# Patient Record
Sex: FEMALE | Race: WHITE | Employment: OTHER | URBAN - NONMETROPOLITAN AREA
[De-identification: names, ages, dates, MRNs, and addresses within clinical notes are randomized per-mention and may not be internally consistent; named-entity substitution may affect disease eponyms.]

---

## 2017-07-31 LAB
BUN BLDV-MCNC: NORMAL MG/DL
CALCIUM SERPL-MCNC: NORMAL MG/DL
CHLORIDE BLD-SCNC: NORMAL MMOL/L
CO2: NORMAL MMOL/L
CREAT SERPL-MCNC: NORMAL MG/DL
GFR CALCULATED: NORMAL
GLUCOSE BLD-MCNC: NORMAL MG/DL
POTASSIUM SERPL-SCNC: NORMAL MMOL/L
SODIUM BLD-SCNC: NORMAL MMOL/L

## 2017-08-21 ENCOUNTER — HOSPITAL ENCOUNTER (OUTPATIENT)
Age: 82
Discharge: HOME | End: 2017-08-21
Payer: MEDICARE

## 2017-08-21 ENCOUNTER — OFFICE VISIT (OUTPATIENT)
Dept: FAMILY MEDICINE CLINIC | Age: 82
End: 2017-08-21
Payer: MEDICARE

## 2017-08-21 VITALS
HEIGHT: 60 IN | BODY MASS INDEX: 26.7 KG/M2 | TEMPERATURE: 98.1 F | WEIGHT: 136 LBS | SYSTOLIC BLOOD PRESSURE: 120 MMHG | RESPIRATION RATE: 16 BRPM | DIASTOLIC BLOOD PRESSURE: 74 MMHG | HEART RATE: 72 BPM

## 2017-08-21 DIAGNOSIS — Z01.818 PREOP EXAMINATION: ICD-10-CM

## 2017-08-21 DIAGNOSIS — M17.12 PRIMARY OSTEOARTHRITIS OF LEFT KNEE: Primary | ICD-10-CM

## 2017-08-21 LAB
EKG ATRIAL RATE: 81 BPM
EKG P AXIS: 55 DEGREES
EKG P-R INTERVAL: 134 MS
EKG Q-T INTERVAL: 398 MS
EKG QRS DURATION: 84 MS
EKG QTC CALCULATION (BAZETT): 462 MS
EKG R AXIS: -28 DEGREES
EKG T AXIS: -15 DEGREES
EKG VENTRICULAR RATE: 81 BPM

## 2017-08-21 PROCEDURE — 1036F TOBACCO NON-USER: CPT | Performed by: FAMILY MEDICINE

## 2017-08-21 PROCEDURE — 93005 ELECTROCARDIOGRAM TRACING: CPT

## 2017-08-21 PROCEDURE — 4040F PNEUMOC VAC/ADMIN/RCVD: CPT | Performed by: FAMILY MEDICINE

## 2017-08-21 PROCEDURE — 1123F ACP DISCUSS/DSCN MKR DOCD: CPT | Performed by: FAMILY MEDICINE

## 2017-08-21 PROCEDURE — G8399 PT W/DXA RESULTS DOCUMENT: HCPCS | Performed by: FAMILY MEDICINE

## 2017-08-21 PROCEDURE — 99213 OFFICE O/P EST LOW 20 MIN: CPT | Performed by: FAMILY MEDICINE

## 2017-08-21 PROCEDURE — 1090F PRES/ABSN URINE INCON ASSESS: CPT | Performed by: FAMILY MEDICINE

## 2017-08-21 PROCEDURE — G8427 DOCREV CUR MEDS BY ELIG CLIN: HCPCS | Performed by: FAMILY MEDICINE

## 2017-08-21 PROCEDURE — G8419 CALC BMI OUT NRM PARAM NOF/U: HCPCS | Performed by: FAMILY MEDICINE

## 2017-08-21 RX ORDER — ACETAMINOPHEN,DIPHENHYDRAMINE HCL 500; 25 MG/1; MG/1
1 TABLET, FILM COATED ORAL NIGHTLY PRN
Status: ON HOLD | COMMUNITY
End: 2017-10-12 | Stop reason: HOSPADM

## 2017-08-21 RX ORDER — IBUPROFEN 200 MG
200 TABLET ORAL 2 TIMES DAILY PRN
COMMUNITY
End: 2017-08-29

## 2017-08-21 ASSESSMENT — ENCOUNTER SYMPTOMS
COUGH: 0
RHINORRHEA: 0
DIARRHEA: 0
SORE THROAT: 0
EYES NEGATIVE: 1
BACK PAIN: 0
NAUSEA: 0
SHORTNESS OF BREATH: 0
ABDOMINAL PAIN: 0
VOMITING: 0
CHEST TIGHTNESS: 0

## 2017-08-23 ENCOUNTER — TELEPHONE (OUTPATIENT)
Dept: FAMILY MEDICINE CLINIC | Age: 82
End: 2017-08-23

## 2017-08-23 ENCOUNTER — TELEPHONE (OUTPATIENT)
Dept: CARDIOLOGY CLINIC | Age: 82
End: 2017-08-23

## 2017-08-29 ENCOUNTER — OFFICE VISIT (OUTPATIENT)
Dept: CARDIOLOGY CLINIC | Age: 82
End: 2017-08-29
Payer: MEDICARE

## 2017-08-29 VITALS — DIASTOLIC BLOOD PRESSURE: 77 MMHG | HEART RATE: 92 BPM | SYSTOLIC BLOOD PRESSURE: 131 MMHG

## 2017-08-29 DIAGNOSIS — I35.1 NONRHEUMATIC AORTIC VALVE INSUFFICIENCY: ICD-10-CM

## 2017-08-29 DIAGNOSIS — I49.3 PVC'S (PREMATURE VENTRICULAR CONTRACTIONS): ICD-10-CM

## 2017-08-29 DIAGNOSIS — Z01.818 PRE-OP EVALUATION: ICD-10-CM

## 2017-08-29 PROCEDURE — 1036F TOBACCO NON-USER: CPT | Performed by: INTERNAL MEDICINE

## 2017-08-29 PROCEDURE — 1090F PRES/ABSN URINE INCON ASSESS: CPT | Performed by: INTERNAL MEDICINE

## 2017-08-29 PROCEDURE — G8399 PT W/DXA RESULTS DOCUMENT: HCPCS | Performed by: INTERNAL MEDICINE

## 2017-08-29 PROCEDURE — 1123F ACP DISCUSS/DSCN MKR DOCD: CPT | Performed by: INTERNAL MEDICINE

## 2017-08-29 PROCEDURE — G8427 DOCREV CUR MEDS BY ELIG CLIN: HCPCS | Performed by: INTERNAL MEDICINE

## 2017-08-29 PROCEDURE — 99204 OFFICE O/P NEW MOD 45 MIN: CPT | Performed by: INTERNAL MEDICINE

## 2017-08-29 PROCEDURE — G8419 CALC BMI OUT NRM PARAM NOF/U: HCPCS | Performed by: INTERNAL MEDICINE

## 2017-08-29 PROCEDURE — 4040F PNEUMOC VAC/ADMIN/RCVD: CPT | Performed by: INTERNAL MEDICINE

## 2017-08-29 RX ORDER — IBUPROFEN 200 MG
200 TABLET ORAL EVERY 6 HOURS PRN
Status: ON HOLD | COMMUNITY
End: 2017-10-10 | Stop reason: HOSPADM

## 2017-08-29 ASSESSMENT — ENCOUNTER SYMPTOMS
GASTROINTESTINAL NEGATIVE: 1
RESPIRATORY NEGATIVE: 1
EYES NEGATIVE: 1

## 2017-08-30 LAB
ANION GAP SERPL CALCULATED.3IONS-SCNC: 14 MMOL/L
BUN BLDV-MCNC: 24 MG/DL (ref 9–24)
CALCIUM SERPL-MCNC: 9.2 MG/DL (ref 8.7–10.8)
CHLORIDE BLD-SCNC: 107 MMOL/L (ref 95–111)
CO2: 25 MMOL/L (ref 21–32)
CREAT SERPL-MCNC: 0.8 MG/DL (ref 0.5–1.3)
EGFR AFRICAN AMERICAN: 83
EGFR IF NONAFRICAN AMERICAN: 69
GLUCOSE: 133 MG/DL (ref 70–100)
MAGNESIUM: 2.5 MG/DL (ref 1.7–2.4)
POTASSIUM SERPL-SCNC: 4 MMOL/L (ref 3.5–5.4)
SODIUM BLD-SCNC: 142 MMOL/L (ref 134–147)

## 2017-10-05 ENCOUNTER — APPOINTMENT (OUTPATIENT)
Dept: CT IMAGING | Age: 82
DRG: 286 | End: 2017-10-05
Payer: MEDICARE

## 2017-10-05 ENCOUNTER — HOSPITAL ENCOUNTER (INPATIENT)
Age: 82
LOS: 7 days | Discharge: HOME | DRG: 286 | End: 2017-10-12
Attending: EMERGENCY MEDICINE | Admitting: HOSPITALIST
Payer: MEDICARE

## 2017-10-05 DIAGNOSIS — I48.91 ATRIAL FIBRILLATION WITH RVR (HCC): Primary | ICD-10-CM

## 2017-10-05 DIAGNOSIS — I50.21 ACUTE SYSTOLIC CHF (CONGESTIVE HEART FAILURE) (HCC): ICD-10-CM

## 2017-10-05 PROBLEM — R07.9 CHEST PAIN: Status: ACTIVE | Noted: 2017-10-05

## 2017-10-05 PROBLEM — R06.02 SOB (SHORTNESS OF BREATH): Status: ACTIVE | Noted: 2017-10-05

## 2017-10-05 LAB
ANION GAP SERPL CALCULATED.3IONS-SCNC: 19 MEQ/L (ref 8–16)
ANISOCYTOSIS: ABNORMAL
APTT: 23.9 SECONDS (ref 22–38)
BACTERIA: ABNORMAL /HPF
BASOPHILS # BLD: 0.6 %
BASOPHILS ABSOLUTE: 0.1 THOU/MM3 (ref 0–0.1)
BILIRUBIN URINE: NEGATIVE
BLOOD, URINE: NEGATIVE
BUN BLDV-MCNC: 22 MG/DL (ref 7–22)
CALCIUM SERPL-MCNC: 9.1 MG/DL (ref 8.5–10.5)
CASTS 2: ABNORMAL /LPF
CASTS UA: ABNORMAL /LPF
CHARACTER, URINE: CLEAR
CHLORIDE BLD-SCNC: 106 MEQ/L (ref 98–111)
CO2: 17 MEQ/L (ref 23–33)
COLOR: YELLOW
CREAT SERPL-MCNC: 0.8 MG/DL (ref 0.4–1.2)
CRYSTALS, UA: ABNORMAL
EKG ATRIAL RATE: 105 BPM
EKG Q-T INTERVAL: 290 MS
EKG QRS DURATION: 82 MS
EKG QTC CALCULATION (BAZETT): 477 MS
EKG R AXIS: -46 DEGREES
EKG T AXIS: -134 DEGREES
EKG VENTRICULAR RATE: 163 BPM
EOSINOPHIL # BLD: 0.4 %
EOSINOPHILS ABSOLUTE: 0 THOU/MM3 (ref 0–0.4)
EPITHELIAL CELLS, UA: ABNORMAL /HPF
GFR SERPL CREATININE-BSD FRML MDRD: 68 ML/MIN/1.73M2
GLUCOSE BLD-MCNC: 222 MG/DL (ref 70–108)
GLUCOSE URINE: NEGATIVE MG/DL
HCT VFR BLD CALC: 36.5 % (ref 37–47)
HEMOGLOBIN: 11.8 GM/DL (ref 12–16)
INR BLD: 1.15 (ref 0.85–1.13)
KETONES, URINE: NEGATIVE
LACTIC ACID: 3 MMOL/L (ref 0.5–2.2)
LACTIC ACID: 3.8 MMOL/L (ref 0.5–2.2)
LEUKOCYTE ESTERASE, URINE: ABNORMAL
LV EF: 30 %
LVEF MODALITY: NORMAL
LYMPHOCYTES # BLD: 13.3 %
LYMPHOCYTES ABSOLUTE: 1.2 THOU/MM3 (ref 1–4.8)
MAGNESIUM: 2.5 MG/DL (ref 1.6–2.4)
MCH RBC QN AUTO: 29 PG (ref 27–31)
MCHC RBC AUTO-ENTMCNC: 32.3 GM/DL (ref 33–37)
MCV RBC AUTO: 89.7 FL (ref 81–99)
MISCELLANEOUS 2: ABNORMAL
MONOCYTES # BLD: 6.9 %
MONOCYTES ABSOLUTE: 0.6 THOU/MM3 (ref 0.4–1.3)
NITRITE, URINE: NEGATIVE
NUCLEATED RED BLOOD CELLS: 0 /100 WBC
OSMOLALITY CALCULATION: 293.3 MOSMOL/KG (ref 275–300)
PDW BLD-RTO: 14.9 % (ref 11.5–14.5)
PH UA: 5.5
PLATELET # BLD: 351 THOU/MM3 (ref 130–400)
PMV BLD AUTO: 9.5 MCM (ref 7.4–10.4)
POTASSIUM SERPL-SCNC: 4.2 MEQ/L (ref 3.5–5.2)
PRO-BNP: 6159 PG/ML (ref 0–1800)
PROCALCITONIN: 0.05 NG/ML (ref 0.01–0.09)
PROTEIN UA: ABNORMAL
RBC # BLD: 4.06 MILL/MM3 (ref 4.2–5.4)
RBC # BLD: NORMAL 10*6/UL
RBC URINE: ABNORMAL /HPF
RENAL EPITHELIAL, UA: ABNORMAL
SEG NEUTROPHILS: 78.8 %
SEGMENTED NEUTROPHILS ABSOLUTE COUNT: 7.2 THOU/MM3 (ref 1.8–7.7)
SODIUM BLD-SCNC: 142 MEQ/L (ref 135–145)
SPECIFIC GRAVITY, URINE: > 1.03 (ref 1–1.03)
TROPONIN T: < 0.01 NG/ML
UROBILINOGEN, URINE: 0.2 EU/DL
WBC # BLD: 9.2 THOU/MM3 (ref 4.8–10.8)
WBC UA: ABNORMAL /HPF
YEAST: ABNORMAL

## 2017-10-05 PROCEDURE — 83735 ASSAY OF MAGNESIUM: CPT

## 2017-10-05 PROCEDURE — 36415 COLL VENOUS BLD VENIPUNCTURE: CPT

## 2017-10-05 PROCEDURE — B3201ZZ COMPUTERIZED TOMOGRAPHY (CT SCAN) OF THORACIC AORTA USING LOW OSMOLAR CONTRAST: ICD-10-PCS | Performed by: RADIOLOGY

## 2017-10-05 PROCEDURE — 6360000002 HC RX W HCPCS: Performed by: NUCLEAR MEDICINE

## 2017-10-05 PROCEDURE — 87086 URINE CULTURE/COLONY COUNT: CPT

## 2017-10-05 PROCEDURE — 2500000003 HC RX 250 WO HCPCS: Performed by: PHYSICIAN ASSISTANT

## 2017-10-05 PROCEDURE — 84484 ASSAY OF TROPONIN QUANT: CPT

## 2017-10-05 PROCEDURE — B32S1ZZ COMPUTERIZED TOMOGRAPHY (CT SCAN) OF RIGHT PULMONARY ARTERY USING LOW OSMOLAR CONTRAST: ICD-10-PCS | Performed by: RADIOLOGY

## 2017-10-05 PROCEDURE — 87040 BLOOD CULTURE FOR BACTERIA: CPT

## 2017-10-05 PROCEDURE — 71275 CT ANGIOGRAPHY CHEST: CPT

## 2017-10-05 PROCEDURE — 81001 URINALYSIS AUTO W/SCOPE: CPT

## 2017-10-05 PROCEDURE — 96374 THER/PROPH/DIAG INJ IV PUSH: CPT

## 2017-10-05 PROCEDURE — 6360000002 HC RX W HCPCS

## 2017-10-05 PROCEDURE — 84145 PROCALCITONIN (PCT): CPT

## 2017-10-05 PROCEDURE — 83880 ASSAY OF NATRIURETIC PEPTIDE: CPT

## 2017-10-05 PROCEDURE — 80048 BASIC METABOLIC PNL TOTAL CA: CPT

## 2017-10-05 PROCEDURE — 93306 TTE W/DOPPLER COMPLETE: CPT

## 2017-10-05 PROCEDURE — 85610 PROTHROMBIN TIME: CPT

## 2017-10-05 PROCEDURE — 85730 THROMBOPLASTIN TIME PARTIAL: CPT

## 2017-10-05 PROCEDURE — 6370000000 HC RX 637 (ALT 250 FOR IP): Performed by: NUCLEAR MEDICINE

## 2017-10-05 PROCEDURE — B32T1ZZ COMPUTERIZED TOMOGRAPHY (CT SCAN) OF LEFT PULMONARY ARTERY USING LOW OSMOLAR CONTRAST: ICD-10-PCS | Performed by: RADIOLOGY

## 2017-10-05 PROCEDURE — 1200000003 HC TELEMETRY R&B

## 2017-10-05 PROCEDURE — 99222 1ST HOSP IP/OBS MODERATE 55: CPT | Performed by: HOSPITALIST

## 2017-10-05 PROCEDURE — 85025 COMPLETE CBC W/AUTO DIFF WBC: CPT

## 2017-10-05 PROCEDURE — 2580000003 HC RX 258: Performed by: PHYSICIAN ASSISTANT

## 2017-10-05 PROCEDURE — 99223 1ST HOSP IP/OBS HIGH 75: CPT | Performed by: NUCLEAR MEDICINE

## 2017-10-05 PROCEDURE — 99285 EMERGENCY DEPT VISIT HI MDM: CPT

## 2017-10-05 PROCEDURE — 2580000003 HC RX 258: Performed by: HOSPITALIST

## 2017-10-05 PROCEDURE — 6360000002 HC RX W HCPCS: Performed by: HOSPITALIST

## 2017-10-05 PROCEDURE — 83605 ASSAY OF LACTIC ACID: CPT

## 2017-10-05 PROCEDURE — 93005 ELECTROCARDIOGRAM TRACING: CPT | Performed by: PHYSICIAN ASSISTANT

## 2017-10-05 PROCEDURE — 6360000004 HC RX CONTRAST MEDICATION: Performed by: EMERGENCY MEDICINE

## 2017-10-05 RX ORDER — TRAMADOL HYDROCHLORIDE 50 MG/1
50 TABLET ORAL EVERY 6 HOURS PRN
Status: ON HOLD | COMMUNITY
End: 2017-10-12 | Stop reason: HOSPADM

## 2017-10-05 RX ORDER — DILTIAZEM HYDROCHLORIDE 5 MG/ML
15 INJECTION INTRAVENOUS ONCE
Status: COMPLETED | OUTPATIENT
Start: 2017-10-05 | End: 2017-10-05

## 2017-10-05 RX ORDER — POTASSIUM CHLORIDE 20 MEQ/1
20 TABLET, EXTENDED RELEASE ORAL ONCE
Status: COMPLETED | OUTPATIENT
Start: 2017-10-05 | End: 2017-10-05

## 2017-10-05 RX ORDER — HYDROCODONE BITARTRATE AND ACETAMINOPHEN 5; 325 MG/1; MG/1
1 TABLET ORAL EVERY 6 HOURS PRN
Status: ON HOLD | COMMUNITY
End: 2017-10-12 | Stop reason: HOSPADM

## 2017-10-05 RX ORDER — ONDANSETRON 2 MG/ML
4 INJECTION INTRAMUSCULAR; INTRAVENOUS EVERY 6 HOURS PRN
Status: DISCONTINUED | OUTPATIENT
Start: 2017-10-05 | End: 2017-10-07 | Stop reason: SDUPTHER

## 2017-10-05 RX ORDER — ONDANSETRON 2 MG/ML
INJECTION INTRAMUSCULAR; INTRAVENOUS
Status: COMPLETED
Start: 2017-10-05 | End: 2017-10-05

## 2017-10-05 RX ORDER — SODIUM CHLORIDE 0.9 % (FLUSH) 0.9 %
10 SYRINGE (ML) INJECTION EVERY 12 HOURS SCHEDULED
Status: DISCONTINUED | OUTPATIENT
Start: 2017-10-05 | End: 2017-10-07 | Stop reason: SDUPTHER

## 2017-10-05 RX ORDER — ACETAMINOPHEN 325 MG/1
650 TABLET ORAL EVERY 4 HOURS PRN
Status: DISCONTINUED | OUTPATIENT
Start: 2017-10-05 | End: 2017-10-07 | Stop reason: SDUPTHER

## 2017-10-05 RX ORDER — SODIUM CHLORIDE 0.9 % (FLUSH) 0.9 %
10 SYRINGE (ML) INJECTION PRN
Status: DISCONTINUED | OUTPATIENT
Start: 2017-10-05 | End: 2017-10-07 | Stop reason: SDUPTHER

## 2017-10-05 RX ORDER — SODIUM CHLORIDE 9 MG/ML
INJECTION, SOLUTION INTRAVENOUS CONTINUOUS
Status: DISCONTINUED | OUTPATIENT
Start: 2017-10-05 | End: 2017-10-07 | Stop reason: SDUPTHER

## 2017-10-05 RX ORDER — ASPIRIN 81 MG/1
81 TABLET, CHEWABLE ORAL DAILY
Status: DISCONTINUED | OUTPATIENT
Start: 2017-10-05 | End: 2017-10-12 | Stop reason: HOSPADM

## 2017-10-05 RX ORDER — FUROSEMIDE 40 MG/1
40 TABLET ORAL DAILY
Status: DISCONTINUED | OUTPATIENT
Start: 2017-10-06 | End: 2017-10-10

## 2017-10-05 RX ORDER — FUROSEMIDE 10 MG/ML
40 INJECTION INTRAMUSCULAR; INTRAVENOUS ONCE
Status: COMPLETED | OUTPATIENT
Start: 2017-10-05 | End: 2017-10-05

## 2017-10-05 RX ADMIN — FUROSEMIDE 40 MG: 10 INJECTION, SOLUTION INTRAMUSCULAR; INTRAVENOUS at 15:14

## 2017-10-05 RX ADMIN — SODIUM CHLORIDE: 9 INJECTION, SOLUTION INTRAVENOUS at 08:09

## 2017-10-05 RX ADMIN — ENOXAPARIN SODIUM 60 MG: 60 INJECTION SUBCUTANEOUS at 20:00

## 2017-10-05 RX ADMIN — ONDANSETRON 4 MG: 2 INJECTION INTRAMUSCULAR; INTRAVENOUS at 08:09

## 2017-10-05 RX ADMIN — Medication 10 ML: at 20:01

## 2017-10-05 RX ADMIN — CEFTRIAXONE 1 G: 1 INJECTION, POWDER, FOR SOLUTION INTRAMUSCULAR; INTRAVENOUS at 20:00

## 2017-10-05 RX ADMIN — ENOXAPARIN SODIUM 40 MG: 40 INJECTION SUBCUTANEOUS at 12:21

## 2017-10-05 RX ADMIN — DILTIAZEM HYDROCHLORIDE 5 MG/HR: 5 INJECTION INTRAVENOUS at 09:31

## 2017-10-05 RX ADMIN — DILTIAZEM HYDROCHLORIDE 15 MG: 5 INJECTION INTRAVENOUS at 08:41

## 2017-10-05 RX ADMIN — METOPROLOL TARTRATE 25 MG: 25 TABLET ORAL at 20:00

## 2017-10-05 RX ADMIN — DILTIAZEM HYDROCHLORIDE 15 MG/HR: 5 INJECTION INTRAVENOUS at 17:21

## 2017-10-05 RX ADMIN — SODIUM CHLORIDE: 9 INJECTION, SOLUTION INTRAVENOUS at 11:04

## 2017-10-05 RX ADMIN — IOPAMIDOL 80 ML: 755 INJECTION, SOLUTION INTRAVENOUS at 08:56

## 2017-10-05 RX ADMIN — POTASSIUM CHLORIDE 20 MEQ: 1500 TABLET, EXTENDED RELEASE ORAL at 16:18

## 2017-10-05 ASSESSMENT — ENCOUNTER SYMPTOMS
VOMITING: 0
SORE THROAT: 0
COUGH: 1
EYE PAIN: 0
EYE DISCHARGE: 0
WHEEZING: 1
RHINORRHEA: 0
CONSTIPATION: 1
SHORTNESS OF BREATH: 1
NAUSEA: 1
BACK PAIN: 0
DIARRHEA: 0
ABDOMINAL PAIN: 0

## 2017-10-05 ASSESSMENT — PAIN DESCRIPTION - LOCATION: LOCATION: RIB CAGE

## 2017-10-05 ASSESSMENT — PAIN SCALES - GENERAL: PAINLEVEL_OUTOF10: 2

## 2017-10-05 ASSESSMENT — PAIN DESCRIPTION - PAIN TYPE: TYPE: ACUTE PAIN

## 2017-10-05 NOTE — ED PROVIDER NOTES
[facility]  eMERGENCY dEPARTMENT eNCOUnter          [address] [facility]       Chief Complaint   Patient presents with    Shortness of Breath    Nausea       Nurses Notes reviewed and I agree except as noted in the HPI. HISTORY OF PRESENT ILLNESS    Aquiles Funez is a 80 y.o. female who presents to the Emergency Department for the evaluation of shortness of breath. The patient's symptoms started several days ago, worse today and accompanied with a stabbing chest pain under the left breast that lasted a few seconds. She has had cough, wheezing, and orthopnea since the onset of the shortness of breath. Additionally, the patient has had nausea and decreased appetite since a left TKR that she had about a month ago that was performed by Dr. Thanh Rangel, for which she has been on Ultram and Norco. The patient states that she did stop taking the pain medications a few days ago because of constipation and the shortness of breath. She is on 81 mg aspirin daily, does not take any anticoagulant medications. No prior history of DVT/PE. The patient denies recent illness. She denies a history of atrial fibrillation. The patient follows up with cardiologist, Dr. Rosa Christie. Location/Symptom: Shortness of breath  Timing/Onset: Several days ago  Context/Setting: Symptoms started several days ago per patient. Accompanied by an episode of stabbing chest pain this morning that lasted a few seconds. Recent TKR on the left lower extremity. No history of PE/DVT. Discontinued pain medications. No anticoagulants. Quality: Stabbing, chest pain  Duration: Intermittent, episode lasted a few seconds  Severity: Moderate    The HPI was provided by the patient. REVIEW OF SYSTEMS     Review of Systems   Constitutional: Positive for appetite change. Negative for chills, fatigue and fever. HENT: Negative for congestion, ear pain, rhinorrhea and sore throat. Eyes: Negative for pain, discharge and visual disturbance. Respiratory: Positive for cough, shortness of breath and wheezing. Orthopnea   Cardiovascular: Positive for chest pain. Negative for palpitations and leg swelling. Gastrointestinal: Positive for constipation and nausea. Negative for abdominal pain, diarrhea and vomiting. Genitourinary: Negative for difficulty urinating, dysuria and vaginal discharge. Musculoskeletal: Negative for arthralgias, back pain, joint swelling and neck pain. Skin: Negative for pallor and rash. Neurological: Negative for dizziness, syncope, weakness, light-headedness and headaches. Hematological: Negative for adenopathy. Psychiatric/Behavioral: Negative for confusion, dysphoric mood and suicidal ideas. The patient is not nervous/anxious. PAST MEDICAL HISTORY    has a past medical history of Allergic rhinitis; Arthritis; GERD (gastroesophageal reflux disease); Nonrheumatic aortic valve insufficiency; Osteoporosis; Pre-op evaluation: prior to left knee repalcement; PVC's (premature ventricular contractions); and Torn meniscus. SURGICAL HISTORY      has a past surgical history that includes Rotator cuff repair (3/8/06); Appendectomy (1946); back surgery (3/24/14); and eye surgery.     CURRENT MEDICATIONS       Current Discharge Medication List      CONTINUE these medications which have NOT CHANGED    Details   HYDROcodone-acetaminophen (NORCO) 5-325 MG per tablet Take 1 tablet by mouth every 6 hours as needed for Pain .      traMADol (ULTRAM) 50 MG tablet Take 50 mg by mouth every 6 hours as needed for Pain      ibuprofen (ADVIL;MOTRIN) 200 MG tablet Take 200 mg by mouth every 6 hours as needed for Pain      !! NONFORMULARY Vit C 1 tab  daily      diphenhydrAMINE-APAP, sleep, (TYLENOL PM EXTRA STRENGTH)  MG tablet Take 1 tablet by mouth nightly as needed for Sleep      lansoprazole (PREVACID) 30 MG delayed release capsule Take 1 capsule by mouth daily  Qty: 30 capsule, Refills: 11      aspirin EC 81 MG EC

## 2017-10-05 NOTE — ED NOTES
Pt resting quietly on cart, resp easy. Shruti Jaimes PA in to update pt on plan of care. Will monitor.       Claudia Ryan RN  10/05/17 2817

## 2017-10-05 NOTE — CONSULTS
No known drug allergies. MEDICATIONS: None except for p.r.n. _____. SOCIAL HISTORY:  No tobacco.  No drugs. No alcohol. FAMILY HISTORY:  Noncontributory. PHYSICAL EXAMINATION:  VITAL SIGNS:  Blood pressure of 130/80, heart rate of 120 after  Cardizem treatment, respiratory rate 25. GENERAL APPEARANCE:  Pleasant lady, seems to be somehow short of  breath. EYES AND EARS:  No discharge. NECK:  Moderate JVD. LUNGS:  Decreased air entry with bilateral rhonchi. HEART:  Normal S1 and S2. Systolic murmur grade 1/6. ABDOMEN:  Soft and nontender. Positive bowel sounds. No  organomegaly. EXTREMITIES:  Mild edema. NEUROLOGICAL:  Remains grossly intact. Awake and alert. No focal  deficits. PSYCH:  No evidence of active psychosis. SKIN:  No rashes. LABORATORY DATA:  Sodium 142, potassium 4.2, BUN 22, and creatinine   0.8. White count 9.2, hemoglobin 11.8, hematocrit 36.5, platelets  793. BNP 6159. EKG showed AFib, rapid ventricular response. IMPRESSION:  This is a patient who comes in with AFib, RVR with  secondary congestive heart failure which is likely caused by the  atrial fibrillation. So far, the patient has ruled out for myocardial  infarct and for pulmonary embolus. RECOMMENDATIONS:  1.  Rate control. 2.  Add beta-blockers. 3.  Diuresis. 4.  Anticoagulation. 5.  Consider KAMRAN-guided cardioversion which I discussed with the  patient. 6.  Close monitoring. 7.  Further management would follow accordingly. Discussed with the  staff at length as well as the patient. Thank you for allowing me to participate in her care.         Mike Courtney M.D.    D: 10/05/2017 15:05:55       T: 10/05/2017 16:35:18     [email or web address]  Job#: [id number]     Doc#: [id number]

## 2017-10-05 NOTE — H&P
History & Physical    Patient: Yousif Vera  YOB: 1934  Date of Service: 10/5/2017  MRN: [id number]   Acct:  [de-identified]   Primary Care Physician: Liliane Miller MD    Chief Complaint: SOB    History of Present Illness:   History obtained from the patient and her . The patient is a 80 y.o. female who presents with complaints of worsening SOB and fatigue for the last month. Patient is s/p left knee replacement and has had a slow recovery. She is accompanied by her  who states she has been slowly declining and finally came in today because she couldn't catch her breath. She also complains of easy fatigue and nausea. She denies any fever or chills. In the ED she was found to be in rapid atrial fibrillation and was started on cardizem drip. She had a negative CTA chest.  She recently saw cardiology for pre op clearance and was in normal sinus rhythm at that time. Past Medical History:        Diagnosis Date    Allergic rhinitis     Arthritis     GERD (gastroesophageal reflux disease)     Nonrheumatic aortic valve insufficiency 8/29/2017    Osteoporosis     Pre-op evaluation: prior to left knee repalcement 8/29/2017    PVC's (premature ventricular contractions) 8/29/2017    Torn meniscus 12/2016       Past Surgical History:        Procedure Laterality Date    APPENDECTOMY  1946    BACK SURGERY  3/24/14    Lumbar Laminectomy Fusion L3-5, L4-5 PLHAMIDA - Dr. Ortiz [facility]  3/8/06    right shoulder        Home Medications:   No current facility-administered medications on file prior to encounter.       Current Outpatient Prescriptions on File Prior to Encounter   Medication Sig Dispense Refill    ibuprofen (ADVIL;MOTRIN) 200 MG tablet Take 200 mg by mouth every 6 hours as needed for Pain      NONFORMULARY Vit C 1 tab  daily      diphenhydrAMINE-APAP, sleep, (TYLENOL PM EXTRA STRENGTH)  MG tablet Take 1 tablet by mouth nightly as needed for Sleep      lansoprazole (PREVACID) 30 MG delayed release capsule Take 1 capsule by mouth daily 30 capsule 11    aspirin EC 81 MG EC tablet Take 1 tablet by mouth daily 30 tablet 0    NONFORMULARY Vit D 1 tab daily         Allergies:  Review of patient's allergies indicates no known allergies. Social History:    reports that she quit smoking about 52 years ago. She has a 2.50 pack-year smoking history. She has never used smokeless tobacco. She reports that she drinks alcohol. She reports that she does not use illicit drugs. Family History:       Problem Relation Age of Onset    Arthritis Mother     Heart Disease Sister     High Blood Pressure Sister     Arthritis Sister     Cancer Brother     Heart Disease Brother     Diabetes Maternal Grandfather        Review of systems:    10 point review of systems completed, all other than noted above are negative. Vitals:   Vitals:    10/05/17 1410   BP:    Pulse: 99   Resp: 24   Temp:    SpO2:       BMI: Body mass index is 26.95 kg/(m^2). Exam:  Physical Examination: General appearance - alert, awake appears to be in no acute distress  HEENT: Atraumatic normocephalic,no JVD, trachea is midline  Neck - supple, no significant adenopathy, no JVD, or carotid bruits  Chest - Bilateral air entry, no wheezes, crackles or rhonchi. Non tender to palpation of chest wall  Heart - Irregular rhythm and rate  Abdomen - Soft, non tender non distended. Normoactive bowel sounds  Neurological - II-XII grossly intact, no neurological deficits  Musculoskeletal - 5/5 power upper and lower extremities equal bilaterally.   Full ROM of all limbs  Extremities - no edema, no cyanosis or clubbing  Skin - normal coloration and turgor, no rashes, no suspicious skin lesions noted      Review of Labs and Diagnostic Testing:  CBC:   Recent Labs      10/05/17   0755   WBC  9.2   HGB  11.8*   PLT  351     BMP:    Recent Labs      10/05/17   0755   NA  142   K lymphadenopathy. The tracheobronchial tree is patent. Lung windows reveal small to moderate bilateral pleural effusions, right slightly larger than left. There is accentuation of the vascular markings. Lung windows are limited secondary to breathing motion. Limited images through the upper abdomen reveal a small hiatal hernia. There is a 12 mm liver cyst or hemangioma at the liver dome laterally. There is a mildly prominent thoracic kyphosis and there are degenerative throughout the lower cervical and thoracic spine. 1. No evidence of pulmonary embolism. 2. Small to moderate bilateral pleural effusions with accentuation of the vascular markings. **This report has been created using voice recognition software. It may contain minor errors which are inherent in voice recognition technology. ** Final report electronically signed by Dr. Raheem Barnes on 10/5/2017 9:21 AM         EKG: rapid atrial fibrillation    Assessment:    Active Problems:    Rapid atrial fibrillation (HCC)    SOB (shortness of breath)    Chest pain      Plan:  1. Rapid atrial fibrillation new onset:  cardizem drip. Cardiology consulted -- she follows with Dr Kari Clemons. Echo. ASA. 2. New onset CHF: echo ordered, lasix and monitor  3. HTN: bp stable   4.  VTE prophyalxis: Lovenox      DVT prophylaxis: [x] Lovenox                                 [] SCDs                                 [] SQ Heparin                                 [] Encourage ambulation, low risk for DVT, no chemical or mechanical prophylaxis necessary              [] Already on Anticoagulation        Anticipated Disposition upon discharge: [x] Home                                                                         [] Home with Home Health                                                                         [] [facility]                                                                         [] [address]          Electronically signed by Florian Almonte MD on 10/5/2017 at 2:31 PM

## 2017-10-06 ENCOUNTER — PREP FOR PROCEDURE (OUTPATIENT)
Dept: CARDIOLOGY CLINIC | Age: 82
End: 2017-10-06

## 2017-10-06 ENCOUNTER — APPOINTMENT (OUTPATIENT)
Dept: GENERAL RADIOLOGY | Age: 82
DRG: 286 | End: 2017-10-06
Payer: MEDICARE

## 2017-10-06 LAB
ALLEN TEST: POSITIVE
ANION GAP SERPL CALCULATED.3IONS-SCNC: 16 MEQ/L (ref 8–16)
AVERAGE GLUCOSE: 93 MG/DL (ref 70–126)
BASE EXCESS (CALCULATED): -4.4 MMOL/L (ref -2.5–2.5)
BUN BLDV-MCNC: 25 MG/DL (ref 7–22)
CALCIUM SERPL-MCNC: 8.1 MG/DL (ref 8.5–10.5)
CHLORIDE BLD-SCNC: 104 MEQ/L (ref 98–111)
CO2: 16 MEQ/L (ref 23–33)
COLLECTED BY:: ABNORMAL
CREAT SERPL-MCNC: 1 MG/DL (ref 0.4–1.2)
DEVICE: ABNORMAL
FOLATE: 16.9 NG/ML (ref 4.8–24.2)
GFR SERPL CREATININE-BSD FRML MDRD: 53 ML/MIN/1.73M2
GLUCOSE BLD-MCNC: 154 MG/DL (ref 70–108)
HBA1C MFR BLD: 5.1 % (ref 4.4–6.4)
HCO3: 19 MMOL/L (ref 23–28)
IFIO2: 1
MAGNESIUM: 2.2 MG/DL (ref 1.6–2.4)
O2 SATURATION: 96 %
ORGANISM: ABNORMAL
PCO2: 29 MMHG (ref 35–45)
PH BLOOD GAS: 7.43 (ref 7.35–7.45)
PHOSPHORUS: 4.5 MG/DL (ref 2.4–4.7)
PO2: 77 MMHG (ref 71–104)
POTASSIUM SERPL-SCNC: 4.6 MEQ/L (ref 3.5–5.2)
PRO-BNP: 5651 PG/ML (ref 0–1800)
SODIUM BLD-SCNC: 136 MEQ/L (ref 135–145)
SOURCE, BLOOD GAS: ABNORMAL
TROPONIN T: < 0.01 NG/ML
TSH SERPL DL<=0.05 MIU/L-ACNC: 2.47 UIU/ML (ref 0.4–4.2)
URINE CULTURE REFLEX: ABNORMAL
VITAMIN B-12: 338 PG/ML (ref 211–911)

## 2017-10-06 PROCEDURE — 2580000003 HC RX 258: Performed by: PHYSICIAN ASSISTANT

## 2017-10-06 PROCEDURE — 71010 XR CHEST PORTABLE: CPT

## 2017-10-06 PROCEDURE — 2700000000 HC OXYGEN THERAPY PER DAY

## 2017-10-06 PROCEDURE — 82803 BLOOD GASES ANY COMBINATION: CPT

## 2017-10-06 PROCEDURE — 83036 HEMOGLOBIN GLYCOSYLATED A1C: CPT

## 2017-10-06 PROCEDURE — A6257 TRANSPARENT FILM <= 16 SQ IN: HCPCS

## 2017-10-06 PROCEDURE — 36415 COLL VENOUS BLD VENIPUNCTURE: CPT

## 2017-10-06 PROCEDURE — 1200000003 HC TELEMETRY R&B

## 2017-10-06 PROCEDURE — 6360000002 HC RX W HCPCS: Performed by: INTERNAL MEDICINE

## 2017-10-06 PROCEDURE — 80048 BASIC METABOLIC PNL TOTAL CA: CPT

## 2017-10-06 PROCEDURE — 36600 WITHDRAWAL OF ARTERIAL BLOOD: CPT

## 2017-10-06 PROCEDURE — 6360000002 HC RX W HCPCS: Performed by: HOSPITALIST

## 2017-10-06 PROCEDURE — 2500000003 HC RX 250 WO HCPCS: Performed by: PHYSICIAN ASSISTANT

## 2017-10-06 PROCEDURE — 6360000002 HC RX W HCPCS: Performed by: NUCLEAR MEDICINE

## 2017-10-06 PROCEDURE — 2500000003 HC RX 250 WO HCPCS: Performed by: INTERNAL MEDICINE

## 2017-10-06 PROCEDURE — 84443 ASSAY THYROID STIM HORMONE: CPT

## 2017-10-06 PROCEDURE — 82607 VITAMIN B-12: CPT

## 2017-10-06 PROCEDURE — 6370000000 HC RX 637 (ALT 250 FOR IP): Performed by: PHYSICIAN ASSISTANT

## 2017-10-06 PROCEDURE — 2580000003 HC RX 258: Performed by: HOSPITALIST

## 2017-10-06 PROCEDURE — 51702 INSERT TEMP BLADDER CATH: CPT

## 2017-10-06 PROCEDURE — 84100 ASSAY OF PHOSPHORUS: CPT

## 2017-10-06 PROCEDURE — 99232 SBSQ HOSP IP/OBS MODERATE 35: CPT | Performed by: PHYSICIAN ASSISTANT

## 2017-10-06 PROCEDURE — 84484 ASSAY OF TROPONIN QUANT: CPT

## 2017-10-06 PROCEDURE — 2580000003 HC RX 258: Performed by: INTERNAL MEDICINE

## 2017-10-06 PROCEDURE — 83880 ASSAY OF NATRIURETIC PEPTIDE: CPT

## 2017-10-06 PROCEDURE — 93005 ELECTROCARDIOGRAM TRACING: CPT | Performed by: NUCLEAR MEDICINE

## 2017-10-06 PROCEDURE — 83735 ASSAY OF MAGNESIUM: CPT

## 2017-10-06 PROCEDURE — 82746 ASSAY OF FOLIC ACID SERUM: CPT

## 2017-10-06 PROCEDURE — 6370000000 HC RX 637 (ALT 250 FOR IP): Performed by: HOSPITALIST

## 2017-10-06 RX ORDER — ALPRAZOLAM 0.25 MG/1
0.25 TABLET ORAL ONCE
Status: DISCONTINUED | OUTPATIENT
Start: 2017-10-06 | End: 2017-10-12 | Stop reason: HOSPADM

## 2017-10-06 RX ORDER — FUROSEMIDE 10 MG/ML
40 INJECTION INTRAMUSCULAR; INTRAVENOUS ONCE
Status: COMPLETED | OUTPATIENT
Start: 2017-10-06 | End: 2017-10-06

## 2017-10-06 RX ORDER — BUMETANIDE 0.25 MG/ML
0.5 INJECTION, SOLUTION INTRAMUSCULAR; INTRAVENOUS ONCE
Status: DISCONTINUED | OUTPATIENT
Start: 2017-10-06 | End: 2017-10-06

## 2017-10-06 RX ADMIN — ENOXAPARIN SODIUM 60 MG: 60 INJECTION SUBCUTANEOUS at 20:14

## 2017-10-06 RX ADMIN — METOPROLOL TARTRATE 25 MG: 25 TABLET ORAL at 20:14

## 2017-10-06 RX ADMIN — FUROSEMIDE 40 MG: 10 INJECTION, SOLUTION INTRAMUSCULAR; INTRAVENOUS at 02:32

## 2017-10-06 RX ADMIN — CEFTRIAXONE 1 G: 1 INJECTION, POWDER, FOR SOLUTION INTRAMUSCULAR; INTRAVENOUS at 21:40

## 2017-10-06 RX ADMIN — DILTIAZEM HYDROCHLORIDE 5 MG/HR: 5 INJECTION INTRAVENOUS at 20:21

## 2017-10-06 RX ADMIN — ENOXAPARIN SODIUM 60 MG: 60 INJECTION SUBCUTANEOUS at 10:21

## 2017-10-06 RX ADMIN — SODIUM CHLORIDE: 9 INJECTION, SOLUTION INTRAVENOUS at 13:37

## 2017-10-06 RX ADMIN — DILTIAZEM HYDROCHLORIDE 15 MG/HR: 5 INJECTION INTRAVENOUS at 10:21

## 2017-10-06 RX ADMIN — BUMETANIDE 0.5 MG/HR: 0.25 INJECTION, SOLUTION INTRAMUSCULAR; INTRAVENOUS at 07:36

## 2017-10-06 RX ADMIN — DILTIAZEM HYDROCHLORIDE 15 MG/HR: 5 INJECTION INTRAVENOUS at 01:41

## 2017-10-06 RX ADMIN — ASPIRIN 81 MG: 81 TABLET, CHEWABLE ORAL at 10:21

## 2017-10-06 ASSESSMENT — ENCOUNTER SYMPTOMS
SHORTNESS OF BREATH: 1
COUGH: 0
VOMITING: 0
DIARRHEA: 0
NAUSEA: 0

## 2017-10-06 ASSESSMENT — PAIN SCALES - GENERAL: PAINLEVEL_OUTOF10: 0

## 2017-10-06 NOTE — PROGRESS NOTES
Potassium Latest Ref Range: 3.5 - 5.2 meq/L  4.0  4.2      4.6        Chloride Latest Ref Range: 98 - 111 meq/L  107  106      104        CO2 Latest Ref Range: 23 - 33 meq/L  25  17 (L)      16 (L)        BUN Latest Ref Range: 7 - 22 mg/dL  24  22      25 (H)        Creatinine Latest Ref Range: 0.4 - 1.2 mg/dL  0.8  0.8      1.0        Anion Gap Latest Ref Range: 8.0 - 16.0 meq/L  14  19.0 (H)      16.0        Est, Glom Filt Rate Latest Units: ml/min/1.73m2    68 (A)      53 (A)        eGFR  Latest Ref Range: >60   83                EGFR IF NonAfrican American Latest Ref Range: >60   69                Magnesium Latest Ref Range: 1.6 - 2.4 mg/dL  2.5 (H)  2.5 (H)           2.2   Lactic Acid Latest Ref Range: 0.5 - 2.2 mmol/L    3.8 (H)    3.0 (H)          Glucose Latest Ref Range: 70 - 108 mg/dL  133 (H)  222 (H)      154 (H)        Calcium Latest Ref Range: 8.5 - 10.5 mg/dL  9.2  9.1      8.1 (L)        Osmolality Calc Latest Ref Range: 275.0 - 300 mOsmol/kg    293.3              Phosphorus Latest Ref Range: 2.4 - 4.7 mg/dL               4.5   Procalcitonin Latest Ref Range: 0.01 - 0.09 ng/mL    0.05              AVERAGE GLUCOSE Latest Ref Range: 70 - 126 mg/dL               93   Pro-BNP Latest Ref Range: 0.0 - 1800.0 pg/mL    6159.0 (H)           5651.0 (H)   Troponin T Latest Units: ng/ml    < 0.010      < 0.010        Hemoglobin A1C Latest Ref Range: 4.4 - 6.4 %               5.1   TSH Latest Ref Range: 0.400 - 4.20 uIU/mL          2.470        WBC Latest Ref Range: 4.8 - 10.8 thou/mm3    9.2              RBC Latest Ref Range: 4.20 - 5.40 mill/mm3    4.06 (L)              Hemoglobin Quant Latest Ref Range: 12.0 - 16.0 gm/dl    11.8 (L)              Hematocrit Latest Ref Range: 37.0 - 47.0 %    36.5 (L)              MCV Latest Ref Range: 81.0 - 99.0 fL    89.7              MCH Latest Ref Range: 27.0 - 31.0 pg    29.0              MCHC Latest Ref Range: 33.0 - 37.0 gm/dl    32.3 (L) MPV Latest Ref Range: 7.4 - 10.4 mcm    9.5              RDW Latest Ref Range: 11.5 - 14.5 %    14.9 (H)              Platelet Count Latest Ref Range: 130 - 400 thou/mm3    351              Lymphocytes # Latest Ref Range: 1.0 - 4.8 thou/mm3    1.2              Monocytes # Latest Ref Range: 0.4 - 1.3 thou/mm3    0.6              Eosinophils # Latest Ref Range: 0.0 - 0.4 thou/mm3    0.0              Basophils # Latest Ref Range: 0.0 - 0.1 thou/mm3    0.1              Seg Neutrophils Latest Units: %    78.8              Segs Absolute Latest Ref Range: 1.8 - 7.7 thou/mm3    7.2              Lymphocytes Latest Units: %    13.3              Monocytes Latest Units: %    6.9              Eosinophils Latest Units: %    0.4              Basophils Latest Units: %    0.6              Nucleated Red Blood Cells Latest Units: /100 wbc    0              RBC Morphology Unknown    NORMAL              Anisocytosis Unknown    1+              Folate Latest Ref Range: 4.8 - 24.2 ng/mL              16.9    Vitamin B-12 Latest Ref Range: 211 - 911 pg/mL              338    INR Latest Ref Range: 0.85 - 1.13     1.15 (H)              aPTT Latest Ref Range: 22.0 - 38.0 seconds    23.9              CULTURE BLOOD #1 Unknown     Rpt             CULTURE BLOOD #2 Unknown    Rpt              URINE CULTURE, REFLEXED Unknown         Rpt (A)         URINE WITH REFLEXED MICRO Unknown         Rpt (A)         Organism Unknown         Mixed Growth (A)         Blood Culture, Routine Unknown    No growth-prelimi. .. No growth-prelimi. ..              Color, UA Latest Ref Range: STRAW-YELL          YELLOW         Glucose, UA Latest Ref Range: NEGATIVE mg/dl         NEGATIVE         Bilirubin, Urine Latest Ref Range: NEGATIVE          NEGATIVE         Ketones, Urine Latest Ref Range: NEGATIVE          NEGATIVE         Blood, Urine Latest Ref Range: NEGATIVE          NEGATIVE         pH, UA Latest Ref Range: 5.0 - 9.0          5.5         Protein, UA Latest Ref QRS Duration Latest Units: ms   82        92       QTc Calculation (Bazett) Latest Units: ms   477        549       R Axis Latest Units: degrees   -46        -45       T Axis Latest Units: degrees   -134        -170       Ventricular Rate Latest Units: BPM   163        93       ECHOCARDIOGRAM COMPLETE 2D W DOPPLER W COLOR Unknown       Rpt                      Assessment:    Condition: In stable condition. Unchanged. Active Problems:    Rapid atrial fibrillation (HCC)    SOB (shortness of breath)    Chest pain        Plan:  1. Rapid atrial fibrillation new onset:  cardizem drip. Cardiology consulted -- she follows with Dr Serene Shelley. Echo. ASA. Plan for C pending in AM.  2. New onset CHF: echo ordered, lasix and monitor  3. HTN: bp stable   4.  VTE prophyalxis: Lovenox        DVT prophylaxis: [x] Lovenox                                 [] SCDs                                 [] SQ Heparin                                 [] Encourage ambulation, low risk for DVT, no chemical or mechanical prophylaxis necessary                                                        [] Already on Anticoagulation           Anticipated Disposition upon discharge: [x] Home                                                                         [] Home with Home Health                                                                         [] [facility]                                                                         [] [address] [place] [address]  10/6/2017

## 2017-10-06 NOTE — PROGRESS NOTES
Nutrition Assessment    Type and Reason for Visit: Initial, Positive Nutrition Screen (Poor intake/appetite, Nausea/vomiting)    Nutrition Recommendations: Consider MVI. Consider Calcium Supplement with Vitamin D that is easy to swallow-cannot swallow large pills. Consider Vitamin D level-hx of osteoporosis noted. Malnutrition Assessment:  · Malnutrition Status: At risk for malnutrition    Nutrition Diagnosis:   · Problem: Inadequate oral intake  · Etiology: related to Cardiac dysfunction     Signs and symptoms:  as evidenced by Patient report of    Nutrition Assessment:  · Subjective Assessment: Pt. seen. She mentions she is waiting on her breakfast tray. Appetite has been poor ~ 1 month since knee surgery 9/5. Has been eating less than 50% usual intake for ~ 1 month. She denies difficulty chewing or swallowing foods, has difficulty swallowing large pills. No BM. She mentions  she  doesn't cook with salt but adds salt to foods at the table. She mentions she is not nauseated now, didn't eat breakfast yet. ·   · Wound Type: None  · Current Nutrition Therapies:  · Oral Diet Orders: Cardiac   · Oral Diet intake: %, 51-75%  · Oral Nutrition Supplement (ONS) Orders:  (declined)  · ONS intake: Refused  · Anthropometric Measures:  · Ht: 5' (152.4 cm)   · Current Body Wt: 139 lb 8.8 oz (63.3 kg)  · Admission Body Wt: 138 lb 0.1 oz (62.6 kg) ((10/5))  · Usual Body Wt: 137 lb (62.1 kg) ((3/27/14) PER HOSPITAL RECORDS)  · Adjusted Body Wt: 110 lb (49.9 kg), body weight adjusted for Obesity  · BMI Classification: BMI 25.0 - 29.9 Overweight  · Comparative Standards (Estimated Nutrition Needs):  · Estimated Daily Total Kcal: 6630-2861  · Estimated Daily Protein (g): 50-60 grams    Estimated Intake vs Estimated Needs: Intake Less Than Needs    Nutrition Risk Level:  Moderate    Nutrition Interventions:   Continue current diet  Continued Inpatient Monitoring, Education Completed ([facility] diet education completed (10/6))  Encouraged lean protein choice with meals. Nutrition Evaluation:   · Evaluation: Goals set   · Goals: Pt. will consume 75% or more at meals during LOS. · Monitoring: Meal Intake, Diet Tolerance, Wound Healing, Weight, Comparative Standards, Pertinent Labs, Nausea or Vomiting    See Adult Nutrition Doc Flowsheet for more detail.      Electronically signed by Charles Moreno RD, LD on 10/6/17 at 9:57 AM    Contact Number: [phone]

## 2017-10-06 NOTE — CARE COORDINATION
DISCHARGE BARRIERS  10/6/17, 11:06 AM    Reason for Referral: \"may need help at discharge\"  Mental Status: Patient is alert and oriented  Decision Making: Patient makes own decisons  Family/Social/Home Environment: Spoke with patient, spouse and daughter, assessment completed. Patient lives with spouse in one floor home with basement. Bedroom is on the main floor, shower is in the basement. Patient states she has no problems with steps. She has a walk in shower with a seat, nor grab bars and a higher toilet. Patient is independent with ADL's. Patient has knee replacement Sept. 5, since then her  has been doing the cooking, cleaning and laundry but expects to resume those duties. Patient currently goes to outpatient PT therapy at [facility]. Discussed [facility] service, patient unsure if she wants HH, thinks she will be well enough when discharged to not need HH. Left a [facility] agency list, SW will stop back this afternoon to talk with them again. Current Services: Outpatient PT at [facility]  Current Equipment: cane  Payment Source:[employer] Medicare Supp  Concerns or Barriers to Discharge: none  Collabrative List of ECF/HH were provided:List provided    Teach Back Method used with patient, spouse and daughter regarding care plan and discharge planning  Patient and spouse and daughter verbalize understanding of the plan of care and contribute to goal setting. Anticipated Needs/Discharge Plan: Patient plans to return home with spouse, unsure if she wants [facility] services.       Electronically signed by DEBBIE Gracia on 10/6/2017 at 11:06 AM
Services PTA:     Potential Assistance Needed:  N/A  Potential Assistance Purchasing Medications:  No  Does patient want to participate in local refill/ meds to beds program?  No  Type of Home Care Services:  None  Patient expects to be discharged to:     Expected Discharge date:  10/07/17  Follow Up Appointment: Best Day/ Time:      Discharge Plan: Spoke with patient, plans to return home with spouse at discharge. Uses a cane at home. Does not have home oxygen. She is unsure if [place] will be needed at discharge. EMMANUEL consult order by physician. Will continue to monitor.       Evaluation: yes

## 2017-10-06 NOTE — PLAN OF CARE
Problem: Pain Control  Goal: Maintain pain level at or below patients acceptable level (or 5 if patient is unable to determine acceptable level)  Outcome: Met This Shift  Pt denied pain throughout the shift. Stated she had slight discomfort in her left knee, that recently underwent a knee replacement, but no pain. Problem: Cardiovascular  Goal: Hemodynamic stability  Outcome: Ongoing  Pt remained hemodynamically stable throughout the night. Problem: Respiratory  Goal: O2 Sat > 90%  Outcome: Ongoing  Pt O2 sat remained withing limits >90%    Problem: GI  Goal: No bowel complications  Outcome: Ongoing  Pt has had no bowel complications this shift. Problem: Discharge Planning  Goal: Able to ambulate independently  Able to ambulate independently   Outcome: Ongoing  Pt ambulated in room with assist from staff and a cane. Pt tolerates ambulation with with no complaints. Non slip slippers are on     Comments:   Care plan reviewed with patient. Patient verbalizes understanding of the care plan and contributed to goal setting.

## 2017-10-06 NOTE — PROGRESS NOTES
Cardiology Progress Note      Patient: Ludmila Abarca  YOB: 1934  MRN: [id number]   Acct: [de-identified]  Admit Date:  10/5/2017  Primary Cardiologist: Barry Montanez MD  Seen by dr James Saleh    Reason for consult - new afib  hpi per dr James Saleh \" This is a very pleasant 80-year-old  patient who recently has not had any obvious significant medical  problems, who is status post recent knee surgery about a month ago and  was recovering from that. Came in with worsening shortness of breath  for the last 2 days, significant orthopnea, unable to lie flat, and  significant fatigue. There is some discomfort and heaviness; however,  no significant chest pain. No previous history of cardiac disease. No previous history of CHF and some history of arthritis. Initial  assessment in the ER showed evidence of atrial fibrillation, rapid  ventricular response with a chest x-ray and CAT scan ruling out  pulmonary embolism showing pulmonary effusion. We were asked to  Evaluate. \"    Subjective (Events in last 24 hours): pt awake and alert. NAD. No cp. Sob has improved, but still with orthopnea and cath cancelled due to orthopnea not resolved yet.       Objective:   /66  Pulse 103  Temp 98.1 °F (36.7 °C) (Oral)   Resp 16  Ht 5' (1.524 m)  Wt 139 lb 9.6 oz (63.3 kg)  SpO2 90%  BMI 27.26 kg/m2       TELEMETRY: afib 100s    Physical Exam:  General Appearance: alert and oriented to person, place and time, in no acute distress  Cardiovascular: irregularly irregular  Pulmonary/Chest: clear to auscultation bilaterally- no wheezes, rales or rhonchi, normal air movement, no respiratory distress  Abdomen: soft, non-tender, non-distended, normal bowel sounds, no masses Extremities: no cyanosis, clubbing or edema, pulse   Skin: warm and dry, no rash or erythema  Head: normocephalic and atraumatic  Eyes: pupils equal, round, and reactive to light  Neck: supple and non-tender without mass, no thyromegaly   Musculoskeletal: normal range of motion, no joint swelling, deformity or tenderness  Neurological: alert, oriented, normal speech, no focal findings or movement disorder noted    Medications:    ALPRAZolam  0.25 mg Oral Once    sodium chloride flush  10 mL Intravenous 2 times per day    furosemide  40 mg Oral Daily    potassium replacement protocol   Other RX Placeholder    metoprolol tartrate  25 mg Oral BID    enoxaparin  1 mg/kg Subcutaneous Q12H    aspirin  81 mg Oral Daily    cefTRIAXone (ROCEPHIN) IV  1 g Intravenous Q24H      bumetanide 0.1 mg/mL infusion 0.5 mg/hr (10/06/17 0736)    diltiazem (CARDIZEM) 125 mg in dextrose 5% 125 mL infusion 15 mg/hr (10/06/17 1021)    sodium chloride 20 mL/hr (10/06/17 0619)       sodium chloride flush 10 mL PRN   acetaminophen 650 mg Q4H PRN   magnesium hydroxide 30 mL Daily PRN   ondansetron 4 mg Q6H PRN       Diagnostics:  Echo - 10/5/17  Summary   Ejection fraction is visually estimated at 30%.   There was moderate global hypokinesis of the left ventricle.   Moderate mitral regurgitation is present.   Moderate-to-severe tricuspid regurgitation.      Signature      ----------------------------------------------------------------   Electronically signed by Carmenza Resendiz MD (Interpreting   physician) on 10/05/2017 at 04:21 PM    Lab Data:    Cardiac Enzymes:  No results for input(s): CKTOTAL, CKMB, CKMBINDEX, TROPONINI in the last 72 hours.     CBC:   Lab Results   Component Value Date    WBC 9.2 10/05/2017    RBC 4.06 10/05/2017    HGB 11.8 10/05/2017    HCT 36.5 10/05/2017     10/05/2017       CMP:  Lab Results   Component Value Date     10/06/2017    K 4.6 10/06/2017     10/06/2017    CO2 16 10/06/2017    BUN 25 10/06/2017    CREATININE 1.0 10/06/2017    LABGLOM 53 10/06/2017    GLUCOSE 154 10/06/2017    GLUCOSE 133 08/29/2017    CALCIUM 8.1 10/06/2017       Hepatic Function Panel:  Lab Results   Component Value Date    ALKPHOS 82 09/23/2016    ALT 10 09/23/2016    AST 11 09/23/2016    PROT 6.8 09/23/2016    BILITOT 0.8 09/23/2016    LABALBU 4.1 09/23/2016       Magnesium:    Lab Results   Component Value Date    MG 2.2 10/06/2017       PT/INR:    Lab Results   Component Value Date    INR 1.15 10/05/2017       HgBA1c:    Lab Results   Component Value Date    LABA1C 5.1 10/06/2017       FLP:  Lab Results   Component Value Date    TRIG 92 09/24/2016    HDL 62 09/24/2016    LDLCALC 131 09/24/2016       TSH:    Lab Results   Component Value Date    TSH 2.470 10/06/2017         Assessment:    Acute systolic CHF  Bilateral pleural effusions  New onset afib rvr of unknown duration  Ef 30 per echo 10/5/17 - ?etiology - r/o ischemia, ?tachy induced  Mod MR, mod-severe TR    Plan:  · Normal tsh 10/6/17  · Keep mag >2 and K >4  · Cont diuresis - on bumex gtt - monitor vitals and BMP  · Daily I/o and weights  · 2 liter fluid restriction and 2 gm sodium diet  · Wean cdz gtt off to keep HR <100  · Cont full dose lovenox, hold after 8pm dose for preparation for cath tomorrow  · Cont asa/bb  · Npo after midnight  · Cardiac cath tomorrow as long as breathing/CHF has improved  · May need KAMRAN/CV         Electronically signed by Lamont Rico PA-C on 10/6/2017 at 12:46 PM

## 2017-10-06 NOTE — PLAN OF CARE
Problem: DISCHARGE BARRIERS  Goal: Patients continuum of care needs are met  Outcome: Ongoing  Patient plans to return home with , unsure if she wants [facility] services, see sw note.

## 2017-10-06 NOTE — PLAN OF CARE
Problem: Nutrition  Goal: Optimal nutrition therapy  Outcome: Ongoing  Nutrition Problem: Inadequate oral intake  Intervention: Food and/or Nutrient Delivery: Continue current diet  Nutritional Goals: Pt. will consume 75% or more at meals during LOS.

## 2017-10-06 NOTE — PLAN OF CARE
Problem: Pain Control  Goal: Maintain pain level at or below patients acceptable level (or 5 if patient is unable to determine acceptable level)  Outcome: Ongoing  Monitoring patients pain with each shift assessment and hourly rounding. She denies any pain at this time. Medication per mar if needed. Problem: Cardiovascular  Goal: Hemodynamic stability  Outcome: Ongoing  Monitoring patients vitals with each shift assessment and hourly rounding and as needed. Problem: Respiratory  Goal: O2 Sat > 90%  Outcome: Ongoing  Patient is currently on 1L nasal cannula and is currently not using oxygen at home. Will try to wean her to off when able. Problem: GI  Goal: No bowel complications  Outcome: Ongoing  Monitoring patient for bowel movement with each shift assessment. Problem: Discharge Planning  Goal: Able to ambulate independently  Able to ambulate independently   Outcome: Ongoing  Monitoring patients discharge needs with each shift assessment and when  is present.  is also on the case. Problem: Nutrition  Goal: Optimal nutrition therapy  Outcome: Ongoing  Monitoring amount of intake with each meal and every 8 hours. Problem: DISCHARGE BARRIERS  Goal: Patients continuum of care needs are met  Outcome: Ongoing    Problem: Risk for Impaired Skin Integrity  Goal: Tissue integrity - skin and mucous membranes  Structural intactness and normal physiological function of skin and  mucous membranes. Outcome: Ongoing  Monitoring patients skin with each shift assessment and as needed. Comments:   Care plan reviewed with patient. Patient verbalize understanding of the plan of care and contribute to goal setting.

## 2017-10-06 NOTE — H&P
(NORCO) 5-325 MG per tablet Take 1 tablet by mouth every 6 hours as needed for Pain . Historical Provider, MD   traMADol (ULTRAM) 50 MG tablet Take 50 mg by mouth every 6 hours as needed for Pain    Historical Provider, MD   ibuprofen (ADVIL;MOTRIN) 200 MG tablet Take 200 mg by mouth every 6 hours as needed for Pain    Historical Provider, MD   NONFORMULARY Vit C 1 tab  daily    Historical Provider, MD   NONFORMULARY Vit D 1 tab daily    Historical Provider, MD   diphenhydrAMINE-APAP, sleep, (TYLENOL PM EXTRA STRENGTH)  MG tablet Take 1 tablet by mouth nightly as needed for Sleep    Historical Provider, MD   lansoprazole (PREVACID) 30 MG delayed release capsule Take 1 capsule by mouth daily 10/20/16   Irina Pichardo MD   aspirin EC 81 MG EC tablet Take 1 tablet by mouth daily 9/24/16   Kobi Peñaloza MD     Additional information:       VITAL SIGNS   VSS    PHYSICAL:   General: No acute distress, but visible short of breath  HEENT:  Unremarkable for age  Neck: with midly increased JVD = 5-6, carotid pulses 2+ bilaterally without bruits  Heart: Irreg-irreg, S1 & S2 WNL, S4 gallop, without murmurs or rubs    Lungs: Mild crackles bilaterally    Abdomen: BS present, without HSM, masses, or tenderness    Extremities: without C,C,E.  Pulses 2+ bilaterally  Mental Status: Alert & Oriented        PLANNED PROCEDURE   [x]Cath  []PCI                []Pacemaker/AICD  []KAMRAN             []Cardioversion []Peripheral angiography/PTA  []Other:      SEDATION  Planned agent:[x]Midazolam []Meperidine [x]Sublimaze []Morphine  []Diazepam  []Other:     ASA Classification:  []1 []2 [x]3 []4 []5  Class 1: A normal healthy patient  Class 2: Pt with mild to moderate systemic disease  Class 3: Severe systemic disease or disturbance  Class 4: Severe systemic disorders that are already life threatening. Class 5: Moribund pt with little chances of survival, for more than 24 hours.   Mallampati I Airway Classification:   []1 [x]2

## 2017-10-06 NOTE — PROGRESS NOTES
0022: Pt called out having increased shortness of breath while laying flat. Pt HOB was elevated to high moreno and vital signs were assessed, lung sounds were clear In all lung fields. Bp was 110/68 Manually, Pulse Ox 92%, and pulse 92 BPM. Pt respirations were 24 and was slightly tachypnic, slightly more shallow than previous assessment. 0025: Pt sitting high fowlers and appears more relaxed, respirations decreased to 18 and breaths appear normal. Pt stated she just felt anxious. Denied need for any extra medication for anxiety. Pt stated to just let her \"sit up for awhile\" and hope she can fall asleep. 0030: pt repositioned and a third pillow was added to support her head pt was laid back to semi fowlers to promote rest and pt appears comfortable. 1221: Pt eyes closed and respirations unlabored.

## 2017-10-07 ENCOUNTER — APPOINTMENT (OUTPATIENT)
Dept: CARDIAC CATH/INVASIVE PROCEDURES | Age: 82
DRG: 286 | End: 2017-10-07
Payer: MEDICARE

## 2017-10-07 LAB
ABO: NORMAL
ALBUMIN SERPL-MCNC: 3.7 G/DL (ref 3.5–5.1)
ALP BLD-CCNC: 84 U/L (ref 38–126)
ALT SERPL-CCNC: 29 U/L (ref 11–66)
ANION GAP SERPL CALCULATED.3IONS-SCNC: 16 MEQ/L (ref 8–16)
ANTIBODY SCREEN: NORMAL
APTT: 30.5 SECONDS (ref 22–38)
AST SERPL-CCNC: 17 U/L (ref 5–40)
BILIRUB SERPL-MCNC: 0.6 MG/DL (ref 0.3–1.2)
BUN BLDV-MCNC: 18 MG/DL (ref 7–22)
CALCIUM SERPL-MCNC: 8.1 MG/DL (ref 8.5–10.5)
CHLORIDE BLD-SCNC: 97 MEQ/L (ref 98–111)
CO2: 26 MEQ/L (ref 23–33)
COLLECTED BY:: ABNORMAL
COLLECTED BY:: NORMAL
CREAT SERPL-MCNC: 0.8 MG/DL (ref 0.4–1.2)
EKG ATRIAL RATE: 105 BPM
EKG Q-T INTERVAL: 442 MS
EKG QRS DURATION: 92 MS
EKG QTC CALCULATION (BAZETT): 549 MS
EKG R AXIS: -45 DEGREES
EKG T AXIS: -170 DEGREES
EKG VENTRICULAR RATE: 93 BPM
GFR SERPL CREATININE-BSD FRML MDRD: 68 ML/MIN/1.73M2
GLUCOSE BLD-MCNC: 109 MG/DL (ref 70–108)
HCT VFR BLD CALC: 31.4 % (ref 37–47)
HEMOGLOBIN: 10.6 GM/DL (ref 12–16)
INR BLD: 1.24 (ref 0.85–1.13)
MCH RBC QN AUTO: 29.4 PG (ref 27–31)
MCHC RBC AUTO-ENTMCNC: 33.6 GM/DL (ref 33–37)
MCV RBC AUTO: 87.6 FL (ref 81–99)
PDW BLD-RTO: 14.4 % (ref 11.5–14.5)
PLATELET # BLD: 281 THOU/MM3 (ref 130–400)
PMV BLD AUTO: 9.3 MCM (ref 7.4–10.4)
POC O2 SATURATION: 62 % (ref 94–97)
POC O2 SATURATION: 96 % (ref 94–97)
POTASSIUM SERPL-SCNC: 2.8 MEQ/L (ref 3.5–5.2)
POTASSIUM SERPL-SCNC: 4.5 MEQ/L (ref 3.5–5.2)
PRO-BNP: 5370 PG/ML (ref 0–1800)
RBC # BLD: 3.59 MILL/MM3 (ref 4.2–5.4)
RH FACTOR: NORMAL
SODIUM BLD-SCNC: 139 MEQ/L (ref 135–145)
SOURCE, BLOOD GAS: ABNORMAL
SOURCE, BLOOD GAS: NORMAL
TOTAL PROTEIN: 6.5 G/DL (ref 6.1–8)
WBC # BLD: 10 THOU/MM3 (ref 4.8–10.8)

## 2017-10-07 PROCEDURE — B2151ZZ FLUOROSCOPY OF LEFT HEART USING LOW OSMOLAR CONTRAST: ICD-10-PCS | Performed by: INTERNAL MEDICINE

## 2017-10-07 PROCEDURE — A6258 TRANSPARENT FILM >16<=48 IN: HCPCS

## 2017-10-07 PROCEDURE — B2111ZZ FLUOROSCOPY OF MULTIPLE CORONARY ARTERIES USING LOW OSMOLAR CONTRAST: ICD-10-PCS | Performed by: INTERNAL MEDICINE

## 2017-10-07 PROCEDURE — 6360000002 HC RX W HCPCS: Performed by: NUCLEAR MEDICINE

## 2017-10-07 PROCEDURE — 84132 ASSAY OF SERUM POTASSIUM: CPT

## 2017-10-07 PROCEDURE — 4A023N8 MEASUREMENT OF CARDIAC SAMPLING AND PRESSURE, BILATERAL, PERCUTANEOUS APPROACH: ICD-10-PCS | Performed by: INTERNAL MEDICINE

## 2017-10-07 PROCEDURE — 6370000000 HC RX 637 (ALT 250 FOR IP): Performed by: HOSPITALIST

## 2017-10-07 PROCEDURE — 86850 RBC ANTIBODY SCREEN: CPT

## 2017-10-07 PROCEDURE — 2720000010 HC SURG SUPPLY STERILE

## 2017-10-07 PROCEDURE — 86901 BLOOD TYPING SEROLOGIC RH(D): CPT

## 2017-10-07 PROCEDURE — 85027 COMPLETE CBC AUTOMATED: CPT

## 2017-10-07 PROCEDURE — 93460 R&L HRT ART/VENTRICLE ANGIO: CPT

## 2017-10-07 PROCEDURE — 82810 BLOOD GASES O2 SAT ONLY: CPT

## 2017-10-07 PROCEDURE — 85730 THROMBOPLASTIN TIME PARTIAL: CPT

## 2017-10-07 PROCEDURE — C1760 CLOSURE DEV, VASC: HCPCS

## 2017-10-07 PROCEDURE — C1769 GUIDE WIRE: HCPCS

## 2017-10-07 PROCEDURE — 2580000003 HC RX 258: Performed by: INTERNAL MEDICINE

## 2017-10-07 PROCEDURE — 1200000003 HC TELEMETRY R&B

## 2017-10-07 PROCEDURE — 2780000010 HC IMPLANT OTHER

## 2017-10-07 PROCEDURE — 86900 BLOOD TYPING SEROLOGIC ABO: CPT

## 2017-10-07 PROCEDURE — 2500000003 HC RX 250 WO HCPCS

## 2017-10-07 PROCEDURE — 6370000000 HC RX 637 (ALT 250 FOR IP): Performed by: INTERNAL MEDICINE

## 2017-10-07 PROCEDURE — 80053 COMPREHEN METABOLIC PANEL: CPT

## 2017-10-07 PROCEDURE — 36415 COLL VENOUS BLD VENIPUNCTURE: CPT

## 2017-10-07 PROCEDURE — 6360000002 HC RX W HCPCS

## 2017-10-07 PROCEDURE — 2580000003 HC RX 258: Performed by: HOSPITALIST

## 2017-10-07 PROCEDURE — 99232 SBSQ HOSP IP/OBS MODERATE 35: CPT | Performed by: HOSPITALIST

## 2017-10-07 PROCEDURE — 6360000002 HC RX W HCPCS: Performed by: HOSPITALIST

## 2017-10-07 PROCEDURE — 93460 R&L HRT ART/VENTRICLE ANGIO: CPT | Performed by: INTERNAL MEDICINE

## 2017-10-07 PROCEDURE — 83880 ASSAY OF NATRIURETIC PEPTIDE: CPT

## 2017-10-07 PROCEDURE — 85610 PROTHROMBIN TIME: CPT

## 2017-10-07 PROCEDURE — 6370000000 HC RX 637 (ALT 250 FOR IP): Performed by: PHYSICIAN ASSISTANT

## 2017-10-07 PROCEDURE — C1894 INTRO/SHEATH, NON-LASER: HCPCS

## 2017-10-07 RX ORDER — ATROPINE SULFATE 0.4 MG/ML
0.5 AMPUL (ML) INJECTION
Status: ACTIVE | OUTPATIENT
Start: 2017-10-07 | End: 2017-10-07

## 2017-10-07 RX ORDER — SODIUM CHLORIDE 0.9 % (FLUSH) 0.9 %
10 SYRINGE (ML) INJECTION EVERY 12 HOURS SCHEDULED
Status: DISCONTINUED | OUTPATIENT
Start: 2017-10-07 | End: 2017-10-07 | Stop reason: SDUPTHER

## 2017-10-07 RX ORDER — ONDANSETRON 2 MG/ML
4 INJECTION INTRAMUSCULAR; INTRAVENOUS EVERY 6 HOURS PRN
Status: DISCONTINUED | OUTPATIENT
Start: 2017-10-07 | End: 2017-10-12 | Stop reason: HOSPADM

## 2017-10-07 RX ORDER — SODIUM CHLORIDE 0.9 % (FLUSH) 0.9 %
10 SYRINGE (ML) INJECTION PRN
Status: DISCONTINUED | OUTPATIENT
Start: 2017-10-07 | End: 2017-10-07 | Stop reason: SDUPTHER

## 2017-10-07 RX ORDER — SODIUM CHLORIDE 9 MG/ML
INJECTION, SOLUTION INTRAVENOUS CONTINUOUS
Status: DISCONTINUED | OUTPATIENT
Start: 2017-10-07 | End: 2017-10-09

## 2017-10-07 RX ORDER — ACETAMINOPHEN 325 MG/1
650 TABLET ORAL EVERY 4 HOURS PRN
Status: DISCONTINUED | OUTPATIENT
Start: 2017-10-07 | End: 2017-10-12 | Stop reason: HOSPADM

## 2017-10-07 RX ORDER — SODIUM CHLORIDE 9 MG/ML
INJECTION, SOLUTION INTRAVENOUS CONTINUOUS
Status: ACTIVE | OUTPATIENT
Start: 2017-10-07 | End: 2017-10-07

## 2017-10-07 RX ORDER — SODIUM CHLORIDE 0.9 % (FLUSH) 0.9 %
10 SYRINGE (ML) INJECTION PRN
Status: DISCONTINUED | OUTPATIENT
Start: 2017-10-07 | End: 2017-10-12 | Stop reason: HOSPADM

## 2017-10-07 RX ORDER — SODIUM CHLORIDE 0.9 % (FLUSH) 0.9 %
10 SYRINGE (ML) INJECTION EVERY 12 HOURS SCHEDULED
Status: DISCONTINUED | OUTPATIENT
Start: 2017-10-07 | End: 2017-10-12 | Stop reason: HOSPADM

## 2017-10-07 RX ORDER — POTASSIUM CHLORIDE 20MEQ/15ML
40 LIQUID (ML) ORAL ONCE
Status: COMPLETED | OUTPATIENT
Start: 2017-10-07 | End: 2017-10-07

## 2017-10-07 RX ORDER — POTASSIUM CHLORIDE 7.45 MG/ML
10 INJECTION INTRAVENOUS
Status: DISCONTINUED | OUTPATIENT
Start: 2017-10-07 | End: 2017-10-07

## 2017-10-07 RX ADMIN — POTASSIUM CHLORIDE 40 MEQ: 1.5 SOLUTION ORAL at 08:08

## 2017-10-07 RX ADMIN — SODIUM CHLORIDE: 9 INJECTION, SOLUTION INTRAVENOUS at 12:56

## 2017-10-07 RX ADMIN — METOPROLOL TARTRATE 25 MG: 25 TABLET ORAL at 23:04

## 2017-10-07 RX ADMIN — SODIUM CHLORIDE: 9 INJECTION, SOLUTION INTRAVENOUS at 21:18

## 2017-10-07 RX ADMIN — METOPROLOL TARTRATE 25 MG: 25 TABLET ORAL at 08:33

## 2017-10-07 RX ADMIN — ENOXAPARIN SODIUM 60 MG: 60 INJECTION SUBCUTANEOUS at 21:02

## 2017-10-07 RX ADMIN — CEFTRIAXONE 1 G: 1 INJECTION, POWDER, FOR SOLUTION INTRAMUSCULAR; INTRAVENOUS at 21:02

## 2017-10-07 RX ADMIN — ASPIRIN 81 MG: 81 TABLET, CHEWABLE ORAL at 08:33

## 2017-10-07 RX ADMIN — POTASSIUM CHLORIDE 10 MEQ: 10 INJECTION, SOLUTION INTRAVENOUS at 07:01

## 2017-10-07 RX ADMIN — Medication 10 ML: at 21:02

## 2017-10-07 ASSESSMENT — ENCOUNTER SYMPTOMS
NAUSEA: 0
DIARRHEA: 0
SHORTNESS OF BREATH: 1
VOMITING: 0
COUGH: 1

## 2017-10-07 ASSESSMENT — PAIN SCALES - GENERAL
PAINLEVEL_OUTOF10: 0
PAINLEVEL_OUTOF10: 0

## 2017-10-07 NOTE — PROGRESS NOTES
Range: 4.20 - 5.40 mill/mm3   4.06 (L)            3.59 (L)   Hemoglobin Quant Latest Ref Range: 12.0 - 16.0 gm/dl   11.8 (L)            10.6 (L)   Hematocrit Latest Ref Range: 37.0 - 47.0 %   36.5 (L)            31.4 (L)   MCV Latest Ref Range: 81.0 - 99.0 fL   89.7            87.6   MCH Latest Ref Range: 27.0 - 31.0 pg   29.0            29.4   MCHC Latest Ref Range: 33.0 - 37.0 gm/dl   32.3 (L)            33.6   MPV Latest Ref Range: 7.4 - 10.4 mcm   9.5            9.3   RDW Latest Ref Range: 11.5 - 14.5 %   14.9 (H)            14.4   Platelet Count Latest Ref Range: 130 - 400 thou/mm3   351            281   Lymphocytes # Latest Ref Range: 1.0 - 4.8 thou/mm3   1.2               Monocytes # Latest Ref Range: 0.4 - 1.3 thou/mm3   0.6               Eosinophils # Latest Ref Range: 0.0 - 0.4 thou/mm3   0.0               Basophils # Latest Ref Range: 0.0 - 0.1 thou/mm3   0.1               Seg Neutrophils Latest Units: %   78.8               Segs Absolute Latest Ref Range: 1.8 - 7.7 thou/mm3   7.2               Lymphocytes Latest Units: %   13.3               Monocytes Latest Units: %   6.9               Eosinophils Latest Units: %   0.4               Basophils Latest Units: %   0.6               Nucleated Red Blood Cells Latest Units: /100 wbc   0               RBC Morphology Unknown   NORMAL               Anisocytosis Unknown   1+               Folate Latest Ref Range: 4.8 - 24.2 ng/mL             16.9     Vitamin B-12 Latest Ref Range: 211 - 911 pg/mL             338     INR Latest Ref Range: 0.85 - 1.13    1.15 (H)            1.24 (H)   aPTT Latest Ref Range: 22.0 - 38.0 seconds   23.9            30.5   CULTURE BLOOD #1 Unknown    Rpt              CULTURE BLOOD #2 Unknown   Rpt               URINE CULTURE, REFLEXED Unknown        Rpt (A)          URINE WITH REFLEXED MICRO Unknown        Rpt (A)          Organism Unknown        Mixed Growth (A)          Blood Culture, Routine Unknown   No growth-prelimi. ..  No the distal right clavicle.       LINES/TUBES/MECHANICAL DEVICES: None.       TRACHEA/HEART/MEDIASTINUM/HILUM: Unremarkable.       LUNG RAVI: Normal.       OTHER: None.       PNEUMOTHORAX:  None.       OSSEOUS STRUCTURES:   1. No acute osseous abnormality. 2. Generalized osteopenia. 3. Suspected rotator cuff degeneration/tearing at the right shoulder. 4. Degenerative changes at the left glenohumeral articulation.               Impression   1. There is no acute cardiopulmonary process.               **This report has been created using voice recognition software.  It may contain minor errors which are inherent in voice recognition technology. **       Final report electronically signed by Dr. Wendy Faulkner on 10/6/2017 7:00 AM             Assessment:    Condition: In stable condition. Active Problems:    Rapid atrial fibrillation (HCC)    SOB (shortness of breath)    Chest pain        Plan:  1. Rapid atrial fibrillation new onset:  cardizem drip. Cardiology consulted -- she follows with Dr Leti Fish. Echo. ASA. 2. New onset CHF: echo ordered, lasix and monitor  3. HTN: bp stable   4. VTE prophyalxis: Lovenox  5.  Severe Hypokalemia of 2.8 after being on Bumex IV gtt - Patient started on potassium replacement protocol.        DVT prophylaxis: [x] Lovenox                                 [] SCDs                                 [] SQ Heparin                                 [] Encourage ambulation, low risk for DVT, no chemical or mechanical prophylaxis necessary                                                        [] Already on Anticoagulation           Anticipated Disposition upon discharge: [x] Home                                                                         [] Home with Home Health                                                                         [] [facility]                                                                         [] [address] [place] [address]

## 2017-10-07 NOTE — PROGRESS NOTES
0830:  Dr. Rizwan Jo was on the unit and updated him regarding the patient getting IV potassium per replacement, she was 2.8 this a.m., but it was burning her so the normal saline was increased to 50 ml/hr and the potassium is infusing at 50 ml/hr and the bumex drip was stopped this a.m. Per the order. He stated to give her 409 meq liquid potassium now and let the 10 meq iv potassium that's infusing continue and then redraw stat.

## 2017-10-07 NOTE — PLAN OF CARE
Problem: Pain Control  Goal: Maintain pain level at or below patients acceptable level (or 5 if patient is unable to determine acceptable level)  Outcome: Met This Shift  Patient has not complained of any pain this shift. Problem: Cardiovascular  Goal: Hemodynamic stability  Outcome: Met This Shift  Monitoring BP and HR. All medications given. HR 80-90s. Cardizem gtt at 5mg/hr. BP stable. Problem: Respiratory  Goal: O2 Sat > 90%  Outcome: Met This Shift  Patient is still on 1L NC. O2 sat 93%. Will try to wean off when awake. Problem: GI  Goal: No bowel complications  Outcome: Met This Shift  Last BM was yesterday. BS active and passing gas. Problem: Discharge Planning  Goal: Able to ambulate independently  Able to ambulate independently   Outcome: Ongoing  Patient uses a cane and assist from one staff member due to multiple lines. Problem: Nutrition  Goal: Optimal nutrition therapy  Outcome: Ongoing  Patient denied any need for a snack at bedtime. Problem: DISCHARGE BARRIERS  Goal: Patients continuum of care needs are met  Outcome: Met This Shift  Patient is able to do perform ADLs on own. Problem: Falls - Risk of  Goal: Absence of falls  Outcome: Met This Shift  Patient has not fallen this shift. Non slip socks and bed alarm is on. Call light and bedside table is at reach. Hourly rounding completed. Problem: Risk for Impaired Skin Integrity  Goal: Tissue integrity - skin and mucous membranes  Structural intactness and normal physiological function of skin and  mucous membranes. Outcome: Met This Shift  No signs of skin breakdown noted. Comments:   Care plan reviewed with patient. Patient verbalizes understanding of the care plan and contributed to goal setting.

## 2017-10-08 LAB
ALBUMIN SERPL-MCNC: 3.3 G/DL (ref 3.5–5.1)
ALP BLD-CCNC: 76 U/L (ref 38–126)
ALT SERPL-CCNC: 22 U/L (ref 11–66)
ANION GAP SERPL CALCULATED.3IONS-SCNC: 12 MEQ/L (ref 8–16)
AST SERPL-CCNC: 13 U/L (ref 5–40)
BILIRUB SERPL-MCNC: 0.8 MG/DL (ref 0.3–1.2)
BUN BLDV-MCNC: 22 MG/DL (ref 7–22)
CALCIUM SERPL-MCNC: 8.2 MG/DL (ref 8.5–10.5)
CHLORIDE BLD-SCNC: 100 MEQ/L (ref 98–111)
CO2: 29 MEQ/L (ref 23–33)
CREAT SERPL-MCNC: 0.7 MG/DL (ref 0.4–1.2)
GFR SERPL CREATININE-BSD FRML MDRD: 80 ML/MIN/1.73M2
GLUCOSE BLD-MCNC: 97 MG/DL (ref 70–108)
HCT VFR BLD CALC: 31.9 % (ref 37–47)
HEMOGLOBIN: 10.7 GM/DL (ref 12–16)
MCH RBC QN AUTO: 29.7 PG (ref 27–31)
MCHC RBC AUTO-ENTMCNC: 33.6 GM/DL (ref 33–37)
MCV RBC AUTO: 88.3 FL (ref 81–99)
PDW BLD-RTO: 14.8 % (ref 11.5–14.5)
PLATELET # BLD: 253 THOU/MM3 (ref 130–400)
PMV BLD AUTO: 9.1 MCM (ref 7.4–10.4)
POTASSIUM SERPL-SCNC: 3.8 MEQ/L (ref 3.5–5.2)
PRO-BNP: 3716 PG/ML (ref 0–1800)
RBC # BLD: 3.62 MILL/MM3 (ref 4.2–5.4)
SODIUM BLD-SCNC: 141 MEQ/L (ref 135–145)
TOTAL PROTEIN: 5.9 G/DL (ref 6.1–8)
WBC # BLD: 9.4 THOU/MM3 (ref 4.8–10.8)

## 2017-10-08 PROCEDURE — 83880 ASSAY OF NATRIURETIC PEPTIDE: CPT

## 2017-10-08 PROCEDURE — 85027 COMPLETE CBC AUTOMATED: CPT

## 2017-10-08 PROCEDURE — 6370000000 HC RX 637 (ALT 250 FOR IP): Performed by: HOSPITALIST

## 2017-10-08 PROCEDURE — 6360000002 HC RX W HCPCS: Performed by: HOSPITALIST

## 2017-10-08 PROCEDURE — 6360000002 HC RX W HCPCS: Performed by: NURSE PRACTITIONER

## 2017-10-08 PROCEDURE — 6370000000 HC RX 637 (ALT 250 FOR IP): Performed by: NUCLEAR MEDICINE

## 2017-10-08 PROCEDURE — 2580000003 HC RX 258: Performed by: HOSPITALIST

## 2017-10-08 PROCEDURE — 6370000000 HC RX 637 (ALT 250 FOR IP): Performed by: INTERNAL MEDICINE

## 2017-10-08 PROCEDURE — 2500000003 HC RX 250 WO HCPCS: Performed by: PHYSICIAN ASSISTANT

## 2017-10-08 PROCEDURE — 6370000000 HC RX 637 (ALT 250 FOR IP): Performed by: PHYSICIAN ASSISTANT

## 2017-10-08 PROCEDURE — 6360000002 HC RX W HCPCS: Performed by: NUCLEAR MEDICINE

## 2017-10-08 PROCEDURE — 99232 SBSQ HOSP IP/OBS MODERATE 35: CPT | Performed by: HOSPITALIST

## 2017-10-08 PROCEDURE — 36415 COLL VENOUS BLD VENIPUNCTURE: CPT

## 2017-10-08 PROCEDURE — 6370000000 HC RX 637 (ALT 250 FOR IP): Performed by: NURSE PRACTITIONER

## 2017-10-08 PROCEDURE — 99232 SBSQ HOSP IP/OBS MODERATE 35: CPT | Performed by: NURSE PRACTITIONER

## 2017-10-08 PROCEDURE — 2580000003 HC RX 258: Performed by: INTERNAL MEDICINE

## 2017-10-08 PROCEDURE — 1200000003 HC TELEMETRY R&B

## 2017-10-08 PROCEDURE — 80053 COMPREHEN METABOLIC PANEL: CPT

## 2017-10-08 PROCEDURE — 2580000003 HC RX 258: Performed by: PHYSICIAN ASSISTANT

## 2017-10-08 RX ORDER — POTASSIUM CHLORIDE 750 MG/1
20 TABLET, FILM COATED, EXTENDED RELEASE ORAL ONCE
Status: COMPLETED | OUTPATIENT
Start: 2017-10-08 | End: 2017-10-08

## 2017-10-08 RX ORDER — DIGOXIN 0.25 MG/ML
500 INJECTION INTRAMUSCULAR; INTRAVENOUS ONCE
Status: COMPLETED | OUTPATIENT
Start: 2017-10-08 | End: 2017-10-08

## 2017-10-08 RX ORDER — DIGOXIN 0.25 MG/ML
250 INJECTION INTRAMUSCULAR; INTRAVENOUS EVERY 6 HOURS
Status: COMPLETED | OUTPATIENT
Start: 2017-10-08 | End: 2017-10-09

## 2017-10-08 RX ADMIN — DILTIAZEM HYDROCHLORIDE 2.5 MG/HR: 5 INJECTION INTRAVENOUS at 18:34

## 2017-10-08 RX ADMIN — DIGOXIN 250 MCG: 0.25 INJECTION INTRAMUSCULAR; INTRAVENOUS at 18:34

## 2017-10-08 RX ADMIN — METOPROLOL TARTRATE 25 MG: 25 TABLET ORAL at 20:40

## 2017-10-08 RX ADMIN — SODIUM CHLORIDE: 9 INJECTION, SOLUTION INTRAVENOUS at 20:40

## 2017-10-08 RX ADMIN — ENOXAPARIN SODIUM 60 MG: 60 INJECTION SUBCUTANEOUS at 10:32

## 2017-10-08 RX ADMIN — ASPIRIN 81 MG: 81 TABLET, CHEWABLE ORAL at 10:32

## 2017-10-08 RX ADMIN — CEFTRIAXONE 1 G: 1 INJECTION, POWDER, FOR SOLUTION INTRAMUSCULAR; INTRAVENOUS at 20:40

## 2017-10-08 RX ADMIN — Medication 10 ML: at 20:41

## 2017-10-08 RX ADMIN — FUROSEMIDE 40 MG: 40 TABLET ORAL at 11:52

## 2017-10-08 RX ADMIN — APIXABAN 5 MG: 5 TABLET, FILM COATED ORAL at 20:40

## 2017-10-08 RX ADMIN — POTASSIUM CHLORIDE 20 MEQ: 750 TABLET, FILM COATED, EXTENDED RELEASE ORAL at 14:20

## 2017-10-08 RX ADMIN — MAGNESIUM HYDROXIDE 30 ML: 400 SUSPENSION ORAL at 14:21

## 2017-10-08 RX ADMIN — DIGOXIN 500 MCG: 0.25 INJECTION INTRAMUSCULAR; INTRAVENOUS at 12:43

## 2017-10-08 ASSESSMENT — PAIN SCALES - GENERAL
PAINLEVEL_OUTOF10: 0

## 2017-10-08 ASSESSMENT — ENCOUNTER SYMPTOMS
COUGH: 0
VOMITING: 0
SHORTNESS OF BREATH: 0
DIARRHEA: 0
NAUSEA: 0

## 2017-10-08 NOTE — PROGRESS NOTES
chloride 20 mL/hr at 10/07/17 2118    diltiazem (CARDIZEM) 125 mg in dextrose 5% 125 mL infusion 2.5 mg/hr (10/08/17 0530)       sodium chloride flush 10 mL PRN   acetaminophen 650 mg Q4H PRN   magnesium hydroxide 30 mL Daily PRN   ondansetron 4 mg Q6H PRN       Diagnostics:  EK-OCT-2017 04:23:18 [facility]  Atrial fibrillation with premature ventricular or aberrantly conducted complexes  Left axis deviation  Low voltage QRS, consider pulmonary disease, pericardial effusion, or normal variant  Cannot rule out Anterior infarct , age undetermined  ST & T wave abnormality, consider lateral ischemia or digitalis effect  Prolonged QT interval or tu fusion, consider myocardial disease, electrolyte imbalance, or drug effects  Abnormal ECG  When compared with ECG of 05-OCT-2017 07:41,  Ventricular rate has decreased BY 70 BPM  Confirmed by [facility] MD, Manfred N Bethany ([id number]) on 10/7/2017 12:04:05 PM      Echo:   Summary   Ejection fraction is visually estimated at 30%.   There was moderate global hypokinesis of the left ventricle.   Moderate mitral regurgitation is present.   Moderate-to-severe tricuspid regurgitation.      Signature      ----------------------------------------------------------------   Electronically signed by Sandy Mackay MD (Interpreting   physician) on 10/05/2017       Left Heart Cath:10/7/2017  Diffusely diseased LAD and LCX in a patent right dominant system. Not amenable to PCI. Right heart pressures demonstrate adequate pressures suggesting optimally diuresed      Lab Data:    Cardiac Enzymes:  No results for input(s): CKTOTAL, CKMB, CKMBINDEX, TROPONINI in the last 72 hours.     CBC:   Lab Results   Component Value Date    WBC 9.4 10/08/2017    RBC 3.62 10/08/2017    HGB 10.7 10/08/2017    HCT 31.9 10/08/2017     10/08/2017       CMP:  Lab Results   Component Value Date     10/08/2017    K 3.8 10/08/2017     10/08/2017    CO2 29 10/08/2017    BUN 22 10/08/2017 CREATININE 0.7 10/08/2017    LABGLOM 80 10/08/2017    GLUCOSE 97 10/08/2017    GLUCOSE 133 08/29/2017    CALCIUM 8.2 10/08/2017       Hepatic Function Panel:  Lab Results   Component Value Date    ALKPHOS 76 10/08/2017    ALT 22 10/08/2017    AST 13 10/08/2017    PROT 5.9 10/08/2017    BILITOT 0.8 10/08/2017    LABALBU 3.3 10/08/2017       Magnesium:    Lab Results   Component Value Date    MG 2.2 10/06/2017       PT/INR:    Lab Results   Component Value Date    INR 1.24 10/07/2017       HgBA1c:    Lab Results   Component Value Date    LABA1C 5.1 10/06/2017       FLP:  Lab Results   Component Value Date    TRIG 92 09/24/2016    HDL 62 09/24/2016    LDLCALC 131 09/24/2016       TSH:    Lab Results   Component Value Date    TSH 2.470 10/06/2017         Assessment:    Acute systolic CHF- resolved     Bilateral pleural effusions    New onset AFB of unknown duration- remains RVR   [id number]-  At least 4  Needs OAC to be started      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 Not amenable to PCI.   Right heart pressures demonstrate adequate pressures suggesting optimally diuresed    Hypotension     Recent Knee OR        Plan:    IV dig for better rate control - maybe she can take BB then  KAMRAN / CV in am    Add eliquis   Pt agrees to [employer] - she understands the risks and benefits of [employer]           Electronically signed by Kadi Nolan CNP on 10/8/2017 at 11:20 AM

## 2017-10-08 NOTE — PROGRESS NOTES
°C), temperature source Oral, resp. rate 16, height 5' (1.524 m), weight 131 lb 12.8 oz (59.8 kg), SpO2 94 %, not currently breastfeeding. Subjective:  Symptoms:  Improved. She reports weakness. No shortness of breath, malaise, cough, chest pain, headache, chest pressure, anorexia, diarrhea or anxiety. (Patient weaned off oxygen and doing well. She is speaking in complete sentences and wants to go home ASAP. She was told she is still in Atrial Fibrillation and cardiology would need to adjust her medications. ). Diet:  Adequate intake. No nausea or vomiting. Activity level: Returning to normal.    Pain:  She reports no pain. Objective:  General Appearance:  Comfortable, well-appearing, in no acute distress and not in pain. Vital signs: (most recent): Blood pressure (!) 104/55, pulse 115, temperature 98.1 °F (36.7 °C), temperature source Oral, resp. rate 20, height 5' (1.524 m), weight 131 lb 12.8 oz (59.8 kg), SpO2 94 %, not currently breastfeeding. Vital signs are normal.  No fever. Output: Producing urine. HEENT: Normal HEENT exam.    Lungs:  Normal effort and normal respiratory rate. Heart: Tachycardia. Irregular rhythm. Abdomen: Abdomen is soft, flat and non-distended. There are no signs of ascites. Bowel sounds are normal.   There is no abdominal tenderness. Extremities: Normal range of motion. Pulses: Distal pulses are intact. Neurological: Patient is alert and oriented to person, place and time. GCS score is 15. Pupils:  Pupils are equal, round, and reactive to light. Skin:  Warm, dry and pale. Labs:  Results for Felicitas Yuan (MRN [id number]) as of 10/8/2017 07:26   Ref.  Range 10/5/2017 13:59 10/5/2017 14:45 10/5/2017 16:20 10/6/2017 04:10 10/6/2017 04:23 10/6/2017 06:27 10/6/2017 06:53 10/6/2017 08:12 10/6/2017 09:07 10/7/2017 04:15 10/7/2017 08:55 10/7/2017 11:42 10/7/2017 11:47 10/7/2017 11:51 10/8/2017 05:32   Sodium Latest Ref Range: 135 - 145 meq/L    136      139        Potassium Latest Ref Range: 3.5 - 5.2 meq/L    4.6      2.8 (L) 4.5       Chloride Latest Ref Range: 98 - 111 meq/L    104      97 (L)        CO2 Latest Ref Range: 23 - 33 meq/L    16 (L)      26        BUN Latest Ref Range: 7 - 22 mg/dL    25 (H)      18        Creatinine Latest Ref Range: 0.4 - 1.2 mg/dL    1.0      0.8        Anion Gap Latest Ref Range: 8.0 - 16.0 meq/L    16.0      16.0        Est, Glom Filt Rate Latest Units: ml/min/1.73m2    53 (A)      68 (A)        Magnesium Latest Ref Range: 1.6 - 2.4 mg/dL         2.2         Lactic Acid Latest Ref Range: 0.5 - 2.2 mmol/L  3.0 (H)                Glucose Latest Ref Range: 70 - 108 mg/dL    154 (H)      109 (H)        Calcium Latest Ref Range: 8.5 - 10.5 mg/dL    8.1 (L)      8.1 (L)        Phosphorus Latest Ref Range: 2.4 - 4.7 mg/dL         4.5         Total Protein Latest Ref Range: 6.1 - 8.0 g/dL          6.5        AVERAGE GLUCOSE Latest Ref Range: 70 - 126 mg/dL         93         POC O2 SAT Latest Ref Range: 94 - 97 %             96 62 (L)    Pro-BNP Latest Ref Range: 0.0 - 1800.0 pg/mL         5651.0 (H)  5370.0 (H)    3664.0 (H)   Troponin T Latest Units: ng/ml    < 0.010              Albumin Latest Ref Range: 3.5 - 5.1 g/dL          3.7        Alk Phos Latest Ref Range: 38 - 126 U/L          84        ALT Latest Ref Range: 11 - 66 U/L          29        AST Latest Ref Range: 5 - 40 U/L          17        Bilirubin Latest Ref Range: 0.3 - 1.2 mg/dL          0.6        Hemoglobin A1C Latest Ref Range: 4.4 - 6.4 %         5.1         TSH Latest Ref Range: 0.400 - 4.20 uIU/mL    2.470              WBC Latest Ref Range: 4.8 - 10.8 thou/mm3          10.0     9.4   RBC Latest Ref Range: 4.20 - 5.40 mill/mm3          3.59 (L)     3.62 (L)   Hemoglobin Quant Latest Ref Range: 12.0 - 16.0 gm/dl          10.6 (L)     10.7 (L)   Hematocrit Latest Ref Range: 37.0 - 47.0 %          31.4 (L)     31.9 (L)   MCV Latest Ref Range: 81.0 - 99.0 fL          87.6     88.3   MCH Latest Ref Range: 27.0 - 31.0 pg          29.4     29.7   MCHC Latest Ref Range: 33.0 - 37.0 gm/dl          33.6     33.6   MPV Latest Ref Range: 7.4 - 10.4 mcm          9.3     9.1   RDW Latest Ref Range: 11.5 - 14.5 %          14.4     14.8 (H)   Platelet Count Latest Ref Range: 130 - 400 thou/mm3          281     253   Folate Latest Ref Range: 4.8 - 24.2 ng/mL        16.9          Vitamin B-12 Latest Ref Range: 211 - 911 pg/mL        338          INR Latest Ref Range: 0.85 - 1.13           1.24 (H)        aPTT Latest Ref Range: 22.0 - 38.0 seconds          30.5        URINE CULTURE, REFLEXED Unknown   Rpt (A)               URINE WITH REFLEXED MICRO Unknown   Rpt (A)               Organism Unknown   Mixed Growth (A)               Color, UA Latest Ref Range: STRAW-YELL    YELLOW               Glucose, UA Latest Ref Range: NEGATIVE mg/dl   NEGATIVE               Bilirubin, Urine Latest Ref Range: NEGATIVE    NEGATIVE               Ketones, Urine Latest Ref Range: NEGATIVE    NEGATIVE               Blood, Urine Latest Ref Range: NEGATIVE    NEGATIVE               pH, UA Latest Ref Range: 5.0 - 9.0    5.5               Protein, UA Latest Ref Range: NEGATIVE    TRACE (A)               Urobilinogen, Urine Latest Ref Range: 0.0 - 1.0 eu/dl   0.2               Nitrite, Urine Latest Ref Range: NEGATIVE    NEGATIVE               Leukocyte Esterase, Urine Latest Ref Range: NEGATIVE    MODERATE (A)               Casts UA Latest Ref Range: NONE SEEN /lpf   NONE SEEN               CASTS 2 Latest Ref Range: NONE SEEN /lpf   NONE SEEN               WBC, UA Latest Ref Range: 0-4/hpf /hpf   10-15               RBC, UA Latest Ref Range: 0-2/hpf /hpf   0-2               Epi Cells Latest Ref Range: 3-5/hpf /hpf   0-2               Renal Epithelial, Urine Latest Ref Range: NONE SEEN    NONE SEEN               Bacteria, UA Latest Ref Range: FEW/NONE S /hpf   NONE               Yeast, Urine Latest Ref Consider DCCV. 2. New onset CHF: echo ordered, lasix and monitor with patient now euvolemic and off oxygen. 3. HTN: bp stable   4. VTE prophyalxis: Lovenox  5.  Severe Hypokalemia of 2.8 after being on Bumex IV gtt  Which is now stopped - Patient started on potassium replacement protocol with repeat level of 4.5.           DVT prophylaxis: [x] Lovenox                                 [] SCDs                                 [] SQ Heparin                                 [] Encourage ambulation, low risk for DVT, no chemical or mechanical prophylaxis necessary                                                        [] Already on Anticoagulation           Anticipated Disposition upon discharge: [x] Home                                                                         [] Home with [facility]                CARMINE BADILLO DO  10/8/2017

## 2017-10-08 NOTE — PROGRESS NOTES
1145:  Trini Lopez NP is on the unit and she asked me to schedule estefania/cardioversion for tomorrow with Dr. Linda Tripp. She stated to make sure patient is on for tomorrow and they will have to check tomorrow for the time once we know when Dr. Linda Tripp will be able to do this. 1200:  Left a message with cath lab for a estefania/echo tomorrow and they will call with the time in the a.m. I spoke with asim in cath lab and I updated the patient and son that was in the room. She knows nothing to eat or drink after midnight. 1245:  Removed patients randall cath. Removed 10 mls of fluid from the balloon which was intact upon removal.  Patient tolerated well.

## 2017-10-08 NOTE — PLAN OF CARE
Problem: Pain Control  Goal: Maintain pain level at or below patients acceptable level (or 5 if patient is unable to determine acceptable level)  Outcome: Met This Shift  Patient has not complained of any pain this shift.      Problem: Cardiovascular  Goal: Hemodynamic stability  Outcome: Met This Shift  Monitoring BP and HR. BP on the low side at the beginning of the shift. Cardizem gtt decreased to 2mg/hr. Metoprolol given. HR . Will continue to monitor.      Problem: Respiratory  Goal: O2 Sat > 90%  Outcome: Met This Shift  Patient was on 2L NC to start the night. Weaned down to RA. Will continue to monitor.      Problem: GI  Goal: No bowel complications  Outcome: Met This Shift  Last BM was a two days ago. BS active and passing gas.      Problem: Discharge Planning  Goal: Able to ambulate independently  Able to ambulate independently   Outcome: Ongoing  Patient uses a cane and assist from one staff member due to multiple lines.      Problem: Nutrition  Goal: Optimal nutrition therapy  Outcome: Ongoing  Patient denied any need for a snack at bedtime.     Problem: DISCHARGE BARRIERS  Goal: Patients continuum of care needs are met  Outcome: Met This Shift  Patient is able to do perform ADLs on own.      Problem: Falls - Risk of  Goal: Absence of falls  Outcome: Met This Shift  Patient has not fallen this shift. Non slip socks and bed alarm is on. Call light and bedside table is at reach. Hourly rounding completed.     Problem: Risk for Impaired Skin Integrity  Goal: Tissue integrity - skin and mucous membranes  Structural intactness and normal physiological function of skin and  mucous membranes. Outcome: Met This Shift  No signs of skin breakdown noted.      Comments:   Care plan reviewed with patient. Patient verbalizes understanding of the care plan and contributed to goal setting.

## 2017-10-09 ENCOUNTER — APPOINTMENT (OUTPATIENT)
Dept: CARDIAC CATH/INVASIVE PROCEDURES | Age: 82
DRG: 286 | End: 2017-10-09
Payer: MEDICARE

## 2017-10-09 LAB
ANION GAP SERPL CALCULATED.3IONS-SCNC: 12 MEQ/L (ref 8–16)
BUN BLDV-MCNC: 17 MG/DL (ref 7–22)
CALCIUM SERPL-MCNC: 8.6 MG/DL (ref 8.5–10.5)
CHLORIDE BLD-SCNC: 101 MEQ/L (ref 98–111)
CO2: 28 MEQ/L (ref 23–33)
CREAT SERPL-MCNC: 0.7 MG/DL (ref 0.4–1.2)
EKG ATRIAL RATE: 102 BPM
EKG Q-T INTERVAL: 378 MS
EKG QRS DURATION: 86 MS
EKG QTC CALCULATION (BAZETT): 430 MS
EKG R AXIS: -50 DEGREES
EKG T AXIS: 158 DEGREES
EKG VENTRICULAR RATE: 78 BPM
GFR SERPL CREATININE-BSD FRML MDRD: 80 ML/MIN/1.73M2
GLUCOSE BLD-MCNC: 92 MG/DL (ref 70–108)
HCT VFR BLD CALC: 34.5 % (ref 37–47)
HEMOGLOBIN: 11.3 GM/DL (ref 12–16)
LV EF: 28 %
LVEF MODALITY: NORMAL
MCH RBC QN AUTO: 28.9 PG (ref 27–31)
MCHC RBC AUTO-ENTMCNC: 32.7 GM/DL (ref 33–37)
MCV RBC AUTO: 88.2 FL (ref 81–99)
PDW BLD-RTO: 14.8 % (ref 11.5–14.5)
PLATELET # BLD: 273 THOU/MM3 (ref 130–400)
PMV BLD AUTO: 9.3 MCM (ref 7.4–10.4)
POTASSIUM SERPL-SCNC: 3.9 MEQ/L (ref 3.5–5.2)
PRO-BNP: 3882 PG/ML (ref 0–1800)
RBC # BLD: 3.91 MILL/MM3 (ref 4.2–5.4)
SODIUM BLD-SCNC: 141 MEQ/L (ref 135–145)
WBC # BLD: 9.7 THOU/MM3 (ref 4.8–10.8)

## 2017-10-09 PROCEDURE — 80048 BASIC METABOLIC PNL TOTAL CA: CPT

## 2017-10-09 PROCEDURE — 5A2204Z RESTORATION OF CARDIAC RHYTHM, SINGLE: ICD-10-PCS | Performed by: INTERNAL MEDICINE

## 2017-10-09 PROCEDURE — 6360000002 HC RX W HCPCS: Performed by: NURSE PRACTITIONER

## 2017-10-09 PROCEDURE — G8988 SELF CARE GOAL STATUS: HCPCS

## 2017-10-09 PROCEDURE — G8987 SELF CARE CURRENT STATUS: HCPCS

## 2017-10-09 PROCEDURE — G8978 MOBILITY CURRENT STATUS: HCPCS

## 2017-10-09 PROCEDURE — 6370000000 HC RX 637 (ALT 250 FOR IP): Performed by: NUCLEAR MEDICINE

## 2017-10-09 PROCEDURE — 83880 ASSAY OF NATRIURETIC PEPTIDE: CPT

## 2017-10-09 PROCEDURE — 93005 ELECTROCARDIOGRAM TRACING: CPT | Performed by: INTERNAL MEDICINE

## 2017-10-09 PROCEDURE — 6360000002 HC RX W HCPCS

## 2017-10-09 PROCEDURE — 1200000003 HC TELEMETRY R&B

## 2017-10-09 PROCEDURE — 6370000000 HC RX 637 (ALT 250 FOR IP): Performed by: PHYSICIAN ASSISTANT

## 2017-10-09 PROCEDURE — 92960 CARDIOVERSION ELECTRIC EXT: CPT | Performed by: INTERNAL MEDICINE

## 2017-10-09 PROCEDURE — 93325 DOPPLER ECHO COLOR FLOW MAPG: CPT

## 2017-10-09 PROCEDURE — 6370000000 HC RX 637 (ALT 250 FOR IP): Performed by: NURSE PRACTITIONER

## 2017-10-09 PROCEDURE — 2580000003 HC RX 258: Performed by: INTERNAL MEDICINE

## 2017-10-09 PROCEDURE — 2500000003 HC RX 250 WO HCPCS

## 2017-10-09 PROCEDURE — 93320 DOPPLER ECHO COMPLETE: CPT

## 2017-10-09 PROCEDURE — 6370000000 HC RX 637 (ALT 250 FOR IP): Performed by: INTERNAL MEDICINE

## 2017-10-09 PROCEDURE — 6360000002 HC RX W HCPCS: Performed by: INTERNAL MEDICINE

## 2017-10-09 PROCEDURE — 6370000000 HC RX 637 (ALT 250 FOR IP): Performed by: HOSPITALIST

## 2017-10-09 PROCEDURE — 97530 THERAPEUTIC ACTIVITIES: CPT

## 2017-10-09 PROCEDURE — 99233 SBSQ HOSP IP/OBS HIGH 50: CPT | Performed by: HOSPITALIST

## 2017-10-09 PROCEDURE — 97165 OT EVAL LOW COMPLEX 30 MIN: CPT

## 2017-10-09 PROCEDURE — 93312 ECHO TRANSESOPHAGEAL: CPT

## 2017-10-09 PROCEDURE — 85027 COMPLETE CBC AUTOMATED: CPT

## 2017-10-09 PROCEDURE — G8979 MOBILITY GOAL STATUS: HCPCS

## 2017-10-09 PROCEDURE — 6370000000 HC RX 637 (ALT 250 FOR IP)

## 2017-10-09 PROCEDURE — 97110 THERAPEUTIC EXERCISES: CPT

## 2017-10-09 PROCEDURE — 36415 COLL VENOUS BLD VENIPUNCTURE: CPT

## 2017-10-09 PROCEDURE — 97161 PT EVAL LOW COMPLEX 20 MIN: CPT

## 2017-10-09 RX ORDER — LISINOPRIL 5 MG/1
5 TABLET ORAL DAILY
Status: DISCONTINUED | OUTPATIENT
Start: 2017-10-09 | End: 2017-10-09

## 2017-10-09 RX ORDER — SPIRONOLACTONE 25 MG/1
25 TABLET ORAL DAILY
Status: DISCONTINUED | OUTPATIENT
Start: 2017-10-09 | End: 2017-10-09

## 2017-10-09 RX ORDER — LISINOPRIL 2.5 MG/1
2.5 TABLET ORAL DAILY
Status: DISCONTINUED | OUTPATIENT
Start: 2017-10-10 | End: 2017-10-09

## 2017-10-09 RX ORDER — AMIODARONE HYDROCHLORIDE 200 MG/1
200 TABLET ORAL DAILY
Status: DISCONTINUED | OUTPATIENT
Start: 2017-10-09 | End: 2017-10-10

## 2017-10-09 RX ORDER — LISINOPRIL 2.5 MG/1
2.5 TABLET ORAL DAILY
Status: DISCONTINUED | OUTPATIENT
Start: 2017-10-09 | End: 2017-10-12 | Stop reason: HOSPADM

## 2017-10-09 RX ORDER — SPIRONOLACTONE 25 MG/1
12.5 TABLET ORAL DAILY
Status: DISCONTINUED | OUTPATIENT
Start: 2017-10-09 | End: 2017-10-12 | Stop reason: HOSPADM

## 2017-10-09 RX ORDER — ATORVASTATIN CALCIUM 40 MG/1
40 TABLET, FILM COATED ORAL NIGHTLY
Status: DISCONTINUED | OUTPATIENT
Start: 2017-10-09 | End: 2017-10-12 | Stop reason: HOSPADM

## 2017-10-09 RX ORDER — SPIRONOLACTONE 25 MG/1
12.5 TABLET ORAL DAILY
Status: DISCONTINUED | OUTPATIENT
Start: 2017-10-10 | End: 2017-10-09

## 2017-10-09 RX ADMIN — METOPROLOL TARTRATE 25 MG: 25 TABLET ORAL at 20:15

## 2017-10-09 RX ADMIN — SPIRONOLACTONE 12.5 MG: 25 TABLET, FILM COATED ORAL at 16:02

## 2017-10-09 RX ADMIN — DIGOXIN 250 MCG: 0.25 INJECTION INTRAMUSCULAR; INTRAVENOUS at 00:52

## 2017-10-09 RX ADMIN — APIXABAN 5 MG: 5 TABLET, FILM COATED ORAL at 20:15

## 2017-10-09 RX ADMIN — AMIODARONE HYDROCHLORIDE 1 MG/MIN: 50 INJECTION, SOLUTION INTRAVENOUS at 12:56

## 2017-10-09 RX ADMIN — APIXABAN 5 MG: 5 TABLET, FILM COATED ORAL at 08:11

## 2017-10-09 RX ADMIN — ASPIRIN 81 MG: 81 TABLET, CHEWABLE ORAL at 08:11

## 2017-10-09 RX ADMIN — FUROSEMIDE 40 MG: 40 TABLET ORAL at 08:10

## 2017-10-09 RX ADMIN — AMIODARONE HYDROCHLORIDE 0.5 MG/MIN: 50 INJECTION, SOLUTION INTRAVENOUS at 20:34

## 2017-10-09 RX ADMIN — AMIODARONE HYDROCHLORIDE 200 MG: 200 TABLET ORAL at 14:19

## 2017-10-09 RX ADMIN — METOPROLOL TARTRATE 25 MG: 25 TABLET ORAL at 08:10

## 2017-10-09 RX ADMIN — ATORVASTATIN CALCIUM 40 MG: 40 TABLET, FILM COATED ORAL at 20:15

## 2017-10-09 RX ADMIN — Medication 10 ML: at 20:12

## 2017-10-09 RX ADMIN — LISINOPRIL 2.5 MG: 5 TABLET ORAL at 14:45

## 2017-10-09 ASSESSMENT — PAIN SCALES - GENERAL
PAINLEVEL_OUTOF10: 0

## 2017-10-09 NOTE — PROGRESS NOTES
CARDIOLOGY PROGRESS NOTES          Acct: [de-identified]  Admit Date:  10/5/2017  Primary Cardiologist: Chen Brandon MD    Chief Complaint:Patient is seen on rounds today and  For cmp and afib Denies change in condition and has no fever no chest pains no sob and no bowel issues and ambulation issues all the patients  questions and concerns are attended to on rounds    Subjective (Events in last 24 hours):  Event overnight noted       Objective:    height is 5' (1.524 m) and weight is 131 lb 3.2 oz (59.5 kg). Her oral temperature is 98.3 °F (36.8 °C). Her blood pressure is 113/62 and her pulse is 106. Her respiration is 18 and oxygen saturation is 93%.     Wt Readings from Last 3 Encounters:   10/09/17 131 lb 3.2 oz (59.5 kg)   08/21/17 136 lb (61.7 kg)   10/20/16 141 lb (64 kg)       I/O (24 Hours)    Intake/Output Summary (Last 24 hours) at 10/09/17 1100  Last data filed at 10/09/17 1054   Gross per 24 hour   Intake             1051 ml   Output              450 ml   Net              601 ml       TELEMETRY: afib    Physical Exam:  General Appearance:alert   Cardiovascular: normal rate, regular rhythm, normal S1 and S2, no murmurs, rubs, clicks, or gallops, distal pulses intact, no carotid bruits, no JVD  Pulmonary/Chest: clear to auscultation bilaterally-  no wheezes,  rales or   rhonchi,  With air movement, no respiratory distress  Abdomen: soft, non-tender, non-distended, normal bowel sounds, no masses Extremities: no cyanosis, clubbing or edema, pulse     Skin: warm and dry, no rash or erythema  Head: normocephalic and atraumatic  Eyes: pupils equal, round, and reactive to light  Neck: supple and non-tender without mass, no thyromegaly   Musculoskeletal: normal range of motion, no joint swelling, deformity or tenderness  Neurological: intact    Medications:    lisinopril  5 mg Oral Daily    spironolactone  25 mg Oral Daily    atorvastatin  40 mg Oral Nightly   

## 2017-10-09 NOTE — H&P
PRE-PROCEDURE   Code Status: FULL CODE  Brief History/Pre-Procedure Diagnosis:  Víctor /cardioversion    Consent: :I have discussed with the patient and/or the patient representative the indication, alternatives, and the possible risks and/or complications of the planned procedure and the anesthesia methods. The patient and/or representative appear to understand and agree to proceed. The discussion encompasses risks, benefits, and side effects related to the alternatives and the risks related to not receiving the proposed care, treatment, and services.           MEDICAL HISTORY  [x]ASHD/ANGINA/MI/CHF   []Hypertension  []Diabetes  []Hyperlipidemia  []Smoking  []Family Hx of ASHD  []Additional information:      Past Medical History:   Diagnosis Date    Allergic rhinitis     Arthritis     GERD (gastroesophageal reflux disease)     Nonrheumatic aortic valve insufficiency 8/29/2017    Osteoporosis     Pre-op evaluation: prior to left knee repalcement 8/29/2017    PVC's (premature ventricular contractions) 8/29/2017    Torn meniscus 12/2016       SURGICAL HISTORY  Past Surgical History:   Procedure Laterality Date    APPENDECTOMY  1946    BACK SURGERY  3/24/14    Lumbar Laminectomy Fusion L3-5, L4-5 PLIF - Dr. Corrinne Fallen  3/8/06    right shoulder        ALLERGIES  No Known Allergies    Additional information:       MEDICATIONS   Aspirin  [x] 81 mg  [] 325 mg  [] None  Antiplatelet drug therapy use last 5 days  []No []Yes  Coumadin Use Last 5 Days []No []Yes  Other anticoagulant use last 5 days  []No []Yes  Current Facility-Administered Medications   Medication Dose Route Frequency Provider Last Rate Last Dose    apixaban (ELIQUIS) tablet 5 mg  5 mg Oral BID Antoni Hills CNP   5 mg at 10/09/17 0811    potassium (CARDIAC) replacement protocol   Other RX Placeholder Felix Reyes,         sodium chloride flush 0.9 % injection 10 mL  10 mL Intravenous 2 times per day Earl Nugent MD   10 mL at 10/08/17 2041    sodium chloride flush 0.9 % injection 10 mL  10 mL Intravenous PRN Earl Nugent MD        acetaminophen (TYLENOL) tablet 650 mg  650 mg Oral Q4H PRN Earl Nugent MD        magnesium hydroxide (MILK OF MAGNESIA) 400 MG/5ML suspension 30 mL  30 mL Oral Daily PRN Earl Nugent MD   30 mL at 10/08/17 1421    ondansetron (ZOFRAN) injection 4 mg  4 mg Intravenous Q6H PRN Earl Nugent MD        0.9 % sodium chloride infusion   Intravenous Continuous Earl Nugent MD   Stopped at 10/08/17 2042    ALPRAZolam Tomeka Blind) tablet 0.25 mg  0.25 mg Oral Once Viayahairae Boogie Reyes DO        furosemide (LASIX) tablet 40 mg  40 mg Oral Daily Monalisaheir Marissa Kawasaki, MD   40 mg at 10/09/17 0810    potassium replacement protocol   Other RX Placeholder Apple Hayward MD        metoprolol tartrate (LOPRESSOR) tablet 25 mg  25 mg Oral BID Jazlyn Larios PA-C   25 mg at 10/09/17 0810    aspirin chewable tablet 81 mg  81 mg Oral Daily Trav Melchor MD   81 mg at 10/09/17 0811    cefTRIAXone (ROCEPHIN) 1 g in sterile water IV syringe  1 g Intravenous Q24H Trav Melchor MD   1 g at 10/08/17 2040     Prescriptions Prior to Admission: HYDROcodone-acetaminophen ([address]) 5-325 MG per tablet, Take 1 tablet by mouth every 6 hours as needed for Pain .  traMADol (ULTRAM) 50 MG tablet, Take 50 mg by mouth every 6 hours as needed for Pain  ibuprofen (ADVIL;MOTRIN) 200 MG tablet, Take 200 mg by mouth every 6 hours as needed for Pain  NONFORMULARY, Vit C 1 tab  daily  diphenhydrAMINE-APAP, sleep, (TYLENOL PM EXTRA STRENGTH)  MG tablet, Take 1 tablet by mouth nightly as needed for Sleep  lansoprazole (PREVACID) 30 MG delayed release capsule, Take 1 capsule by mouth daily  aspirin EC 81 MG EC tablet, Take 1 tablet by mouth daily  NONFORMULARY, Vit D 1 tab daily    Additional information:       VITAL SIGNS   Vitals:    10/08/17 2241 10/09/17 0000 10/09/17 0421 10/09/17 0808   BP:  127/74 (!) 119/59 113/62   Pulse: 96 99 100 106   Resp:  20 16 18   Temp:  98.1 °F (36.7 °C) 98.1 °F (36.7 °C) 98.3 °F (36.8 °C)   TempSrc:  Oral Oral Oral   SpO2:  97% 96% 93%   Weight:   131 lb 3.2 oz (59.5 kg)    Height:           PHYSICAL:   General: No acute distress  HEENT:  Unremarkable for age  Neck: without increased JVD, carotid pulses 2+ bilaterally without bruits  Heart: RRR, S1 & S2 WNL, S4 gallop, without murmurs or rubs    Lungs: Clear to auscultation    Abdomen: BS present, without HSM, masses, or tenderness    Extremities: without C,C,E.  Pulses 2+ bilaterally  Mental Status: Alert & Oriented        PLANNED PROCEDURE   [x]Cath  []PCI                []Pacemaker/AICD  []KAMRAN             []Cardioversion []Peripheral angiography/PTA  []Other:      SEDATION  Planned agent:[x]Midazolam []Meperidine [x]Sublimaze []Morphine  []Diazepam  []Other:     ASA Classification:  []1 [x]2 []3 []4 []5  Class 1: A normal healthy patient  Class 2: Pt with mild to moderate systemic disease  Class 3: Severe systemic disease or disturbance  Class 4: Severe systemic disorders that are already life threatening. Class 5: Moribund pt with little chances of survival, for more than 24 hours. Mallampati I Airway Classification:   []1 [x]2 []3 []4    [x]Pre-procedure diagnostic studies complete and results available. Comment:    [x]Previous sedation/anesthesia experiences assessed. Comment:    [x]The patient is an appropriate candidate to undergo the planned procedure sedation and anesthesia. (Refer to nursing sedation/analgesia documentation record)  [x]Formulation and discussion of sedation/procedure plan, risks, and expectations with patient and/or responsible adult completed. [x]Patient examined immediately prior to the procedure. (Refer to nursing sedation/analgesia documentation record)    Day Del Castillo D.O., YOUNG Pisano., F.A.C.O.I., FA,C,C BOARD CERTIFIED  CARDIOLOGIST

## 2017-10-09 NOTE — PROGRESS NOTES
[facility] 60  INPATIENT OCCUPATIONAL THERAPY  STR CCU-STEPDOWN 3B  EVALUATION    Time:  Time In: 0493  Time Out: 1500  Timed Code Treatment Minutes: 8 Minutes  Minutes: 23          Date: 10/9/2017  Patient Name: Nasrin Carlson,   Gender: female      MRN: [id number]  : 1934  (80 y.o.)  Referring Practitioner: Chong Valverde CNP  Diagnosis: rapid atrial fibrillation  Additional Pertinent Hx: Per ED note, pt is a 80 y. o.female who presents to ED complaining of SOB and irregular heart beat. Patient was found to have tachycardia and EKG shows atrial fibrillation with rapid ventricular response. Status post left total knee a month ago. This is a new onset atrial fibrillation. Patient denied chronic atrial fibrillation or flutter. She denied previous MI or coronary artery disease. She's not on anticoagulation. No prior bleeding problem. Physical examination: Mild shortness of breath, irregularly irregular rhythm, tachycardic, abdomen soft. Left knee status post total knee changes, incision is clean dry and intact. No calf swelling. VR under control with Cardizem. Restrictions/Precautions:  Restrictions/Precautions: Fall Risk, General Precautions                      Past Medical History:   Diagnosis Date    Allergic rhinitis     Arthritis     GERD (gastroesophageal reflux disease)     Nonrheumatic aortic valve insufficiency 2017    Osteoporosis     Pre-op evaluation: prior to left knee repalcement 2017    PVC's (premature ventricular contractions) 2017    Torn meniscus 2016     Past Surgical History:   Procedure Laterality Date    APPENDECTOMY      BACK SURGERY  3/24/14    Lumbar Laminectomy Fusion L3-5, L4-5 PLIF - Dr. Arvind Soria  3/8/06    right shoulder            Subjective  Patient assessed for rehabilitation services?: Yes    Subjective: RN okayed session.  pt in bed asleep agreeable to work with therapy     General: Level: Stand by assistance  Functional Mobility Comments: in room and into hallway at slow pace, slightly unsteady at times, no LOB     Type of ROM/Therapeutic Exercise  Comment: issued yellow theraband. Pt completed BUE strengthening exercises for all joints in all planes X 10 reps with min cues for technique while seated at EOB. completed to improve UB strength and activity tolerance needed for ease with ADLs and functional trnasfers         Activity Tolerance:  Activity Tolerance: Patient Tolerated treatment well    Treatment Initiated:  OT eval completed, see above for more details. Assessment:  Assessment: Pt tolerated treatment well, more fatigued this pm, Pt presents with above deficits requiring increased assitance with ADLs and mobility, will continue to benefit from OT services to address deficits and return to PLOF  Performance deficits / Impairments: Decreased functional mobility , Decreased ADL status, Decreased endurance, Decreased strength, Decreased balance  Prognosis: Good  Discharge Recommendations: Continue to assess pending progress    Clinical Decision Making: Clinical Decision making was of Low Complexity as the result of analysis of data from a problem focused assessment, a consideration of a limited number of treatment options, no significant comorbidities affecting the plan of care and no modification or assistance required to complete the evaluation. Patient Education:  Patient Education: OT POC, role of OT, UE exercises    Equipment Recommendations:  Equipment Needed: No    Safety:  Safety Devices in place: Yes  Type of devices:  All fall risk precautions in place, Left in bed, Nurse notified, Call light within reach    Plan:  Times per week: 5x  Current Treatment Recommendations: Strengthening, Balance Training, Functional Mobility Training, Endurance Training, Patient/Caregiver Education & Training, Self-Care / ADL, Safety Education & Training    Goals:  Patient goals : to go home    Short term goals  Time Frame for Short term goals: 1 week  Short term goal 1: Pt will complete functional mobility within environment with S and cane with no LOB to increase I with HH ambulation  Short term goal 2: Pt will complete functional transfers with S and no cues for safety including to/from toilet  Short term goal 3: Pt will complete dynamic standing task with S for > 4 minutes with 2 hand release and no LOB to increase I with sink side grooming  Short term goal 4: Pt will complete BUE strengthening exercises to increase strength and endurance needed for ease with ADL routine  Short term goal 5: Pt will complete BADL routine with S and any adaptive technique needed to increase I with bathing and dressing at home  Long term goals  Time Frame for Long term goals : No LTG established d/t short ELOS    Evaluation Complexity: Based on the findings of patient history, examination, clinical presentation, and decision making during this evaluation, this patient is of low complexity. OT G-codes  Functional Assessment Tool Used: professional judgement  Functional Limitation: Self care  Self Care Current Status ():  At least 1 percent but less than 20 percent impaired, limited or restricted  Self Care Goal Status (): 0 percent impaired, limited or restricted

## 2017-10-09 NOTE — PROGRESS NOTES
[facility]  INPATIENT PHYSICAL THERAPY  EVALUATION  STRZ CCU-STEPDOWN 3B    Time In: 0940  Time Out: 1004  Timed Code Treatment Minutes: 9 Minutes  Minutes: 24          Date: 10/9/2017  Patient Name: Asia Stone,  Gender:  female        MRN: [id number]  : 1934  (80 y.o.)  Referral Date : 10/08/17   Referring Practitioner: Apoorva Alexis CNP  Diagnosis: Rapid Atrial Fibrillation  Additional Pertinent Hx: Per ED note, pt is a 80 y. o.female who presents to ED complaining of SOB and irregular heart beat. Patient was found to have tachycardia and EKG shows atrial fibrillation with rapid ventricular response. Status post left total knee a month ago. This is a new onset atrial fibrillation. Patient denied chronic atrial fibrillation or flutter. She denied previous MI or coronary artery disease. She's not on anticoagulation. No prior bleeding problem. Physical examination: Mild shortness of breath, irregularly irregular rhythm, tachycardic, abdomen soft. Left knee status post total knee changes, incision is clean dry and intact. No calf swelling. VR under control with Cardizem. Past Medical History:   Diagnosis Date    Allergic rhinitis     Arthritis     GERD (gastroesophageal reflux disease)     Nonrheumatic aortic valve insufficiency 2017    Osteoporosis     Pre-op evaluation: prior to left knee repalcement 2017    PVC's (premature ventricular contractions) 2017    Torn meniscus 2016     Past Surgical History:   Procedure Laterality Date    APPENDECTOMY      BACK SURGERY  3/24/14    Lumbar Laminectomy Fusion L3-5, L4-5 PLHAMIDA - Dr. Wellington Vazquez  3/8/06    right shoulder        Restrictions/Precautions:  Restrictions/Precautions: Fall Risk, General Precautions        Subjective:  Chart Reviewed: Yes  Patient assessed for rehabilitation services?: Yes  Subjective: Pt resting in bed with spouse present.  RN approved session and pt to be having cardioversion later this morning    General:  Overall Orientation Status: Within Normal Limits    Vision: Impaired  Vision Exceptions: Wears glasses at all times    Hearing: Within functional limits         Pain:  Denies. Social/Functional History:    Lives With: Spouse  Type of Home: House  Home Layout: Two level, Able to Live on Main level with bedroom/bathroom  Home Access: Stairs to enter with rails  Entrance Stairs - Number of Steps: 2-3 steps with 1 rail  Home Equipment: Rolling walker, [employer] Help From: Family        Ambulation Assistance: Independent  Transfer Assistance: Independent       Additional Comments: Pt using cane currently and has been going to Outpt PT for L total knee replacement completed last month. Objective:     RLE AROM: WNL     LLE AROM : WFL        Strength RLE: WFL  Comment: grossly 4/5 throughout    Strength LLE: WFL  Comment: grossly 4-/5 throughout        Sensation  Overall Sensation Status: WNL         Supine to Sit: Modified independent  Sit to Supine: Modified independent  Scooting: Modified independent    Transfers  Sit to Stand: Stand by assistance  Stand to sit: Supervision    WB Status: WBAT L LE  Ambulation 1  Surface: level tile  Device: Single point cane  Assistance: Stand by assistance  Quality of Gait: limited L knee terminal extension, minor LOB that was self corrected  Distance: 10 ft and additional distance below       Exercises:  Comments: Pt was guided through completion of supine heelslides, hip abd/add, quad sets, ankle pumps, and straight leg raises. Each x 7-10 reps for strengthening to improve functional mobility          Activity Tolerance:  Activity Tolerance: Patient limited by endurance; Patient Tolerated treatment well    Treatment Initiated: See exercises above and pt ambulated 120 ft with st cane and close SBA with same deviations as above      Assessment:   Body structures, Functions, Activity limitations: Decreased decision making during this evaluation, the evaluation of Brisa Funez  is of low complexity. PT G-Codes  Functional Assessment Tool Used: prof judgement  Functional Limitation: Mobility: Walking and moving around  Mobility: Walking and Moving Around Current Status (): At least 20 percent but less than 40 percent impaired, limited or restricted  Mobility: Walking and Moving Around Goal Status (): At least 1 percent but less than 20 percent impaired, limited or restricted      Bhavana .  Charlene Alston, [place] [address]

## 2017-10-09 NOTE — PROGRESS NOTES
0.8   --   0.7  0.7   CALCIUM  8.1*   --   8.2*  8.6     Recent Labs      10/07/17   0415  10/08/17   0624   AST  17  13   ALT  29  22   BILITOT  0.6  0.8   ALKPHOS  84  76     Recent Labs      10/07/17   0415   INR  1.24*     No results for input(s): Thelma Labor in the last 72 hours. Urinalysis:    Lab Results   Component Value Date    NITRU NEGATIVE 10/05/2017    WBCUA 10-15 10/05/2017    BACTERIA NONE 10/05/2017    RBCUA 0-2 10/05/2017    BLOODU NEGATIVE 10/05/2017    GLUCOSEU NEGATIVE 10/05/2017       Radiology:  XR Chest Portable   Final Result   1. There is no acute cardiopulmonary process. **This report has been created using voice recognition software. It may contain minor errors which are inherent in voice recognition technology. **      Final report electronically signed by Dr. Lucina Caballero on 10/6/2017 7:00 AM      CTA [address]   Final Result   1. No evidence of pulmonary embolism. 2. Small to moderate bilateral pleural effusions with accentuation of the vascular markings. **This report has been created using voice recognition software. It may contain minor errors which are inherent in voice recognition technology. **      Final report electronically signed by Dr. Marcos Ortega on 10/5/2017 9:21 AM            Active Hospital Problems    Diagnosis Date Noted    Acute systolic CHF (congestive heart failure) (HCC) [I50.21]      Priority: High    CAD, multiple vessel [I25.10]      Priority: High    Ischemic dilated cardiomyopathy [I25.5, I42.0]      Priority: High    Hypotension [I95.9]      Priority: High    Atrial fibrillation with RVR (HCC) [I48.91]      Priority: High    Rapid atrial fibrillation (HCC) [I48.91] 10/05/2017    SOB (shortness of breath) [R06.02] 10/05/2017    Chest pain [R07.9] 10/05/2017               Electronically signed by Maggi Chandra MD on 10/9/2017 at 12:07 PM

## 2017-10-09 NOTE — PLAN OF CARE
Problem: Pain Control  Goal: Maintain pain level at or below patient's acceptable level (or 5 if patient is unable to determine acceptable level)  Outcome: Ongoing  Patient denies pain, will continue to assess. Problem: Cardiovascular  Goal: Hemodynamic stability  Outcome: Ongoing  Blood pressures better. Heart rate 100s still afib. KAMRAN cardioversion today    Problem: Respiratory  Goal: O2 Sat > 90%  Outcome: Ongoing  Oxygen level within limits on room air    Problem: GI  Goal: No bowel complications  Outcome: Ongoing  Patient had bowel movement today. Problem: Discharge Planning  Goal: Able to ambulate independently  Able to ambulate independently     Outcome: Completed Date Met: 10/09/17  Patient up with a standby assist for safety    Problem: Nutrition  Goal: Optimal nutrition therapy  Outcome: Completed Date Met: 10/09/17  Patient has increased appetite    Problem: DISCHARGE BARRIERS  Goal: Patient's continuum of care needs are met  Outcome: Ongoing  Patient lives at home with , denies needs at this time    Problem: Falls - Risk of  Goal: Absence of falls  Outcome: Ongoing  Hourly rounding continues. Patient uses call light for assistance    Comments: Care plan reviewed with patient and family. Patient and family verbalize understanding of the plan of care and contribute to goal setting.

## 2017-10-09 NOTE — PROCEDURES
[address] [place] [address]                           CARDIAC CATHETERIZATION    PATIENT NAME: RUTH COUCH                                    :       1934  MED REC NO:   [id number]                                      ROOM:      0033  ACCOUNT NO:   [de-identified]                                      ADMISSION  DATE:  10/05/2017  PROVIDER:     Ej Penny D.O.      DATE OF PROCEDURE:  10/09/2017      PROCEDURE:  KAMRAN with cardioversion. INDICATION:  Difficult to control atrial fibrillation which the  patient presented with and has now also tachycardia-induced  cardiomyopathy, EF about 30%. MEDICATIONS:  Fentanyl 50 mcg and Versed 2 mg. LOCATION:  Procedure was performed in the catheterization lab. COMPLICATIONS:  None. CONSENT:  Consent was obtained from the patient after discussing the  risks of the procedure not limited to death, stroke, bleeding,  dissection of the esophagus, damage to teeth, etc.  Despite the risks,  the patient agreed to undergo a KAMRAN with cardioversion. DESCRIPTION OF PROCEDURE:  The patient was brought to the cardiac  catheterization-KAMRAN region. The throat was numbed with Cetacaine  spray. Then, the patient was given sedatives with fentanyl and  Versed. Once adequate sedation was obtained, then we went ahead and  intubated the patient. The intubation was pursued without any trauma  and then, the probe was directed towards the mid and distal esophagus. Then, images were obtained and stored so that the left atrial  appendage was small and Doppler flow across did indicate higher flow  velocity. There was no thrombus noted. The pulmonary flow velocity  appears to be normal in the intra-atrial region. The mitral valve  appears to be normal, mild mitral regurgitation. Tricuspid valve was  grossly normal with trace tricuspid insufficiency. Aortic valve is  trileaflet.   The aortic root appears to

## 2017-10-10 ENCOUNTER — TELEPHONE (OUTPATIENT)
Dept: FAMILY MEDICINE CLINIC | Age: 82
End: 2017-10-10

## 2017-10-10 LAB
ANION GAP SERPL CALCULATED.3IONS-SCNC: 11 MEQ/L (ref 8–16)
BLOOD CULTURE, ROUTINE: NORMAL
BLOOD CULTURE, ROUTINE: NORMAL
BUN BLDV-MCNC: 18 MG/DL (ref 7–22)
CALCIUM SERPL-MCNC: 8.3 MG/DL (ref 8.5–10.5)
CHLORIDE BLD-SCNC: 99 MEQ/L (ref 98–111)
CO2: 27 MEQ/L (ref 23–33)
CREAT SERPL-MCNC: 0.6 MG/DL (ref 0.4–1.2)
EKG ATRIAL RATE: 62 BPM
EKG P AXIS: 15 DEGREES
EKG P-R INTERVAL: 150 MS
EKG Q-T INTERVAL: 560 MS
EKG QRS DURATION: 90 MS
EKG QTC CALCULATION (BAZETT): 568 MS
EKG R AXIS: -54 DEGREES
EKG T AXIS: 166 DEGREES
EKG VENTRICULAR RATE: 62 BPM
GFR SERPL CREATININE-BSD FRML MDRD: > 90 ML/MIN/1.73M2
GLUCOSE BLD-MCNC: 93 MG/DL (ref 70–108)
MAGNESIUM: 2.1 MG/DL (ref 1.6–2.4)
POTASSIUM SERPL-SCNC: 4.1 MEQ/L (ref 3.5–5.2)
SODIUM BLD-SCNC: 137 MEQ/L (ref 135–145)

## 2017-10-10 PROCEDURE — 6370000000 HC RX 637 (ALT 250 FOR IP): Performed by: NUCLEAR MEDICINE

## 2017-10-10 PROCEDURE — 97535 SELF CARE MNGMENT TRAINING: CPT

## 2017-10-10 PROCEDURE — 6360000002 HC RX W HCPCS: Performed by: INTERNAL MEDICINE

## 2017-10-10 PROCEDURE — A6257 TRANSPARENT FILM <= 16 SQ IN: HCPCS

## 2017-10-10 PROCEDURE — 6370000000 HC RX 637 (ALT 250 FOR IP): Performed by: PHYSICIAN ASSISTANT

## 2017-10-10 PROCEDURE — 97110 THERAPEUTIC EXERCISES: CPT

## 2017-10-10 PROCEDURE — 99232 SBSQ HOSP IP/OBS MODERATE 35: CPT | Performed by: INTERNAL MEDICINE

## 2017-10-10 PROCEDURE — 6370000000 HC RX 637 (ALT 250 FOR IP): Performed by: NURSE PRACTITIONER

## 2017-10-10 PROCEDURE — 97116 GAIT TRAINING THERAPY: CPT

## 2017-10-10 PROCEDURE — 36415 COLL VENOUS BLD VENIPUNCTURE: CPT

## 2017-10-10 PROCEDURE — 83735 ASSAY OF MAGNESIUM: CPT

## 2017-10-10 PROCEDURE — 93005 ELECTROCARDIOGRAM TRACING: CPT | Performed by: INTERNAL MEDICINE

## 2017-10-10 PROCEDURE — 6370000000 HC RX 637 (ALT 250 FOR IP): Performed by: INTERNAL MEDICINE

## 2017-10-10 PROCEDURE — 6370000000 HC RX 637 (ALT 250 FOR IP): Performed by: HOSPITALIST

## 2017-10-10 PROCEDURE — 2580000003 HC RX 258: Performed by: INTERNAL MEDICINE

## 2017-10-10 PROCEDURE — 80048 BASIC METABOLIC PNL TOTAL CA: CPT

## 2017-10-10 PROCEDURE — 1200000003 HC TELEMETRY R&B

## 2017-10-10 PROCEDURE — 99231 SBSQ HOSP IP/OBS SF/LOW 25: CPT | Performed by: NURSE PRACTITIONER

## 2017-10-10 RX ORDER — AMIODARONE HYDROCHLORIDE 200 MG/1
200 TABLET ORAL DAILY
Qty: 30 TABLET | Refills: 3 | Status: SHIPPED | OUTPATIENT
Start: 2017-10-11 | End: 2017-10-10 | Stop reason: HOSPADM

## 2017-10-10 RX ORDER — ATORVASTATIN CALCIUM 40 MG/1
40 TABLET, FILM COATED ORAL NIGHTLY
Qty: 30 TABLET | Refills: 3 | Status: SHIPPED | OUTPATIENT
Start: 2017-10-10 | End: 2018-01-24 | Stop reason: SDUPTHER

## 2017-10-10 RX ORDER — LISINOPRIL 2.5 MG/1
2.5 TABLET ORAL DAILY
Qty: 30 TABLET | Refills: 3 | Status: SHIPPED | OUTPATIENT
Start: 2017-10-11 | End: 2018-01-16 | Stop reason: SINTOL

## 2017-10-10 RX ORDER — SPIRONOLACTONE 25 MG/1
12.5 TABLET ORAL DAILY
Qty: 30 TABLET | Refills: 3 | Status: SHIPPED | OUTPATIENT
Start: 2017-10-11 | End: 2018-01-24 | Stop reason: SDUPTHER

## 2017-10-10 RX ORDER — MAGNESIUM SULFATE IN WATER 40 MG/ML
2 INJECTION, SOLUTION INTRAVENOUS ONCE
Status: COMPLETED | OUTPATIENT
Start: 2017-10-10 | End: 2017-10-10

## 2017-10-10 RX ORDER — FUROSEMIDE 20 MG/1
20 TABLET ORAL DAILY
Status: DISCONTINUED | OUTPATIENT
Start: 2017-10-11 | End: 2017-10-12 | Stop reason: HOSPADM

## 2017-10-10 RX ORDER — FUROSEMIDE 20 MG/1
20 TABLET ORAL DAILY
Qty: 60 TABLET | Refills: 3 | Status: SHIPPED | OUTPATIENT
Start: 2017-10-11 | End: 2018-01-24 | Stop reason: SDUPTHER

## 2017-10-10 RX ADMIN — METOPROLOL TARTRATE 25 MG: 25 TABLET ORAL at 16:54

## 2017-10-10 RX ADMIN — METOPROLOL TARTRATE 25 MG: 25 TABLET ORAL at 22:02

## 2017-10-10 RX ADMIN — METOPROLOL TARTRATE 25 MG: 25 TABLET ORAL at 09:09

## 2017-10-10 RX ADMIN — MAGNESIUM SULFATE HEPTAHYDRATE 2 G: 40 INJECTION, SOLUTION INTRAVENOUS at 17:33

## 2017-10-10 RX ADMIN — ASPIRIN 81 MG: 81 TABLET, CHEWABLE ORAL at 09:09

## 2017-10-10 RX ADMIN — APIXABAN 5 MG: 5 TABLET, FILM COATED ORAL at 20:29

## 2017-10-10 RX ADMIN — APIXABAN 5 MG: 5 TABLET, FILM COATED ORAL at 09:09

## 2017-10-10 RX ADMIN — ATORVASTATIN CALCIUM 40 MG: 40 TABLET, FILM COATED ORAL at 20:29

## 2017-10-10 RX ADMIN — LISINOPRIL 2.5 MG: 5 TABLET ORAL at 09:10

## 2017-10-10 RX ADMIN — FUROSEMIDE 40 MG: 40 TABLET ORAL at 09:09

## 2017-10-10 RX ADMIN — Medication 10 ML: at 17:34

## 2017-10-10 RX ADMIN — AMIODARONE HYDROCHLORIDE 200 MG: 200 TABLET ORAL at 09:09

## 2017-10-10 RX ADMIN — Medication 10 ML: at 20:28

## 2017-10-10 RX ADMIN — SPIRONOLACTONE 12.5 MG: 25 TABLET, FILM COATED ORAL at 09:09

## 2017-10-10 ASSESSMENT — PAIN SCALES - GENERAL
PAINLEVEL_OUTOF10: 0

## 2017-10-10 NOTE — PLAN OF CARE
Problem: Pain Control  Goal: Maintain pain level at or below patient's acceptable level (or 5 if patient is unable to determine acceptable level)  Outcome: Ongoing  Patient denies pain so far this shift. Reminded patient to report any pain, pressure, or shortness of breath to the nurse. Will continue to monitor. Problem: Cardiovascular  Goal: Hemodynamic stability  Outcome: Ongoing  Ongoing assessment & interventions provided throughout shift. Patient on continuous telemetry monitoring, heart tones, vitals & pulses checked at least 3 times per shift & PRN. Vitals within acceptable limits. Peripheral pulses palpable. Problem: Respiratory  Goal: O2 Sat > 90%  Outcome: Ongoing  Pulse ox 95% or better on room air so far this shift. Problem: Falls - Risk of  Goal: Absence of falls  Outcome: Ongoing  Assessment & interventions provided throughout shift. Bed locked & in low position, call light in reach, side-rails up x2, bed alarm on, non-slip socks on when ambulating, reminded patient to use call light to call for assistance. Problem: Risk for Impaired Skin Integrity  Goal: Tissue integrity - skin and mucous membranes  Structural intactness and normal physiological function of skin and  mucous membranes. Outcome: Ongoing  Patient turns self with ease. Ambulates with a cane due to left knee surgery a month ago but is steady. Comments: Care plan reviewed with patient. Patient verbalizes understanding of the care plan and contributed to goal setting.

## 2017-10-10 NOTE — PLAN OF CARE
Problem: Pain Control  Goal: Maintain pain level at or below patient's acceptable level (or 5 if patient is unable to determine acceptable level)  Outcome: Met This Shift  Patient denies pain, will continue to assess. Problem: Cardiovascular  Goal: Hemodynamic stability  Outcome: Ongoing  Patient having runs of torsades, medication changes, telemetry and vitals continue    Problem: Respiratory  Goal: O2 Sat > 90%  Outcome: Completed Date Met: 10/10/17  Patient's O2 on room within limits. Problem: GI  Goal: No bowel complications  Outcome: Completed Date Met: 10/10/17  Bowel movement today. Problem: DISCHARGE BARRIERS  Goal: Patient's continuum of care needs are met  Outcome: Ongoing  Patient continues to plan to go home to live with . Denies additional needs. Problem: Falls - Risk of  Goal: Absence of falls  Outcome: Ongoing  Hourly rounding continues. Problem: Risk for Impaired Skin Integrity  Goal: Tissue integrity - skin and mucous membranes  Structural intactness and normal physiological function of skin and  mucous membranes. Outcome: Ongoing  Skin intact, will continue to assess. Comments: Care plan reviewed with patient and family. Patient and family verbalize understanding of the plan of care and contribute to goal setting.

## 2017-10-10 NOTE — PROGRESS NOTES
[facility]  INPATIENT OCCUPATIONAL THERAPY  STRZ CCU-STEPDOWN 3B  DAILY NOTE    Time:  Time In: 65  Time Out: 0827  Timed Code Treatment Minutes: 32 Minutes  Minutes: 24    Date: 10/10/2017  Patient Name: Cherry Matamoros,   Gender: female      Room: [facility]33/033-A  MRN: [id number]  : 1934  (80 y.o.)  Referring Practitioner: Rodolfo Acevedo CNP  Diagnosis: rapid atrial fibrillation  Additional Pertinent Hx: Per ED note, pt is a 80 y. o.female who presents to ED complaining of SOB and irregular heart beat. Patient was found to have tachycardia and EKG shows atrial fibrillation with rapid ventricular response. Status post left total knee a month ago. This is a new onset atrial fibrillation. Patient denied chronic atrial fibrillation or flutter. She denied previous MI or coronary artery disease. She's not on anticoagulation. No prior bleeding problem. Physical examination: Mild shortness of breath, irregularly irregular rhythm, tachycardic, abdomen soft. Left knee status post total knee changes, incision is clean dry and intact. No calf swelling. VR under control with Cardizem.       Restrictions/Precautions:  Restrictions/Precautions: Fall Risk, General Precautions    Past Medical History:   Diagnosis Date    Allergic rhinitis     Arthritis     Encounter for cardioversion procedure 10/05/2017    GERD (gastroesophageal reflux disease)     Hx of transesophageal echocardiography (KAMRAN) for monitoring 10/2017    Mild mitral regurgitation 10/2017    Nonrheumatic aortic valve insufficiency 2017    Osteoporosis     Pre-op evaluation: prior to left knee repalcement 2017    PVC's (premature ventricular contractions) 2017    S/P cardiac catheterization 10/07/2017    with Dr. Julito Cummings -diagonal 90%, circ 90%,     Torn meniscus 2016     Past Surgical History:   Procedure Laterality Date    APPENDECTOMY  194    BACK SURGERY  3/24/14    Lumbar Laminectomy Fusion L3-5, L4-5 PLIF -  Rema    EYE SURGERY      ROTATOR CUFF REPAIR  3/8/06    right shoulder          Subjective  Subjective: Patient cooperative to OT session   Overall Orientation Status: Within Normal Limits    Pain:  Pain Assessment  Patient Currently in Pain: Denies    Objective  ADL  Grooming: Stand by assistance (At sink to wash face, complete oral care, utilize shampoo cap and comb/dry hair)  UE Dressing: Minimal assistance (Mazin/doff gown d/t lines)  Toileting: Stand by assistance (Able to complete july care and clothing management)     Transfers  Sit to stand: Stand by assistance (From STS )  Stand to sit: Stand by assistance (To EOB )  Toilet Transfers  Toilet - Technique: Ambulating  Equipment Used: Standard toilet  Toilet Transfer: Stand by assistance  Toilet Transfers Comments: Getting off commode    Balance  Sitting Balance: Modified independent  (Sitting EOB)  Standing Balance: Stand by assistance (sink side)     Time: ~10mins   Activity: self care skills standing at sink      Functional Mobility  Functional - Mobility Device: Cane  Activity: To/from bathroom  Functional Mobility Comments: Patient completed functional mobility in room from  with SBA, cane, no LOB      Activity Tolerance:  Activity Tolerance: Patient Tolerated treatment well    Assessment:  Performance deficits / Impairments: Decreased functional mobility , Decreased ADL status, Decreased endurance, Decreased strength, Decreased balance  Prognosis: Good  Discharge Recommendations: Continue to assess pending progress    Patient Education:  Patient Education: Role of OT, self care skills     Equipment Recommendations:  Equipment Needed: No    Safety:  Safety Devices in place: Yes  Type of devices:  All fall risk precautions in place, Left in bed, Nurse notified, Call light within reach    Plan:  Times per week: 5x  Current Treatment Recommendations: Strengthening, Balance Training, Functional Mobility Training, Endurance Training, Patient/Caregiver Education & Training, Self-Care / ADL, Safety Education & Training    Goals:  Patient goals : to go home    Short term goals  Time Frame for Short term goals: 1 week  Short term goal 1: Pt will complete functional mobility within environment with S and cane with no LOB to increase I with HH ambulation  Short term goal 2: Pt will complete functional transfers with S and no cues for safety including to/from toilet  Short term goal 3: Pt will complete dynamic standing task with S for > 4 minutes with 2 hand release and no LOB to increase I with sink side grooming  Short term goal 4: Pt will complete BUE strengthening exercises to increase strength and endurance needed for ease with ADL routine  Short term goal 5: Pt will complete BADL routine with S and any adaptive technique needed to increase I with bathing and dressing at home  Long term goals  Time Frame for Long term goals : No LTG established d/t short ELOS

## 2017-10-10 NOTE — PROGRESS NOTES
CARDIOLOGY PROGRESS NOTES          Acct: [de-identified]  Admit Date:  10/5/2017  Primary Cardiologist: dr friedman/preston    Chief Complaint:Patient is seen on rounds today and  For cardiomyopathy and Afib in sinus sp cadioversion  Denies change in condition and has no fever no chest pains no sob and no bowel issues and ambulation issues all the patients  questions and concerns are attended to on rounds    Subjective (Events in last 24 hours):  Event overnight noted       Objective:    height is 5' (1.524 m) and weight is 131 lb 6.4 oz (59.6 kg). Her oral temperature is 98.2 °F (36.8 °C). Her blood pressure is 120/50 (abnormal) and her pulse is 66. Her respiration is 16 and oxygen saturation is 97%.     Wt Readings from Last 3 Encounters:   10/09/17 131 lb 6.4 oz (59.6 kg)   08/21/17 136 lb (61.7 kg)   10/20/16 141 lb (64 kg)       I/O (24 Hours)    Intake/Output Summary (Last 24 hours) at 10/10/17 1356  Last data filed at 10/10/17 0429   Gross per 24 hour   Intake          1294.07 ml   Output             1100 ml   Net           194.07 ml       TELEMETRY:sinus     Physical Exam:  General Appearance:alert   Cardiovascular: normal rate, regular rhythm, normal S1 and S2, no murmurs, rubs, clicks, or gallops, distal pulses intact, no carotid bruits, no JVD  Pulmonary/Chest: clear to auscultation bilaterally-  no wheezes,  rales or   rhonchi,  With air movement, no respiratory distress  Abdomen: soft, non-tender, non-distended, normal bowel sounds, no masses Extremities: no cyanosis, clubbing or edema, pulse     Skin: warm and dry, no rash or erythema  Head: normocephalic and atraumatic  Eyes: pupils equal, round, and reactive to light  Neck: supple and non-tender without mass, no thyromegaly   Musculoskeletal: normal range of motion, no joint swelling, deformity or tenderness  Neurological: intact    Medications:    [START ON 10/11/2017] furosemide  20 mg Oral Daily    atorvastatin  40 mg Oral Nightly    amiodarone  200 mg Oral Daily    lisinopril  2.5 mg Oral Daily    spironolactone  12.5 mg Oral Daily    apixaban  5 mg Oral BID    potassium (CARDIAC) replacement protocol   Other RX Placeholder    sodium chloride flush  10 mL Intravenous 2 times per day    ALPRAZolam  0.25 mg Oral Once    potassium replacement protocol   Other RX Placeholder    metoprolol tartrate  25 mg Oral BID    aspirin  81 mg Oral Daily      amiodarone 450mg/250ml D5W infusion 0.5 mg/min (10/09/17 2034)       sodium chloride flush 10 mL PRN   acetaminophen 650 mg Q4H PRN   magnesium hydroxide 30 mL Daily PRN   ondansetron 4 mg Q6H PRN       Diagnostics:      Lab Data:    Cardiac Enzymes:  No results for input(s): CKTOTAL, CKMB, CKMBINDEX, TROPONINI in the last 72 hours.     CBC:   Lab Results   Component Value Date    WBC 9.7 10/09/2017    RBC 3.91 10/09/2017    HGB 11.3 10/09/2017    HCT 34.5 10/09/2017     10/09/2017       CMP:  Lab Results   Component Value Date     10/10/2017    K 4.1 10/10/2017    CL 99 10/10/2017    CO2 27 10/10/2017    BUN 18 10/10/2017    CREATININE 0.6 10/10/2017    LABGLOM >90 10/10/2017    GLUCOSE 93 10/10/2017    GLUCOSE 133 08/29/2017    CALCIUM 8.3 10/10/2017       Hepatic Function Panel:  Lab Results   Component Value Date    ALKPHOS 76 10/08/2017    ALT 22 10/08/2017    AST 13 10/08/2017    PROT 5.9 10/08/2017    BILITOT 0.8 10/08/2017    LABALBU 3.3 10/08/2017       Magnesium:    Lab Results   Component Value Date    MG 2.2 10/06/2017       PT/INR:    Lab Results   Component Value Date    INR 1.24 10/07/2017       HgBA1c:    Lab Results   Component Value Date    LABA1C 5.1 10/06/2017       FLP:  Lab Results   Component Value Date    TRIG 92 09/24/2016    HDL 62 09/24/2016    LDLCALC 131 09/24/2016       TSH:    Lab Results   Component Value Date    TSH 2.470 10/06/2017         Assessment:  nsvt / TdP questionable   Cardiomyopathy  S/p afib in sinus

## 2017-10-10 NOTE — PROCEDURES
[address] [place] [address]                           CARDIAC CATHETERIZATION    PATIENT NAME: RUTH COUCH                          :             1934  MED REC NO:   [id number]                            ROOM:            0033  ACCOUNT NO:   [de-identified]                            ADMISSION DATE:  10/05/2017  PROVIDER:     Ayala Sung      DATE OF PROCEDURE:  10/07/2017      INDICATION:  Heart failure. PROCEDURE:  After informed consent was obtained from the patient, she  was brought to the cardiac catheterization laboratory and prepped in  sterile fashion. Right femoral artery and right femoral vein access  were chosen as the primary access points. After infiltration of the right  inguinal region with 2% lidocaine, a 6-Irish right femoral artery  sheath was inserted into the right femoral artery. A 5-Irish sheath  was inserted into the right femoral vein. Standard JL4 and JR4  diagnostic catheters were used for coronary angiogram.  A 5-Irish  Polanco catheter was used for right heart catheterization. CORONARY ANGIOGRAM:    LEFT MAIN:  The left main is patent with mild luminal irregularities  with no significant obstructive disease. LAD:  The LAD has a proximal vessel that is approximately 4.0 mm in  diameter and quickly narrows to 1.5 mm in diameter. The entire LAD is  diffusely diseased. There is a large diagonal branch that has 90%  stenosis. LEFT CIRCUMFLEX:  The left circumflex is small and diffusely diseased. The AV groove circumflex continues on to with a 90% stenosis in the  proximal segment. The OM branch is diseased in the mid  segment. RCA:  Dominant vessel with mild luminal irregularities throughout. The PDA has approximately 60% stenosis in the mid vessel. The PLV has  mild luminal irregularities.     RIGHT HEART CATHETERIZATION:  Right atrium 10, RV 29/8, PA 27/50 with  a mean of 21, wedge pressure of 11, cardiac index of 2.1, PA  saturation 62%. LV:  The LV systolic pressure is 96 with an EDP of 8. There is no  significant LV to AO systolic gradient. 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 Euvolemic pressures with preserved cardiac  output. PLAN:  1. Continued management of heart failure symptoms. 2.  Optimal medical therapy. 3.  Risk factor management. 4.  Multivessel disease, not amenable to PCI. Continue medical  management. 5.  Bedrest/groin care per protocol. 6.  Follow up with myself or primary cardiologist as an outpatient two  weeks post discharge. All the above was explained to the patient and the patient's family  and they are agreeable and amenable to the plan.         Rosalba Rice    D: 10/09/2017 19:01:31       T: 10/09/2017 22:09:00     ANN [email or web address]  Job#: [id number]     Doc#: [id number]

## 2017-10-10 NOTE — FLOWSHEET NOTE
Called Dr. Tan Chahal to inform him the patient continues to have the episodes of vtach/torsades. He stated to increase the metoprolol to TID and include dose now. Orders placed in Epic.

## 2017-10-10 NOTE — PROGRESS NOTES
Hospitalist Progress Note    Patient: Reginaldo Funez      Unit/Bed:3B-33/033-A    YOB: 1934    MRN: [id number]       Acct: [de-identified]     PCP: Daisy Andre MD    Date of Admission: 10/5/2017    Chief Complaint: SOB    Patient presented to the emergency room with chief complaint of shortness of breath that started couple of days prior to hospital presentation and worsened quickly, to the point where she couldn't catch her breath. She denied any chest pain or palpitations. Will more than a month ago she had left knee replacement and was little slow to recover and has been feeling fatigued for the last 1 month. In the emergency room she was noticed to have atrial fibrillation with rapid ventricular response and was started on Cardizem drip. She also had a CT chest which was negative for PE. Prior to surgery she was in sinus rhythm and had cardiology clearance. Patient stated that she was not taking any medications prior to coming here. CT scans showed moderate bilateral effusions    Hospital Course:     Patient was admitted to the hospital for new onset atrial fibrillation and also suspected to have congestive heart failure and was started on diuretics. Cardiologist was consulted. 2-D echo showed ejection fraction of 30% with moderate global hypokinesis and moderate mitral regurgitation and moderate to severe tricuspid regurgitation. Subsequently had cardiac cath done on 10/7 and was noticed to have proximal LAD stenosis, with diagonal 90%, along with diffuse irregularities in LAD and circumflex, PDA 60% stenosis in mid vessel. Cardiologist recommended medical management, as it is not amenable to PCI      Patient had KAMRAN on 10/9/17 and had cardioversion done, converted to sinus rhythm.      10/10- as she was being planned for discharge today, she was noticed to have nonsustained VT/torsades , on 3 occasions, the longest being around 10 seconds and cardiologist was made aware and she was given 2 g of IV magnesium and amiodarone was discontinued as the QT was prolonged. Cardiologist recommended LifeVest.  Patient is on anticoagulation.       Subjective:  States that she had a brief episode of feeling hot this afternoon while sitting , otherwise denies any CP or SOB or palpitations       Medications:  Reviewed    Infusion Medications    Scheduled Medications    [START ON 10/11/2017] furosemide  20 mg Oral Daily    metoprolol tartrate  25 mg Oral TID    magnesium sulfate  2 g Intravenous Once    atorvastatin  40 mg Oral Nightly    lisinopril  2.5 mg Oral Daily    spironolactone  12.5 mg Oral Daily    apixaban  5 mg Oral BID    potassium (CARDIAC) replacement protocol   Other RX Placeholder    sodium chloride flush  10 mL Intravenous 2 times per day    ALPRAZolam  0.25 mg Oral Once    potassium replacement protocol   Other RX Placeholder    aspirin  81 mg Oral Daily     PRN Meds: sodium chloride flush, acetaminophen, magnesium hydroxide, ondansetron      Intake/Output Summary (Last 24 hours) at 10/10/17 1806  Last data filed at 10/10/17 1433   Gross per 24 hour   Intake           1196.4 ml   Output              700 ml   Net            496.4 ml       Diet:  DIET CARDIAC; Cardiac    Exam:  BP (!) 104/50   Pulse 58   Temp 97.8 °F (36.6 °C) (Oral)   Resp 16   Ht 5' (1.524 m)   Wt 131 lb 6.4 oz (59.6 kg)   SpO2 97%   BMI 25.66 kg/m²     Physical Examination:   General appearance - alert, awake , and in no distress at rest  HEENT: Normocephalic, Atraumatic, pupils reactive, mucous membranes moist  Chest - moving equally to respiration,symmetric air entry, clear to auscultation  Heart - normal rate, regular rhythm, normal S1, S2, no murmurs  Abdomen - soft, nontender, nondistended, no masses or organomegaly, BS+  Neurological - alert, oriented, normal speech, sensations intact and able to move all extremities  Extremities - peripheral pulses normal, no pedal edema,  Capillary refill less than 3

## 2017-10-11 LAB
ANION GAP SERPL CALCULATED.3IONS-SCNC: 12 MEQ/L (ref 8–16)
BUN BLDV-MCNC: 16 MG/DL (ref 7–22)
CALCIUM SERPL-MCNC: 8.4 MG/DL (ref 8.5–10.5)
CHLORIDE BLD-SCNC: 102 MEQ/L (ref 98–111)
CO2: 26 MEQ/L (ref 23–33)
CREAT SERPL-MCNC: 0.6 MG/DL (ref 0.4–1.2)
EKG ATRIAL RATE: 69 BPM
EKG ATRIAL RATE: 72 BPM
EKG P AXIS: 32 DEGREES
EKG P AXIS: 36 DEGREES
EKG P-R INTERVAL: 152 MS
EKG P-R INTERVAL: 152 MS
EKG Q-T INTERVAL: 426 MS
EKG Q-T INTERVAL: 506 MS
EKG QRS DURATION: 90 MS
EKG QRS DURATION: 92 MS
EKG QTC CALCULATION (BAZETT): 466 MS
EKG QTC CALCULATION (BAZETT): 542 MS
EKG R AXIS: -47 DEGREES
EKG R AXIS: -54 DEGREES
EKG T AXIS: -168 DEGREES
EKG T AXIS: 168 DEGREES
EKG VENTRICULAR RATE: 69 BPM
EKG VENTRICULAR RATE: 72 BPM
GFR SERPL CREATININE-BSD FRML MDRD: > 90 ML/MIN/1.73M2
GLUCOSE BLD-MCNC: 94 MG/DL (ref 70–108)
MAGNESIUM: 2.5 MG/DL (ref 1.6–2.4)
PHOSPHORUS: 3.6 MG/DL (ref 2.4–4.7)
POTASSIUM SERPL-SCNC: 3.6 MEQ/L (ref 3.5–5.2)
SODIUM BLD-SCNC: 140 MEQ/L (ref 135–145)

## 2017-10-11 PROCEDURE — 84100 ASSAY OF PHOSPHORUS: CPT

## 2017-10-11 PROCEDURE — 99232 SBSQ HOSP IP/OBS MODERATE 35: CPT | Performed by: INTERNAL MEDICINE

## 2017-10-11 PROCEDURE — 2580000003 HC RX 258: Performed by: INTERNAL MEDICINE

## 2017-10-11 PROCEDURE — 83735 ASSAY OF MAGNESIUM: CPT

## 2017-10-11 PROCEDURE — 6370000000 HC RX 637 (ALT 250 FOR IP): Performed by: INTERNAL MEDICINE

## 2017-10-11 PROCEDURE — 1200000003 HC TELEMETRY R&B

## 2017-10-11 PROCEDURE — 36415 COLL VENOUS BLD VENIPUNCTURE: CPT

## 2017-10-11 PROCEDURE — 99231 SBSQ HOSP IP/OBS SF/LOW 25: CPT | Performed by: NURSE PRACTITIONER

## 2017-10-11 PROCEDURE — 6370000000 HC RX 637 (ALT 250 FOR IP): Performed by: HOSPITALIST

## 2017-10-11 PROCEDURE — 93005 ELECTROCARDIOGRAM TRACING: CPT | Performed by: INTERNAL MEDICINE

## 2017-10-11 PROCEDURE — 97116 GAIT TRAINING THERAPY: CPT

## 2017-10-11 PROCEDURE — 6370000000 HC RX 637 (ALT 250 FOR IP): Performed by: NURSE PRACTITIONER

## 2017-10-11 PROCEDURE — 80048 BASIC METABOLIC PNL TOTAL CA: CPT

## 2017-10-11 RX ORDER — POTASSIUM CHLORIDE 750 MG/1
20 TABLET, FILM COATED, EXTENDED RELEASE ORAL ONCE
Status: COMPLETED | OUTPATIENT
Start: 2017-10-11 | End: 2017-10-11

## 2017-10-11 RX ORDER — SENNA PLUS 8.6 MG/1
1 TABLET ORAL NIGHTLY
Status: DISCONTINUED | OUTPATIENT
Start: 2017-10-11 | End: 2017-10-12 | Stop reason: HOSPADM

## 2017-10-11 RX ORDER — DOCUSATE SODIUM 100 MG/1
100 CAPSULE, LIQUID FILLED ORAL DAILY
Status: DISCONTINUED | OUTPATIENT
Start: 2017-10-11 | End: 2017-10-12 | Stop reason: HOSPADM

## 2017-10-11 RX ADMIN — SENNA 8.6 MG: 8.6 TABLET, COATED ORAL at 20:07

## 2017-10-11 RX ADMIN — ATORVASTATIN CALCIUM 40 MG: 40 TABLET, FILM COATED ORAL at 20:06

## 2017-10-11 RX ADMIN — APIXABAN 5 MG: 5 TABLET, FILM COATED ORAL at 08:56

## 2017-10-11 RX ADMIN — POTASSIUM CHLORIDE 20 MEQ: 750 TABLET, FILM COATED, EXTENDED RELEASE ORAL at 08:57

## 2017-10-11 RX ADMIN — METOPROLOL TARTRATE 25 MG: 25 TABLET ORAL at 20:06

## 2017-10-11 RX ADMIN — ASPIRIN 81 MG: 81 TABLET, CHEWABLE ORAL at 08:56

## 2017-10-11 RX ADMIN — METOPROLOL TARTRATE 25 MG: 25 TABLET ORAL at 08:56

## 2017-10-11 RX ADMIN — Medication 10 ML: at 11:09

## 2017-10-11 RX ADMIN — Medication 10 ML: at 20:05

## 2017-10-11 RX ADMIN — APIXABAN 5 MG: 5 TABLET, FILM COATED ORAL at 20:06

## 2017-10-11 RX ADMIN — SPIRONOLACTONE 12.5 MG: 25 TABLET, FILM COATED ORAL at 08:57

## 2017-10-11 RX ADMIN — METOPROLOL TARTRATE 25 MG: 25 TABLET ORAL at 15:02

## 2017-10-11 RX ADMIN — FUROSEMIDE 20 MG: 20 TABLET ORAL at 11:17

## 2017-10-11 ASSESSMENT — PAIN SCALES - GENERAL
PAINLEVEL_OUTOF10: 0

## 2017-10-11 NOTE — PROGRESS NOTES
[address] [address]  INPATIENT PHYSICAL THERAPY  DAILYNOTE  [facility] CCU-STEPDOWN 3B    Time In: 3505  Time Out: 1200  Timed Code Treatment Minutes: 15 Minutes  Minutes: 15          Date: 10/11/2017  Patient Name: Lucy Can,  Gender:  female        MRN: [id number]  : 1934  (80 y.o.)  Referral Date : 10/08/17  Referring Practitioner: Trinh Carmichael CNP  Diagnosis: Rapid Atrial Fibrillation  Additional Pertinent Hx: Per ED note, pt is a 80 y. o.female who presents to ED complaining of SOB and irregular heart beat. Patient was found to have tachycardia and EKG shows atrial fibrillation with rapid ventricular response. Status post left total knee a month ago. This is a new onset atrial fibrillation. Patient denied chronic atrial fibrillation or flutter. She denied previous MI or coronary artery disease. She's not on anticoagulation. No prior bleeding problem. Physical examination: Mild shortness of breath, irregularly irregular rhythm, tachycardic, abdomen soft. Left knee status post total knee changes, incision is clean dry and intact. No calf swelling. VR under control with Cardizem.       Past Medical History:   Diagnosis Date    Allergic rhinitis     Arthritis     Encounter for cardioversion procedure 10/05/2017    GERD (gastroesophageal reflux disease)     Hx of transesophageal echocardiography (KAMRAN) for monitoring 10/2017    Mild mitral regurgitation 10/2017    Nonrheumatic aortic valve insufficiency 2017    Osteoporosis     Pre-op evaluation: prior to left knee repalcement 2017    PVC's (premature ventricular contractions) 2017    S/P cardiac catheterization 10/07/2017    with Dr. Rizwan Jo -diagonal 90%, circ 90%,     Torn meniscus 2016     Past Surgical History:   Procedure Laterality Date    APPENDECTOMY      BACK SURGERY  3/24/14    Lumbar Laminectomy Fusion L3-5, L4-5 PLIF - Dr. Royal Lim  3/8/06    right shoulder Restrictions/Precautions:  Restrictions/Precautions: Fall Risk, General Precautions       Subjective:     Subjective: RN approved therapy session. Pt. seated EOB with guests present upon arrival and agreeable to therapy session. Pt. slightly impulsive at times. Pain:  Denies. Social/Functional:  Lives With: Spouse ()  Type of Home: House  Home Layout: One level (with basement, shower is in basement )  Home Access: Stairs to enter with rails  Entrance Stairs - Number of Steps: 2-3 steps with 1 rail, pt reports  assist with getting up and down steps, climbs full flight to basement for shower  Home Equipment: Rolling walker, Cane     Objective:  Sit to Supine: Modified independent    Transfers  Sit to Stand: Supervision (From EOB)  Stand to sit: Supervision       Ambulation 1  Surface: level tile  Device: Single point cane  Assistance: Stand by assistance  Quality of Gait: Decreased shalonda and velocity. Decreased step length. Narrow YUE. Pt. with minimal instability but with no LOB. Distance: 150' x 1      Exercises:  Exercises  Comments: Pt. performed seated B LE ther ex at EOB 10x each consisting of ankle pumps, LAQ with 3\" hold, marches, hip abd/add, and glut sets all to increase strength for improved funtional mobility. Activity Tolerance:  Activity Tolerance: Patient limited by endurance; Patient Tolerated treatment well    Assessment: Body structures, Functions, Activity limitations: Decreased functional mobility , Decreased endurance, Decreased ROM, Decreased strength  Assessment: Pt. tolerated session well. Pt. motivated to recover. Pt. reports decreased strength in B LEs. Pt. would beneifit from continued skilled PT to increase strength and balance to further enhance functional mobility.    Prognosis: Excellent  REQUIRES PT FOLLOW UP: Yes  Discharge Recommendations: Continue to assess pending progress, Outpatient PT    Patient Education:  Patient Education: POC, Ther ex, ambulation, transfers. Equipment Recommendations:  Equipment Needed: No    Safety:  Type of devices: All fall risk precautions in place, Call light within reach, Nurse notified, Gait belt, Patient at risk for falls, Left in bed    Plan:  Times per week: 5x GM/O  Times per day: Daily  Current Treatment Recommendations: Strengthening, Balance Training, Endurance Training, Functional Mobility Training, Gait Training, Stair training, Patient/Caregiver Education & Training    Goals:  Patient goals : go home    Short term goals  Time Frame for Short term goals: 2 days  Short term goal 1: Pt to be Mod I for supine <> sit to get in/out of bed  Short term goal 2: Pt to be Mod I with sit <>stand to get up to ambulate  Short term goal 3: Pt to ambulate > 150 ft with cane with Supervision for household distances  Short term goal 4: Pt to negotiate 2 steps with cane and 1 rail with Supervision for home access    Long term goals  Time Frame for Long term goals : not set due to short ELOS    PT G-Codes  Functional Assessment Tool Used: prof judgement  Functional Limitation: Mobility: Walking and moving around  Mobility: Walking and Moving Around Current Status (): At least 20 percent but less than 40 percent impaired, limited or restricted  Mobility: Walking and Moving Around Goal Status ():  At least 1 percent but less than 20 percent impaired, limited or restricted

## 2017-10-11 NOTE — PROGRESS NOTES
Reported off to primary nurse Gonzalo Stanford RN. Patient awake in bed, looking at newspaper. Denies any further needs at this time.      Electronically signed by Sheng Paredes 41 on 10/11/2017 at [phone]

## 2017-10-11 NOTE — PROGRESS NOTES
1630:  Dr. Araceli Lincoln was on the unit and reviewed the patients 2 strips and he asked me to update cardiology.   I updated Sonal Brown who is on call for cardiology

## 2017-10-11 NOTE — PROGRESS NOTES
Cardiology Progress Note      Patient:   Franki Harvey  YOB: 1934  MRN: [id number]   Acct: [de-identified]  Admit Date:  10/5/2017  Primary Cardiologist: Ray Cano MD  Seen by Dr. Pedro Turner    Chief Complaint:   presented to ER with SOB - found to be in CHF with new low EF    Underwent diuresis     Cath done yesterday - revealed MV CAD    Subjective (Events in last 24 hours):     Denies Dizziness or palpitations  Still AFB with RVR- on IV cardizem    bp low so not receiving BB     Pt wanting to go home soon      10/10/2017  Pt is s\p KAMRAN / CV yesterday to SR  IV and po amiodarone started after    Remains SR - malina    VSS      10/11/2017  Apparently had torsades yesterday - I wasn't called re: this  DC was cancelled    Overnight QTC improved  Still with 4-5 beat torsade episode - pt asymptomatic this am at 9:20    VSS    LV in place  Lytes WNL    Objective:   BP (!) 107/56   Pulse 57   Temp 98.4 °F (36.9 °C) (Oral)   Resp 16   Ht 5' (1.524 m)   Wt 131 lb 9.6 oz (59.7 kg)   SpO2 96%   BMI 25.70 kg/m²        TELEMETRY: SR- SB    Physical Exam:  General Appearance: alert and oriented to person, place and time, in no acute distress  Cardiovascular:  Reg regular rhythm, normal S1 and S2, no murmurs, rubs, clicks, or gallops, distal pulses intact, no carotid bruits, no JVD  Pulmonary/Chest: clear to auscultation bilaterally- no wheezes, rales or rhonchi, normal air movement, no respiratory distress  Abdomen: soft, non-tender, non-distended, normal bowel sounds, no masses   Extremities: no cyanosis, clubbing or edema, pulses present   Skin: warm and dry, no rash or erythema  Head: normocephalic and atraumatic  Neck: supple   Musculoskeletal: normal range of motion, no joint swelling, deformity or tenderness  Neurological: alert, oriented, normal speech, no focal findings or movement disorder noted    Medications:    furosemide  20 mg Oral Daily    metoprolol tartrate  25 mg Oral TID    atorvastatin  40 mg

## 2017-10-11 NOTE — PROGRESS NOTES
Hospitalist Progress Note    Patient: Porsha Funez      Unit/Bed:3B-33/033-A    YOB: 1934    MRN: [id number]       Acct: [de-identified]     PCP: Irina Pichardo MD    Date of Admission: 10/5/2017    Chief Complaint: SOB    Patient presented to the emergency room with chief complaint of shortness of breath that started couple of days prior to hospital presentation and worsened quickly, to the point where she couldn't catch her breath. She denied any chest pain or palpitations. Will more than a month ago she had left knee replacement and was little slow to recover and has been feeling fatigued for the last 1 month. In the emergency room she was noticed to have atrial fibrillation with rapid ventricular response and was started on Cardizem drip. She also had a CT chest which was negative for PE. Prior to surgery she was in sinus rhythm and had cardiology clearance. Patient stated that she was not taking any medications prior to coming here. CT scans showed moderate bilateral effusions    Hospital Course:     Patient was admitted to the hospital for new onset atrial fibrillation and also suspected to have congestive heart failure and was started on diuretics. Cardiologist was consulted. 2-D echo showed ejection fraction of 30% with moderate global hypokinesis and moderate mitral regurgitation and moderate to severe tricuspid regurgitation. ON admission was on oxygen, and dropped to 90 inspite of oxygen    Subsequently had cardiac cath done on 10/7 and was noticed to have proximal LAD stenosis, with diagonal 90%, along with diffuse irregularities in LAD and circumflex, PDA 60% stenosis in mid vessel. Cardiologist recommended medical management, as it is not amenable to PCI      Patient had KAMRAN on 10/9/17 and had cardioversion done, converted to sinus rhythm.      10/10- as she was being planned for discharge today, she was noticed to have nonsustained VT/torsades , on 3 occasions, the longest being [] SQ Heparin                                 [] Encourage ambulation           [x] Already on Anticoagulation     Disposition:    [x] Home       [] TCU       [] Rehab       [] Psych       [] SNF       [] [place]       [] Other-    Code Status: Full Code    PT/OT Eval Status: none    Assessment/Plan:    Anticipated Discharge in : 1-2 days    Active Hospital Problems    Diagnosis Date Noted    Acute systolic CHF (congestive heart failure) ([facility]) [I50.21]      Priority: High    CAD, multiple vessel [I25.10]      Priority: High    Ischemic dilated cardiomyopathy [I25.5, I42.0]      Priority: High    Hypotension [I95.9]      Priority: High    Atrial fibrillation with RVR ([address] [I48.91]      Priority: High    Atrial fibrillation, currently in sinus rhythm ([facility]) [Z86.79]     Severe tricuspid regurgitation [I07.1]     Moderate mitral regurgitation [I34.0]     NSVT (nonsustained ventricular tachycardia) ([facility]) [I47.2]     Prolonged QT interval [R94.31]     Rapid atrial fibrillation ([facility]) [I48.91] 10/05/2017    SOB (shortness of breath) [R06.02] 10/05/2017    Chest pain [R07.9] 10/05/2017       Acute systolic congestive heart failure, EF of 30%- diuresed fairly well, currently on Lasix 20 mg daily    Atrial fibrillation with rapid ventricular response, new onset currently in sinus rhythm status post cardioversion - metoprolol dose increased to 25 mg 3 times daily, anticoagulation with Eliquis, aldactone    Multivessel coronary artery disease, not amenable to PCI: medical management, continue aspirin, statin, metoprolol  BP is low and some times not getting metoprolol     Nonsustained VT/torsades- stop amiodarone, magnesium repalced , still having VT/ torsades    QT prolongation- amiodarone stopped and is improving    Moderate to severe tricuspid regurgitation- continue diuretics  Moderate mitral regurgitation continue diuretics    GERD  nonrheumatic aortic

## 2017-10-11 NOTE — PROGRESS NOTES
Shorty Garay is a 80 y.o. female who was seen today for referral to the [facility]. An explanation of what the clinic is for was presented to the patient. She is agreeable in following up at the clinic. An appointment was scheduled For Oct 23, 2017 @1100. The Heart Failure educational binder was given to the patient. Her  was present.      Electronically signed by Chacha Barry CNP on 10/11/2017 at 10:41 AM

## 2017-10-11 NOTE — PLAN OF CARE
Problem: Pain Control  Goal: Maintain pain level at or below patient's acceptable level (or 5 if patient is unable to determine acceptable level)  Outcome: Ongoing  Patient denies pain so far this shift. Reminded patient to report any pain, pressure, or shortness of breath to the nurse. Will continue to monitor. Problem: Cardiovascular  Goal: Hemodynamic stability  Outcome: Ongoing  Ongoing assessment & interventions provided throughout shift. Patient on continuous telemetry monitoring, heart tones, vitals & pulses checked at least 3 times per shift & PRN. Vitals within acceptable limits. Peripheral pulses palpable. Problem: Falls - Risk of  Goal: Absence of falls  Outcome: Ongoing  Assessment & interventions provided throughout shift. Bed locked & in low position, call light in reach, side-rails up x2, non-slip socks on when ambulating, reminded patient to use call light to call for assistance. Problem: Risk for Impaired Skin Integrity  Goal: Tissue integrity - skin and mucous membranes  Structural intactness and normal physiological function of skin and  mucous membranes. Outcome: Ongoing  No skin issues noted. Comments: Care plan reviewed with patient. Patient verbalizes understanding of the care plan and contributed to goal setting.

## 2017-10-12 VITALS
HEIGHT: 60 IN | SYSTOLIC BLOOD PRESSURE: 104 MMHG | OXYGEN SATURATION: 99 % | RESPIRATION RATE: 16 BRPM | WEIGHT: 139.9 LBS | TEMPERATURE: 98.7 F | HEART RATE: 65 BPM | DIASTOLIC BLOOD PRESSURE: 55 MMHG | BODY MASS INDEX: 27.47 KG/M2

## 2017-10-12 LAB
ANION GAP SERPL CALCULATED.3IONS-SCNC: 17 MEQ/L (ref 8–16)
BUN BLDV-MCNC: 15 MG/DL (ref 7–22)
CALCIUM SERPL-MCNC: 8.6 MG/DL (ref 8.5–10.5)
CHLORIDE BLD-SCNC: 104 MEQ/L (ref 98–111)
CO2: 23 MEQ/L (ref 23–33)
CREAT SERPL-MCNC: 0.7 MG/DL (ref 0.4–1.2)
EKG ATRIAL RATE: 61 BPM
EKG P AXIS: 28 DEGREES
EKG P-R INTERVAL: 148 MS
EKG Q-T INTERVAL: 492 MS
EKG QRS DURATION: 86 MS
EKG QTC CALCULATION (BAZETT): 495 MS
EKG R AXIS: -49 DEGREES
EKG T AXIS: -123 DEGREES
EKG VENTRICULAR RATE: 61 BPM
GFR SERPL CREATININE-BSD FRML MDRD: 80 ML/MIN/1.73M2
GLUCOSE BLD-MCNC: 90 MG/DL (ref 70–108)
MAGNESIUM: 2.4 MG/DL (ref 1.6–2.4)
POTASSIUM SERPL-SCNC: 4.1 MEQ/L (ref 3.5–5.2)
SODIUM BLD-SCNC: 144 MEQ/L (ref 135–145)

## 2017-10-12 PROCEDURE — 2580000003 HC RX 258: Performed by: INTERNAL MEDICINE

## 2017-10-12 PROCEDURE — 93005 ELECTROCARDIOGRAM TRACING: CPT | Performed by: NURSE PRACTITIONER

## 2017-10-12 PROCEDURE — 99239 HOSP IP/OBS DSCHRG MGMT >30: CPT | Performed by: INTERNAL MEDICINE

## 2017-10-12 PROCEDURE — 97116 GAIT TRAINING THERAPY: CPT

## 2017-10-12 PROCEDURE — 83735 ASSAY OF MAGNESIUM: CPT

## 2017-10-12 PROCEDURE — 6370000000 HC RX 637 (ALT 250 FOR IP): Performed by: HOSPITALIST

## 2017-10-12 PROCEDURE — 99222 1ST HOSP IP/OBS MODERATE 55: CPT | Performed by: INTERNAL MEDICINE

## 2017-10-12 PROCEDURE — 97110 THERAPEUTIC EXERCISES: CPT

## 2017-10-12 PROCEDURE — 6370000000 HC RX 637 (ALT 250 FOR IP): Performed by: INTERNAL MEDICINE

## 2017-10-12 PROCEDURE — 80048 BASIC METABOLIC PNL TOTAL CA: CPT

## 2017-10-12 PROCEDURE — 36415 COLL VENOUS BLD VENIPUNCTURE: CPT

## 2017-10-12 PROCEDURE — 6370000000 HC RX 637 (ALT 250 FOR IP): Performed by: NURSE PRACTITIONER

## 2017-10-12 RX ORDER — ASCORBIC ACID 500 MG
500 TABLET ORAL DAILY
COMMUNITY

## 2017-10-12 RX ADMIN — ASPIRIN 81 MG: 81 TABLET, CHEWABLE ORAL at 10:16

## 2017-10-12 RX ADMIN — SPIRONOLACTONE 12.5 MG: 25 TABLET, FILM COATED ORAL at 10:16

## 2017-10-12 RX ADMIN — METOPROLOL TARTRATE 25 MG: 25 TABLET ORAL at 10:16

## 2017-10-12 RX ADMIN — LISINOPRIL 2.5 MG: 5 TABLET ORAL at 12:02

## 2017-10-12 RX ADMIN — METOPROLOL TARTRATE 25 MG: 25 TABLET ORAL at 14:56

## 2017-10-12 RX ADMIN — APIXABAN 5 MG: 5 TABLET, FILM COATED ORAL at 10:16

## 2017-10-12 RX ADMIN — Medication 10 ML: at 10:17

## 2017-10-12 RX ADMIN — FUROSEMIDE 20 MG: 20 TABLET ORAL at 10:16

## 2017-10-12 ASSESSMENT — PAIN SCALES - GENERAL
PAINLEVEL_OUTOF10: 0

## 2017-10-12 ASSESSMENT — ENCOUNTER SYMPTOMS
SHORTNESS OF BREATH: 1
VOMITING: 0
BACK PAIN: 0
BLURRED VISION: 0
LEFT EYE: 0
DIARRHEA: 0
WHEEZING: 0
SORE THROAT: 0
ABDOMINAL PAIN: 0
COUGH: 0
RIGHT EYE: 0
DOUBLE VISION: 0
NAUSEA: 1
CONSTIPATION: 0

## 2017-10-12 NOTE — PLAN OF CARE
Problem: Pain Control  Goal: Maintain pain level at or below patient's acceptable level (or 5 if patient is unable to determine acceptable level)  Outcome: Ongoing  Patient denies pain so far this shift. Reminded patient to report any pain, pressure, or shortness of breath to the nurse. Will continue to monitor. Problem: Cardiovascular  Goal: Hemodynamic stability  Outcome: Ongoing  Ongoing assessment & interventions provided throughout shift. Patient on continuous telemetry monitoring, heart tones, vitals & pulses checked at least 3 times per shift & PRN. Vitals within acceptable limits. Peripheral pulses palpable. Problem: DISCHARGE BARRIERS  Goal: Patient's continuum of care needs are met  Outcome: Ongoing  Patient is from home with her . Problem: Falls - Risk of  Goal: Absence of falls  Outcome: Ongoing  Assessment & interventions provided throughout shift. Bed locked & in low position, call light in reach, side-rails up x2, non-slip socks on when ambulating, reminded patient to use call light to call for assistance. Problem: Risk for Impaired Skin Integrity  Goal: Tissue integrity - skin and mucous membranes  Structural intactness and normal physiological function of skin and  mucous membranes. Outcome: Ongoing  Ongoing assessment & interventions provided throughout shift. Skin assessments provided. Encouraging/assisting patient to turn as needed. Comments: Care plan reviewed with patient. Patient verbalizes understanding of the care plan and contributed to goal setting.

## 2017-10-12 NOTE — DISCHARGE INSTR - DIET

## 2017-10-12 NOTE — PROGRESS NOTES
Rema    EYE SURGERY      ROTATOR CUFF REPAIR  3/8/06    right shoulder        Subjective  Subjective: Patient cooperative to OT session     Pain:  Pain Assessment  Patient Currently in Pain: Denies    Objective  ADL  Additional Comments: Declined BADL tasks at this time     Transfers  Sit to stand: Stand by assistance (From EOB )  Stand to sit: Stand by assistance (Return to EOB )    Balance  Sitting Balance: Modified independent  (Sitting EOB )  Standing Balance: Stand by assistance     Time: ~5mins   Activity: Static standing talking with physician      Functional Mobility  Functional - Mobility Device: Cane  Activity: Other  Assist Level: Stand by assistance  Functional Mobility Comments: Patient completed functional mobility in hallway with SBA, cane, slow pace, reported LLE knee stiffness, min unsteady, no LOB      Comment: Reviwed HEP:  Pt completed BUE strengthening exercises with yellow theraband for all joints in all planes X 10 reps with min cues for technique while seated at EOB. completed to improve UB strength and activity tolerance needed for ease with ADLs and functional trnasfers     Activity Tolerance:  Activity Tolerance: Patient Tolerated treatment well    Assessment:  Performance deficits / Impairments: Decreased functional mobility , Decreased ADL status, Decreased endurance, Decreased strength, Decreased balance  Prognosis: Good  Discharge Recommendations: Continue to assess pending progress    Patient Education:  Patient Education: Role of OT, mobility, transfer training, HEP     Equipment Recommendations:  Equipment Needed: No    Safety:  Safety Devices in place: Yes  Type of devices:  All fall risk precautions in place, Left in bed, Nurse notified, Call light within reach    Plan:  Times per week: 5x  Current Treatment Recommendations: Strengthening, Balance Training, Functional Mobility Training, Endurance Training, Patient/Caregiver Education & Training, Self-Care / ADL, Safety Education & Training    Goals:  Patient goals : to go home    Short term goals  Time Frame for Short term goals: 1 week  Short term goal 1: Pt will complete functional mobility within environment with S and cane with no LOB to increase I with HH ambulation  Short term goal 2: Pt will complete functional transfers with S and no cues for safety including to/from toilet  Short term goal 3: Pt will complete dynamic standing task with S for > 4 minutes with 2 hand release and no LOB to increase I with sink side grooming  Short term goal 4: Pt will complete BUE strengthening exercises to increase strength and endurance needed for ease with ADL routine  Short term goal 5: Pt will complete BADL routine with S and any adaptive technique needed to increase I with bathing and dressing at home  Long term goals  Time Frame for Long term goals : No LTG established d/t short ELOS

## 2017-10-12 NOTE — CONSULTS
[facility] [address], [facility] Tristan Buck [facility]  Dept: [phone]  Dept Fax: [phone]      CARDIAC ELECTROPHYSIOLOGY  CONSULTATION    10/5/2017      REFERRING PROVIDER:  Dr. Audrey Pereira:  I was short of breath  Chief Complaint   Patient presents with    Shortness of Breath    Nausea       HPI:  HPI  The patient is an 79 y/o female with a h/o paroxysmal atrial fibrillation diagnosed by Holter monitor in 2016. The episodes were minor and did not require treatment. The patient underwent knee surgery a few weeks ago. The patient states she developed shortness of breath about two weeks ago. She also reported having orthopnea, vague chest discomfort, and fatigue. The patient presented to the hospital on 10/5/2017 and was found to be in atrial fibrillation with a rapid ventricular response. The patient was admitted to the hospital and started on rate control medication. She had an ECHO performed which showed her EF had dropped from 60-65% to 30% in three weeks. The patient underwent a cardiac catheterization which revealed diffuse disease but did not require revascularization. The patient underwent KAMRAN guided cardioversion by Dr. Stephania Perea. She had early recurrence and therefore was started on amiodarone. The patient converted to sinus rhythm and she had a severely prolonged QT interval.  She then started to have runs of polymorphic PVCs and finally episodes of Torsades lou Pointes. The amiodarone was stopped and the patient was given magnesium. Her QT interval is still prolonged but not as long as it was with amiodarone. The patient remains in sinus rhythm. She is also on guide line directed heart failure medications and is wearing a Life Vest.  EP has been consulted for further evaluation and management. PMH:  History obtained from chart review and the patient.   Past Medical History:   Diagnosis Date    Allergic rhinitis     Arthritis     Encounter for cardioversion procedure 10/05/2017    GERD (gastroesophageal reflux disease)     Hx of transesophageal echocardiography (KAMRAN) for monitoring 10/2017    Mild mitral regurgitation 10/2017    Nonrheumatic aortic valve insufficiency 8/29/2017    Osteoporosis     Pre-op evaluation: prior to left knee repalcement 8/29/2017    PVC's (premature ventricular contractions) 8/29/2017    S/P cardiac catheterization 10/07/2017    with Dr. Pamela Mayorga -diagonal 90%, circ 90%,     Torn meniscus 12/2016       PSH:  Past Surgical History:   Procedure Laterality Date    APPENDECTOMY  1946    BACK SURGERY  3/24/14    Lumbar Laminectomy Fusion L3-5, L4-5 PLIF - Dr. Rahul Gordillo  3/8/06    right shoulder        FAMILY HISTORY:  Family History   Problem Relation Age of Onset    Arthritis Mother     Heart Disease Sister     High Blood Pressure Sister     Arthritis Sister     Cancer Brother     Heart Disease Brother     Diabetes Maternal Grandfather        SOCIAL HISTORY:  Social History     Social History    Marital status:      Spouse name: N/A    Number of children: N/A    Years of education: N/A     Social History Main Topics    Smoking status: Former Smoker     Packs/day: 0.50     Years: 5.00     Quit date: 8/30/1965    Smokeless tobacco: Never Used    Alcohol use Yes      Comment: occ.  red wine    Drug use: No    Sexual activity: Yes     Partners: Male     Other Topics Concern    None     Social History Narrative    None       MEDICATIONS:  Current Facility-Administered Medications   Medication Dose Route Frequency Provider Last Rate Last Dose    docusate sodium (COLACE) capsule 100 mg  100 mg Oral Daily Romain Morales MD        senna (SENOKOT) tablet 8.6 mg  1 tablet Oral Nightly Romain Morales MD   8.6 mg at 10/11/17 2007    magnesium replacement protocol   Other RX Placeholder Romain Morales MD        furosemide (LASIX) tablet 20 mg  20 mg Oral Daily [facility], CNP   20 mg at 10/12/17 1016    metoprolol tartrate (LOPRESSOR) tablet 25 mg  25 mg Oral TID Saturninosarai Tomeka, DO   25 mg at 10/12/17 1016    atorvastatin (LIPITOR) tablet 40 mg  40 mg Oral Nightly Saturninosarai Tomeka DO   40 mg at 10/11/17 2006    lisinopril (PRINIVIL;ZESTRIL) tablet 2.5 mg  2.5 mg Oral Daily Saturninofloydmarcus Eckert, DO   2.5 mg at 10/12/17 1202    spironolactone (ALDACTONE) tablet 12.5 mg  12.5 mg Oral Daily Sebastián Whitney, DO   12.5 mg at 10/12/17 1016    apixaban (ELIQUIS) tablet 5 mg  5 mg Oral BID Katalina Irvin, CNP   5 mg at 10/12/17 1016    potassium (CARDIAC) replacement protocol   Other RX Placeholder Jared Reyes, DO        sodium chloride flush 0.9 % injection 10 mL  10 mL Intravenous 2 times per day Baldev Brody MD   10 mL at 10/12/17 1017    sodium chloride flush 0.9 % injection 10 mL  10 mL Intravenous PRN Baldev Brody MD   10 mL at 10/10/17 1734    acetaminophen (TYLENOL) tablet 650 mg  650 mg Oral Q4H PRN Baldev Brdoy MD        magnesium hydroxide (MILK OF MAGNESIA) 400 MG/5ML suspension 30 mL  30 mL Oral Daily PRN Baldev Brody MD   30 mL at 10/08/17 1421    ondansetron (ZOFRAN) injection 4 mg  4 mg Intravenous Q6H PRN Baldev Brody MD        ALPRAZolam Abigail Fallow) tablet 0.25 mg  0.25 mg Oral Once Nargis Reyes, DO        potassium replacement protocol   Other RX Placeholder Crystal Duncan MD        aspirin chewable tablet 81 mg  81 mg Oral Daily Nahid Ling MD   81 mg at 10/12/17 1016       REVIEW OF SYSTEMS:    Review of Systems   Constitution: Negative for fever, weight gain and weight loss. HENT: Negative for hearing loss and sore throat. Eyes: Negative for blurred vision, double vision, vision loss in left eye and vision loss in right eye. Cardiovascular: Positive for dyspnea on exertion. Negative for chest pain, irregular heartbeat, near-syncope, palpitations and syncope. Respiratory: Positive for shortness of breath. Negative for cough and wheezing. Endocrine: Negative for cold intolerance, heat intolerance and polyuria. Hematologic/Lymphatic: Negative for bleeding problem. Does not bruise/bleed easily. Skin: Negative for dry skin, itching and rash. Musculoskeletal: Positive for arthritis and joint pain. Negative for back pain and myalgias. Gastrointestinal: Positive for nausea. Negative for abdominal pain, constipation, diarrhea and vomiting. Genitourinary: Negative for dysuria, frequency, hematuria and urgency. Neurological: Negative for dizziness, headaches, light-headedness, numbness, paresthesias, seizures and vertigo. Psychiatric/Behavioral: Negative for depression and memory loss. The patient is not nervous/anxious. PHYSICAL EXAM:  BP (!) 115/56   Pulse 80   Temp 98.7 °F (37.1 °C) (Oral)   Resp 16   Ht 5' (1.524 m)   Wt 139 lb 14.4 oz (63.5 kg)   SpO2 99%   BMI 27.32 kg/m²   I/O    Intake/Output Summary (Last 24 hours) at 10/12/17 1428  Last data filed at 10/12/17 1427   Gross per 24 hour   Intake             1300 ml   Output              400 ml   Net              900 ml     Patient Vitals for the past 96 hrs (Last 3 readings):   Weight   10/12/17 0440 139 lb 14.4 oz (63.5 kg)   10/11/17 0455 131 lb 9.6 oz (59.7 kg)   10/09/17 2311 131 lb 6.4 oz (59.6 kg)     Physical Exam   Constitutional: She is oriented to person, place, and time. No distress. HENT:   Head: Normocephalic and atraumatic. Head is without contusion. Right Ear: Hearing and external ear normal. No decreased hearing is noted. Left Ear: Hearing and external ear normal. No decreased hearing is noted. Nose: Nose normal. No sinus tenderness. No epistaxis. Mouth/Throat: Oropharynx is clear and moist. Mucous membranes are not dry. No oropharyngeal exudate. Eyes: Conjunctivae and EOM are normal. Pupils are equal, round, and reactive to light. Right eye exhibits no discharge. Left eye exhibits no discharge.    Neck: Trachea normal. Neck supple. No JVD present. No edema present. No thyroid mass present. Cardiovascular: Normal rate, regular rhythm, normal heart sounds and normal pulses. No extrasystoles are present. Pulmonary/Chest: Effort normal and breath sounds normal. She exhibits no tenderness. Breasts are symmetrical.   Abdominal: Soft. Normal appearance and bowel sounds are normal. She exhibits no mass. There is no hepatosplenomegaly. There is no tenderness. No hernia. Musculoskeletal: Normal range of motion. Right shoulder: Normal. She exhibits normal range of motion and no swelling. Left shoulder: Normal. She exhibits normal range of motion and no swelling. Right hip: Normal. She exhibits normal range of motion and no swelling. Left hip: Normal. She exhibits normal range of motion and no swelling. Right knee: Normal. She exhibits normal range of motion and no swelling. Left knee: Normal. She exhibits normal range of motion and no swelling. Right upper arm: Normal.        Left upper arm: Normal.        Right upper leg: Normal.        Left upper leg: Normal.        Right lower leg: Normal.        Left lower leg: Normal.   Lymphadenopathy:        Head (right side): No submandibular adenopathy present. Head (left side): No submandibular adenopathy present. Neurological: She is alert and oriented to person, place, and time. She has normal strength. She displays normal reflexes. No cranial nerve deficit or sensory deficit. Coordination and gait normal.   Skin: Skin is warm and dry. No lesion and no rash noted. She is not diaphoretic. No cyanosis. Nails show no clubbing. Psychiatric: Her speech is normal and behavior is normal. Judgment and thought content normal. Her mood appears not anxious. Her affect is not angry. Cognition and memory are normal. She does not exhibit a depressed mood. Nursing note and vitals reviewed.       DIAGNOSTIC STUDIES & LABORATORY

## 2017-10-12 NOTE — PROGRESS NOTES
2666:  Text sent to Dr. Jennifer Helton through [facility] for arrhythmias. Waiting to hear back from him. Will update hospitalist regarding consult. 1415:  Yemi Washington was on the unit and I showed him the tele strips and he stated to make sure Dr. Jennifer Helton reviews them and if he is okay with discharge then [place] stated we can discharge her. 1445:  Spoke with Dr. Jennifer Helton and he stated he is okay with discharge and his office will be making a follow up with her. Updated him that I have spoken with his office, they called me with the date and time of follow up. Dr. Jennifer Helton stated to keep patients appointment with Yemi Washington and with him. I asked if he would review the cardiac meds as he stated they have to keep a tight leash on her meds and he stated he reviewed them and have the attending service do it but he is okay with what is in there now. Will update the hospitalist.     [phone]:  Discharge instructions completed with patient and her . She denies any pain,shortness of breath and life vest is on the patient and they stated they do understand and have the info for life vest if they need it. Also, used teachback method to make sure they understood the discharge instructions and the post cath groin care instructions. Discussed daily weights and when to call physician, along with all follow up appointments.

## 2017-10-12 NOTE — DISCHARGE SUMMARY
Hospital Medicine Discharge Summary      Patient Identification:   Jennifer Garcia   : 1934  MRN: [id number]   Account: [de-identified]      Patient's PCP: Cory Talavera MD    Admit Date: 10/5/2017     Discharge Date:   10/12/2017     Admitting Physician: Cheng Tierney MD     Discharge Physician: Mitch Aleman MD     Discharge Diagnoses:    Acute systolic congestive heart failure, EF of 30%  Atrial fibrillation with rapid ventricular response, new onset status post cardioversion, in sinus rhythm  Multivessel coronary artery disease not amenable to PCI  Acute respiratory failure, hypoxic  Nonsustained VT  Torsades  QT prolongation secondary to medication  Moderate to severe tricuspid regurgitation  Moderate mitral regurgitation  GERD  Nonrheumatic aortic insufficiency  Osteoarthritis        The patient was seen and examined on day of discharge and this discharge summary is in conjunction with any daily progress note from day of discharge. Hospital Course: Jennifer Garcia is a 80 y.o. female admitted to [address] [place] [address] on 10/5/2017 for shortness of breath      Patient presented to the emergency room with chief complaint of shortness of breath that started couple of days prior to hospital presentation and worsened quickly, to the point where she couldn't catch her breath. She denied any chest pain or palpitations. Will more than a month ago she had left knee replacement and was little slow to recover and has been feeling fatigued for the last 1 month. In the emergency room she was noticed to have atrial fibrillation with rapid ventricular response and was started on Cardizem drip. She also had a CT chest which was negative for PE. Prior to surgery she was in sinus rhythm and had cardiology clearance. Patient stated that she was not taking any medications prior to coming here.   CT scans showed moderate bilateral effusions      Patient was admitted to the hospital for new onset atrial fibrillation and also suspected to have congestive heart failure and was started on diuretics. Cardiologist was consulted. 2-D echo showed ejection fraction of 30% with moderate global hypokinesis and moderate mitral regurgitation and moderate to severe tricuspid regurgitation. ON admission was on oxygen, and dropped to 90 inspite of oxygen     Subsequently had cardiac cath done on 10/7 and was noticed to have proximal LAD stenosis, with diagonal 90%, along with diffuse irregularities in LAD and circumflex, PDA 60% stenosis in mid vessel. Cardiologist recommended medical management, as it is not amenable to PCI. Patient had KAMRAN on 10/9/17 and had cardioversion done, converted to sinus rhythm. As she was being planned for discharge the subsequent day, she started having nonsustained VT and also torsades, the longest being around 10 seconds. She was given IV magnesium empirically and 12-lead EKG showed prolonged QT interval of more than 540 and her amiodarone was discontinued. Subsequent day she still had intermittent episodes of nonsustained VT and EP cardiologist was consulted. Her dose of metoprolol was also increased to 25 mg 3 times daily. EP cardiologist recommended to continue current medications and avoid class III antiarrhythmics along with amiodarone and the continue life vest and anticoagulation and follow with him in the clinic. Patient's blood pressure has been on the lower side and recommended to monitor closely and monitor for signs of bleeding especially black stools and avoid falls. The risk of anticoagulation has been explained to her and also has been.     Exam:     Vitals:  Vitals:    10/12/17 0440 10/12/17 1016 10/12/17 1143 10/12/17 1453   BP: (!) 117/56 (!) 107/56 (!) 115/56 (!) 104/55   Pulse: 70 65 80 65   Resp: 17 18 16    Temp: 98.1 °F (36.7 °C) 98.4 °F (36.9 °C) 98.7 °F (37.1 °C)    TempSrc: Oral Oral Oral    SpO2: 96% 97% 99%    Weight: 139 lb 14.4 oz (63.5 kg)      Height:         Weight: Code     Patient Instructions:    Discharge lab work: Activity: activity as tolerated  Diet: DIET CARDIAC; Cardiac      Follow-up visits:   Malgorzata Rodney MD  [facility] [address]  [address] [address]  [phone]    On 10/17/2017  @ 1:50    Chiara Nick, [address] [address] [facility], [address] [address]  [phone]    Go on 11/6/2017  @ 3:45    [facility] [place] [address], [address],[facility] [address]  [phone]    On 11/14/2017  @ 1 p.m. Discharge Medications:      Terence Worley   Home Medication Instructions NCX:[id number]    Printed on:10/12/17 1721   Medication Information                      apixaban (ELIQUIS) 5 MG TABS tablet  Take 1 tablet by mouth 2 times daily             aspirin EC 81 MG EC tablet  Take 1 tablet by mouth daily             atorvastatin (LIPITOR) 40 MG tablet  Take 1 tablet by mouth nightly             furosemide (LASIX) 20 MG tablet  Take 1 tablet by mouth daily             lansoprazole (PREVACID) 30 MG delayed release capsule  Take 1 capsule by mouth daily             lisinopril (PRINIVIL;ZESTRIL) 2.5 MG tablet  Take 1 tablet by mouth daily             metoprolol tartrate (LOPRESSOR) 25 MG tablet  Take 1 tablet by mouth 3 times daily . hold if SBP less than 100 mm hg or HR less than 60             spironolactone (ALDACTONE) 25 MG tablet  Take 0.5 tablets by mouth daily             vitamin C (ASCORBIC ACID) 500 MG tablet  Take 500 mg by mouth daily             vitamin D (CHOLECALCIFEROL) 1000 UNIT TABS tablet  Take 1,000 Units by mouth daily                 Time Spent on discharge is more than 35 min approx in the examination, evaluation, counseling and review of medications and discharge plan. Signed: Thank you Malgorzata Rodney MD for the opportunity to be involved in this patient's care.     Electronically signed by Ludmila Marcelo MD on 10/12/2017 at 5:21 PM

## 2017-10-12 NOTE — PROGRESS NOTES
around 10 seconds and cardiologist was made aware and she was given 2 g of IV magnesium and amiodarone was discontinued as the QT was prolonged. Cardiologist recommended LifeVest.  Patient is on anticoagulation.     10/11- overnight she still had episodes of VT/torsades,patient's blood pressure is on the lower side and the beta blocker is being held sometimes, QTc improved to 460s, on Lasix 20 mg daily    10/12- EP cardiologist was consulted for her to VT/torsades and she was recommended to avoid class III antiarrhythmics and follow with EP cardiologist as outpatient and continue anticoagulations and metoprolol      Subjective:    Patient is feeling good, denies any chest pain or palpitations, had a bowel movement, looking forward to going home    Medications:  Reviewed    Infusion Medications    Scheduled Medications    docusate sodium  100 mg Oral Daily    senna  1 tablet Oral Nightly    magnesium replacement protocol   Other RX Placeholder    furosemide  20 mg Oral Daily    metoprolol tartrate  25 mg Oral TID    atorvastatin  40 mg Oral Nightly    lisinopril  2.5 mg Oral Daily    spironolactone  12.5 mg Oral Daily    apixaban  5 mg Oral BID    potassium (CARDIAC) replacement protocol   Other RX Placeholder    sodium chloride flush  10 mL Intravenous 2 times per day    ALPRAZolam  0.25 mg Oral Once    potassium replacement protocol   Other RX Placeholder    aspirin  81 mg Oral Daily     PRN Meds: sodium chloride flush, acetaminophen, magnesium hydroxide, ondansetron      Intake/Output Summary (Last 24 hours) at 10/12/17 0845  Last data filed at 10/12/17 0440   Gross per 24 hour   Intake             1050 ml   Output              550 ml   Net              500 ml       Diet:  DIET CARDIAC; Cardiac    Exam:  BP (!) 117/56   Pulse 70   Temp 98.1 °F (36.7 °C) (Oral)   Resp 17   Ht 5' (1.524 m)   Wt 139 lb 14.4 oz (63.5 kg)   SpO2 96%   BMI 27.32 kg/m²     Physical Examination:   General appearance - alert, awake , and in no distress at rest, sitting in bed, has LifeVest  HEENT: Normocephalic, Atraumatic, pupils reactive, mucous membranes moist  Chest - moving equally to respiration,symmetric air entry, failry clear to auscultation  Heart - normal rate, regular rhythm, normal S1, S2, no murmurs  Abdomen - soft, nontender, nondistended, no masses or organomegaly, BS+  Neurological - alert, oriented, normal speech, sensations intact and able to move all extremities  Extremities - peripheral pulses normal, no pedal edema,  Capillary refill less than 3 sec  Skin - normal coloration and turgor, no rashes,         Labs:   No results for input(s): WBC, HGB, HCT, PLT in the last 72 hours. Recent Labs      10/10/17   0507  10/11/17   0334  10/12/17   0451   NA  137  140  144   K  4.1  3.6  4.1   CL  99  102  104   CO2  27  26  23   BUN  18  16  15   CREATININE  0.6  0.6  0.7   CALCIUM  8.3*  8.4*  8.6   PHOS   --   3.6   --      No results for input(s): AST, ALT, BILIDIR, BILITOT, ALKPHOS in the last 72 hours. No results for input(s): INR in the last 72 hours. No results for input(s): [address] in the last 72 hours. Urinalysis:      Lab Results   Component Value Date    NITRU NEGATIVE 10/05/2017    WBCUA 10-15 10/05/2017    BACTERIA NONE 10/05/2017    RBCUA 0-2 10/05/2017    BLOODU NEGATIVE 10/05/2017    GLUCOSEU NEGATIVE 10/05/2017       Radiology:  XR Chest Portable   Final Result   1. There is no acute cardiopulmonary process. **This report has been created using voice recognition software. It may contain minor errors which are inherent in voice recognition technology. **      Final report electronically signed by Dr. Greg Hubbard on 10/6/2017 7:00 AM      [address]   Final Result   1. No evidence of pulmonary embolism. 2. Small to moderate bilateral pleural effusions with accentuation of the vascular markings.                **This report has been created using voice recognition software. It may contain minor errors which are inherent in voice recognition technology. **      Final report electronically signed by Dr. Andressa Duke on 10/5/2017 9:21 AM          Diet: DIET CARDIAC; Cardiac    DVT prophylaxis: [] Lovenox                                 [] SCDs                                 [] SQ Heparin                                 [] Encourage ambulation           [x] Already on Anticoagulation     Disposition:    [x] Home       [] TCU       [] Rehab       [] Psych       [] SNF       [] [place]       [] Other-    Code Status: Full Code    PT/OT Eval Status: none    Assessment/Plan:    Anticipated Discharge in : today    Active Hospital Problems    Diagnosis Date Noted    Acute systolic CHF (congestive heart failure) ([facility]) [I50.21]      Priority: High    CAD, multiple vessel [I25.10]      Priority: High    Ischemic dilated cardiomyopathy [I25.5, I42.0]      Priority: High    Hypotension [I95.9]      Priority: High    Atrial fibrillation with RVR ([facility] [address] [I48.91]      Priority: High    Atrial fibrillation, currently in sinus rhythm ([facility]) [Z86.79]     Severe tricuspid regurgitation [I07.1]     Moderate mitral regurgitation [I34.0]     NSVT (nonsustained ventricular tachycardia) ([facility]) [I47.2]     Prolonged QT interval [R94.31]     Rapid atrial fibrillation ([address] [I48.91] 10/05/2017    SOB (shortness of breath) [R06.02] 10/05/2017    Chest pain [R07.9] 10/05/2017       Acute systolic congestive heart failure, EF of 30%- diuresed fairly well, currently on Lasix 20 mg daily, low-dose lisinopril, metoprolol, Aldactone    Atrial fibrillation with rapid ventricular response, new onset currently in sinus rhythm status post cardioversion - metoprolol dose increased to 25 mg 3 times daily, anticoagulation with Eliquis, aldactone    Multivessel coronary artery disease, not amenable to PCI: medical management, continue aspirin, statin, metoprolol  Blood pressure is

## 2017-10-12 NOTE — PROGRESS NOTES
PT to increase balance and stability to further enhance functional mobility. Prognosis: Excellent  REQUIRES PT FOLLOW UP: Yes  Discharge Recommendations: Continue to assess pending progress, Outpatient PT    Patient Education:  Patient Education: POC, Ther ex, Transfers, ambulation    Equipment Recommendations:  Equipment Needed: No    Safety:  Type of devices: All fall risk precautions in place, Call light within reach, Nurse notified, Gait belt, Patient at risk for falls, Left in bed    Plan:  Times per week: 5x GM/O  Times per day: Daily  Current Treatment Recommendations: Strengthening, Balance Training, Endurance Training, Functional Mobility Training, Gait Training, Stair training, Patient/Caregiver Education & Training    Goals:  Patient goals : go home    Short term goals  Time Frame for Short term goals: 2 days  Short term goal 1: Pt to be Mod I for supine <> sit to get in/out of bed  Short term goal 2: Pt to be Mod I with sit <>stand to get up to ambulate  Short term goal 3: Pt to ambulate > 150 ft with cane with Supervision for household distances  Short term goal 4: Pt to negotiate 2 steps with cane and 1 rail with Supervision for home access    Long term goals  Time Frame for Long term goals : not set due to short ELOS    PT G-Codes  Functional Assessment Tool Used: prof judgement  Functional Limitation: Mobility: Walking and moving around  Mobility: Walking and Moving Around Current Status (): At least 20 percent but less than 40 percent impaired, limited or restricted  Mobility: Walking and Moving Around Goal Status ():  At least 1 percent but less than 20 percent impaired, limited or restricted

## 2017-10-17 ENCOUNTER — OFFICE VISIT (OUTPATIENT)
Dept: FAMILY MEDICINE CLINIC | Age: 82
End: 2017-10-17
Payer: MEDICARE

## 2017-10-17 VITALS
WEIGHT: 129 LBS | SYSTOLIC BLOOD PRESSURE: 102 MMHG | HEART RATE: 72 BPM | DIASTOLIC BLOOD PRESSURE: 60 MMHG | BODY MASS INDEX: 25.19 KG/M2 | RESPIRATION RATE: 24 BRPM | TEMPERATURE: 98.3 F

## 2017-10-17 DIAGNOSIS — I48.91 ATRIAL FIBRILLATION WITH RVR (HCC): Primary | ICD-10-CM

## 2017-10-17 DIAGNOSIS — I48.91 ATRIAL FIBRILLATION, UNSPECIFIED TYPE (HCC): ICD-10-CM

## 2017-10-17 DIAGNOSIS — Z96.652 STATUS POST TOTAL LEFT KNEE REPLACEMENT: ICD-10-CM

## 2017-10-17 DIAGNOSIS — I42.2 HYPERTROPHIC NONOBSTRUCTIVE CARDIOMYOPATHY (HCC): ICD-10-CM

## 2017-10-17 DIAGNOSIS — I50.21 ACUTE SYSTOLIC CHF (CONGESTIVE HEART FAILURE) (HCC): ICD-10-CM

## 2017-10-17 DIAGNOSIS — I25.5 ISCHEMIC DILATED CARDIOMYOPATHY (HCC): ICD-10-CM

## 2017-10-17 DIAGNOSIS — I42.0 ISCHEMIC DILATED CARDIOMYOPATHY (HCC): ICD-10-CM

## 2017-10-17 DIAGNOSIS — I50.22 HEART FAILURE, SYSTOLIC, CHRONIC (HCC): ICD-10-CM

## 2017-10-17 DIAGNOSIS — I47.20 VENTRICULAR TACHYARRHYTHMIA: ICD-10-CM

## 2017-10-17 PROCEDURE — 99495 TRANSJ CARE MGMT MOD F2F 14D: CPT | Performed by: FAMILY MEDICINE

## 2017-10-17 ASSESSMENT — PATIENT HEALTH QUESTIONNAIRE - PHQ9
SUM OF ALL RESPONSES TO PHQ QUESTIONS 1-9: 0
SUM OF ALL RESPONSES TO PHQ9 QUESTIONS 1 & 2: 0
2. FEELING DOWN, DEPRESSED OR HOPELESS: 0
1. LITTLE INTEREST OR PLEASURE IN DOING THINGS: 0

## 2017-10-20 LAB
ANION GAP SERPL CALCULATED.3IONS-SCNC: 16 MMOL/L
BUN BLDV-MCNC: 20 MG/DL (ref 9–24)
CALCIUM SERPL-MCNC: 9.3 MG/DL (ref 8.7–10.8)
CHLORIDE BLD-SCNC: 102 MMOL/L (ref 95–111)
CO2: 24 MMOL/L (ref 21–32)
CREAT SERPL-MCNC: 1 MG/DL (ref 0.5–1.3)
EGFR AFRICAN AMERICAN: 64
EGFR IF NONAFRICAN AMERICAN: 53
GLUCOSE: 123 MG/DL (ref 70–100)
MAGNESIUM: 2.4 MG/DL (ref 1.7–2.4)
POTASSIUM SERPL-SCNC: 4.5 MMOL/L (ref 3.5–5.4)
SODIUM BLD-SCNC: 137 MMOL/L (ref 134–147)

## 2017-10-22 ASSESSMENT — ENCOUNTER SYMPTOMS
ABDOMINAL PAIN: 0
WHEEZING: 0
SORE THROAT: 0
NAUSEA: 0
COUGH: 0
EYES NEGATIVE: 1
CONSTIPATION: 0
VOMITING: 0
DIARRHEA: 0

## 2017-10-22 NOTE — PROGRESS NOTES
mouth daily             lansoprazole (PREVACID) 30 MG delayed release capsule  Take 1 capsule by mouth daily             lisinopril (PRINIVIL;ZESTRIL) 2.5 MG tablet  Take 1 tablet by mouth daily             metoprolol tartrate (LOPRESSOR) 25 MG tablet  Take 1 tablet by mouth 3 times daily . hold if SBP less than 100 mm hg or HR less than 60             spironolactone (ALDACTONE) 25 MG tablet  Take 0.5 tablets by mouth daily             vitamin C (ASCORBIC ACID) 500 MG tablet  Take 500 mg by mouth daily             vitamin D (CHOLECALCIFEROL) 1000 UNIT TABS tablet  Take 1,000 Units by mouth daily                   Medications marked \"taking\" at this time  Outpatient Prescriptions Marked as Taking for the 10/17/17 encounter (Office Visit) with Catherine Burdick MD   Medication Sig Dispense Refill    metoprolol tartrate (LOPRESSOR) 25 MG tablet Take 1 tablet by mouth 3 times daily . hold if SBP less than 100 mm hg or HR less than 60 60 tablet 3    vitamin C (ASCORBIC ACID) 500 MG tablet Take 500 mg by mouth daily      apixaban (ELIQUIS) 5 MG TABS tablet Take 1 tablet by mouth 2 times daily 60 tablet 3    atorvastatin (LIPITOR) 40 MG tablet Take 1 tablet by mouth nightly 30 tablet 3    lisinopril (PRINIVIL;ZESTRIL) 2.5 MG tablet Take 1 tablet by mouth daily 30 tablet 3    furosemide (LASIX) 20 MG tablet Take 1 tablet by mouth daily 60 tablet 3    spironolactone (ALDACTONE) 25 MG tablet Take 0.5 tablets by mouth daily 30 tablet 3    lansoprazole (PREVACID) 30 MG delayed release capsule Take 1 capsule by mouth daily 30 capsule 11    aspirin EC 81 MG EC tablet Take 1 tablet by mouth daily 30 tablet 0        Medications patient taking as of now reconciled against medications ordered at time of hospital discharge     Vitals:    10/17/17 1356   BP: 102/60   Pulse: 72   Resp: 24   Temp: 98.3 °F (36.8 °C)   TempSrc: Oral   Weight: 129 lb (58.5 kg)     Body mass index is 25.19 kg/m².      Wt Readings from Last 3 Encounters: 10/17/17 129 lb (58.5 kg)   10/12/17 139 lb 14.4 oz (63.5 kg)   08/21/17 136 lb (61.7 kg)     BP Readings from Last 3 Encounters:   10/17/17 102/60   10/12/17 (!) 104/55   08/29/17 131/77        Inpatient course: Discharge summary reviewed- see chart. Chief Complaint   Patient presents with    Follow-Up from Hospital     [facility] visit, discharged from [address] [place] [address] on 10/10/17 for CHF, a-fib. Says she is doing well. History of Present illness - Follow up of Hospital diagnosis(es): afib      Non face to face encounter following discharge, date first attempted: 10/16/2017  8:07 AM  Non face to face care coordinator encounter initiated within 2 business days of hospital discharge. Note reviewed yes. Interval history/Current status: Review of Systems   Constitutional: Negative for chills and fever. HENT: Negative for congestion and sore throat. Eyes: Negative. Respiratory: Negative for cough and wheezing. Cardiovascular: Negative for palpitations and leg swelling. Gastrointestinal: Negative for abdominal pain, constipation, diarrhea, nausea and vomiting. Genitourinary: Negative for dysuria and frequency. Musculoskeletal: Negative for joint pain and myalgias. Skin: Negative for rash. Neurological: Negative for dizziness, tingling and headaches. Physical Exam:  General Appearance: alert and oriented to person, place and time, well-developed and well-nourished, in no acute distress  Pulmonary/Chest: clear to auscultation bilaterally- no wheezes, rales or rhonchi, normal air movement, no respiratory distress  Cardiovascular: normal rate, normal S1 and S2, no gallops, intact distal pulses and no carotid bruits  Extremities: no cyanosis and no clubbing  Musculoskeletal: normal range of motion, no joint swelling, deformity or tenderness  Neurologic: gait and coordination normal and speech normal        Assessment/Plan:   [facility] was seen today for follow-up from hospital.    Diagnoses and all orders for this visit:    Atrial fibrillation with RVR (HCC)    Acute systolic CHF (congestive heart failure) (HCC)    Ischemic dilated cardiomyopathy (HCC)    Status post total left knee replacement          Medical Decision Making: moderate complexity

## 2017-10-23 ENCOUNTER — OFFICE VISIT (OUTPATIENT)
Dept: CARDIOLOGY CLINIC | Age: 82
End: 2017-10-23
Payer: MEDICARE

## 2017-10-23 VITALS
HEIGHT: 60 IN | WEIGHT: 126 LBS | OXYGEN SATURATION: 99 % | BODY MASS INDEX: 24.74 KG/M2 | RESPIRATION RATE: 20 BRPM | HEART RATE: 66 BPM | DIASTOLIC BLOOD PRESSURE: 49 MMHG | SYSTOLIC BLOOD PRESSURE: 94 MMHG

## 2017-10-23 DIAGNOSIS — I50.22 CHF (CONGESTIVE HEART FAILURE), NYHA CLASS II, CHRONIC, SYSTOLIC (HCC): Primary | ICD-10-CM

## 2017-10-23 PROCEDURE — 99213 OFFICE O/P EST LOW 20 MIN: CPT | Performed by: NURSE PRACTITIONER

## 2017-10-23 PROCEDURE — 1111F DSCHRG MED/CURRENT MED MERGE: CPT | Performed by: NURSE PRACTITIONER

## 2017-10-23 PROCEDURE — 1036F TOBACCO NON-USER: CPT | Performed by: NURSE PRACTITIONER

## 2017-10-23 PROCEDURE — G8420 CALC BMI NORM PARAMETERS: HCPCS | Performed by: NURSE PRACTITIONER

## 2017-10-23 PROCEDURE — G8598 ASA/ANTIPLAT THER USED: HCPCS | Performed by: NURSE PRACTITIONER

## 2017-10-23 PROCEDURE — 4040F PNEUMOC VAC/ADMIN/RCVD: CPT | Performed by: NURSE PRACTITIONER

## 2017-10-23 PROCEDURE — G8484 FLU IMMUNIZE NO ADMIN: HCPCS | Performed by: NURSE PRACTITIONER

## 2017-10-23 PROCEDURE — G8427 DOCREV CUR MEDS BY ELIG CLIN: HCPCS | Performed by: NURSE PRACTITIONER

## 2017-10-23 PROCEDURE — G8399 PT W/DXA RESULTS DOCUMENT: HCPCS | Performed by: NURSE PRACTITIONER

## 2017-10-23 PROCEDURE — 1090F PRES/ABSN URINE INCON ASSESS: CPT | Performed by: NURSE PRACTITIONER

## 2017-10-23 PROCEDURE — 1123F ACP DISCUSS/DSCN MKR DOCD: CPT | Performed by: NURSE PRACTITIONER

## 2017-10-23 ASSESSMENT — ENCOUNTER SYMPTOMS
APNEA: 0
COLOR CHANGE: 0
NAUSEA: 0
WHEEZING: 0
SHORTNESS OF BREATH: 0
ABDOMINAL DISTENTION: 0
COUGH: 0
CHEST TIGHTNESS: 0
ABDOMINAL PAIN: 0

## 2017-10-23 NOTE — PROGRESS NOTES
Heart Failure Clinic Follow Up      Visit Date: 10/23/2017  Cardiologist:  Dr. David Reynoso  Primary Care Physician: Dr. Radha Metzger MD    Asia Stone is a 80 y.o. female who presents today for:  Chief Complaint   Patient presents with    Congestive Heart Failure     new patient- HFU       HPI:   Asia Stone is a 80 y.o. female who presents to the office for a new patient visit in the heart failure clinic. Patient has:    Chest Pain: no  Worsening SOB/orthopnea/PND: no  Edema: no  Any extra diuretic use since last visit: no  Weight gain: no  Compliant checking home weight: yes  Abdominal bloating: no  Appetite: \"isn't that hungry\"   Difficulty sleeping: no  Cough: no  Compliant checking blood pressure: yes  Any refills on CHF medications needed at this time: no    Aggie Vaughn is here with her  and she has been feeling good since she left the hospital. She was admitted from 10/5/17-10-12-17 for A fib, SOB. Had some Torsades while in house. Per her previous ECHO her EF was 60% on 9/23/16. Her EF on 10/9/17 per KAMRAN was 25-30%. She was discharged with a LifeVest and has been wearing it but complains about it being \"heavy\". She denies feeling SOB with regular ADLs. She states she walked around [employer] last week without dyspnea. Her BPs have been in the high 90's wo mid 110's. She will sometimes not take a dose of Metoprolol due to this as she was instructed not to take if her SBP was < 100. They have been watching their diet and avoiding higher sodium foods. Her  states he has actually lost 10 pounds because of the diet change. Her weight has been consistent 125 give or take a pound.     Past Medical History:   Diagnosis Date    Allergic rhinitis     Arthritis     Encounter for cardioversion procedure 10/05/2017    GERD (gastroesophageal reflux disease)     Hx of transesophageal echocardiography (KAMRAN) for monitoring 10/2017    Mild mitral regurgitation 10/2017    Nonrheumatic aortic valve insufficiency 8/29/2017    Osteoporosis     Pre-op evaluation: prior to left knee repalcement 8/29/2017    PVC's (premature ventricular contractions) 8/29/2017    S/P cardiac catheterization 10/07/2017    with Dr. Pamela Mayorga -diagonal 90%, circ 90%,     Torn meniscus 12/2016     Past Surgical History:   Procedure Laterality Date    APPENDECTOMY  1946    BACK SURGERY  3/24/14    Lumbar Laminectomy Fusion L3-5, L4-5 PLIF - Dr. Olga Lidia Cook Left 09/05/2017    Rodriguez    ROTATOR CUFF REPAIR  3/8/06    right shoulder      Family History   Problem Relation Age of Onset    Arthritis Mother     Heart Disease Sister     High Blood Pressure Sister     Arthritis Sister     Cancer Brother     Heart Disease Brother     Diabetes Maternal Grandfather      Social History   Substance Use Topics    Smoking status: Former Smoker     Packs/day: 0.50     Years: 5.00     Quit date: 8/30/1965    Smokeless tobacco: Never Used    Alcohol use Yes      Comment: occ. red wine     Current Outpatient Prescriptions   Medication Sig Dispense Refill    metoprolol tartrate (LOPRESSOR) 25 MG tablet Take 1 tablet by mouth 2 times daily . hold if SBP less than 100 mm hg or HR less than 60 60 tablet 3    vitamin C (ASCORBIC ACID) 500 MG tablet Take 500 mg by mouth daily      vitamin D (CHOLECALCIFEROL) 1000 UNIT TABS tablet Take 1,000 Units by mouth daily      apixaban (ELIQUIS) 5 MG TABS tablet Take 1 tablet by mouth 2 times daily 60 tablet 3    atorvastatin (LIPITOR) 40 MG tablet Take 1 tablet by mouth nightly 30 tablet 3    lisinopril (PRINIVIL;ZESTRIL) 2.5 MG tablet Take 1 tablet by mouth daily 30 tablet 3    furosemide (LASIX) 20 MG tablet Take 1 tablet by mouth daily 60 tablet 3    spironolactone (ALDACTONE) 25 MG tablet Take 0.5 tablets by mouth daily 30 tablet 3    lansoprazole (PREVACID) 30 MG delayed release capsule Take 1 capsule by mouth daily 30 capsule 11    aspirin EC 81 MG EC tablet Take 1 tablet by mouth daily 30 tablet 0     No current facility-administered medications for this visit. Allergies   Allergen Reactions    Amiodarone Other (See Comments)     Prolonged QT; Torsades DP       SUBJECTIVE:   Review of Systems   Constitutional: Negative for activity change, appetite change, fatigue and fever. HENT: Negative for congestion. Respiratory: Negative for apnea, cough, chest tightness, shortness of breath and wheezing. Cardiovascular: Negative for chest pain, palpitations and leg swelling. Wearing a LifeVest   Gastrointestinal: Negative for abdominal distention, abdominal pain and nausea. Genitourinary: Negative for difficulty urinating and dysuria. Musculoskeletal: Negative for arthralgias and gait problem. Recent left knee surgery    Skin: Negative for color change. Neurological: Negative for dizziness, numbness and headaches. Psychiatric/Behavioral: Negative for confusion. The patient is not nervous/anxious. OBJECTIVE:   Today's Vitals:  BP (!) 94/49 (Site: Right Arm, Position: Sitting)   Pulse 66   Resp 20   Ht 5' (1.524 m)   Wt 126 lb (57.2 kg)   SpO2 99%   BMI 24.61 kg/m²     Physical Exam   Constitutional: She is oriented to person, place, and time. She appears well-developed and well-nourished. HENT:   Head: Normocephalic and atraumatic. Eyes: Pupils are equal, round, and reactive to light. Neck: Normal range of motion. No JVD present. Cardiovascular: Normal rate, regular rhythm and normal heart sounds. No murmur heard. Wearing a LifeVest   Pulmonary/Chest: Effort normal. No respiratory distress. She has no rales. Abdominal: Soft. She exhibits no distension. There is no tenderness. Musculoskeletal: She exhibits no edema or tenderness. Neurological: She is alert and oriented to person, place, and time. Skin: Skin is warm and dry. Psychiatric: She has a normal mood and affect.  Her behavior is normal.   Vitals

## 2017-10-23 NOTE — PATIENT INSTRUCTIONS
Continue:  · Continue current medications  · Daily weights  · Fluid restriction of 2 Liters per day  · Limit sodium in diet to around 1500 mg/day  · Monitor BP  · Activity as tolerated     Call the Heart Failure Clinic for any of the following symptoms: [phone]  Weight gain of 2-3 pounds in 1 day or 5 pounds in 1 week  Increased shortness of breath  Chest pain  Swelling in feet, ankles or legs  Tenderness or bloating in the abdomen  Decreased appetite    Decrease Metoprolol to 25 mg twice a day

## 2017-11-06 ENCOUNTER — OFFICE VISIT (OUTPATIENT)
Dept: CARDIOLOGY CLINIC | Age: 82
End: 2017-11-06
Payer: MEDICARE

## 2017-11-06 VITALS
DIASTOLIC BLOOD PRESSURE: 62 MMHG | SYSTOLIC BLOOD PRESSURE: 106 MMHG | HEIGHT: 60 IN | BODY MASS INDEX: 24.74 KG/M2 | WEIGHT: 126 LBS | HEART RATE: 76 BPM

## 2017-11-06 DIAGNOSIS — I50.22 CHF (CONGESTIVE HEART FAILURE), NYHA CLASS II, CHRONIC, SYSTOLIC (HCC): ICD-10-CM

## 2017-11-06 DIAGNOSIS — I48.0 PAROXYSMAL ATRIAL FIBRILLATION (HCC): ICD-10-CM

## 2017-11-06 DIAGNOSIS — I42.8 NON-ISCHEMIC CARDIOMYOPATHY (HCC): Primary | ICD-10-CM

## 2017-11-06 PROCEDURE — G8484 FLU IMMUNIZE NO ADMIN: HCPCS | Performed by: PHYSICIAN ASSISTANT

## 2017-11-06 PROCEDURE — 1111F DSCHRG MED/CURRENT MED MERGE: CPT | Performed by: PHYSICIAN ASSISTANT

## 2017-11-06 PROCEDURE — G8399 PT W/DXA RESULTS DOCUMENT: HCPCS | Performed by: PHYSICIAN ASSISTANT

## 2017-11-06 PROCEDURE — 1090F PRES/ABSN URINE INCON ASSESS: CPT | Performed by: PHYSICIAN ASSISTANT

## 2017-11-06 PROCEDURE — 99213 OFFICE O/P EST LOW 20 MIN: CPT | Performed by: PHYSICIAN ASSISTANT

## 2017-11-06 PROCEDURE — G8420 CALC BMI NORM PARAMETERS: HCPCS | Performed by: PHYSICIAN ASSISTANT

## 2017-11-06 PROCEDURE — 1036F TOBACCO NON-USER: CPT | Performed by: PHYSICIAN ASSISTANT

## 2017-11-06 PROCEDURE — 1123F ACP DISCUSS/DSCN MKR DOCD: CPT | Performed by: PHYSICIAN ASSISTANT

## 2017-11-06 PROCEDURE — G8427 DOCREV CUR MEDS BY ELIG CLIN: HCPCS | Performed by: PHYSICIAN ASSISTANT

## 2017-11-06 PROCEDURE — G8598 ASA/ANTIPLAT THER USED: HCPCS | Performed by: PHYSICIAN ASSISTANT

## 2017-11-06 PROCEDURE — 4040F PNEUMOC VAC/ADMIN/RCVD: CPT | Performed by: PHYSICIAN ASSISTANT

## 2017-11-06 NOTE — PROGRESS NOTES
[facility] PROFESSIONAL SERVICES  [facility]   [address] [place] [address]   [address] [address]   Dept: [phone]   Dept Fax: [phone]: [phone]      Chief Complaint   Patient presents with    Follow-Up from Hospital     CHF & Afib     Follow-up recent hospitalization and KAMRAN/cardioversion for atrial fibrillation. Patient is wearing a LifeVest.  Has not discharged. He does alarm at times. She denies any chest pain, shortness of breath or palpitations. She has not had any issues with peripheral edema, orthopnea, or weight gain. Cardiologist:  Dr. Ava Florez:   No fever, no chills, No fatigue or weight loss  Pulmonary:    No dyspnea, no wheezing  Cardiac:    Denies recent chest pain   GI:     No nausea or vomiting, no abdominal pain  Neuro:    No dizziness or light headedness  Musculoskeletal:  No recent active issues  Extremities:   No edema, good peripheral pulses      Past Medical History:   Diagnosis Date    Allergic rhinitis     Arthritis     Encounter for cardioversion procedure 10/05/2017    GERD (gastroesophageal reflux disease)     Hx of transesophageal echocardiography (KAMRAN) for monitoring 10/2017    Mild mitral regurgitation 10/2017    Nonrheumatic aortic valve insufficiency 8/29/2017    Osteoporosis     Pre-op evaluation: prior to left knee repalcement 8/29/2017    PVC's (premature ventricular contractions) 8/29/2017    S/P cardiac catheterization 10/07/2017    with Dr. Vernon Schreiber -diagonal 90%, circ 90%,     Torn meniscus 12/2016       Allergies   Allergen Reactions    Amiodarone Other (See Comments)     Prolonged QT; Torsades DP       Current Outpatient Prescriptions   Medication Sig Dispense Refill    metoprolol tartrate (LOPRESSOR) 25 MG tablet Take 1 tablet by mouth 2 times daily . hold if SBP less than 100 mm hg or HR less than 60 60 tablet 3    vitamin C (ASCORBIC ACID) 500 MG tablet Take 500 mg by mouth daily      vitamin D deformities        KAMRAN 10/9/2017  Conclusions      Summary   KAMRAN was carried out after adequate sedation. The probe was inserted with   ease and manipulated to obtain selected views in 2D, M-Mode, and color   flow. No complications occurred.  [place] was severe global hypokinesis of the left ventricle.   and left ventricle was dilated   Ejection fraction is visually estimated at 25 to 30%.   Normal size left atrium.   No evidence of patent foramen ovale.   No evidence of atrial septal defect.  MARY ALICE IF FREE OF THROMBUS AS SUCH PATIENT UNDERWENT CARDIOVERSION AT 80   JOULES AND CONVERTED TO SINUS x 1 WITHOUT COMPLICATIONS AND WAS   NEUROLOGICAL INTACT .   patient was given anti dote to sedatives post procedure   Structurally normal mitral valve.   Mild mitral regurgitation is present.   no evidence of mass or thrombus is noted in the apex of the left ventricle    Cardiac catheterization 10/9/2017  SUMMARY:  Significant multivessel disease predominantly in the LAD and  circumflex territories. Euvolemic pressures with preserved cardiac  output.     PLAN:  1. Continued management of heart failure symptoms. 2.  Optimal medical therapy. 3.  Risk factor management. 4.  Multivessel disease, not amenable to PCI. Continue medical  management. 5.  Bedrest/groin care per protocol. 6.  Follow up with myself or primary cardiologist as an outpatient two  weeks post discharge.     All the above was explained to the patient and the patient's family  and they are agreeable and amenable to the plan. 1. Non-ischemic cardiomyopathy ([address]  ECHO Limited   2. CHF (congestive heart failure), NYHA class II, chronic, systolic (HCC)     3. Paroxysmal atrial fibrillation ([address]         Orders Placed This Encounter   Procedures    ECHO Limited     Standing Status:   Future     Standing Expiration Date:   11/6/2018     Scheduling Instructions:       To be read by Dr Slater Pi            To be done after Brett 10     Order Specific Question:

## 2017-11-14 ENCOUNTER — OFFICE VISIT (OUTPATIENT)
Dept: CARDIOLOGY CLINIC | Age: 82
End: 2017-11-14
Payer: MEDICARE

## 2017-11-14 VITALS
BODY MASS INDEX: 25.13 KG/M2 | HEIGHT: 60 IN | WEIGHT: 128 LBS | DIASTOLIC BLOOD PRESSURE: 63 MMHG | SYSTOLIC BLOOD PRESSURE: 97 MMHG | HEART RATE: 80 BPM

## 2017-11-14 DIAGNOSIS — Z86.79 ATRIAL FIBRILLATION, CURRENTLY IN SINUS RHYTHM: Primary | ICD-10-CM

## 2017-11-14 DIAGNOSIS — I42.8 NON-ISCHEMIC CARDIOMYOPATHY (HCC): ICD-10-CM

## 2017-11-14 PROCEDURE — G8484 FLU IMMUNIZE NO ADMIN: HCPCS | Performed by: INTERNAL MEDICINE

## 2017-11-14 PROCEDURE — G8399 PT W/DXA RESULTS DOCUMENT: HCPCS | Performed by: INTERNAL MEDICINE

## 2017-11-14 PROCEDURE — G8419 CALC BMI OUT NRM PARAM NOF/U: HCPCS | Performed by: INTERNAL MEDICINE

## 2017-11-14 PROCEDURE — 1090F PRES/ABSN URINE INCON ASSESS: CPT | Performed by: INTERNAL MEDICINE

## 2017-11-14 PROCEDURE — G8427 DOCREV CUR MEDS BY ELIG CLIN: HCPCS | Performed by: INTERNAL MEDICINE

## 2017-11-14 PROCEDURE — 4040F PNEUMOC VAC/ADMIN/RCVD: CPT | Performed by: INTERNAL MEDICINE

## 2017-11-14 PROCEDURE — G8598 ASA/ANTIPLAT THER USED: HCPCS | Performed by: INTERNAL MEDICINE

## 2017-11-14 PROCEDURE — 1036F TOBACCO NON-USER: CPT | Performed by: INTERNAL MEDICINE

## 2017-11-14 PROCEDURE — 93000 ELECTROCARDIOGRAM COMPLETE: CPT | Performed by: INTERNAL MEDICINE

## 2017-11-14 PROCEDURE — 99214 OFFICE O/P EST MOD 30 MIN: CPT | Performed by: INTERNAL MEDICINE

## 2017-11-14 PROCEDURE — 1123F ACP DISCUSS/DSCN MKR DOCD: CPT | Performed by: INTERNAL MEDICINE

## 2017-11-14 ASSESSMENT — ENCOUNTER SYMPTOMS
BLURRED VISION: 0
SORE THROAT: 0
NAUSEA: 0
BACK PAIN: 0
LEFT EYE: 0
RIGHT EYE: 0
SHORTNESS OF BREATH: 0
DOUBLE VISION: 0
COUGH: 0
ABDOMINAL PAIN: 0
DIARRHEA: 0
WHEEZING: 0
CONSTIPATION: 0
VOMITING: 0

## 2017-11-14 NOTE — PROGRESS NOTES
CARDIOLOGY - ELECTROPHYSIOLOGY  PROGRESS NOTE    Date:   11/14/2017  Patient name: Shawanda Funez  Date of admission:  No admission date for patient encounter. MRN:   [id number]  YOB: 1934  PCP: Garvin Merlin, MD    Reason for Admission: Heart failure and Afib with RVR    Subjective: I do not like wearing this Life Vest       Clinical Changes / Abnormalities:    10/05/2017:  HPI  The patient is an 81 y/o female with a h/o paroxysmal atrial fibrillation diagnosed by Holter monitor in 2016. The episodes were minor and did not require treatment. The patient underwent knee surgery a few weeks ago. The patient states she developed shortness of breath about two weeks ago. She also reported having orthopnea, vague chest discomfort, and fatigue. The patient presented to the hospital on 10/5/2017 and was found to be in atrial fibrillation with a rapid ventricular response. The patient was admitted to the hospital and started on rate control medication. She had an ECHO performed which showed her EF had dropped from 60-65% to 30% in three weeks. The patient underwent a cardiac catheterization which revealed diffuse disease but did not require revascularization. The patient underwent KAMRAN guided cardioversion by Dr. Ebony Felix. She had early recurrence and therefore was started on amiodarone. The patient converted to sinus rhythm and she had a severely prolonged QT interval.  She then started to have runs of polymorphic PVCs and finally episodes of Torsades lou Pointes. The amiodarone was stopped and the patient was given magnesium. Her QT interval is still prolonged but not as long as it was with amiodarone. The patient remains in sinus rhythm. She is also on guide line directed heart failure medications and is wearing a Life Vest.  EP has been consulted for further evaluation and management. 11/14/2017:  The patient is doing well since her discharge.   She has not had any documented recurrence of atrial input(s): AST, ALT, ALB, BILITOT, ALKPHOS in the last 72 hours. Troponin: No results for input(s): TROPONINI in the last 72 hours. BNP: No results for input(s): BNP in the last 72 hours. Lipids: No results for input(s): CHOL, HDL in the last 72 hours. Invalid input(s): LDLCALCU  INR: No results for input(s): INR in the last 72 hours. Objective:   REVIEW OF SYSTEMS:    Review of Systems   Constitution: Negative for fever, weight gain and weight loss. HENT: Negative for hearing loss and sore throat. Eyes: Negative for blurred vision, double vision, vision loss in left eye and vision loss in right eye. Cardiovascular: Negative for chest pain, dyspnea on exertion, irregular heartbeat, near-syncope, palpitations and syncope. Respiratory: Negative for cough, shortness of breath and wheezing. Endocrine: Negative for cold intolerance, heat intolerance and polyuria. Hematologic/Lymphatic: Negative for bleeding problem. Does not bruise/bleed easily. Skin: Negative for dry skin, itching and rash. Musculoskeletal: Positive for arthritis. Negative for back pain, joint pain and myalgias. Gastrointestinal: Negative for abdominal pain, constipation, diarrhea, nausea and vomiting. Genitourinary: Negative for dysuria, frequency, hematuria and urgency. Neurological: Negative for dizziness, headaches, light-headedness, numbness, paresthesias, seizures and vertigo. Psychiatric/Behavioral: Negative for depression and memory loss. The patient is not nervous/anxious. PHYSICAL EXAM:  BP 97/63   Pulse 80   Ht 5' (1.524 m)   Wt 128 lb (58.1 kg)   Breastfeeding? No   BMI 25.00 kg/m²     Physical Exam   Constitutional: She is oriented to person, place, and time. No distress. HENT:   Head: Normocephalic and atraumatic. Head is without contusion. Right Ear: Hearing and external ear normal. No decreased hearing is noted. Left Ear: Hearing and external ear normal. No decreased hearing is noted. Nails show no clubbing. Psychiatric: Her speech is normal and behavior is normal. Judgment and thought content normal. Her mood appears not anxious. Her affect is not angry. Cognition and memory are normal. She does not exhibit a depressed mood. Nursing note and vitals reviewed. DIAGNOSTIC STUDIES & LABORATORY DATA:     I have personally reviewed and interpreted the results of the following diagnostic testing  CARDIAC HISTORY:  CATH: 10/7/2017  CORONARY ANGIOGRAM:     LEFT MAIN: Shannan Arellano left main is patent with mild luminal irregularities  with no significant obstructive disease.     LAD:  The LAD has a proximal vessel that is approximately 4.0 mm in  diameter and quickly narrows to 1.5 mm in diameter.  The entire LAD is  diffusely diseased.  There is a large diagonal branch that has 90%  stenosis.     LEFT CIRCUMFLEX:  The left circumflex is small and diffusely diseased. The AV groove circumflex continues on to with a 90% stenosis in the  proximal segment.  The OM branch is diseased in the mid  segment.     RCA:  Dominant vessel with mild luminal irregularities throughout. The PDA has approximately 60% stenosis in the mid vessel.  The PLV has  mild luminal irregularities.     RIGHT HEART CATHETERIZATION:  Right atrium 10, RV 29/8, PA 27/50 with  a mean of 21, wedge pressure of 11, cardiac index of 2.1, PA  saturation 62%.     LV:  The LV systolic pressure is 96 with an EDP of 8. There is no  significant LV to AO systolic gradient.     SUMMARY:  Significant multivessel disease predominantly in the LAD and  circumflex territories.  Euvolemic pressures with preserved cardiac  output. ECHO:   10/5/2017  Summary   Ejection fraction is visually estimated at 30%.   There was moderate global hypokinesis of the left ventricle.   Moderate mitral regurgitation is present.   Moderate-to-severe tricuspid regurgitation. 9/23/2016  Summary   Normal left ventricle size and systolic function.  Ejection fraction PVC's (premature ventricular contractions)     Nonrheumatic aortic valve insufficiency     Rapid atrial fibrillation (HCC)     SOB (shortness of breath)     Chest pain     Atrial fibrillation with RVR ([facility])     Acute systolic CHF (congestive heart failure) (HCC)     CAD, multiple vessel     Ischemic dilated cardiomyopathy (HCC)     Hypotension     Atrial fibrillation, currently in sinus rhythm     Severe tricuspid regurgitation     Moderate mitral regurgitation     NSVT (nonsustained ventricular tachycardia) ([facility])     Prolonged QT interval      Plan of Treatment:   1. Continue current medications. 2. Continue Life Vest.  3. F/U after repeat ECHO to discuss need for permanent ICD.

## 2017-12-07 ENCOUNTER — TELEPHONE (OUTPATIENT)
Dept: FAMILY MEDICINE CLINIC | Age: 82
End: 2017-12-07

## 2017-12-07 NOTE — TELEPHONE ENCOUNTER
I called and spoke with the pt. I scheduled her An appt with  tomorrow 12-8-17 at 8:15am per pt. She states that she will discuss her results with JR tomorrow at her appt.

## 2017-12-08 ENCOUNTER — OFFICE VISIT (OUTPATIENT)
Dept: FAMILY MEDICINE CLINIC | Age: 82
End: 2017-12-08
Payer: MEDICARE

## 2017-12-08 VITALS
WEIGHT: 125 LBS | BODY MASS INDEX: 23.6 KG/M2 | TEMPERATURE: 97.7 F | RESPIRATION RATE: 16 BRPM | DIASTOLIC BLOOD PRESSURE: 52 MMHG | HEIGHT: 61 IN | HEART RATE: 60 BPM | SYSTOLIC BLOOD PRESSURE: 82 MMHG

## 2017-12-08 DIAGNOSIS — H61.21 IMPACTED CERUMEN OF RIGHT EAR: ICD-10-CM

## 2017-12-08 DIAGNOSIS — J01.90 ACUTE BACTERIAL SINUSITIS: Primary | ICD-10-CM

## 2017-12-08 DIAGNOSIS — I42.0 ISCHEMIC DILATED CARDIOMYOPATHY (HCC): ICD-10-CM

## 2017-12-08 DIAGNOSIS — I25.10 CAD, MULTIPLE VESSEL: ICD-10-CM

## 2017-12-08 DIAGNOSIS — H65.93 BILATERAL SEROUS OTITIS MEDIA, UNSPECIFIED CHRONICITY: ICD-10-CM

## 2017-12-08 DIAGNOSIS — I50.21 ACUTE SYSTOLIC CHF (CONGESTIVE HEART FAILURE) (HCC): ICD-10-CM

## 2017-12-08 DIAGNOSIS — B96.89 ACUTE BACTERIAL SINUSITIS: Primary | ICD-10-CM

## 2017-12-08 DIAGNOSIS — I48.0 PAROXYSMAL ATRIAL FIBRILLATION (HCC): ICD-10-CM

## 2017-12-08 DIAGNOSIS — I25.5 ISCHEMIC DILATED CARDIOMYOPATHY (HCC): ICD-10-CM

## 2017-12-08 DIAGNOSIS — M47.9 OSTEOARTHRITIS OF SPINE, UNSPECIFIED SPINAL OSTEOARTHRITIS COMPLICATION STATUS, UNSPECIFIED SPINAL REGION: ICD-10-CM

## 2017-12-08 PROCEDURE — 1036F TOBACCO NON-USER: CPT | Performed by: NURSE PRACTITIONER

## 2017-12-08 PROCEDURE — G8598 ASA/ANTIPLAT THER USED: HCPCS | Performed by: NURSE PRACTITIONER

## 2017-12-08 PROCEDURE — G8484 FLU IMMUNIZE NO ADMIN: HCPCS | Performed by: NURSE PRACTITIONER

## 2017-12-08 PROCEDURE — G8420 CALC BMI NORM PARAMETERS: HCPCS | Performed by: NURSE PRACTITIONER

## 2017-12-08 PROCEDURE — G8427 DOCREV CUR MEDS BY ELIG CLIN: HCPCS | Performed by: NURSE PRACTITIONER

## 2017-12-08 PROCEDURE — 99213 OFFICE O/P EST LOW 20 MIN: CPT | Performed by: NURSE PRACTITIONER

## 2017-12-08 PROCEDURE — 4040F PNEUMOC VAC/ADMIN/RCVD: CPT | Performed by: NURSE PRACTITIONER

## 2017-12-08 PROCEDURE — 96372 THER/PROPH/DIAG INJ SC/IM: CPT | Performed by: NURSE PRACTITIONER

## 2017-12-08 PROCEDURE — 1123F ACP DISCUSS/DSCN MKR DOCD: CPT | Performed by: NURSE PRACTITIONER

## 2017-12-08 PROCEDURE — 1090F PRES/ABSN URINE INCON ASSESS: CPT | Performed by: NURSE PRACTITIONER

## 2017-12-08 PROCEDURE — G8399 PT W/DXA RESULTS DOCUMENT: HCPCS | Performed by: NURSE PRACTITIONER

## 2017-12-08 RX ORDER — BENZONATATE 100 MG/1
100 CAPSULE ORAL 3 TIMES DAILY PRN
Qty: 30 CAPSULE | Refills: 0 | Status: SHIPPED | OUTPATIENT
Start: 2017-12-08 | End: 2017-12-18

## 2017-12-08 RX ORDER — CEFUROXIME AXETIL 250 MG/1
250 TABLET ORAL 2 TIMES DAILY
Qty: 20 TABLET | Refills: 0 | Status: SHIPPED | OUTPATIENT
Start: 2017-12-08 | End: 2017-12-18

## 2017-12-08 RX ORDER — METHYLPREDNISOLONE ACETATE 80 MG/ML
80 INJECTION, SUSPENSION INTRA-ARTICULAR; INTRALESIONAL; INTRAMUSCULAR; SOFT TISSUE ONCE
Status: COMPLETED | OUTPATIENT
Start: 2017-12-08 | End: 2017-12-08

## 2017-12-08 RX ADMIN — METHYLPREDNISOLONE ACETATE 80 MG: 80 INJECTION, SUSPENSION INTRA-ARTICULAR; INTRALESIONAL; INTRAMUSCULAR; SOFT TISSUE at 09:02

## 2017-12-13 LAB
ANION GAP SERPL CALCULATED.3IONS-SCNC: 13 MMOL/L
BUN BLDV-MCNC: 26 MG/DL (ref 9–24)
CALCIUM SERPL-MCNC: 9.4 MG/DL (ref 8.7–10.8)
CHLORIDE BLD-SCNC: 107 MMOL/L (ref 95–111)
CO2: 26 MMOL/L (ref 21–32)
CREAT SERPL-MCNC: 0.8 MG/DL (ref 0.5–1.3)
EGFR AFRICAN AMERICAN: 83
EGFR IF NONAFRICAN AMERICAN: 69
GLUCOSE: 89 MG/DL (ref 70–100)
MAGNESIUM: 2.4 MG/DL (ref 1.7–2.4)
POTASSIUM SERPL-SCNC: 4.8 MMOL/L (ref 3.5–5.4)
SODIUM BLD-SCNC: 141 MMOL/L (ref 134–147)

## 2017-12-21 ASSESSMENT — ENCOUNTER SYMPTOMS
GASTROINTESTINAL NEGATIVE: 1
RHINORRHEA: 1
SINUS PRESSURE: 1
RESPIRATORY NEGATIVE: 1
EYES NEGATIVE: 1
SINUS PAIN: 1

## 2017-12-21 NOTE — PROGRESS NOTES
[address]  [facility]  [facility]  Dept: [phone]  Dept Fax: [phone]  Loc: [phone]  PROGRESS NOTE      Visit Date: 12/21/2017    Yousif Vera is a 80 y.o. female who presents today for:  Chief Complaint   Patient presents with    Sinus Problem     Sinus drainage x one month, she cannot cough it up. She is on a lot of heart mediction so she is scared to take OTC products. No facial pressure, no headaches. Subjective:  C/o sinus drainage , congestion, ear pain, pnd past month. No fever, cp, sob,           Review of Systems   Constitutional: Negative for fever. HENT: Positive for congestion, ear pain, postnasal drip, rhinorrhea, sinus pain and sinus pressure. Negative for ear discharge. Eyes: Negative. Respiratory: Negative. Cardiovascular: Negative. Gastrointestinal: Negative. Endocrine: Negative. Genitourinary: Negative. Musculoskeletal: Negative. Skin: Negative. Neurological: Negative. Hematological: Negative. Psychiatric/Behavioral: Negative.       Past Medical History:   Diagnosis Date    Allergic rhinitis     Arthritis     Encounter for cardioversion procedure 10/05/2017    GERD (gastroesophageal reflux disease)     Hx of transesophageal echocardiography (KAMRAN) for monitoring 10/2017    Mild mitral regurgitation 10/2017    Nonrheumatic aortic valve insufficiency 8/29/2017    Osteoporosis     Pre-op evaluation: prior to left knee repalcement 8/29/2017    PVC's (premature ventricular contractions) 8/29/2017    S/P cardiac catheterization 10/07/2017    with Dr. Lizzy Suarez -diagonal 90%, circ 90%,     Torn meniscus 12/2016      Past Surgical History:   Procedure Laterality Date    APPENDECTOMY  1946    BACK SURGERY  3/24/14    Lumbar Laminectomy Fusion L3-5, L4-5 PLIF - Dr. Williamson Dial Left 09/05/2017    Michael Shepherd Duane  3/8/06    right shoulder Cough     Orders Placed This Encounter   Procedures    Basic Metabolic Panel     Standing Status:   Future     Standing Expiration Date:   12/8/2018    Magnesium     Standing Status:   Future     Standing Expiration Date:   12/8/2018    Basic Metabolic Panel    Magnesium       Patient given educational materials - see patient instructions. Discussed use, benefit, and side effects of prescribed medications. All patient questions answered. Pt voiced understanding. Reviewed health maintenance. Patient agreed with treatment plan. Follow up as directed.           Electronically signed by Roman Roth NP on 12/21/2017 at 12:27 PM

## 2018-01-11 ENCOUNTER — HOSPITAL ENCOUNTER (OUTPATIENT)
Dept: NON INVASIVE DIAGNOSTICS | Age: 83
Discharge: HOME | End: 2018-01-11
Payer: MEDICARE

## 2018-01-11 ENCOUNTER — TELEPHONE (OUTPATIENT)
Dept: CARDIOLOGY CLINIC | Age: 83
End: 2018-01-11

## 2018-01-11 DIAGNOSIS — I42.8 NON-ISCHEMIC CARDIOMYOPATHY (HCC): ICD-10-CM

## 2018-01-11 PROCEDURE — 93307 TTE W/O DOPPLER COMPLETE: CPT

## 2018-01-12 ENCOUNTER — TELEPHONE (OUTPATIENT)
Dept: CARDIOLOGY CLINIC | Age: 83
End: 2018-01-12

## 2018-01-12 NOTE — TELEPHONE ENCOUNTER
Pt's  called.   Asking about ECHO and if she can discontinue life vest.    Verbal order Dr Rusty Wells:  Aide Sober to discontinue Life vest.    Pt's  notified and voice understanding     Please agree with verbal order

## 2018-01-16 ENCOUNTER — OFFICE VISIT (OUTPATIENT)
Dept: CARDIOLOGY CLINIC | Age: 83
End: 2018-01-16
Payer: MEDICARE

## 2018-01-16 VITALS
HEIGHT: 60 IN | HEART RATE: 78 BPM | DIASTOLIC BLOOD PRESSURE: 53 MMHG | BODY MASS INDEX: 24.74 KG/M2 | SYSTOLIC BLOOD PRESSURE: 105 MMHG | WEIGHT: 126 LBS

## 2018-01-16 DIAGNOSIS — I42.8 NON-ISCHEMIC CARDIOMYOPATHY (HCC): ICD-10-CM

## 2018-01-16 DIAGNOSIS — I48.91 ATRIAL FIBRILLATION WITH RVR (HCC): Primary | ICD-10-CM

## 2018-01-16 PROCEDURE — 4040F PNEUMOC VAC/ADMIN/RCVD: CPT | Performed by: INTERNAL MEDICINE

## 2018-01-16 PROCEDURE — G8598 ASA/ANTIPLAT THER USED: HCPCS | Performed by: INTERNAL MEDICINE

## 2018-01-16 PROCEDURE — 93000 ELECTROCARDIOGRAM COMPLETE: CPT | Performed by: INTERNAL MEDICINE

## 2018-01-16 PROCEDURE — 99213 OFFICE O/P EST LOW 20 MIN: CPT | Performed by: INTERNAL MEDICINE

## 2018-01-16 PROCEDURE — G8484 FLU IMMUNIZE NO ADMIN: HCPCS | Performed by: INTERNAL MEDICINE

## 2018-01-16 PROCEDURE — 1123F ACP DISCUSS/DSCN MKR DOCD: CPT | Performed by: INTERNAL MEDICINE

## 2018-01-16 PROCEDURE — G8420 CALC BMI NORM PARAMETERS: HCPCS | Performed by: INTERNAL MEDICINE

## 2018-01-16 PROCEDURE — G8399 PT W/DXA RESULTS DOCUMENT: HCPCS | Performed by: INTERNAL MEDICINE

## 2018-01-16 PROCEDURE — G8427 DOCREV CUR MEDS BY ELIG CLIN: HCPCS | Performed by: INTERNAL MEDICINE

## 2018-01-16 PROCEDURE — 1036F TOBACCO NON-USER: CPT | Performed by: INTERNAL MEDICINE

## 2018-01-16 PROCEDURE — 1090F PRES/ABSN URINE INCON ASSESS: CPT | Performed by: INTERNAL MEDICINE

## 2018-01-16 ASSESSMENT — ENCOUNTER SYMPTOMS
NAUSEA: 0
BACK PAIN: 0
ABDOMINAL PAIN: 0
RIGHT EYE: 0
LEFT EYE: 0
WHEEZING: 0
VOMITING: 0
BLURRED VISION: 0
SHORTNESS OF BREATH: 0
DOUBLE VISION: 0
CONSTIPATION: 0
SORE THROAT: 0
COUGH: 0
DIARRHEA: 0

## 2018-01-16 NOTE — PROGRESS NOTES
Patient is here to follow up from an echo    Denies cp, sob, palpitations, dizziness and JOSUE.     C/o a cough

## 2018-01-16 NOTE — PROGRESS NOTES
CARDIOLOGY - ELECTROPHYSIOLOGY  PROGRESS NOTE    Date:   1/16/2018  Patient name: Shawanda Funez  Date of admission:  No admission date for patient encounter. MRN:   [id number]  YOB: 1934  PCP: Marian Norris MD    Reason for Admission:     Subjective:  I have a nagging cough that will not go away. Clinical Changes / Abnormalities:    10/05/2017:  HPI  The patient is an 79 y/o female with a h/o paroxysmal atrial fibrillation diagnosed by Holter monitor in 2016.  The episodes were minor and did not require treatment.  The patient underwent knee surgery a few weeks ago.  The patient states she developed shortness of breath about two weeks ago. Itzel Paidlla also reported having orthopnea, vague chest discomfort, and fatigue.  The patient presented to the hospital on 10/5/2017 and was found to be in atrial fibrillation with a rapid ventricular response.  The patient was admitted to the hospital and started on rate control medication.  She had an ECHO performed which showed her EF had dropped from 60-65% to 30% in three weeks.  The patient underwent a cardiac catheterization which revealed diffuse disease but did not require revascularization.  The patient underwent KAMRAN guided cardioversion by Dr. Nick Vigil. Itzel Padilla had early recurrence and therefore was started on amiodarone.  The patient converted to sinus rhythm and she had a severely prolonged QT interval.  She then started to have runs of polymorphic PVCs and finally episodes of Torsades lou Pointes.  The amiodarone was stopped and the patient was given magnesium.  Her QT interval is still prolonged but not as long as it was with amiodarone.  The patient remains in sinus rhythm.  She is also on guide line directed heart failure medications and is wearing a Life Vest.  EP has been consulted for further evaluation and management.     11/14/2017:  The patient is doing well since her discharge. She has not had any documented recurrence of atrial fibrillation.   She

## 2018-01-24 ENCOUNTER — OFFICE VISIT (OUTPATIENT)
Dept: CARDIOLOGY CLINIC | Age: 83
End: 2018-01-24
Payer: MEDICARE

## 2018-01-24 VITALS
BODY MASS INDEX: 24.77 KG/M2 | DIASTOLIC BLOOD PRESSURE: 60 MMHG | WEIGHT: 126.2 LBS | HEART RATE: 64 BPM | HEIGHT: 60 IN | SYSTOLIC BLOOD PRESSURE: 122 MMHG

## 2018-01-24 DIAGNOSIS — I35.1 NONRHEUMATIC AORTIC VALVE INSUFFICIENCY: ICD-10-CM

## 2018-01-24 DIAGNOSIS — I42.0 ISCHEMIC DILATED CARDIOMYOPATHY (HCC): ICD-10-CM

## 2018-01-24 DIAGNOSIS — I48.91 ATRIAL FIBRILLATION WITH RVR (HCC): Primary | ICD-10-CM

## 2018-01-24 DIAGNOSIS — I25.5 ISCHEMIC DILATED CARDIOMYOPATHY (HCC): ICD-10-CM

## 2018-01-24 PROCEDURE — G8399 PT W/DXA RESULTS DOCUMENT: HCPCS | Performed by: INTERNAL MEDICINE

## 2018-01-24 PROCEDURE — G8420 CALC BMI NORM PARAMETERS: HCPCS | Performed by: INTERNAL MEDICINE

## 2018-01-24 PROCEDURE — 99214 OFFICE O/P EST MOD 30 MIN: CPT | Performed by: INTERNAL MEDICINE

## 2018-01-24 PROCEDURE — 1090F PRES/ABSN URINE INCON ASSESS: CPT | Performed by: INTERNAL MEDICINE

## 2018-01-24 PROCEDURE — 4040F PNEUMOC VAC/ADMIN/RCVD: CPT | Performed by: INTERNAL MEDICINE

## 2018-01-24 PROCEDURE — 1123F ACP DISCUSS/DSCN MKR DOCD: CPT | Performed by: INTERNAL MEDICINE

## 2018-01-24 PROCEDURE — G8427 DOCREV CUR MEDS BY ELIG CLIN: HCPCS | Performed by: INTERNAL MEDICINE

## 2018-01-24 PROCEDURE — 1036F TOBACCO NON-USER: CPT | Performed by: INTERNAL MEDICINE

## 2018-01-24 PROCEDURE — G8484 FLU IMMUNIZE NO ADMIN: HCPCS | Performed by: INTERNAL MEDICINE

## 2018-01-24 PROCEDURE — G8598 ASA/ANTIPLAT THER USED: HCPCS | Performed by: INTERNAL MEDICINE

## 2018-01-24 RX ORDER — NITROGLYCERIN 0.4 MG/1
0.4 TABLET SUBLINGUAL EVERY 5 MIN PRN
Qty: 25 TABLET | Refills: 3 | Status: SHIPPED | OUTPATIENT
Start: 2018-01-24 | End: 2019-01-21 | Stop reason: SDUPTHER

## 2018-01-24 RX ORDER — ATORVASTATIN CALCIUM 40 MG/1
40 TABLET, FILM COATED ORAL NIGHTLY
Qty: 30 TABLET | Refills: 2 | Status: SHIPPED | OUTPATIENT
Start: 2018-01-24 | End: 2018-03-06 | Stop reason: SDUPTHER

## 2018-01-24 RX ORDER — FUROSEMIDE 20 MG/1
20 TABLET ORAL DAILY
Qty: 60 TABLET | Refills: 2 | Status: SHIPPED | OUTPATIENT
Start: 2018-01-24 | End: 2018-03-06 | Stop reason: SDUPTHER

## 2018-01-24 RX ORDER — SPIRONOLACTONE 25 MG/1
12.5 TABLET ORAL DAILY
Qty: 15 TABLET | Refills: 2 | Status: SHIPPED | OUTPATIENT
Start: 2018-01-24 | End: 2018-03-06 | Stop reason: SDUPTHER

## 2018-01-24 ASSESSMENT — ENCOUNTER SYMPTOMS
SHORTNESS OF BREATH: 1
EYES NEGATIVE: 1
GASTROINTESTINAL NEGATIVE: 1

## 2018-01-24 NOTE — PROGRESS NOTES
[facility]  [facility]  [address]  [address]  [address] [address]  Dept: [phone]  Dept Fax: [phone]  Loc: [phone]    Visit Date: 1/24/2018      Ms. Shayy Saunders is a 80 y.o. who is today for follow up on CHF and atrial fibrillation. She was initially seen for pre-op evaluation prior to left knee replacement with Dr. Nuno Cohen. She had an abnormal EKG. She had the surgery without problem, but later on she went into CHF and was found to have significant CAD and low LVEF. Pt denies chest pain, heart palpitations, dizziness, peripheral edema. HPI:   Ms. Shayy Saunders is a 80 y.o. female who is seen today for follow up on the cardiac problems mentioned below. MEDICAL DIAGNOSES  Patient Active Problem List    Diagnosis Date Noted    Acute systolic CHF (congestive heart failure) ([facility])      Priority: High    CAD, multiple vessel      Priority: High    Ischemic dilated cardiomyopathy ([facility] [address]      Priority: High    Hypotension      Priority: High    Atrial fibrillation with RVR ([facility])      Priority: High    Atrial fibrillation, currently in sinus rhythm     Severe tricuspid regurgitation     Moderate mitral regurgitation     NSVT (nonsustained ventricular tachycardia) ([facility])     Prolonged QT interval     Rapid atrial fibrillation (HCC) 10/05/2017    SOB (shortness of breath) 10/05/2017    Chest pain 10/05/2017    Pre-op evaluation: prior to left knee repalcement 08/29/2017    PVC's (premature ventricular contractions) 08/29/2017    Nonrheumatic aortic valve insufficiency 08/29/2017    Vertigo     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    Status post laminectomy with spinal fusion 03/27/2014    OA (osteoarthritis of spine), severe 03/27/2014    Spinal stenosis of lumbar region 01/07/2014    GERD (gastroesophageal reflux disease)     Osteoporosis     Allergic rhinitis        Allergies   Allergen Reactions    Amiodarone Other (See Comments)     Prolonged QT;  Torsades DP 12/12/2017    BILITOT 0.8 10/08/2017    ALKPHOS 76 10/08/2017    AST 13 10/08/2017    ALT 22 10/08/2017     Hepatic Function Panel:    Lab Results   Component Value Date    ALKPHOS 76 10/08/2017    ALT 22 10/08/2017    AST 13 10/08/2017    PROT 5.9 10/08/2017    BILITOT 0.8 10/08/2017    LABALBU 3.3 10/08/2017     Magnesium:    Lab Results   Component Value Date    MG 2.4 12/12/2017     PT/INR:    Lab Results   Component Value Date    INR 1.24 10/07/2017    INR 1.15 10/05/2017     HgBA1c:    Lab Results   Component Value Date    LABA1C 5.1 10/06/2017     FLP:    Lab Results   Component Value Date    TRIG 92 09/24/2016    TRIG 146 09/15/2013    TRIG 129 09/01/2012    HDL 62 09/24/2016    HDL 56 09/15/2013    HDL 56 09/01/2012    LDLCALC 131 09/24/2016    LDLCALC 133 09/15/2013    LDLCALC 155 09/01/2012     TSH:    Lab Results   Component Value Date    TSH 2.470 10/06/2017     No results for input(s): CKTOTAL, CKMB, CKMBINDEX, TROPONINI in the last 72 hours. Assessment:   Ms. Forrest Fregoso is a 80 y.o. who is known to us with history of multiple spine surgeries including one last year. She is active but needs left knee replacement. She does her house chores without any limitations otherwise, and she denies any symptoms suggestive of unstable angina or significant arrhythmias. She was seen by Dr. Lauren Hinojosa for atrial fibrillation. He saw her in 10/2017:  The patient is an 81 y/o female with a h/o paroxysmal atrial fibrillation diagnosed by Holter monitor in 2016.  The episodes were minor and did not require treatment.  The patient underwent knee surgery a few weeks ago.  The patient states she developed shortness of breath about two weeks ago. David Goodrich also reported having orthopnea, vague chest discomfort, and fatigue.  The patient presented to the hospital on 10/5/2017 and was found to be in atrial fibrillation with a rapid ventricular response.  The patient was admitted to the hospital and started on rate control medication. regurgitation is present.   Mild tricuspid regurgitation.   Mild pulmonic regurgitation visualized    The following diagnoses were addressed during this visit:  1. Atrial fibrillation with RVR ([address]     2. Ischemic dilated cardiomyopathy ([address]     3. Nonrheumatic aortic valve insufficiency       Plan:   The patient has gotten better and her LVEF improved. Could not tolerate ACEI because of hypotension, and cough. I will see in 3 months and consider ARB Losartan 25 mg daily. Check labs. Correct mag and K if needed. Follow up regularly because of mild to moderate AI and CAD with atrial fibrillation. Medications reviewed and renewed. Orders Placed:  No orders of the defined types were placed in this encounter. Medications:  Orders Placed This Encounter   Medications    nitroGLYCERIN (NITROSTAT) 0.4 MG SL tablet     Sig: Place 1 tablet under the tongue every 5 minutes as needed for Chest pain (SOB)     Dispense:  25 tablet     Refill:  3    apixaban (ELIQUIS) 5 MG TABS tablet     Sig: Take 1 tablet by mouth 2 times daily     Dispense:  60 tablet     Refill:  2    atorvastatin (LIPITOR) 40 MG tablet     Sig: Take 1 tablet by mouth nightly     Dispense:  30 tablet     Refill:  2    furosemide (LASIX) 20 MG tablet     Sig: Take 1 tablet by mouth daily     Dispense:  60 tablet     Refill:  2    metoprolol tartrate (LOPRESSOR) 25 MG tablet     Sig: Take 1 tablet by mouth 2 times daily . hold if SBP less than 100 mm hg or HR less than 60     Dispense:  60 tablet     Refill:  2    spironolactone (ALDACTONE) 25 MG tablet     Sig: Take 0.5 tablets by mouth daily     Dispense:  15 tablet     Refill:  2       Discussed use, benefit, and side effects of prescribed medications. All patient questions answered. Pt voiced understanding. Instructed to continue current medications, diet and exercise. Continue risk factor modification and medical management. Patient agreed with treatment plan.  Follow up as

## 2018-02-16 ENCOUNTER — OFFICE VISIT (OUTPATIENT)
Dept: CARDIOLOGY CLINIC | Age: 83
End: 2018-02-16
Payer: MEDICARE

## 2018-02-16 VITALS
WEIGHT: 130 LBS | HEART RATE: 57 BPM | BODY MASS INDEX: 25.52 KG/M2 | HEIGHT: 60 IN | DIASTOLIC BLOOD PRESSURE: 62 MMHG | SYSTOLIC BLOOD PRESSURE: 112 MMHG | OXYGEN SATURATION: 97 %

## 2018-02-16 DIAGNOSIS — I50.22 CHF (CONGESTIVE HEART FAILURE), NYHA CLASS II, CHRONIC, SYSTOLIC (HCC): Primary | ICD-10-CM

## 2018-02-16 PROCEDURE — G8598 ASA/ANTIPLAT THER USED: HCPCS | Performed by: NURSE PRACTITIONER

## 2018-02-16 PROCEDURE — G8484 FLU IMMUNIZE NO ADMIN: HCPCS | Performed by: NURSE PRACTITIONER

## 2018-02-16 PROCEDURE — 99213 OFFICE O/P EST LOW 20 MIN: CPT | Performed by: NURSE PRACTITIONER

## 2018-02-16 PROCEDURE — 1036F TOBACCO NON-USER: CPT | Performed by: NURSE PRACTITIONER

## 2018-02-16 PROCEDURE — G8399 PT W/DXA RESULTS DOCUMENT: HCPCS | Performed by: NURSE PRACTITIONER

## 2018-02-16 PROCEDURE — 1090F PRES/ABSN URINE INCON ASSESS: CPT | Performed by: NURSE PRACTITIONER

## 2018-02-16 PROCEDURE — G8419 CALC BMI OUT NRM PARAM NOF/U: HCPCS | Performed by: NURSE PRACTITIONER

## 2018-02-16 PROCEDURE — 1123F ACP DISCUSS/DSCN MKR DOCD: CPT | Performed by: NURSE PRACTITIONER

## 2018-02-16 PROCEDURE — 4040F PNEUMOC VAC/ADMIN/RCVD: CPT | Performed by: NURSE PRACTITIONER

## 2018-02-16 PROCEDURE — G8427 DOCREV CUR MEDS BY ELIG CLIN: HCPCS | Performed by: NURSE PRACTITIONER

## 2018-02-16 RX ORDER — LANSOPRAZOLE 30 MG/1
30 CAPSULE, DELAYED RELEASE ORAL DAILY
COMMUNITY
End: 2018-06-04 | Stop reason: SDUPTHER

## 2018-02-16 ASSESSMENT — ENCOUNTER SYMPTOMS
CHEST TIGHTNESS: 0
COLOR CHANGE: 0
SHORTNESS OF BREATH: 1
WHEEZING: 0
ABDOMINAL PAIN: 0
COUGH: 0
ABDOMINAL DISTENTION: 0
APNEA: 0
NAUSEA: 0

## 2018-02-16 NOTE — PATIENT INSTRUCTIONS
Continue:  · Continue current medications  · Daily weights and record  · Fluid restriction of 2 Liters per day  · Limit sodium in diet to around 1849-6222 mg/day  · Monitor BP  · Activity as tolerated     Call the Heart Failure Clinic for any of the following symptoms: [phone]  Weight gain of 2-3 pounds in 1 day or 5 pounds in 1 week  Increased shortness of breath  Shortness of breath while laying down  Cough  Chest pain  Swelling in feet, ankles or legs  Tenderness or bloating in the abdomen  Fatigue   Decreased appetite or nausea   Confusion

## 2018-02-16 NOTE — PROGRESS NOTES
Heart Failure Clinic       Visit Date: 2/16/2018  Cardiologist:  Dr. Lani Veliz  Primary Care Physician: Dr. Melissa Pichardo MD    Dennis Hernández is a 80 y.o. female who presents today for:  Chief Complaint   Patient presents with    Congestive Heart Failure       HPI:   Dennis Hernández is a 80 y.o. female who presents to the office for a follow up in the heart failure clinic. Her EF improved to 40-45% so the LifeVest was discontinued by Dr Lani Veliz. She has been feeling well, she is able to perform ADLs without fatigue or SOB. She can walk around the grocery without fatigue or SOB. She does tire and gets SOB with exertion. She does complain of leg cramps at times. She does not drink more then a liter of fluid a day. Patient has:  Last hospital admission related to Heart Failure:   Oct 2017   Chest Pain: no  Worsening SOB/orthopnea/PND: no  Edema: no  Any extra diuretic use since last visit: no  Weight gain: no  Compliant checking home weight: yes  Fatigue: no  Abdominal bloating: no  Appetite: good  Difficulty sleeping: no  Cough: no  Compliant checking blood pressure: yes  Any refills on CHF medications needed at this time: no    Past Medical History:   Diagnosis Date    Allergic rhinitis     Arthritis     Encounter for cardioversion procedure 10/05/2017    GERD (gastroesophageal reflux disease)     Hx of transesophageal echocardiography (KAMRAN) for monitoring 10/2017    Mild mitral regurgitation 10/2017    Nonrheumatic aortic valve insufficiency 8/29/2017    Osteoporosis     Pre-op evaluation: prior to left knee repalcement 8/29/2017    PVC's (premature ventricular contractions) 8/29/2017    S/P cardiac catheterization 10/07/2017    with Dr. Arvella Cockayne -diagonal 90%, circ 90%,     Torn meniscus 12/2016     Past Surgical History:   Procedure Laterality Date    APPENDECTOMY  1946    BACK SURGERY  3/24/14    Lumbar Laminectomy Fusion L3-5, L4-5 PLIF - Dr. Shaheed Enrique Left 09/05/2017 [facility]    ROTATOR CUFF REPAIR  3/8/06    right shoulder      Family History   Problem Relation Age of Onset    Arthritis Mother     Heart Disease Sister     High Blood Pressure Sister     Arthritis Sister     Cancer Brother     Heart Disease Brother     Diabetes Maternal Grandfather      Social History   Substance Use Topics    Smoking status: Former Smoker     Packs/day: 0.50     Years: 5.00     Quit date: 8/30/1965    Smokeless tobacco: Never Used    Alcohol use Yes      Comment: occ. red wine     Current Outpatient Prescriptions   Medication Sig Dispense Refill    lansoprazole (PREVACID) 30 MG delayed release capsule Take 30 mg by mouth daily      nitroGLYCERIN (NITROSTAT) 0.4 MG SL tablet Place 1 tablet under the tongue every 5 minutes as needed for Chest pain (SOB) 25 tablet 3    apixaban (ELIQUIS) 5 MG TABS tablet Take 1 tablet by mouth 2 times daily 60 tablet 2    atorvastatin (LIPITOR) 40 MG tablet Take 1 tablet by mouth nightly 30 tablet 2    furosemide (LASIX) 20 MG tablet Take 1 tablet by mouth daily 60 tablet 2    metoprolol tartrate (LOPRESSOR) 25 MG tablet Take 1 tablet by mouth 2 times daily . hold if SBP less than 100 mm hg or HR less than 60 60 tablet 2    spironolactone (ALDACTONE) 25 MG tablet Take 0.5 tablets by mouth daily 15 tablet 2    vitamin C (ASCORBIC ACID) 500 MG tablet Take 500 mg by mouth daily      vitamin D (CHOLECALCIFEROL) 1000 UNIT TABS tablet Take 1,000 Units by mouth daily      lansoprazole (PREVACID) 30 MG delayed release capsule Take 1 capsule by mouth daily 30 capsule 11    aspirin EC 81 MG EC tablet Take 1 tablet by mouth daily 30 tablet 0     No current facility-administered medications for this visit. Allergies   Allergen Reactions    Amiodarone Other (See Comments)     Prolonged QT; Torsades DP       SUBJECTIVE:   Review of Systems   Constitutional: Negative for activity change, appetite change, fatigue and fever.    HENT: Negative for size was normal.   Leaflets exhibited mildly increased thickness and mildly reduced cuspal   separation of the aortic valve.   No evidence of any pericardial effusion.      Signature      ----------------------------------------------------------------   Electronically signed by Rena Alva MD (Interpreting   physician) on 01/11/2018 at 02:52 PM   ----------------------------------------------------------------      Findings      Aortic Valve   Leaflets exhibited mildly increased thickness and mildly reduced cuspal   separation of the aortic valve.      Left Atrium   Left atrial size was normal.      Left Ventricle   LV systolic function is moderately decreased, with LVEF of 40 to 45 %.     Right Atrium   Right atrial size was normal.      Right Ventricle   The right ventricular size was normal with normal systolic function and   wall thickness.      Pericardial Effusion   No evidence of any pericardial effusion.     Results reviewed:  BNP: No results found for: BNP  CBC:   Lab Results   Component Value Date    WBC 9.7 10/09/2017    RBC 3.91 10/09/2017    HGB 11.3 10/09/2017    HCT 34.5 10/09/2017     10/09/2017     CMP:    Lab Results   Component Value Date     12/12/2017    K 4.8 12/12/2017     12/12/2017    CO2 26 12/12/2017    BUN 26 12/12/2017    CREATININE 0.8 12/12/2017    LABGLOM 80 10/12/2017    GLUCOSE 89 12/12/2017    CALCIUM 9.4 12/12/2017     Hepatic Function Panel:    Lab Results   Component Value Date    ALKPHOS 76 10/08/2017    ALT 22 10/08/2017    AST 13 10/08/2017    PROT 5.9 10/08/2017    BILITOT 0.8 10/08/2017    LABALBU 3.3 10/08/2017     Magnesium:    Lab Results   Component Value Date    MG 2.4 12/12/2017     PT/INR:    Lab Results   Component Value Date    INR 1.24 10/07/2017     Lipids:    Lab Results   Component Value Date    TRIG 92 09/24/2016    HDL 62 09/24/2016    LDLCALC 131 09/24/2016       ASSESSMENT AND PLAN:   The patient's condition/symptoms are Stable:

## 2018-03-06 ENCOUNTER — OFFICE VISIT (OUTPATIENT)
Dept: CARDIOLOGY CLINIC | Age: 83
End: 2018-03-06
Payer: MEDICARE

## 2018-03-06 VITALS
DIASTOLIC BLOOD PRESSURE: 62 MMHG | HEART RATE: 80 BPM | HEIGHT: 60 IN | WEIGHT: 128.8 LBS | BODY MASS INDEX: 25.29 KG/M2 | SYSTOLIC BLOOD PRESSURE: 126 MMHG

## 2018-03-06 DIAGNOSIS — I47.29 NSVT (NONSUSTAINED VENTRICULAR TACHYCARDIA): ICD-10-CM

## 2018-03-06 DIAGNOSIS — I25.5 ISCHEMIC DILATED CARDIOMYOPATHY (HCC): Primary | ICD-10-CM

## 2018-03-06 DIAGNOSIS — I42.0 ISCHEMIC DILATED CARDIOMYOPATHY (HCC): Primary | ICD-10-CM

## 2018-03-06 PROCEDURE — G8419 CALC BMI OUT NRM PARAM NOF/U: HCPCS | Performed by: INTERNAL MEDICINE

## 2018-03-06 PROCEDURE — G8399 PT W/DXA RESULTS DOCUMENT: HCPCS | Performed by: INTERNAL MEDICINE

## 2018-03-06 PROCEDURE — G8427 DOCREV CUR MEDS BY ELIG CLIN: HCPCS | Performed by: INTERNAL MEDICINE

## 2018-03-06 PROCEDURE — 4040F PNEUMOC VAC/ADMIN/RCVD: CPT | Performed by: INTERNAL MEDICINE

## 2018-03-06 PROCEDURE — G8598 ASA/ANTIPLAT THER USED: HCPCS | Performed by: INTERNAL MEDICINE

## 2018-03-06 PROCEDURE — 1036F TOBACCO NON-USER: CPT | Performed by: INTERNAL MEDICINE

## 2018-03-06 PROCEDURE — 99213 OFFICE O/P EST LOW 20 MIN: CPT | Performed by: INTERNAL MEDICINE

## 2018-03-06 PROCEDURE — 1090F PRES/ABSN URINE INCON ASSESS: CPT | Performed by: INTERNAL MEDICINE

## 2018-03-06 PROCEDURE — 1123F ACP DISCUSS/DSCN MKR DOCD: CPT | Performed by: INTERNAL MEDICINE

## 2018-03-06 PROCEDURE — G8484 FLU IMMUNIZE NO ADMIN: HCPCS | Performed by: INTERNAL MEDICINE

## 2018-03-06 RX ORDER — FUROSEMIDE 20 MG/1
20 TABLET ORAL DAILY
Qty: 60 TABLET | Refills: 6 | Status: SHIPPED | OUTPATIENT
Start: 2018-03-06 | End: 2018-06-04 | Stop reason: SDUPTHER

## 2018-03-06 RX ORDER — ATORVASTATIN CALCIUM 40 MG/1
40 TABLET, FILM COATED ORAL NIGHTLY
Qty: 30 TABLET | Refills: 6 | Status: SHIPPED | OUTPATIENT
Start: 2018-03-06 | End: 2018-06-04 | Stop reason: SDUPTHER

## 2018-03-06 RX ORDER — SPIRONOLACTONE 25 MG/1
12.5 TABLET ORAL DAILY
Qty: 15 TABLET | Refills: 6 | Status: SHIPPED | OUTPATIENT
Start: 2018-03-06 | End: 2018-06-04 | Stop reason: SDUPTHER

## 2018-03-06 ASSESSMENT — ENCOUNTER SYMPTOMS
SHORTNESS OF BREATH: 1
EYES NEGATIVE: 1
GASTROINTESTINAL NEGATIVE: 1

## 2018-03-06 NOTE — PROGRESS NOTES
[facility]  [facility] [place] [facility] [place]  [address]  [address]  [place] [address]  Dept: [phone]  Dept Fax: [phone]  Loc: [phone]    Visit Date: 3/6/2018      Ms. Angela Lopez is a 80 y.o. who is here for follow up on CHF and atrial fibrillation. She was initially seen for pre-op evaluation prior to left knee replacement with Dr. Maciel Aguillon. She had an abnormal EKG. She had the surgery without problem, but later on she went into CHF and was found to have significant CAD and low LVEF. Pt denies chest pain, heart palpitations, dizziness, peripheral edema. HPI:   Ms. Angela Lopez is a 80 y.o. female who is seen today for follow up on the cardiac problems mentioned below. MEDICAL DIAGNOSES  Patient Active Problem List    Diagnosis Date Noted    Acute systolic CHF (congestive heart failure) ([facility])      Priority: High    CAD, multiple vessel      Priority: High    Ischemic dilated cardiomyopathy ([address]      Priority: High    Hypotension      Priority: High    Atrial fibrillation with RVR ([facility])      Priority: High    Atrial fibrillation, currently in sinus rhythm     Severe tricuspid regurgitation     Moderate mitral regurgitation     NSVT (nonsustained ventricular tachycardia) ([facility])     Prolonged QT interval     Rapid atrial fibrillation (HCC) 10/05/2017    SOB (shortness of breath) 10/05/2017    Chest pain 10/05/2017    Pre-op evaluation: prior to left knee repalcement 08/29/2017    PVC's (premature ventricular contractions) 08/29/2017    Nonrheumatic aortic valve insufficiency 08/29/2017    Vertigo     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 Neck supple. No JVD present. No thyromegaly present. Cardiovascular: Normal rate, regular rhythm and normal heart sounds. Pulses:       Radial pulses are 2+ on the right side, and 2+ on the left side. Femoral pulses are 2+ on the right side, and 2+ on the left side. Popliteal pulses are 2+ on the right side, and 2+ on the left side. Dorsalis pedis pulses are 2+ on the right side, and 2+ on the left side. Posterior tibial pulses are 2+ on the right side, and 2+ on the left side. Pulmonary/Chest: Effort normal and breath sounds normal.   Abdominal: Soft. Bowel sounds are normal. She exhibits no mass. There is no tenderness. Musculoskeletal: She exhibits no edema. Lymphadenopathy:     She has no cervical adenopathy. Neurological: She is alert and oriented to person, place, and time. She has normal reflexes. No cranial nerve deficit. Skin: Skin is warm. No rash noted. Psychiatric: She has a normal mood and affect.        /62   Pulse 80   Ht 5' (1.524 m)   Wt 128 lb 12.8 oz (58.4 kg)   BMI 25.15 kg/m²   Wt Readings from Last 3 Encounters:   03/06/18 128 lb 12.8 oz (58.4 kg)   02/16/18 130 lb (59 kg)   01/24/18 126 lb 3.2 oz (57.2 kg)     BP Readings from Last 3 Encounters:   03/06/18 126/62   02/16/18 112/62   01/24/18 122/60       Lab Data  CBC:   Lab Results   Component Value Date    WBC 9.7 10/09/2017    RBC 3.91 10/09/2017    HGB 11.3 10/09/2017    HGB 10.7 10/08/2017    HGB 10.6 10/07/2017    HCT 34.5 10/09/2017    MCV 88.2 10/09/2017    MCH 28.9 10/09/2017    MCHC 32.7 10/09/2017    RDW 14.8 10/09/2017     10/09/2017     10/08/2017     10/07/2017    MPV 9.3 10/09/2017     CMP:    Lab Results   Component Value Date     12/12/2017    K 4.8 12/12/2017     12/12/2017    CO2 26 12/12/2017    BUN 26 12/12/2017    CREATININE 0.8 12/12/2017    CREATININE 1.0 10/19/2017    CREATININE 0.7 10/12/2017    LABGLOM 80 10/12/2017 response.  The patient was admitted to the hospital and started on rate control medication.  She had an ECHO performed which showed her EF had dropped from 60-65% to 30% in three weeks.  The patient underwent a cardiac catheterization which revealed diffuse disease but did not require revascularization.  The patient underwent KAMRAN guided cardioversion by Dr. Kat Vargas. Qian Lowe had early recurrence and therefore was started on amiodarone.  The patient converted to sinus rhythm and she had a severely prolonged QT interval.  She then started to have runs of polymorphic PVCs and finally episodes of Torsades lou Pointes.  The amiodarone was stopped and the patient was given magnesium.  Her QT interval is still prolonged but not as long as it was with amiodarone.  The patient remains in sinus rhythm.  She is also on guide line directed heart failure medications and is wearing a Life Vest.  EP has been consulted for further evaluation and management. On 01/16/2018, he stated: The patient is doing well from a cardiac standpoint. She denies having any palpitations, shortness of breath, or chest pains. The patient does report having a nagging cough that began after she was started on the heart failure medications. She had an ECHO last week that showed her EF has improved from 30% to 40-45%. She is no longer wearing the Life Vest.    Echocardiogram 8/20/9780:  LV systolic function is moderately decreased, with LVEF of 40 to 45 %.   Left atrial size was normal.   Leaflets exhibited mildly increased thickness and mildly reduced cuspal   separation of the aortic valve.   No evidence of any pericardial effusion    Echocardiogram 9/2016:  Normal left ventricle size and systolic function. Ejection fraction was   estimated at 60-65%.  There were no regional left ventricular wall motion   abnormalities and wall thickness was within normal limits.   Doppler parameters were consistent with abnormal left ventricular   relaxation (grade

## 2018-03-08 ENCOUNTER — TELEPHONE (OUTPATIENT)
Dept: CARDIOLOGY CLINIC | Age: 83
End: 2018-03-08

## 2018-05-22 LAB
ANION GAP SERPL CALCULATED.3IONS-SCNC: 15 MEQ/L (ref 10–19)
BUN BLDV-MCNC: 26 MG/DL (ref 8–23)
CALCIUM SERPL-MCNC: 9 MG/DL (ref 8.5–10.5)
CHLORIDE BLD-SCNC: 102 MEQ/L (ref 95–107)
CO2: 26 MEQ/L (ref 19–31)
CREAT SERPL-MCNC: 0.9 MG/DL (ref 0.6–1.3)
EGFR AFRICAN AMERICAN: 68.5 ML/MIN/1.73 M2
EGFR IF NONAFRICAN AMERICAN: 59.1 ML/MIN/1.73 M2
GLUCOSE: 97 MG/DL (ref 70–99)
MAGNESIUM: 2.5 MG/DL (ref 1.6–2.6)
POTASSIUM SERPL-SCNC: 4.7 MEQ/L (ref 3.5–5.4)
SODIUM BLD-SCNC: 143 MEQ/L (ref 135–146)

## 2018-06-04 ENCOUNTER — OFFICE VISIT (OUTPATIENT)
Dept: CARDIOLOGY CLINIC | Age: 83
End: 2018-06-04
Payer: MEDICARE

## 2018-06-04 VITALS
HEART RATE: 63 BPM | SYSTOLIC BLOOD PRESSURE: 104 MMHG | HEIGHT: 60 IN | WEIGHT: 128 LBS | DIASTOLIC BLOOD PRESSURE: 53 MMHG | OXYGEN SATURATION: 95 % | BODY MASS INDEX: 25.13 KG/M2

## 2018-06-04 DIAGNOSIS — I50.22 CHF (CONGESTIVE HEART FAILURE), NYHA CLASS II, CHRONIC, SYSTOLIC (HCC): Primary | ICD-10-CM

## 2018-06-04 PROCEDURE — G8427 DOCREV CUR MEDS BY ELIG CLIN: HCPCS | Performed by: NURSE PRACTITIONER

## 2018-06-04 PROCEDURE — 1123F ACP DISCUSS/DSCN MKR DOCD: CPT | Performed by: NURSE PRACTITIONER

## 2018-06-04 PROCEDURE — 1090F PRES/ABSN URINE INCON ASSESS: CPT | Performed by: NURSE PRACTITIONER

## 2018-06-04 PROCEDURE — 1036F TOBACCO NON-USER: CPT | Performed by: NURSE PRACTITIONER

## 2018-06-04 PROCEDURE — G8419 CALC BMI OUT NRM PARAM NOF/U: HCPCS | Performed by: NURSE PRACTITIONER

## 2018-06-04 PROCEDURE — G8399 PT W/DXA RESULTS DOCUMENT: HCPCS | Performed by: NURSE PRACTITIONER

## 2018-06-04 PROCEDURE — G8598 ASA/ANTIPLAT THER USED: HCPCS | Performed by: NURSE PRACTITIONER

## 2018-06-04 PROCEDURE — 4040F PNEUMOC VAC/ADMIN/RCVD: CPT | Performed by: NURSE PRACTITIONER

## 2018-06-04 PROCEDURE — 99213 OFFICE O/P EST LOW 20 MIN: CPT | Performed by: NURSE PRACTITIONER

## 2018-06-04 RX ORDER — ATORVASTATIN CALCIUM 40 MG/1
40 TABLET, FILM COATED ORAL NIGHTLY
Qty: 30 TABLET | Refills: 3 | Status: SHIPPED | OUTPATIENT
Start: 2018-06-04 | End: 2018-09-24 | Stop reason: SDUPTHER

## 2018-06-04 RX ORDER — FUROSEMIDE 20 MG/1
20 TABLET ORAL DAILY
Qty: 30 TABLET | Refills: 3 | Status: SHIPPED | OUTPATIENT
Start: 2018-06-04 | End: 2018-09-24 | Stop reason: SDUPTHER

## 2018-06-04 RX ORDER — SPIRONOLACTONE 25 MG/1
12.5 TABLET ORAL DAILY
Qty: 15 TABLET | Refills: 3 | Status: SHIPPED | OUTPATIENT
Start: 2018-06-04 | End: 2018-09-24 | Stop reason: SDUPTHER

## 2018-06-04 ASSESSMENT — ENCOUNTER SYMPTOMS
CHEST TIGHTNESS: 0
NAUSEA: 0
ABDOMINAL PAIN: 0
WHEEZING: 0
COLOR CHANGE: 0
COUGH: 0
SHORTNESS OF BREATH: 1
APNEA: 0
ABDOMINAL DISTENTION: 0

## 2018-09-24 ENCOUNTER — OFFICE VISIT (OUTPATIENT)
Dept: CARDIOLOGY CLINIC | Age: 83
End: 2018-09-24
Payer: MEDICARE

## 2018-09-24 VITALS
SYSTOLIC BLOOD PRESSURE: 112 MMHG | HEART RATE: 62 BPM | BODY MASS INDEX: 24.74 KG/M2 | OXYGEN SATURATION: 94 % | WEIGHT: 126 LBS | HEIGHT: 60 IN | DIASTOLIC BLOOD PRESSURE: 64 MMHG

## 2018-09-24 DIAGNOSIS — I50.22 CHF (CONGESTIVE HEART FAILURE), NYHA CLASS II, CHRONIC, SYSTOLIC (HCC): Primary | ICD-10-CM

## 2018-09-24 PROCEDURE — G8427 DOCREV CUR MEDS BY ELIG CLIN: HCPCS | Performed by: NURSE PRACTITIONER

## 2018-09-24 PROCEDURE — 1101F PT FALLS ASSESS-DOCD LE1/YR: CPT | Performed by: NURSE PRACTITIONER

## 2018-09-24 PROCEDURE — G8420 CALC BMI NORM PARAMETERS: HCPCS | Performed by: NURSE PRACTITIONER

## 2018-09-24 PROCEDURE — 4040F PNEUMOC VAC/ADMIN/RCVD: CPT | Performed by: NURSE PRACTITIONER

## 2018-09-24 PROCEDURE — 1090F PRES/ABSN URINE INCON ASSESS: CPT | Performed by: NURSE PRACTITIONER

## 2018-09-24 PROCEDURE — 99213 OFFICE O/P EST LOW 20 MIN: CPT | Performed by: NURSE PRACTITIONER

## 2018-09-24 PROCEDURE — 1036F TOBACCO NON-USER: CPT | Performed by: NURSE PRACTITIONER

## 2018-09-24 PROCEDURE — G8598 ASA/ANTIPLAT THER USED: HCPCS | Performed by: NURSE PRACTITIONER

## 2018-09-24 PROCEDURE — 1123F ACP DISCUSS/DSCN MKR DOCD: CPT | Performed by: NURSE PRACTITIONER

## 2018-09-24 PROCEDURE — G8399 PT W/DXA RESULTS DOCUMENT: HCPCS | Performed by: NURSE PRACTITIONER

## 2018-09-24 RX ORDER — FUROSEMIDE 20 MG/1
20 TABLET ORAL DAILY
Qty: 30 TABLET | Refills: 3 | Status: SHIPPED | OUTPATIENT
Start: 2018-09-24 | End: 2019-01-21 | Stop reason: SDUPTHER

## 2018-09-24 RX ORDER — LANSOPRAZOLE 30 MG/1
30 CAPSULE, DELAYED RELEASE ORAL DAILY
Qty: 30 CAPSULE | Refills: 11 | Status: SHIPPED | OUTPATIENT
Start: 2018-09-24 | End: 2019-01-21 | Stop reason: SDUPTHER

## 2018-09-24 RX ORDER — ATORVASTATIN CALCIUM 40 MG/1
40 TABLET, FILM COATED ORAL NIGHTLY
Qty: 30 TABLET | Refills: 3 | Status: SHIPPED | OUTPATIENT
Start: 2018-09-24 | End: 2019-01-21 | Stop reason: SDUPTHER

## 2018-09-24 RX ORDER — CARVEDILOL 6.25 MG/1
6.25 TABLET ORAL 2 TIMES DAILY WITH MEALS
Qty: 60 TABLET | Refills: 3 | Status: SHIPPED | OUTPATIENT
Start: 2018-09-24 | End: 2019-01-21 | Stop reason: SDUPTHER

## 2018-09-24 RX ORDER — SPIRONOLACTONE 25 MG/1
12.5 TABLET ORAL DAILY
Qty: 15 TABLET | Refills: 3 | Status: SHIPPED | OUTPATIENT
Start: 2018-09-24 | End: 2019-01-21 | Stop reason: SDUPTHER

## 2018-09-24 ASSESSMENT — ENCOUNTER SYMPTOMS
COLOR CHANGE: 0
ABDOMINAL DISTENTION: 0
CHEST TIGHTNESS: 0
WHEEZING: 0
SHORTNESS OF BREATH: 1
APNEA: 0
ABDOMINAL PAIN: 0
NAUSEA: 0
COUGH: 0

## 2018-09-24 NOTE — PROGRESS NOTES
pressure is 96 with an EDP of 8. There is no  significant LV to AO systolic gradient.     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 Euvolemic pressures with preserved cardiac  output.       Patient has:  Last hospital admission related to Heart Failure:   Oct 2017  Chest Pain: no  Worsening SOB/orthopnea/PND: no  Edema: no  Any extra diuretic use: no  Weight gain: no  Compliant checking home weight: yes  Fatigue: no  Abdominal bloating: no  Appetite: good  Difficulty sleeping: no  Cough: no  Compliant checking blood pressure: yes  Any refills on CHF medications needed at this time: yes see orders    Past Medical History:   Diagnosis Date    Allergic rhinitis     Arthritis     [facility]F [facility]congestive heart failure) ([facility] [address]     Encounter for cardioversion procedure 10/05/2017    GERD (gastroesophageal reflux disease)     Hx of transesophageal echocardiography (KAMRAN) for monitoring 10/2017    Mild mitral regurgitation 10/2017    Nonrheumatic aortic valve insufficiency 8/29/2017    Osteoporosis     Pre-op evaluation: prior to left knee repalcement 8/29/2017    PVC's (premature ventricular contractions) 8/29/2017    S/P cardiac catheterization 10/07/2017    with Dr. Veda Wesley -diagonal 90%, circ 90%,     Torn meniscus 12/2016     Past Surgical History:   Procedure Laterality Date    APPENDECTOMY  1946    BACK SURGERY  3/24/14    Lumbar Laminectomy Fusion L3-5, L4-5 PLIF - Dr. Anny Bloom Left 09/05/2017    Rodriguez    [address]  3/8/06    right shoulder      Family History   Problem Relation Age of Onset    Arthritis Mother     Heart Disease Sister     High Blood Pressure Sister     Arthritis Sister     Cancer Brother     Heart Disease Brother     Diabetes Maternal Grandfather      Social History   Substance Use Topics    Smoking status: Former Smoker     Packs/day: 0.50     Years: 5.00     Quit date: 8/30/1965   Mae Peraza Smokeless tobacco: Never Used    Alcohol use Yes      Comment: occ. red wine     Current Outpatient Prescriptions   Medication Sig Dispense Refill    lansoprazole (PREVACID) 30 MG delayed release capsule Take 1 capsule by mouth daily 30 capsule 11    apixaban (ELIQUIS) 5 MG TABS tablet Take 1 tablet by mouth 2 times daily 60 tablet 3    atorvastatin (LIPITOR) 40 MG tablet Take 1 tablet by mouth nightly 30 tablet 3    furosemide (LASIX) 20 MG tablet Take 1 tablet by mouth daily 30 tablet 3    spironolactone (ALDACTONE) 25 MG tablet Take 0.5 tablets by mouth daily 15 tablet 3    carvedilol (COREG) 6.25 MG tablet Take 1 tablet by mouth 2 times daily (with meals) 60 tablet 3    nitroGLYCERIN (NITROSTAT) 0.4 MG SL tablet Place 1 tablet under the tongue every 5 minutes as needed for Chest pain (SOB) 25 tablet 3    vitamin C (ASCORBIC ACID) 500 MG tablet Take 500 mg by mouth daily      vitamin D (CHOLECALCIFEROL) 1000 UNIT TABS tablet Take 500 Units by mouth once a week       aspirin EC 81 MG EC tablet Take 1 tablet by mouth daily 30 tablet 0     No current facility-administered medications for this visit. Allergies   Allergen Reactions    Amiodarone Other (See Comments)     Prolonged QT; Torsades DP       SUBJECTIVE:   Review of Systems   Constitutional: Negative for activity change, appetite change, fatigue and fever. HENT: Negative for congestion. Respiratory: Positive for shortness of breath (with exertion ). Negative for apnea, cough, chest tightness and wheezing. Cardiovascular: Negative for chest pain, palpitations and leg swelling. Gastrointestinal: Negative for abdominal distention, abdominal pain and nausea. Genitourinary: Negative for difficulty urinating and dysuria. Musculoskeletal: Negative for arthralgias and gait problem. Skin: Negative for color change. Neurological: Negative for dizziness, numbness and headaches.    Psychiatric/Behavioral: Negative for agitation, day  · Limit sodium in diet to around 6170-7727 mg/day  · Monitor BP  · Activity as tolerated     Patient was instructed to call the [address] for changes in the following symptoms:   Weight gain of 3 pounds in 1 day or 5 pounds in 1 week  Increased shortness of breath  Shortness of breath while laying down  Cough  Chest pain  Swelling in feet, ankles or legs  Tenderness or bloating in the abdomen  Fatigue   Decreased appetite or nausea   Confusion      Return in about 3 months (around 12/24/2018). or sooner if needed     Patient given educational materials - see patient instructions. We discussed the importance of weighing oneself and recording daily. We also discussed the importance of a low sodium diet, higher sodium foods to avoid and better low sodium food options. Patient verbalizes understanding of plan of care using teach back method, and is agreeable to the treatment plan.        Electronically signed by ANDREA Fonseca CNP on 9/24/2018 at 12:24 PM

## 2018-09-26 PROBLEM — Z01.818 PRE-OP EVALUATION: Status: RESOLVED | Noted: 2017-08-29 | Resolved: 2018-09-26

## 2018-09-30 ENCOUNTER — HOSPITAL ENCOUNTER (EMERGENCY)
Age: 83
Discharge: HOME | End: 2018-09-30
Attending: FAMILY MEDICINE
Payer: MEDICARE

## 2018-09-30 ENCOUNTER — APPOINTMENT (OUTPATIENT)
Dept: GENERAL RADIOLOGY | Age: 83
End: 2018-09-30
Payer: MEDICARE

## 2018-09-30 VITALS
TEMPERATURE: 98.3 F | OXYGEN SATURATION: 98 % | HEIGHT: 60 IN | DIASTOLIC BLOOD PRESSURE: 72 MMHG | HEART RATE: 66 BPM | RESPIRATION RATE: 18 BRPM | WEIGHT: 126 LBS | BODY MASS INDEX: 24.74 KG/M2 | SYSTOLIC BLOOD PRESSURE: 117 MMHG

## 2018-09-30 DIAGNOSIS — S39.012A STRAIN OF LUMBAR REGION, INITIAL ENCOUNTER: Primary | ICD-10-CM

## 2018-09-30 PROCEDURE — 72220 X-RAY EXAM SACRUM TAILBONE: CPT

## 2018-09-30 PROCEDURE — 72100 X-RAY EXAM L-S SPINE 2/3 VWS: CPT

## 2018-09-30 PROCEDURE — 6370000000 HC RX 637 (ALT 250 FOR IP): Performed by: FAMILY MEDICINE

## 2018-09-30 PROCEDURE — 99283 EMERGENCY DEPT VISIT LOW MDM: CPT

## 2018-09-30 RX ORDER — HYDROCODONE BITARTRATE AND ACETAMINOPHEN 5; 325 MG/1; MG/1
1 TABLET ORAL ONCE
Status: COMPLETED | OUTPATIENT
Start: 2018-09-30 | End: 2018-09-30

## 2018-09-30 RX ORDER — LACTULOSE 10 G/15ML
10-20 SOLUTION ORAL 2 TIMES DAILY PRN
Qty: 480 ML | Refills: 0 | Status: SHIPPED | OUTPATIENT
Start: 2018-09-30 | End: 2019-01-21

## 2018-09-30 RX ORDER — TIZANIDINE 4 MG/1
4 TABLET ORAL 2 TIMES DAILY PRN
Qty: 14 TABLET | Refills: 0 | Status: SHIPPED | OUTPATIENT
Start: 2018-09-30 | End: 2019-01-21

## 2018-09-30 RX ORDER — HYDROCODONE BITARTRATE AND ACETAMINOPHEN 5; 325 MG/1; MG/1
.5-1 TABLET ORAL EVERY 6 HOURS PRN
Qty: 12 TABLET | Refills: 0 | Status: SHIPPED | OUTPATIENT
Start: 2018-09-30 | End: 2018-10-05

## 2018-09-30 RX ORDER — LIDOCAINE 50 MG/G
1 PATCH TOPICAL DAILY
Qty: 30 PATCH | Refills: 0 | Status: SHIPPED | OUTPATIENT
Start: 2018-09-30 | End: 2018-09-30

## 2018-09-30 RX ORDER — LIDOCAINE 50 MG/G
1 PATCH TOPICAL DAILY
Qty: 6 PATCH | Refills: 1 | Status: SHIPPED | OUTPATIENT
Start: 2018-09-30 | End: 2018-12-12 | Stop reason: ALTCHOICE

## 2018-09-30 RX ADMIN — HYDROCODONE BITARTRATE AND ACETAMINOPHEN 1 TABLET: 5; 325 TABLET ORAL at 10:16

## 2018-09-30 ASSESSMENT — ENCOUNTER SYMPTOMS
BACK PAIN: 1
SHORTNESS OF BREATH: 0
VOMITING: 0
NAUSEA: 0
ABDOMINAL PAIN: 0

## 2018-09-30 ASSESSMENT — PAIN DESCRIPTION - DESCRIPTORS: DESCRIPTORS: SHARP;SQUEEZING

## 2018-09-30 ASSESSMENT — PAIN SCALES - GENERAL
PAINLEVEL_OUTOF10: 7
PAINLEVEL_OUTOF10: 7

## 2018-09-30 ASSESSMENT — PAIN DESCRIPTION - ONSET: ONSET: ON-GOING

## 2018-09-30 ASSESSMENT — PAIN DESCRIPTION - LOCATION: LOCATION: BACK

## 2018-09-30 ASSESSMENT — PAIN DESCRIPTION - PROGRESSION: CLINICAL_PROGRESSION: NOT CHANGED

## 2018-09-30 ASSESSMENT — PAIN DESCRIPTION - ORIENTATION: ORIENTATION: LOWER;RIGHT

## 2018-09-30 ASSESSMENT — PAIN DESCRIPTION - PAIN TYPE: TYPE: ACUTE PAIN

## 2018-09-30 NOTE — ED PROVIDER NOTES
ankles, dorsiflexion of the big toes    Knee reflexes 2/4 bilateral    Ankle reflexes 0/4 bilateral    Light touch intact in left leg    She has some decreased sensation over the right big toe   Skin: Skin is warm and dry. No rash noted. Psychiatric: She has a normal mood and affect. Her behavior is normal.   Nursing note and vitals reviewed. DIFFERENTIAL DIAGNOSIS:       Twisting movement precipitated increased back pain consistent with a muscular strain    Check x-rays look for a compression fracture      DIAGNOSTIC RESULTS     RADIOLOGY: non-plain film images(s) such as CT, Ultrasound and MRI are read by the radiologist.    XR SACRUM COCCYX (MIN 2 VIEWS)   Final Result      No sacrococcygeal fracture      Final report electronically signed by Dr. Rodrigue Alex on 9/30/2018 11:02 AM      XR LUMBAR SPINE (2-3 VIEWS)   Final Result   1. No acute fracture of the lumbar spine. 2. Multilevel degenerative disc disease and surgical changes as detailed above. 3. Stable anterolisthesis of L4 on L5.   4. Anterior wedge deformities of the T11 and T12 vertebra of indeterminate age. Final report electronically signed by Dr. Rodrigue Alex on 9/30/2018 11:00 AM          LABS:   Labs Reviewed - No data to display    EMERGENCY DEPARTMENT COURSE:   Vitals:    Vitals:    09/30/18 0957   BP: 117/72   Pulse: 66   Resp: 18   Temp: 98.3 °F (36.8 °C)   TempSrc: Oral   SpO2: 98%   Weight: 126 lb (57.2 kg)   Height: 5' (1.524 m)       10:13 AM: The patient was seen and evaluated. Nursing notes reviewed    norco by mouth    X-ray show no acute compression fractures and she has stable pedicle screws L3 4 5 with spondylolisthesis L4 5 stable    Pain has improved some    Recommend continuing Norco at home for a few days, adding muscle relaxer  And Lidoderm patch    Discharge home       CRITICAL CARE:   none     CONSULTS:  none    PROCEDURES:  none     FINAL IMPRESSION      1.  Strain of lumbar region, initial encounter

## 2018-10-05 ENCOUNTER — OFFICE VISIT (OUTPATIENT)
Dept: FAMILY MEDICINE CLINIC | Age: 83
End: 2018-10-05
Payer: MEDICARE

## 2018-10-05 VITALS
RESPIRATION RATE: 16 BRPM | SYSTOLIC BLOOD PRESSURE: 110 MMHG | DIASTOLIC BLOOD PRESSURE: 64 MMHG | HEIGHT: 60 IN | TEMPERATURE: 98.4 F | BODY MASS INDEX: 25.01 KG/M2 | OXYGEN SATURATION: 98 % | WEIGHT: 127.4 LBS | HEART RATE: 61 BPM

## 2018-10-05 DIAGNOSIS — M54.50 ACUTE RIGHT-SIDED LOW BACK PAIN WITHOUT SCIATICA: Primary | ICD-10-CM

## 2018-10-05 PROCEDURE — 1101F PT FALLS ASSESS-DOCD LE1/YR: CPT | Performed by: FAMILY MEDICINE

## 2018-10-05 PROCEDURE — G8598 ASA/ANTIPLAT THER USED: HCPCS | Performed by: FAMILY MEDICINE

## 2018-10-05 PROCEDURE — G8399 PT W/DXA RESULTS DOCUMENT: HCPCS | Performed by: FAMILY MEDICINE

## 2018-10-05 PROCEDURE — 1090F PRES/ABSN URINE INCON ASSESS: CPT | Performed by: FAMILY MEDICINE

## 2018-10-05 PROCEDURE — 1123F ACP DISCUSS/DSCN MKR DOCD: CPT | Performed by: FAMILY MEDICINE

## 2018-10-05 PROCEDURE — 1036F TOBACCO NON-USER: CPT | Performed by: FAMILY MEDICINE

## 2018-10-05 PROCEDURE — G8427 DOCREV CUR MEDS BY ELIG CLIN: HCPCS | Performed by: FAMILY MEDICINE

## 2018-10-05 PROCEDURE — G8420 CALC BMI NORM PARAMETERS: HCPCS | Performed by: FAMILY MEDICINE

## 2018-10-05 PROCEDURE — G8482 FLU IMMUNIZE ORDER/ADMIN: HCPCS | Performed by: FAMILY MEDICINE

## 2018-10-05 PROCEDURE — 99213 OFFICE O/P EST LOW 20 MIN: CPT | Performed by: FAMILY MEDICINE

## 2018-10-05 PROCEDURE — 96372 THER/PROPH/DIAG INJ SC/IM: CPT | Performed by: FAMILY MEDICINE

## 2018-10-05 PROCEDURE — 4040F PNEUMOC VAC/ADMIN/RCVD: CPT | Performed by: FAMILY MEDICINE

## 2018-10-05 RX ORDER — TIZANIDINE 4 MG/1
4 TABLET ORAL 2 TIMES DAILY PRN
Qty: 14 TABLET | Refills: 0 | Status: CANCELLED | OUTPATIENT
Start: 2018-10-05

## 2018-10-05 RX ORDER — METHYLPREDNISOLONE ACETATE 80 MG/ML
160 INJECTION, SUSPENSION INTRA-ARTICULAR; INTRALESIONAL; INTRAMUSCULAR; SOFT TISSUE ONCE
Status: COMPLETED | OUTPATIENT
Start: 2018-10-05 | End: 2018-10-05

## 2018-10-05 RX ORDER — OXYCODONE HYDROCHLORIDE AND ACETAMINOPHEN 5; 325 MG/1; MG/1
1 TABLET ORAL EVERY 6 HOURS PRN
Qty: 28 TABLET | Refills: 0 | Status: SHIPPED | OUTPATIENT
Start: 2018-10-05 | End: 2018-10-12

## 2018-10-05 RX ORDER — HYDROCODONE BITARTRATE AND ACETAMINOPHEN 5; 325 MG/1; MG/1
.5-1 TABLET ORAL EVERY 6 HOURS PRN
Qty: 12 TABLET | Refills: 0 | Status: CANCELLED | OUTPATIENT
Start: 2018-10-05 | End: 2018-10-10

## 2018-10-05 RX ADMIN — METHYLPREDNISOLONE ACETATE 160 MG: 80 INJECTION, SUSPENSION INTRA-ARTICULAR; INTRALESIONAL; INTRAMUSCULAR; SOFT TISSUE at 11:54

## 2018-10-05 ASSESSMENT — PATIENT HEALTH QUESTIONNAIRE - PHQ9
SUM OF ALL RESPONSES TO PHQ9 QUESTIONS 1 & 2: 0
1. LITTLE INTEREST OR PLEASURE IN DOING THINGS: 0
SUM OF ALL RESPONSES TO PHQ QUESTIONS 1-9: 0
2. FEELING DOWN, DEPRESSED OR HOPELESS: 0
SUM OF ALL RESPONSES TO PHQ QUESTIONS 1-9: 0

## 2018-10-07 ASSESSMENT — ENCOUNTER SYMPTOMS
DIARRHEA: 0
NAUSEA: 0
EYES NEGATIVE: 1
VOMITING: 0
COUGH: 0
SHORTNESS OF BREATH: 0
SORE THROAT: 0
ABDOMINAL PAIN: 0
BACK PAIN: 1
RHINORRHEA: 0
CHEST TIGHTNESS: 0

## 2018-10-15 NOTE — TELEPHONE ENCOUNTER
----- Message from Dang Butler sent at 10/14/2018 10:29 PM EDT -----  Regarding: RE: Prescription Question  Contact: [phone]  Need zolpidem tartrate refilled.  Thank you  ----- Message -----  From: ALICJA PEARSON  Sent: 10/12/2018  7:37 AM EDT  To: Dang Butler  Subject: RE: Prescription Question  Good morning. I dont know what this medication is. It is not listed on your medication list. Im sorry    ----- Message -----     From: Dang Butler     Sent: 10/11/2018  6:06 PM EDT       To: Marilyn K. Vermeesch, MD  Subject: Prescription Question    Need a refill of [place].    Thank you     I discussed her echo with Dr. Ashlee Ross and he states that the patient's lifevest  can be removed.   Thank you

## 2018-12-12 ENCOUNTER — OFFICE VISIT (OUTPATIENT)
Dept: CARDIOLOGY CLINIC | Age: 83
End: 2018-12-12
Payer: MEDICARE

## 2018-12-12 VITALS
DIASTOLIC BLOOD PRESSURE: 70 MMHG | WEIGHT: 127.6 LBS | BODY MASS INDEX: 25.05 KG/M2 | HEART RATE: 76 BPM | HEIGHT: 60 IN | SYSTOLIC BLOOD PRESSURE: 124 MMHG

## 2018-12-12 DIAGNOSIS — I50.22 CHRONIC SYSTOLIC CONGESTIVE HEART FAILURE (HCC): ICD-10-CM

## 2018-12-12 DIAGNOSIS — R06.09 DOE (DYSPNEA ON EXERTION): Primary | ICD-10-CM

## 2018-12-12 DIAGNOSIS — I48.91 ATRIAL FIBRILLATION, UNSPECIFIED TYPE (HCC): ICD-10-CM

## 2018-12-12 PROCEDURE — G8482 FLU IMMUNIZE ORDER/ADMIN: HCPCS | Performed by: INTERNAL MEDICINE

## 2018-12-12 PROCEDURE — G8427 DOCREV CUR MEDS BY ELIG CLIN: HCPCS | Performed by: INTERNAL MEDICINE

## 2018-12-12 PROCEDURE — G8598 ASA/ANTIPLAT THER USED: HCPCS | Performed by: INTERNAL MEDICINE

## 2018-12-12 PROCEDURE — 1101F PT FALLS ASSESS-DOCD LE1/YR: CPT | Performed by: INTERNAL MEDICINE

## 2018-12-12 PROCEDURE — 1123F ACP DISCUSS/DSCN MKR DOCD: CPT | Performed by: INTERNAL MEDICINE

## 2018-12-12 PROCEDURE — 1036F TOBACCO NON-USER: CPT | Performed by: INTERNAL MEDICINE

## 2018-12-12 PROCEDURE — 4040F PNEUMOC VAC/ADMIN/RCVD: CPT | Performed by: INTERNAL MEDICINE

## 2018-12-12 PROCEDURE — 1090F PRES/ABSN URINE INCON ASSESS: CPT | Performed by: INTERNAL MEDICINE

## 2018-12-12 PROCEDURE — G8399 PT W/DXA RESULTS DOCUMENT: HCPCS | Performed by: INTERNAL MEDICINE

## 2018-12-12 PROCEDURE — 99213 OFFICE O/P EST LOW 20 MIN: CPT | Performed by: INTERNAL MEDICINE

## 2018-12-12 PROCEDURE — G8420 CALC BMI NORM PARAMETERS: HCPCS | Performed by: INTERNAL MEDICINE

## 2019-01-04 LAB
ANION GAP SERPL CALCULATED.3IONS-SCNC: 10 MEQ/L (ref 10–19)
BUN BLDV-MCNC: 17 MG/DL (ref 8–23)
CALCIUM SERPL-MCNC: 9.4 MG/DL (ref 8.5–10.5)
CHLORIDE BLD-SCNC: 104 MEQ/L (ref 95–107)
CO2: 27 MEQ/L (ref 19–31)
CREAT SERPL-MCNC: 0.8 MG/DL (ref 0.6–1.3)
EGFR AFRICAN AMERICAN: 78.5 ML/MIN/1.73 M2
EGFR IF NONAFRICAN AMERICAN: 67.7 ML/MIN/1.73 M2
GLUCOSE: 101 MG/DL (ref 70–99)
POTASSIUM SERPL-SCNC: 4.4 MEQ/L (ref 3.5–5.4)
SODIUM BLD-SCNC: 141 MEQ/L (ref 135–146)

## 2019-01-21 ENCOUNTER — OFFICE VISIT (OUTPATIENT)
Dept: CARDIOLOGY CLINIC | Age: 84
End: 2019-01-21
Payer: MEDICARE

## 2019-01-21 VITALS
WEIGHT: 132 LBS | BODY MASS INDEX: 25.91 KG/M2 | HEART RATE: 66 BPM | OXYGEN SATURATION: 96 % | HEIGHT: 60 IN | SYSTOLIC BLOOD PRESSURE: 104 MMHG | DIASTOLIC BLOOD PRESSURE: 54 MMHG

## 2019-01-21 DIAGNOSIS — I50.22 CHF (CONGESTIVE HEART FAILURE), NYHA CLASS II, CHRONIC, SYSTOLIC (HCC): Primary | ICD-10-CM

## 2019-01-21 PROCEDURE — G8482 FLU IMMUNIZE ORDER/ADMIN: HCPCS | Performed by: NURSE PRACTITIONER

## 2019-01-21 PROCEDURE — G8427 DOCREV CUR MEDS BY ELIG CLIN: HCPCS | Performed by: NURSE PRACTITIONER

## 2019-01-21 PROCEDURE — 1123F ACP DISCUSS/DSCN MKR DOCD: CPT | Performed by: NURSE PRACTITIONER

## 2019-01-21 PROCEDURE — 1101F PT FALLS ASSESS-DOCD LE1/YR: CPT | Performed by: NURSE PRACTITIONER

## 2019-01-21 PROCEDURE — G8399 PT W/DXA RESULTS DOCUMENT: HCPCS | Performed by: NURSE PRACTITIONER

## 2019-01-21 PROCEDURE — G8419 CALC BMI OUT NRM PARAM NOF/U: HCPCS | Performed by: NURSE PRACTITIONER

## 2019-01-21 PROCEDURE — 1090F PRES/ABSN URINE INCON ASSESS: CPT | Performed by: NURSE PRACTITIONER

## 2019-01-21 PROCEDURE — G8598 ASA/ANTIPLAT THER USED: HCPCS | Performed by: NURSE PRACTITIONER

## 2019-01-21 PROCEDURE — 4040F PNEUMOC VAC/ADMIN/RCVD: CPT | Performed by: NURSE PRACTITIONER

## 2019-01-21 PROCEDURE — 1036F TOBACCO NON-USER: CPT | Performed by: NURSE PRACTITIONER

## 2019-01-21 PROCEDURE — 99213 OFFICE O/P EST LOW 20 MIN: CPT | Performed by: NURSE PRACTITIONER

## 2019-01-21 RX ORDER — CARVEDILOL 6.25 MG/1
6.25 TABLET ORAL 2 TIMES DAILY WITH MEALS
Qty: 60 TABLET | Refills: 5 | Status: SHIPPED | OUTPATIENT
Start: 2019-01-21 | End: 2019-04-29 | Stop reason: SDUPTHER

## 2019-01-21 RX ORDER — SPIRONOLACTONE 25 MG/1
12.5 TABLET ORAL DAILY
Qty: 15 TABLET | Refills: 5 | Status: SHIPPED | OUTPATIENT
Start: 2019-01-21 | End: 2019-04-29 | Stop reason: SDUPTHER

## 2019-01-21 RX ORDER — LANSOPRAZOLE 30 MG/1
30 CAPSULE, DELAYED RELEASE ORAL DAILY
Qty: 30 CAPSULE | Refills: 5 | Status: SHIPPED | OUTPATIENT
Start: 2019-01-21 | End: 2019-04-29 | Stop reason: SDUPTHER

## 2019-01-21 RX ORDER — NITROGLYCERIN 0.4 MG/1
0.4 TABLET SUBLINGUAL EVERY 5 MIN PRN
Qty: 25 TABLET | Refills: 3 | Status: SHIPPED | OUTPATIENT
Start: 2019-01-21

## 2019-01-21 RX ORDER — FUROSEMIDE 20 MG/1
20 TABLET ORAL DAILY
Qty: 30 TABLET | Refills: 5 | Status: SHIPPED | OUTPATIENT
Start: 2019-01-21 | End: 2019-04-29 | Stop reason: SDUPTHER

## 2019-01-21 RX ORDER — ATORVASTATIN CALCIUM 40 MG/1
40 TABLET, FILM COATED ORAL NIGHTLY
Qty: 30 TABLET | Refills: 5 | Status: SHIPPED | OUTPATIENT
Start: 2019-01-21 | End: 2019-04-29 | Stop reason: SDUPTHER

## 2019-01-21 ASSESSMENT — ENCOUNTER SYMPTOMS
ABDOMINAL PAIN: 0
COUGH: 0
COLOR CHANGE: 0
APNEA: 0
CHEST TIGHTNESS: 0
ABDOMINAL DISTENTION: 0
SHORTNESS OF BREATH: 1
WHEEZING: 0
NAUSEA: 0

## 2019-03-28 ENCOUNTER — OFFICE VISIT (OUTPATIENT)
Dept: FAMILY MEDICINE CLINIC | Age: 84
End: 2019-03-28
Payer: MEDICARE

## 2019-03-28 VITALS
WEIGHT: 127 LBS | RESPIRATION RATE: 16 BRPM | DIASTOLIC BLOOD PRESSURE: 64 MMHG | HEART RATE: 68 BPM | SYSTOLIC BLOOD PRESSURE: 92 MMHG | BODY MASS INDEX: 24.8 KG/M2 | TEMPERATURE: 97.7 F

## 2019-03-28 DIAGNOSIS — J06.9 VIRAL URI: Primary | ICD-10-CM

## 2019-03-28 DIAGNOSIS — I48.91 ATRIAL FIBRILLATION WITH RVR (HCC): ICD-10-CM

## 2019-03-28 DIAGNOSIS — I48.91 RAPID ATRIAL FIBRILLATION (HCC): ICD-10-CM

## 2019-03-28 PROCEDURE — G8427 DOCREV CUR MEDS BY ELIG CLIN: HCPCS | Performed by: FAMILY MEDICINE

## 2019-03-28 PROCEDURE — 1036F TOBACCO NON-USER: CPT | Performed by: FAMILY MEDICINE

## 2019-03-28 PROCEDURE — 99213 OFFICE O/P EST LOW 20 MIN: CPT | Performed by: FAMILY MEDICINE

## 2019-03-28 PROCEDURE — G8420 CALC BMI NORM PARAMETERS: HCPCS | Performed by: FAMILY MEDICINE

## 2019-03-28 PROCEDURE — G8482 FLU IMMUNIZE ORDER/ADMIN: HCPCS | Performed by: FAMILY MEDICINE

## 2019-03-28 PROCEDURE — 4040F PNEUMOC VAC/ADMIN/RCVD: CPT | Performed by: FAMILY MEDICINE

## 2019-03-28 PROCEDURE — G8598 ASA/ANTIPLAT THER USED: HCPCS | Performed by: FAMILY MEDICINE

## 2019-03-28 PROCEDURE — 1123F ACP DISCUSS/DSCN MKR DOCD: CPT | Performed by: FAMILY MEDICINE

## 2019-03-28 PROCEDURE — G8399 PT W/DXA RESULTS DOCUMENT: HCPCS | Performed by: FAMILY MEDICINE

## 2019-03-28 PROCEDURE — 1090F PRES/ABSN URINE INCON ASSESS: CPT | Performed by: FAMILY MEDICINE

## 2019-03-28 ASSESSMENT — PATIENT HEALTH QUESTIONNAIRE - PHQ9
SUM OF ALL RESPONSES TO PHQ QUESTIONS 1-9: 0
SUM OF ALL RESPONSES TO PHQ9 QUESTIONS 1 & 2: 0
2. FEELING DOWN, DEPRESSED OR HOPELESS: 0
SUM OF ALL RESPONSES TO PHQ QUESTIONS 1-9: 0
1. LITTLE INTEREST OR PLEASURE IN DOING THINGS: 0

## 2019-03-31 ASSESSMENT — ENCOUNTER SYMPTOMS
DIARRHEA: 0
CHEST TIGHTNESS: 0
SORE THROAT: 0
COUGH: 1
BACK PAIN: 0
RHINORRHEA: 1
NAUSEA: 0
VOMITING: 0
ABDOMINAL PAIN: 0
EYES NEGATIVE: 1
SHORTNESS OF BREATH: 0

## 2019-04-29 ENCOUNTER — OFFICE VISIT (OUTPATIENT)
Dept: CARDIOLOGY CLINIC | Age: 84
End: 2019-04-29
Payer: MEDICARE

## 2019-04-29 VITALS
OXYGEN SATURATION: 98 % | HEIGHT: 60 IN | BODY MASS INDEX: 25.6 KG/M2 | WEIGHT: 130.4 LBS | HEART RATE: 52 BPM | DIASTOLIC BLOOD PRESSURE: 62 MMHG | SYSTOLIC BLOOD PRESSURE: 110 MMHG

## 2019-04-29 DIAGNOSIS — I50.22 CHF (CONGESTIVE HEART FAILURE), NYHA CLASS II, CHRONIC, SYSTOLIC (HCC): Primary | ICD-10-CM

## 2019-04-29 PROCEDURE — 4040F PNEUMOC VAC/ADMIN/RCVD: CPT | Performed by: NURSE PRACTITIONER

## 2019-04-29 PROCEDURE — 1090F PRES/ABSN URINE INCON ASSESS: CPT | Performed by: NURSE PRACTITIONER

## 2019-04-29 PROCEDURE — 1123F ACP DISCUSS/DSCN MKR DOCD: CPT | Performed by: NURSE PRACTITIONER

## 2019-04-29 PROCEDURE — G8598 ASA/ANTIPLAT THER USED: HCPCS | Performed by: NURSE PRACTITIONER

## 2019-04-29 PROCEDURE — G8399 PT W/DXA RESULTS DOCUMENT: HCPCS | Performed by: NURSE PRACTITIONER

## 2019-04-29 PROCEDURE — 99213 OFFICE O/P EST LOW 20 MIN: CPT | Performed by: NURSE PRACTITIONER

## 2019-04-29 PROCEDURE — 1036F TOBACCO NON-USER: CPT | Performed by: NURSE PRACTITIONER

## 2019-04-29 PROCEDURE — G8427 DOCREV CUR MEDS BY ELIG CLIN: HCPCS | Performed by: NURSE PRACTITIONER

## 2019-04-29 PROCEDURE — G8419 CALC BMI OUT NRM PARAM NOF/U: HCPCS | Performed by: NURSE PRACTITIONER

## 2019-04-29 RX ORDER — FUROSEMIDE 20 MG/1
20 TABLET ORAL DAILY
Qty: 30 TABLET | Refills: 5 | Status: SHIPPED | OUTPATIENT
Start: 2019-04-29 | End: 2019-08-26 | Stop reason: SDUPTHER

## 2019-04-29 RX ORDER — ATORVASTATIN CALCIUM 40 MG/1
40 TABLET, FILM COATED ORAL NIGHTLY
Qty: 30 TABLET | Refills: 5 | Status: SHIPPED | OUTPATIENT
Start: 2019-04-29 | End: 2019-08-26 | Stop reason: SDUPTHER

## 2019-04-29 RX ORDER — SPIRONOLACTONE 25 MG/1
12.5 TABLET ORAL DAILY
Qty: 15 TABLET | Refills: 5 | Status: SHIPPED | OUTPATIENT
Start: 2019-04-29 | End: 2019-08-26 | Stop reason: SDUPTHER

## 2019-04-29 RX ORDER — LANSOPRAZOLE 30 MG/1
30 CAPSULE, DELAYED RELEASE ORAL EVERY OTHER DAY
Qty: 30 CAPSULE | Refills: 5 | Status: SHIPPED | OUTPATIENT
Start: 2019-04-29 | End: 2020-01-14 | Stop reason: SDUPTHER

## 2019-04-29 RX ORDER — CARVEDILOL 6.25 MG/1
6.25 TABLET ORAL 2 TIMES DAILY WITH MEALS
Qty: 60 TABLET | Refills: 5 | Status: SHIPPED | OUTPATIENT
Start: 2019-04-29 | End: 2019-08-26 | Stop reason: SDUPTHER

## 2019-04-29 ASSESSMENT — ENCOUNTER SYMPTOMS
SHORTNESS OF BREATH: 1
WHEEZING: 0
CHEST TIGHTNESS: 0
ABDOMINAL PAIN: 0
NAUSEA: 0
COUGH: 0
COLOR CHANGE: 0
APNEA: 0
ABDOMINAL DISTENTION: 0

## 2019-04-29 NOTE — PROGRESS NOTES
[facility]       Visit Date: 4/29/2019  Cardiologist:  Dr. Asa Santos   Primary Care Physician: Dr. Germain Alexandra MD    Ab Dumont is a 80 y.o. female who presents today for:  Chief Complaint   Patient presents with    Congestive Heart Failure       HPI:   Ab Dumont is a 80 y.o. female who presents to the office for a follow up visit in the heart failure clinic. Hx knee surgery in Sept 2017, developed A fib RVR a few weeks later post-op. She was Cardioverted 10/9/17.  Also had Torsades while in house felt to be due to Amiodarone which was stopped. Previous ECHO EF 60% (9/23/16). KAMRAN 10/9/17 EF 25-30%. Wore a LifeVest. EF improved 40-45% 1/11/18. She has no signs of fluid overload. She sleeps in a bed. She does try to monitor the sodium. She does tire with some SOB with exertion. She can perform ADLs without SOB or fatigue. She complains of her hands feeling \"numb\" at times with her fingertips turing white and feeling cold, painful in extreme cold temps. Patient has:  Last hospital admission related to Heart Failure:   Oct 2017  Chest Pain: no  Worsening SOB/orthopnea/PND: no  Edema: no  Any extra diuretic use: no  Weight gain: no  Compliant checking home weight: yes  Fatigue: no  Abdominal bloating: no  Appetite: good  Difficulty sleeping: no  Cough: no  Compliant checking blood pressure: yes  Any refills on CHF medications needed at this time: see orders    Past Medical History:   Diagnosis Date    Allergic rhinitis     Arthritis     CHF (congestive heart failure) ([facility] [address]     Encounter for cardioversion procedure 10/05/2017    GERD (gastroesophageal reflux disease)     Hx of transesophageal echocardiography (KAMRAN) for monitoring 10/2017    Mild mitral regurgitation 10/2017    Nonrheumatic aortic valve insufficiency 8/29/2017    Osteoporosis     Pre-op evaluation: prior to left knee repalcement 8/29/2017    PVC's (premature ventricular contractions) 8/29/2017    S/P cardiac Hepatic Function Panel:    Lab Results   Component Value Date    ALKPHOS 76 10/08/2017    ALT 22 10/08/2017    AST 13 10/08/2017    PROT 5.9 10/08/2017    BILITOT 0.8 10/08/2017    LABALBU 3.3 10/08/2017     Magnesium:    Lab Results   Component Value Date    MG 2.5 05/21/2018     PT/INR:    Lab Results   Component Value Date    INR 1.24 10/07/2017     Lipids:    Lab Results   Component Value Date    TRIG 92 09/24/2016    HDL 62 09/24/2016    LDLCALC 131 09/24/2016       ASSESSMENT AND PLAN:   The patient's condition/symptoms are Stable: No clinical evidence of fluid overload today. Continue current medical regimen without changes at present time.      Diagnosis Orders   1. CHF (congestive heart failure), NYHA class II, chronic, systolic (HCC)     EF recovered to 40-45%    Plan:  · Lasix 20 mg daily, Aldactone 12.5 mg daily, Coreg 6.25 mg bid, on Eliquis. HF Zones reviewed. · Daily weights  · Fluid restriction of 2 Liters per day  · Limit sodium in diet to around 1778-4174 mg/day  · Monitor BP  · Activity as tolerated     Patient was instructed to call the [employer] for changes in the following symptoms:   Weight gain of 3 pounds in 1 day or 5 pounds in 1 week  Increased shortness of breath  Shortness of breath while laying down  Cough  Chest pain  Swelling in feet, ankles or legs  Tenderness or bloating in the abdomen  Fatigue   Decreased appetite or nausea   Confusion      Return in about 3 months (around 7/29/2019). or sooner if needed     Patient given educational materials - see patient instructions. We discussed the importance of weighing oneself and recording daily. We also discussed the importance of a lowsodium diet, higher sodium foods to avoid and better low sodium food options. Discussed use, benefit, and side effects of prescribed medications. All patient questions answered.   Patient verbalizes understanding of plan of care using teach back method, and is agreeable to the treatment plan.       Electronicallysigned by Aubery Hatchet, APRN - CNP on 4/29/2019 at 12:39 PM

## 2019-04-29 NOTE — Clinical Note
Not sure if you did any work up for Raynaud's or not but it sounds like she may have this. You see her June 12. Her finger tips only turn white when it is extremely cold out. Just wanted to let you know in case there was anything from your standpoint you wanted to do. Thanks

## 2019-04-29 NOTE — PATIENT INSTRUCTIONS
Continue:  · Continue current medications  · Daily weights and record  · Fluid restriction of 2 Liters per day  · Limit sodium in diet to around 9974-8883 mg/day  · Monitor BP  · Activity as tolerated     Call the Heart Failure Clinic for any of the following symptoms: [phone]  Weight gain of 3 pounds in 1 day or 5 pounds in 1 week  Increased shortness of breath  Shortness of breath while laying down  Cough  Chest pain  Swelling in feet, ankles or legs  Tenderness or bloating in the abdomen  Fatigue   Decreased appetite or nausea   Confusion

## 2019-06-03 ENCOUNTER — CARE COORDINATION (OUTPATIENT)
Dept: CASE MANAGEMENT | Age: 84
End: 2019-06-03

## 2019-06-12 ENCOUNTER — CARE COORDINATION (OUTPATIENT)
Dept: CASE MANAGEMENT | Age: 84
End: 2019-06-12

## 2019-06-12 NOTE — CARE COORDINATION
Name: Zoltan Ohara    ### Patient Details  YOB: 1934  MRN: [id number]    ### Encounter Details  Encounter ID: [id number]  Arrival Date: N/A  Discharge Date: N/A    ### Related interaction  CHF High Touch UA (Welcome Call) ([email or web address])    ### Required Interventions and Feedback     [facility] Update         *Patient Status changed in [facility] to[de-identified]     Patient Declined (selected by W on 06/12/2019 04:14 PM EDT)     Call Status         *Call Status:     Other (Provide details below) (edited by W on 06/12/2019 04:13 PM EDT)    Additional Call Status Details[de-identified]     Attempted to contact patient regarding [facility].  Left message on answering machine with contact information and request for call back.   (edited by W on 06/12/2019 04:14 PM EDT)    Lizbeth Stewart RN  Tele-Health Coordinator

## 2019-07-29 ENCOUNTER — OFFICE VISIT (OUTPATIENT)
Dept: CARDIOLOGY CLINIC | Age: 84
End: 2019-07-29
Payer: MEDICARE

## 2019-07-29 VITALS
DIASTOLIC BLOOD PRESSURE: 74 MMHG | SYSTOLIC BLOOD PRESSURE: 128 MMHG | WEIGHT: 128 LBS | HEIGHT: 60 IN | HEART RATE: 63 BPM | BODY MASS INDEX: 25.13 KG/M2

## 2019-07-29 DIAGNOSIS — I50.22 CHRONIC SYSTOLIC CONGESTIVE HEART FAILURE (HCC): Primary | ICD-10-CM

## 2019-07-29 DIAGNOSIS — I48.91 ATRIAL FIBRILLATION, UNSPECIFIED TYPE (HCC): ICD-10-CM

## 2019-07-29 PROCEDURE — 1036F TOBACCO NON-USER: CPT | Performed by: INTERNAL MEDICINE

## 2019-07-29 PROCEDURE — G8419 CALC BMI OUT NRM PARAM NOF/U: HCPCS | Performed by: INTERNAL MEDICINE

## 2019-07-29 PROCEDURE — 1090F PRES/ABSN URINE INCON ASSESS: CPT | Performed by: INTERNAL MEDICINE

## 2019-07-29 PROCEDURE — 1123F ACP DISCUSS/DSCN MKR DOCD: CPT | Performed by: INTERNAL MEDICINE

## 2019-07-29 PROCEDURE — G8598 ASA/ANTIPLAT THER USED: HCPCS | Performed by: INTERNAL MEDICINE

## 2019-07-29 PROCEDURE — 93000 ELECTROCARDIOGRAM COMPLETE: CPT | Performed by: INTERNAL MEDICINE

## 2019-07-29 PROCEDURE — G8399 PT W/DXA RESULTS DOCUMENT: HCPCS | Performed by: INTERNAL MEDICINE

## 2019-07-29 PROCEDURE — G8427 DOCREV CUR MEDS BY ELIG CLIN: HCPCS | Performed by: INTERNAL MEDICINE

## 2019-07-29 PROCEDURE — 4040F PNEUMOC VAC/ADMIN/RCVD: CPT | Performed by: INTERNAL MEDICINE

## 2019-07-29 PROCEDURE — 99213 OFFICE O/P EST LOW 20 MIN: CPT | Performed by: INTERNAL MEDICINE

## 2019-08-26 ENCOUNTER — OFFICE VISIT (OUTPATIENT)
Dept: CARDIOLOGY CLINIC | Age: 84
End: 2019-08-26
Payer: MEDICARE

## 2019-08-26 VITALS
OXYGEN SATURATION: 94 % | BODY MASS INDEX: 25.91 KG/M2 | WEIGHT: 132 LBS | DIASTOLIC BLOOD PRESSURE: 70 MMHG | SYSTOLIC BLOOD PRESSURE: 108 MMHG | HEIGHT: 60 IN | HEART RATE: 62 BPM

## 2019-08-26 DIAGNOSIS — I50.22 CHF (CONGESTIVE HEART FAILURE), NYHA CLASS II, CHRONIC, SYSTOLIC (HCC): Primary | ICD-10-CM

## 2019-08-26 PROCEDURE — 1123F ACP DISCUSS/DSCN MKR DOCD: CPT | Performed by: NURSE PRACTITIONER

## 2019-08-26 PROCEDURE — G8427 DOCREV CUR MEDS BY ELIG CLIN: HCPCS | Performed by: NURSE PRACTITIONER

## 2019-08-26 PROCEDURE — 4040F PNEUMOC VAC/ADMIN/RCVD: CPT | Performed by: NURSE PRACTITIONER

## 2019-08-26 PROCEDURE — 99213 OFFICE O/P EST LOW 20 MIN: CPT | Performed by: NURSE PRACTITIONER

## 2019-08-26 PROCEDURE — G8598 ASA/ANTIPLAT THER USED: HCPCS | Performed by: NURSE PRACTITIONER

## 2019-08-26 PROCEDURE — G8419 CALC BMI OUT NRM PARAM NOF/U: HCPCS | Performed by: NURSE PRACTITIONER

## 2019-08-26 PROCEDURE — G8399 PT W/DXA RESULTS DOCUMENT: HCPCS | Performed by: NURSE PRACTITIONER

## 2019-08-26 PROCEDURE — 1036F TOBACCO NON-USER: CPT | Performed by: NURSE PRACTITIONER

## 2019-08-26 PROCEDURE — 1090F PRES/ABSN URINE INCON ASSESS: CPT | Performed by: NURSE PRACTITIONER

## 2019-08-26 RX ORDER — ATORVASTATIN CALCIUM 40 MG/1
40 TABLET, FILM COATED ORAL NIGHTLY
Qty: 30 TABLET | Refills: 5 | Status: SHIPPED | OUTPATIENT
Start: 2019-08-26 | End: 2020-01-14 | Stop reason: SDUPTHER

## 2019-08-26 RX ORDER — CARVEDILOL 6.25 MG/1
6.25 TABLET ORAL 2 TIMES DAILY WITH MEALS
Qty: 60 TABLET | Refills: 5 | Status: SHIPPED | OUTPATIENT
Start: 2019-08-26 | End: 2020-01-14 | Stop reason: SDUPTHER

## 2019-08-26 RX ORDER — FUROSEMIDE 20 MG/1
20 TABLET ORAL DAILY
Qty: 30 TABLET | Refills: 5 | Status: SHIPPED | OUTPATIENT
Start: 2019-08-26 | End: 2020-01-14 | Stop reason: SDUPTHER

## 2019-08-26 RX ORDER — SPIRONOLACTONE 25 MG/1
12.5 TABLET ORAL DAILY
Qty: 15 TABLET | Refills: 5 | Status: SHIPPED | OUTPATIENT
Start: 2019-08-26 | End: 2020-01-14 | Stop reason: SDUPTHER

## 2019-08-26 ASSESSMENT — ENCOUNTER SYMPTOMS
CHEST TIGHTNESS: 0
APNEA: 0
NAUSEA: 0
SHORTNESS OF BREATH: 0
COUGH: 0
ABDOMINAL PAIN: 0
ABDOMINAL DISTENTION: 0
COLOR CHANGE: 0
WHEEZING: 0

## 2019-08-28 ENCOUNTER — TELEPHONE (OUTPATIENT)
Dept: CARDIOLOGY CLINIC | Age: 84
End: 2019-08-28

## 2019-08-28 DIAGNOSIS — E87.5 HYPERKALEMIA: Primary | ICD-10-CM

## 2019-08-28 LAB
ABSOLUTE BASO #: 0.1 X10E9/L (ref 0–0.9)
ABSOLUTE EOS #: 0.7 X10E9/L (ref 0–0.4)
ABSOLUTE LYMPH #: 1.9 X10E9/L (ref 1–3.5)
ABSOLUTE MONO #: 0.6 X10E9/L (ref 0–0.9)
ABSOLUTE NEUT #: 4 X10E9/L (ref 1.5–6.6)
ANION GAP SERPL CALCULATED.3IONS-SCNC: 7 MMOL/L (ref 4–12)
BASOPHILS RELATIVE PERCENT: 1.3 %
BUN BLDV-MCNC: 17 MG/DL (ref 5–27)
CALCIUM SERPL-MCNC: 9.2 MG/DL (ref 8.5–10.5)
CHLORIDE BLD-SCNC: 108 MMOL/L (ref 98–109)
CO2: 27 MMOL/L (ref 22–32)
CREAT SERPL-MCNC: 0.82 MG/DL (ref 0.4–1)
EGFR AFRICAN AMERICAN: >60 ML/MIN/1.73SQ.M
EGFR IF NONAFRICAN AMERICAN: >60 ML/MIN/1.73SQ.M
EOSINOPHILS RELATIVE PERCENT: 9.7 %
GLUCOSE: 94 MG/DL (ref 65–99)
HCT VFR BLD CALC: 37.6 % (ref 34–48)
HEMOGLOBIN: 12.4 G/DL (ref 11.7–16.1)
LYMPHOCYTE %: 26.3 %
MCH RBC QN AUTO: 29.8 PG (ref 27–35)
MCHC RBC AUTO-ENTMCNC: 32.9 G/DL (ref 31–36)
MCV RBC AUTO: 91 FL (ref 81–101)
MONOCYTES # BLD: 7.6 %
NEUTROPHILS RELATIVE PERCENT: 55.1 %
PDW BLD-RTO: 13.6 % (ref 11.5–14.7)
PLATELETS: 195 X10E9/L (ref 150–450)
PMV BLD AUTO: 9.7 FL (ref 7–12)
POTASSIUM SERPL-SCNC: 5.2 MMOL/L (ref 3.5–5)
RBC: 4.15 X10E12/L (ref 3.3–5.5)
SODIUM BLD-SCNC: 142 MMOL/L (ref 134–146)
WBC: 7.3 X10E9/L (ref 4–11.8)

## 2019-08-28 NOTE — TELEPHONE ENCOUNTER
Instructions given, high potassium food list reviewed and mailed to pt. Lab slip mailed to pt as well.

## 2019-09-13 LAB — POTASSIUM SERPL-SCNC: 4.9 MMOL/L (ref 3.5–5)

## 2019-10-08 ENCOUNTER — TELEPHONE (OUTPATIENT)
Dept: CARDIOLOGY CLINIC | Age: 84
End: 2019-10-08

## 2019-10-16 ENCOUNTER — HOSPITAL ENCOUNTER (EMERGENCY)
Age: 84
Discharge: HOME | End: 2019-10-16
Payer: MEDICARE

## 2019-10-16 ENCOUNTER — APPOINTMENT (OUTPATIENT)
Dept: GENERAL RADIOLOGY | Age: 84
End: 2019-10-16
Payer: MEDICARE

## 2019-10-16 VITALS
HEART RATE: 73 BPM | RESPIRATION RATE: 16 BRPM | HEIGHT: 60 IN | SYSTOLIC BLOOD PRESSURE: 132 MMHG | OXYGEN SATURATION: 99 % | BODY MASS INDEX: 25.32 KG/M2 | WEIGHT: 129 LBS | TEMPERATURE: 98.6 F | DIASTOLIC BLOOD PRESSURE: 79 MMHG
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
## 2019-10-16 DIAGNOSIS — S83.91XA SPRAIN OF RIGHT KNEE, UNSPECIFIED LIGAMENT, INITIAL ENCOUNTER: ICD-10-CM

## 2019-10-16 DIAGNOSIS — S20.211A RIB CONTUSION, RIGHT, INITIAL ENCOUNTER: ICD-10-CM

## 2019-10-16 DIAGNOSIS — S60.211A CONTUSION OF RIGHT WRIST, INITIAL ENCOUNTER: ICD-10-CM
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
## 2019-10-16 PROCEDURE — 73090 X-RAY EXAM OF FOREARM: CPT

## 2019-10-16 PROCEDURE — 6370000000 HC RX 637 (ALT 250 FOR IP): Performed by: PHYSICIAN ASSISTANT

## 2019-10-16 PROCEDURE — 71101 X-RAY EXAM UNILAT RIBS/CHEST: CPT

## 2019-10-16 PROCEDURE — 73564 X-RAY EXAM KNEE 4 OR MORE: CPT

## 2019-10-16 PROCEDURE — 99283 EMERGENCY DEPT VISIT LOW MDM: CPT

## 2019-10-16 RX ORDER — CYCLOBENZAPRINE HCL 5 MG
2.5-5 TABLET ORAL 3 TIMES DAILY PRN
Qty: 10 TABLET | Refills: 0 | Status: SHIPPED | OUTPATIENT
Start: 2019-10-16 | End: 2019-10-26

## 2019-10-16 RX ORDER — LIDOCAINE 40 MG/G
CREAM TOPICAL PRN
Status: DISCONTINUED | OUTPATIENT
Start: 2019-10-16 | End: 2019-10-16 | Stop reason: HOSPADM

## 2019-10-16 RX ORDER — LIDOCAINE 4 G/G
1 PATCH TOPICAL ONCE
Status: DISCONTINUED | OUTPATIENT
Start: 2019-10-16 | End: 2019-10-16 | Stop reason: HOSPADM

## 2019-10-16 RX ADMIN — LIDOCAINE 4%: 4 CREAM TOPICAL at 14:49

## 2019-10-16 ASSESSMENT — ENCOUNTER SYMPTOMS
SHORTNESS OF BREATH: 0
RHINORRHEA: 0
SORE THROAT: 0
COUGH: 0
NAUSEA: 0
ABDOMINAL PAIN: 0
BACK PAIN: 0
EYE PAIN: 0
VOMITING: 0
EYE DISCHARGE: 0
DIARRHEA: 0
WHEEZING: 0

## 2019-10-16 ASSESSMENT — PAIN DESCRIPTION - ORIENTATION: ORIENTATION: RIGHT

## 2019-10-16 ASSESSMENT — PAIN DESCRIPTION - PAIN TYPE: TYPE: ACUTE PAIN

## 2019-10-16 ASSESSMENT — PAIN SCALES - GENERAL: PAINLEVEL_OUTOF10: 8

## 2019-10-21 ENCOUNTER — NURSE TRIAGE (OUTPATIENT)
Dept: OTHER | Facility: CLINIC | Age: 84
End: 2019-10-21

## 2019-10-21 ENCOUNTER — OFFICE VISIT (OUTPATIENT)
Dept: FAMILY MEDICINE CLINIC | Age: 84
End: 2019-10-21
Payer: MEDICARE

## 2019-10-21 VITALS
WEIGHT: 131 LBS | DIASTOLIC BLOOD PRESSURE: 60 MMHG | TEMPERATURE: 97.5 F | RESPIRATION RATE: 20 BRPM | HEART RATE: 80 BPM | SYSTOLIC BLOOD PRESSURE: 120 MMHG | BODY MASS INDEX: 25.58 KG/M2
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
## 2019-10-21 DIAGNOSIS — R07.81 RIB PAIN ON RIGHT SIDE: Primary | ICD-10-CM

## 2019-10-21 PROCEDURE — G8399 PT W/DXA RESULTS DOCUMENT: HCPCS | Performed by: NURSE PRACTITIONER

## 2019-10-21 PROCEDURE — G8427 DOCREV CUR MEDS BY ELIG CLIN: HCPCS | Performed by: NURSE PRACTITIONER

## 2019-10-21 PROCEDURE — G8598 ASA/ANTIPLAT THER USED: HCPCS | Performed by: NURSE PRACTITIONER

## 2019-10-21 PROCEDURE — 4040F PNEUMOC VAC/ADMIN/RCVD: CPT | Performed by: NURSE PRACTITIONER

## 2019-10-21 PROCEDURE — 1090F PRES/ABSN URINE INCON ASSESS: CPT | Performed by: NURSE PRACTITIONER

## 2019-10-21 PROCEDURE — 1123F ACP DISCUSS/DSCN MKR DOCD: CPT | Performed by: NURSE PRACTITIONER

## 2019-10-21 PROCEDURE — 99213 OFFICE O/P EST LOW 20 MIN: CPT | Performed by: NURSE PRACTITIONER

## 2019-10-21 PROCEDURE — G8482 FLU IMMUNIZE ORDER/ADMIN: HCPCS | Performed by: NURSE PRACTITIONER

## 2019-10-21 PROCEDURE — 1036F TOBACCO NON-USER: CPT | Performed by: NURSE PRACTITIONER

## 2019-10-21 PROCEDURE — G8417 CALC BMI ABV UP PARAM F/U: HCPCS | Performed by: NURSE PRACTITIONER

## 2019-10-21 RX ORDER — TRAMADOL HYDROCHLORIDE 50 MG/1
50 TABLET ORAL EVERY 8 HOURS PRN
Qty: 9 TABLET | Refills: 0 | Status: SHIPPED | OUTPATIENT
Start: 2019-10-21 | End: 2019-10-24

## 2019-10-21 ASSESSMENT — ENCOUNTER SYMPTOMS
BACK PAIN: 0
ALLERGIC/IMMUNOLOGIC NEGATIVE: 1
SHORTNESS OF BREATH: 0
WHEEZING: 0
COUGH: 0
PHOTOPHOBIA: 0

## 2020-01-14 ENCOUNTER — TELEPHONE (OUTPATIENT)
Dept: CARDIOLOGY CLINIC | Age: 85
End: 2020-01-14

## 2020-01-14 ENCOUNTER — OFFICE VISIT (OUTPATIENT)
Dept: CARDIOLOGY CLINIC | Age: 85
End: 2020-01-14
Payer: MEDICARE

## 2020-01-14 VITALS
HEIGHT: 60 IN | BODY MASS INDEX: 25.52 KG/M2 | WEIGHT: 130 LBS | OXYGEN SATURATION: 97 % | SYSTOLIC BLOOD PRESSURE: 98 MMHG | DIASTOLIC BLOOD PRESSURE: 62 MMHG | HEART RATE: 65 BPM

## 2020-01-14 LAB
ANION GAP SERPL CALCULATED.3IONS-SCNC: 15 MEQ/L (ref 8–16)
BUN BLDV-MCNC: 21 MG/DL (ref 7–22)
CALCIUM SERPL-MCNC: 8.9 MG/DL (ref 8.5–10.5)
CHLORIDE BLD-SCNC: 106 MEQ/L (ref 98–111)
CO2: 23 MEQ/L (ref 23–33)
CREAT SERPL-MCNC: 0.8 MG/DL (ref 0.4–1.2)
GFR SERPL CREATININE-BSD FRML MDRD: 68 ML/MIN/1.73M2
GLUCOSE BLD-MCNC: 91 MG/DL (ref 70–108)
MAGNESIUM: 2.3 MG/DL (ref 1.6–2.4)
POTASSIUM SERPL-SCNC: 4.7 MEQ/L (ref 3.5–5.2)
SODIUM BLD-SCNC: 144 MEQ/L (ref 135–145)

## 2020-01-14 PROCEDURE — G8427 DOCREV CUR MEDS BY ELIG CLIN: HCPCS | Performed by: NURSE PRACTITIONER

## 2020-01-14 PROCEDURE — 1036F TOBACCO NON-USER: CPT | Performed by: NURSE PRACTITIONER

## 2020-01-14 PROCEDURE — 99213 OFFICE O/P EST LOW 20 MIN: CPT | Performed by: NURSE PRACTITIONER

## 2020-01-14 PROCEDURE — 1123F ACP DISCUSS/DSCN MKR DOCD: CPT | Performed by: NURSE PRACTITIONER

## 2020-01-14 PROCEDURE — G8417 CALC BMI ABV UP PARAM F/U: HCPCS | Performed by: NURSE PRACTITIONER

## 2020-01-14 PROCEDURE — G8399 PT W/DXA RESULTS DOCUMENT: HCPCS | Performed by: NURSE PRACTITIONER

## 2020-01-14 PROCEDURE — 4040F PNEUMOC VAC/ADMIN/RCVD: CPT | Performed by: NURSE PRACTITIONER

## 2020-01-14 PROCEDURE — 1090F PRES/ABSN URINE INCON ASSESS: CPT | Performed by: NURSE PRACTITIONER

## 2020-01-14 PROCEDURE — 36415 COLL VENOUS BLD VENIPUNCTURE: CPT | Performed by: NURSE PRACTITIONER

## 2020-01-14 PROCEDURE — G8482 FLU IMMUNIZE ORDER/ADMIN: HCPCS | Performed by: NURSE PRACTITIONER

## 2020-01-14 RX ORDER — SPIRONOLACTONE 25 MG/1
12.5 TABLET ORAL DAILY
Qty: 15 TABLET | Refills: 5 | Status: SHIPPED | OUTPATIENT
Start: 2020-01-14 | End: 2020-06-09 | Stop reason: SDUPTHER

## 2020-01-14 RX ORDER — CARVEDILOL 6.25 MG/1
6.25 TABLET ORAL 2 TIMES DAILY WITH MEALS
Qty: 60 TABLET | Refills: 5 | Status: SHIPPED | OUTPATIENT
Start: 2020-01-14 | End: 2020-06-09 | Stop reason: SDUPTHER

## 2020-01-14 RX ORDER — ATORVASTATIN CALCIUM 40 MG/1
40 TABLET, FILM COATED ORAL NIGHTLY
Qty: 30 TABLET | Refills: 5 | Status: SHIPPED | OUTPATIENT
Start: 2020-01-14 | End: 2020-06-09 | Stop reason: SDUPTHER

## 2020-01-14 RX ORDER — LANSOPRAZOLE 30 MG/1
30 CAPSULE, DELAYED RELEASE ORAL EVERY OTHER DAY
Qty: 30 CAPSULE | Refills: 5 | Status: SHIPPED | OUTPATIENT
Start: 2020-01-14 | End: 2021-01-12 | Stop reason: SDUPTHER

## 2020-01-14 RX ORDER — FUROSEMIDE 20 MG/1
20 TABLET ORAL DAILY
Qty: 30 TABLET | Refills: 5 | Status: SHIPPED | OUTPATIENT
Start: 2020-01-14 | End: 2020-06-09 | Stop reason: SDUPTHER

## 2020-01-14 ASSESSMENT — ENCOUNTER SYMPTOMS
SHORTNESS OF BREATH: 0
WHEEZING: 0
APNEA: 0
CHEST TIGHTNESS: 0
COLOR CHANGE: 0
ABDOMINAL PAIN: 0
COUGH: 0
ABDOMINAL DISTENTION: 0
NAUSEA: 0

## 2020-01-14 NOTE — PROGRESS NOTES
[facility]       Visit Date: 1/14/2020  Cardiologist:  Dr. Matty Artis   Primary Care Physician: Dr. Rosangela Plascencia MD    Nataly Bejarano is a 80 y.o. female who presents today for:  Chief Complaint   Patient presents with    Congestive Heart Failure       HPI: Obtained from patient and chart    Nataly Bejarano is a 80 y.o. female who presents to the office for a follow up visit in the heart failure clinic. Hx knee surgery in Sept 2017, developed A fib RVR a few weeks later post-op. She was Cardioverted 10/9/17.  Also had Torsades while in the hospital felt to be due to Amiodarone which was stopped. Previous ECHO EF 60% (9/23/16). KAMRAN 10/9/17 EF 25-30%. Wore a LifeVest. EF improved 40-45% 1/11/18, NICM. She has been feeling well. She can perform ADLs without SOB or fatigue. Denies swelling, orthopnea or CP. She is making urine with the Lasix. She does complain of leg cramps at night. She states she has \"poor circulation\" to her feet. Accompanied by:   Last hospital admission related to Heart Failure:   Oct 2017  Chest Pain: no  Worsening SOB: no  Worsening Orthopnea/PND: no  Edema: no  Any extra diuretic use: no  Weight gain: no  Fatigue: no  Abdominal bloating: no  Appetite: good  CORDELL: no  Cough: occasional   Compliant checking home weight: yes 128 lbs  Compliant checking blood pressure: yes       Past Medical History:   Diagnosis Date    Allergic rhinitis     Arthritis     CHF (congestive heart failure) ([facility] [address]     Encounter for cardioversion procedure 10/05/2017    GERD (gastroesophageal reflux disease)     Hx of transesophageal echocardiography (KAMRAN) for monitoring 10/2017    Mild mitral regurgitation 10/2017    Nonrheumatic aortic valve insufficiency 8/29/2017    Osteoporosis     Pre-op evaluation: prior to left knee repalcement 8/29/2017    PVC's (premature ventricular contractions) 8/29/2017    S/P cardiac catheterization 10/07/2017    with Dr. Matty Artis Component Value Date     01/14/2020    K 4.7 01/14/2020     01/14/2020    CO2 23 01/14/2020    BUN 21 01/14/2020    CREATININE 0.8 01/14/2020    LABGLOM 68 01/14/2020    GLUCOSE 91 01/14/2020    GLUCOSE 94 08/27/2019    CALCIUM 8.9 01/14/2020     Hepatic Function Panel:    Lab Results   Component Value Date    ALKPHOS 76 10/08/2017    ALT 22 10/08/2017    AST 13 10/08/2017    PROT 5.9 10/08/2017    BILITOT 0.8 10/08/2017    LABALBU 3.3 10/08/2017     Magnesium:    Lab Results   Component Value Date    MG 2.3 01/14/2020     PT/INR:    Lab Results   Component Value Date    INR 1.24 10/07/2017     Lipids:    Lab Results   Component Value Date    TRIG 92 09/24/2016    HDL 62 09/24/2016    LDLCALC 131 09/24/2016       ASSESSMENT AND PLAN:   The patient's condition/symptoms are Stable      Diagnosis Orders   1. CHF (congestive heart failure), NYHA class II, chronic, systolic (HCC)  Basic Metabolic Panel    Magnesium   2. Hx Atrial fibrillation        Plan:  · GDMT   · ACE/ARB/ARNi: no -lower BPs  · Beta Blocker: Coreg 6.25 mg bid  · Aldosterone antagonist: Aldactone 12.5 mg daily  · Diuretic/Potassium: Lasix 20 mg daily  · Hydralazine/Nitrate: no  · Other: Eliquis, Lipitor  · BMP today reviewed. Continue current medications. No signs of fluid overload. She refused GIOVANNI screening. · HF Zones reviewed. · Daily weights  · Fluid restriction of 2 Liters per day  · Limit sodium in diet to around 7422-3735 mg/day  · Monitor BP  · Activity as tolerated     Patient was instructed to call the [facility] for changes in the following symptoms:    Weight gain of 3 pounds in 1 day or 5 pounds in 1 week   Increased shortness of breath   Shortness of breath while laying down   Cough   Chest pain   Swelling in feet, ankles or legs   Tenderness or bloating in the abdomen   Fatigue    Decreased appetite or nausea    Confusion      No follow-ups on file.  or sooner if needed     Patient given educational

## 2020-06-09 ENCOUNTER — OFFICE VISIT (OUTPATIENT)
Dept: CARDIOLOGY CLINIC | Age: 85
End: 2020-06-09
Payer: MEDICARE

## 2020-06-09 VITALS
HEIGHT: 60 IN | SYSTOLIC BLOOD PRESSURE: 116 MMHG | OXYGEN SATURATION: 97 % | HEART RATE: 70 BPM | WEIGHT: 132 LBS | BODY MASS INDEX: 25.91 KG/M2 | DIASTOLIC BLOOD PRESSURE: 68 MMHG

## 2020-06-09 PROCEDURE — G8399 PT W/DXA RESULTS DOCUMENT: HCPCS | Performed by: NURSE PRACTITIONER

## 2020-06-09 PROCEDURE — 1036F TOBACCO NON-USER: CPT | Performed by: NURSE PRACTITIONER

## 2020-06-09 PROCEDURE — G8427 DOCREV CUR MEDS BY ELIG CLIN: HCPCS | Performed by: NURSE PRACTITIONER

## 2020-06-09 PROCEDURE — 4040F PNEUMOC VAC/ADMIN/RCVD: CPT | Performed by: NURSE PRACTITIONER

## 2020-06-09 PROCEDURE — 1090F PRES/ABSN URINE INCON ASSESS: CPT | Performed by: NURSE PRACTITIONER

## 2020-06-09 PROCEDURE — 99213 OFFICE O/P EST LOW 20 MIN: CPT | Performed by: NURSE PRACTITIONER

## 2020-06-09 PROCEDURE — 1123F ACP DISCUSS/DSCN MKR DOCD: CPT | Performed by: NURSE PRACTITIONER

## 2020-06-09 PROCEDURE — G8417 CALC BMI ABV UP PARAM F/U: HCPCS | Performed by: NURSE PRACTITIONER

## 2020-06-09 RX ORDER — ATORVASTATIN CALCIUM 40 MG/1
40 TABLET, FILM COATED ORAL NIGHTLY
Qty: 30 TABLET | Refills: 5 | Status: SHIPPED | OUTPATIENT
Start: 2020-06-09 | End: 2021-01-12 | Stop reason: SDUPTHER

## 2020-06-09 RX ORDER — FUROSEMIDE 20 MG/1
20 TABLET ORAL DAILY
Qty: 30 TABLET | Refills: 5 | Status: SHIPPED | OUTPATIENT
Start: 2020-06-09 | End: 2020-06-09

## 2020-06-09 RX ORDER — CARVEDILOL 6.25 MG/1
6.25 TABLET ORAL 2 TIMES DAILY WITH MEALS
Qty: 60 TABLET | Refills: 5 | Status: SHIPPED | OUTPATIENT
Start: 2020-06-09 | End: 2021-01-12 | Stop reason: SDUPTHER

## 2020-06-09 RX ORDER — SPIRONOLACTONE 25 MG/1
12.5 TABLET ORAL DAILY
Qty: 15 TABLET | Refills: 5 | Status: SHIPPED | OUTPATIENT
Start: 2020-06-09 | End: 2021-01-12 | Stop reason: SDUPTHER

## 2020-06-09 RX ORDER — FUROSEMIDE 20 MG/1
20 TABLET ORAL
Qty: 30 TABLET | Refills: 5 | Status: SHIPPED | OUTPATIENT
Start: 2020-06-10 | End: 2021-01-12

## 2020-06-09 ASSESSMENT — ENCOUNTER SYMPTOMS
COLOR CHANGE: 0
WHEEZING: 0
ABDOMINAL DISTENTION: 0
ABDOMINAL PAIN: 0
CHEST TIGHTNESS: 0
APNEA: 0
SHORTNESS OF BREATH: 0
NAUSEA: 0
COUGH: 0

## 2020-06-09 NOTE — PROGRESS NOTES
Lumbar Laminectomy Fusion L3-5, L4-5 PLIF - Dr. Natalio Koch Left 2017    Rodriguez Left Total Knee    ROTATOR CUFF REPAIR  3/8/06    right shoulder      Family History   Problem Relation Age of Onset    Arthritis Mother     Heart Disease Sister     High Blood Pressure Sister     Arthritis Sister     COPD Sister     Cancer Sister         Skin Cancer    Cancer Brother     Heart Disease Brother     Diabetes Maternal Grandfather     Heart Disease Brother         CHF    Cancer Brother         Lung Cancer     Social History     Tobacco Use    Smoking status: Former Smoker     Packs/day: 0.50     Years: 5.00     Pack years: 2.50     Last attempt to quit: 1965     Years since quittin.8    Smokeless tobacco: Never Used   Substance Use Topics    Alcohol use: Yes     Comment: occ.  red wine     Current Outpatient Medications   Medication Sig Dispense Refill    spironolactone (ALDACTONE) 25 MG tablet Take 0.5 tablets by mouth daily 15 tablet 5    carvedilol (COREG) 6.25 MG tablet Take 1 tablet by mouth 2 times daily (with meals) 60 tablet 5    atorvastatin (LIPITOR) 40 MG tablet Take 1 tablet by mouth nightly 30 tablet 5    apixaban (ELIQUIS) 5 MG TABS tablet Take 1 tablet by mouth 2 times daily 60 tablet 5    [START ON 6/10/2020] furosemide (LASIX) 20 MG tablet Take 1 tablet by mouth three times a week Mon-Wed-Fri 30 tablet 5    lansoprazole (PREVACID) 30 MG delayed release capsule Take 1 capsule by mouth every other day 30 capsule 5    nitroGLYCERIN (NITROSTAT) 0.4 MG SL tablet Place 1 tablet under the tongue every 5 minutes as needed for Chest pain (SOB) 25 tablet 3    vitamin C (ASCORBIC ACID) 500 MG tablet Take 500 mg by mouth daily      vitamin D (CHOLECALCIFEROL) 1000 UNIT TABS tablet Take 500 Units by mouth once a week       aspirin EC 81 MG EC tablet Take 1 tablet by mouth daily 30 tablet 0     No current facility-administered medications for this visit. Allergies   Allergen Reactions    Amiodarone Other (See Comments)     Prolonged QT; Torsades DP       SUBJECTIVE:   Review of Systems   Constitutional: Negative for activity change, appetite change, fatigue and fever. HENT: Negative for congestion. Respiratory: Negative for apnea, cough, chest tightness, shortness of breath and wheezing. Cardiovascular: Negative for chest pain, palpitations and leg swelling. Gastrointestinal: Negative for abdominal distention, abdominal pain and nausea. Genitourinary: Negative for difficulty urinating and dysuria. Musculoskeletal: Negative for arthralgias and gait problem. Skin: Negative for color change. Neurological: Negative for dizziness, numbness and headaches. Psychiatric/Behavioral: Negative for agitation, confusion and sleep disturbance. The patient is not nervous/anxious. OBJECTIVE:   Today's Vitals:  /68   Pulse 70   Ht 5' (1.524 m)   Wt 132 lb (59.9 kg)   SpO2 97%   BMI 25.78 kg/m²     Physical Exam  Vitals signs reviewed. Constitutional:       Appearance: She is well-developed. HENT:      Head: Normocephalic and atraumatic. Eyes:      Pupils: Pupils are equal, round, and reactive to light. Neck:      Musculoskeletal: Normal range of motion. Vascular: No JVD. Cardiovascular:      Rate and Rhythm: Normal rate and regular rhythm. Heart sounds: Murmur present. Pulmonary:      Effort: Pulmonary effort is normal. No respiratory distress. Breath sounds: No rales. Abdominal:      General: There is no distension. Palpations: Abdomen is soft. Tenderness: There is no abdominal tenderness. Musculoskeletal:         General: No tenderness. Right lower leg: No edema. Left lower leg: No edema. Skin:     General: Skin is warm and dry. Capillary Refill: Capillary refill takes less than 2 seconds.    Neurological:      Mental Status: She is alert and oriented to person, place, CREATININE 0.8 01/14/2020    LABGLOM 68 01/14/2020    GLUCOSE 91 01/14/2020    GLUCOSE 94 08/27/2019    CALCIUM 8.9 01/14/2020     Hepatic Function Panel:    Lab Results   Component Value Date    ALKPHOS 76 10/08/2017    ALT 22 10/08/2017    AST 13 10/08/2017    PROT 5.9 10/08/2017    BILITOT 0.8 10/08/2017    LABALBU 3.3 10/08/2017     Magnesium:    Lab Results   Component Value Date    MG 2.3 01/14/2020     PT/INR:    Lab Results   Component Value Date    INR 1.24 10/07/2017     Lipids:    Lab Results   Component Value Date    TRIG 92 09/24/2016    HDL 62 09/24/2016    LDLCALC 131 09/24/2016       ASSESSMENT AND PLAN:   The patient's condition/symptoms are Stable      Diagnosis Orders   1. CHF (congestive heart failure), NYHA class II, chronic, systolic (HCC)  Basic Metabolic Panel   2. Atrial fibrillation, unspecified type ([facility] [address]     3. Nonischemic cardiomyopathy (HCC)         Plan:  · GDMT   · ACE/ARB/ARNi: no - had lower BPs  · Beta Blocker: Coreg 6.25 mg bid  · Aldosterone antagonist: Aldactone 12.5 mg daily  · Diuretic/Potassium: decrease Lasix to M-W-F  · Hydralazine/Nitrate: no  · Other: Eliquis, Lipitor  · No signs of fluid overload. Decrease Lasix as above. BMP in 2 weeks. · HF Zones reviewed. · Daily weights  · Fluid restriction of 2 Liters per day (64 oz)   · Limit sodium in diet to around 4093-2016 mg/day  · Monitor BP  · Activity as tolerated     Patient was instructed to call the [facility] for changes in the following symptoms:    Weight gain of 3 pounds in 1 day or 5 pounds in 1 week   Increased shortness of breath   Shortness of breath while laying down   Cough   Chest pain   Swelling in feet, ankles or legs   Tenderness or bloating in the abdomen   Fatigue    Decreased appetite or feeling \"full\"   Nausea    Confusion      Return in about 6 months (around 12/9/2020). or sooner if needed     Patient given educational materials - see patient instructions.      We discussed the importance of weighing oneself and recording daily. We also discussed the importance of a low sodium diet, higher sodium foods to avoid and appropriate low sodium food choices. Discussed use, benefit, and side effects of prescribed medications. All patient questions answered. Patient verbalizes understanding of plan of care using teach back method, and is agreeable to the treatment plan.        Electronicallysigned by ANDREA Butts CNP on 6/9/2020 at 10:43 AM

## 2020-06-12 ENCOUNTER — TELEPHONE (OUTPATIENT)
Dept: CARDIOLOGY CLINIC | Age: 85
End: 2020-06-12

## 2020-06-12 LAB
ANION GAP SERPL CALCULATED.3IONS-SCNC: 8 MMOL/L (ref 5–15)
BUN BLDV-MCNC: 23 MG/DL (ref 5–27)
CALCIUM SERPL-MCNC: 9.1 MG/DL (ref 8.5–10.5)
CHLORIDE BLD-SCNC: 105 MMOL/L (ref 98–109)
CO2: 26 MMOL/L (ref 22–32)
CREAT SERPL-MCNC: 0.87 MG/DL (ref 0.4–1)
EGFR AFRICAN AMERICAN: >60 ML/MIN/1.73SQ.M
EGFR IF NONAFRICAN AMERICAN: >60 ML/MIN/1.73SQ.M
GLUCOSE: 104 MG/DL (ref 65–99)
POTASSIUM SERPL-SCNC: 4.3 MMOL/L (ref 3.5–5)
SODIUM BLD-SCNC: 139 MMOL/L (ref 134–146)

## 2020-06-12 NOTE — TELEPHONE ENCOUNTER
Spoke with patient   She has not noticed any wt gain, sob, JOSUE, bloating, orthopnea with 3x wk  She will try lasix prn and let office know if she has any of the above symptoms

## 2020-06-23 ENCOUNTER — APPOINTMENT (OUTPATIENT)
Dept: GENERAL RADIOLOGY | Age: 85
End: 2020-06-23
Payer: MEDICARE

## 2020-06-23 ENCOUNTER — HOSPITAL ENCOUNTER (EMERGENCY)
Age: 85
Discharge: HOME | End: 2020-06-23
Attending: EMERGENCY MEDICINE
Payer: MEDICARE

## 2020-06-23 VITALS
WEIGHT: 130 LBS | BODY MASS INDEX: 25.52 KG/M2 | HEART RATE: 74 BPM | HEIGHT: 60 IN | DIASTOLIC BLOOD PRESSURE: 75 MMHG | RESPIRATION RATE: 16 BRPM | SYSTOLIC BLOOD PRESSURE: 111 MMHG | TEMPERATURE: 97.8 F | OXYGEN SATURATION: 97 %

## 2020-06-23 LAB
ANION GAP SERPL CALCULATED.3IONS-SCNC: 10 MEQ/L (ref 8–16)
BASOPHILS # BLD: 0.9 %
BASOPHILS ABSOLUTE: 0.1 THOU/MM3 (ref 0–0.1)
BUN BLDV-MCNC: 27 MG/DL (ref 7–22)
CALCIUM SERPL-MCNC: 8.9 MG/DL (ref 8.5–10.5)
CHLORIDE BLD-SCNC: 107 MEQ/L (ref 98–111)
CO2: 21 MEQ/L (ref 23–33)
CREAT SERPL-MCNC: 0.8 MG/DL (ref 0.4–1.2)
EKG ATRIAL RATE: 60 BPM
EKG ATRIAL RATE: 78 BPM
EKG P AXIS: 10 DEGREES
EKG P AXIS: 54 DEGREES
EKG P-R INTERVAL: 134 MS
EKG P-R INTERVAL: 142 MS
EKG Q-T INTERVAL: 368 MS
EKG Q-T INTERVAL: 412 MS
EKG QRS DURATION: 88 MS
EKG QRS DURATION: 90 MS
EKG QTC CALCULATION (BAZETT): 412 MS
EKG QTC CALCULATION (BAZETT): 419 MS
EKG R AXIS: -26 DEGREES
EKG R AXIS: -46 DEGREES
EKG T AXIS: 36 DEGREES
EKG T AXIS: 49 DEGREES
EKG VENTRICULAR RATE: 60 BPM
EKG VENTRICULAR RATE: 78 BPM
EOSINOPHIL # BLD: 4.9 %
EOSINOPHILS ABSOLUTE: 0.4 THOU/MM3 (ref 0–0.4)
ERYTHROCYTE [DISTWIDTH] IN BLOOD BY AUTOMATED COUNT: 13.4 % (ref 11.5–14.5)
ERYTHROCYTE [DISTWIDTH] IN BLOOD BY AUTOMATED COUNT: 48 FL (ref 35–45)
GFR SERPL CREATININE-BSD FRML MDRD: 68 ML/MIN/1.73M2
GLUCOSE BLD-MCNC: 166 MG/DL (ref 70–108)
HCT VFR BLD CALC: 39.7 % (ref 37–47)
HEMOGLOBIN: 12.3 GM/DL (ref 12–16)
IMMATURE GRANS (ABS): 0.02 THOU/MM3 (ref 0–0.07)
IMMATURE GRANULOCYTES: 0.3 %
LYMPHOCYTES # BLD: 29.6 %
LYMPHOCYTES ABSOLUTE: 2.2 THOU/MM3 (ref 1–4.8)
MCH RBC QN AUTO: 30.1 PG (ref 26–33)
MCHC RBC AUTO-ENTMCNC: 31 GM/DL (ref 32.2–35.5)
MCV RBC AUTO: 97.1 FL (ref 81–99)
MONOCYTES # BLD: 7.2 %
MONOCYTES ABSOLUTE: 0.5 THOU/MM3 (ref 0.4–1.3)
NUCLEATED RED BLOOD CELLS: 0 /100 WBC
OSMOLALITY CALCULATION: 284.5 MOSMOL/KG (ref 275–300)
PLATELET # BLD: 191 THOU/MM3 (ref 130–400)
PMV BLD AUTO: 10.8 FL (ref 9.4–12.4)
POTASSIUM REFLEX MAGNESIUM: 4.4 MEQ/L (ref 3.5–5.2)
PRO-BNP: 222.3 PG/ML (ref 0–1800)
RBC # BLD: 4.09 MILL/MM3 (ref 4.2–5.4)
SEG NEUTROPHILS: 57.1 %
SEGMENTED NEUTROPHILS ABSOLUTE COUNT: 4.3 THOU/MM3 (ref 1.8–7.7)
SODIUM BLD-SCNC: 138 MEQ/L (ref 135–145)
TROPONIN T: < 0.01 NG/ML
TROPONIN T: < 0.01 NG/ML
WBC # BLD: 7.5 THOU/MM3 (ref 4.8–10.8)

## 2020-06-23 PROCEDURE — 36415 COLL VENOUS BLD VENIPUNCTURE: CPT

## 2020-06-23 PROCEDURE — 71045 X-RAY EXAM CHEST 1 VIEW: CPT

## 2020-06-23 PROCEDURE — 93005 ELECTROCARDIOGRAM TRACING: CPT | Performed by: EMERGENCY MEDICINE

## 2020-06-23 PROCEDURE — 93010 ELECTROCARDIOGRAM REPORT: CPT | Performed by: INTERNAL MEDICINE

## 2020-06-23 PROCEDURE — 84484 ASSAY OF TROPONIN QUANT: CPT

## 2020-06-23 PROCEDURE — 83880 ASSAY OF NATRIURETIC PEPTIDE: CPT

## 2020-06-23 PROCEDURE — 99283 EMERGENCY DEPT VISIT LOW MDM: CPT

## 2020-06-23 PROCEDURE — 80048 BASIC METABOLIC PNL TOTAL CA: CPT

## 2020-06-23 PROCEDURE — 85025 COMPLETE CBC W/AUTO DIFF WBC: CPT

## 2020-06-23 RX ORDER — ASPIRIN 81 MG/1
324 TABLET, CHEWABLE ORAL ONCE
Status: DISCONTINUED | OUTPATIENT
Start: 2020-06-23 | End: 2020-06-23 | Stop reason: HOSPADM

## 2020-06-23 RX ORDER — NITROGLYCERIN 0.4 MG/1
0.4 TABLET SUBLINGUAL ONCE
Status: DISCONTINUED | OUTPATIENT
Start: 2020-06-23 | End: 2020-06-23 | Stop reason: HOSPADM

## 2020-06-23 ASSESSMENT — PAIN SCALES - GENERAL: PAINLEVEL_OUTOF10: 8

## 2020-06-23 ASSESSMENT — PAIN DESCRIPTION - PAIN TYPE: TYPE: ACUTE PAIN

## 2020-06-23 ASSESSMENT — PAIN DESCRIPTION - LOCATION: LOCATION: CHEST

## 2020-06-23 ASSESSMENT — PAIN DESCRIPTION - DESCRIPTORS: DESCRIPTORS: ACHING

## 2020-06-23 ASSESSMENT — PAIN DESCRIPTION - FREQUENCY: FREQUENCY: CONTINUOUS

## 2020-06-23 ASSESSMENT — PAIN DESCRIPTION - ORIENTATION: ORIENTATION: MID;LEFT

## 2020-06-23 NOTE — ED NOTES
Pt refusing aspirin and nitro at this time. States she feels she \"doesn't need any of that. \" Pt educated but continues to refuse. Pt denies any other needs or concerns at this time.       Vilma Mcdowell RN  06/23/20 [id number]

## 2020-06-23 NOTE — ED PROVIDER NOTES
is alive. She indicated that only one of her three brothers is alive. She indicated that the status of her maternal grandfather is unknown.   family history includes Arthritis in her mother and sister; COPD in her sister; Cancer in her brother, brother, and sister; Diabetes in her maternal grandfather; Heart Disease in her brother, brother, and sister; High Blood Pressure in her sister. SOCIAL HISTORY      reports that she quit smoking about 54 years ago. She has a 2.50 pack-year smoking history. She has never used smokeless tobacco. She reports current alcohol use. She reports that she does not use drugs. PHYSICAL EXAM     INITIAL VITALS:  height is 5' (1.524 m) and weight is 130 lb (59 kg). Her oral temperature is 97.8 °F (36.6 °C). Her blood pressure is 111/75 and her pulse is 74. Her respiration is 16 and oxygen saturation is 97%. Physical Exam   Constitutional:  well-developed and well-nourished. HENT: Head: Normocephalic, atraumatic, Bilateral external ears normal, Oropharynx mosit, No oral exudates, Nose normal.   Eyes: PERRL, EOMI, Conjunctiva normal, No discharge. No scleral icterus  Neck: Normal range of motion, No tenderness, Supple  Lympatics: No lymphadenopathy. Cardiovascular: Normal rate, regular rhythm, S1 normal and S2 normal.  Exam reveals no gallop. Pulmonary/Chest: Effort normal and breath sounds normal. No accessory muscle usage or stridor. No respiratory distress. no wheezes. has no rales. exhibits no tenderness. Abdominal: Soft. Bowel sounds are normal.  exhibits no distension. There is no tenderness. There is no rebound and no guarding. Extremities: No edema, no tenderness, no cyanosis, no clubbing. Musculoskeletal: Good range of motion in major joints is observed. No major deformities noted. Neurological: Alert and oriented ×3, normal motor function, normal sensory function, no focal deficits. GCS 15  Skin: Skin is warm, dry and intact. No rash noted. No erythema. [id number]  [phone]  Go on 6/24/2020  RE-CHECK AND FURTHER TESTING AS NEEDED at 11 AM      DISCHARGE MEDICATIONS:  Discharge Medication List as of 6/23/2020  4:12 PM          (Please note that portions of this note were completed with a voice recognition program.  Efforts were made to edit the dictations but occasionally words are mis-transcribed.)    Jackie Rice, DO Jackie Rice, DO  06/23/20 [address] [place] [address],   06/23/20 [facility]

## 2020-06-23 NOTE — ED NOTES
Presents to ER with complaints of chest pain. Pt states the pain started about one hour ago and feels it radiated down her left arm. Pt states she had the same event happen on Saturday but it subsided with no intervention. States she has a history of CHF but denies any hx of MI or cardiac stents. Rate dane a 8 out of 10 in severity. Respirations unlabored. EKG completed.      Mg Dhaliwal RN  06/23/20 [id number]

## 2020-06-24 ENCOUNTER — OFFICE VISIT (OUTPATIENT)
Dept: CARDIOLOGY CLINIC | Age: 85
End: 2020-06-24
Payer: MEDICARE

## 2020-06-24 ENCOUNTER — CARE COORDINATION (OUTPATIENT)
Dept: CARE COORDINATION | Age: 85
End: 2020-06-24

## 2020-06-24 VITALS
DIASTOLIC BLOOD PRESSURE: 72 MMHG | BODY MASS INDEX: 25.95 KG/M2 | HEIGHT: 60 IN | WEIGHT: 132.2 LBS | SYSTOLIC BLOOD PRESSURE: 124 MMHG | HEART RATE: 68 BPM

## 2020-06-24 PROCEDURE — G8399 PT W/DXA RESULTS DOCUMENT: HCPCS | Performed by: INTERNAL MEDICINE

## 2020-06-24 PROCEDURE — 1090F PRES/ABSN URINE INCON ASSESS: CPT | Performed by: INTERNAL MEDICINE

## 2020-06-24 PROCEDURE — G8427 DOCREV CUR MEDS BY ELIG CLIN: HCPCS | Performed by: INTERNAL MEDICINE

## 2020-06-24 PROCEDURE — 1036F TOBACCO NON-USER: CPT | Performed by: INTERNAL MEDICINE

## 2020-06-24 PROCEDURE — G8417 CALC BMI ABV UP PARAM F/U: HCPCS | Performed by: INTERNAL MEDICINE

## 2020-06-24 PROCEDURE — 99213 OFFICE O/P EST LOW 20 MIN: CPT | Performed by: INTERNAL MEDICINE

## 2020-06-24 PROCEDURE — 4040F PNEUMOC VAC/ADMIN/RCVD: CPT | Performed by: INTERNAL MEDICINE

## 2020-06-24 PROCEDURE — 1123F ACP DISCUSS/DSCN MKR DOCD: CPT | Performed by: INTERNAL MEDICINE

## 2020-06-24 RX ORDER — ISOSORBIDE MONONITRATE 30 MG/1
30 TABLET, EXTENDED RELEASE ORAL DAILY
Qty: 30 TABLET | Refills: 3 | Status: SHIPPED | OUTPATIENT
Start: 2020-06-24 | End: 2020-09-23 | Stop reason: SDUPTHER

## 2020-06-24 NOTE — CARE COORDINATION
Declined Loop program    Patient contacted regarding recent discharge and COVID-19 risk. Discussed COVID-19 related testing which was not done at this time. Test results were not done. Patient informed of results, if available? No     Care Transition Nurse/ Ambulatory Care Manager contacted the patient by telephone to perform post discharge assessment. Verified name and  with patient as identifiers. Patient has following risk factors of: heart failure. CTN/ACM reviewed discharge instructions, medical action plan and red flags related to discharge diagnosis. Reviewed and educated them on any new and changed medications related to discharge diagnosis. Advised obtaining a 90-day supply of all daily and as-needed medications. Education provided regarding infection prevention, and signs and symptoms of COVID-19 and when to seek medical attention with patient who verbalized understanding. Discussed exposure protocols and quarantine from [address] you at higher risk for severe illness  and given an opportunity for questions and concerns. The patient agrees to contact the COVID-19 hotline [phone] or PCP office for questions related to their healthcare. CTN/ACM provided contact information for future reference. From CDC: Are you at higher risk for severe illness?  Wash your hands often.  Avoid close contact (6 feet, which is about two arm lengths) with people who are sick.  Put distance between yourself and other people if COVID-19 is spreading in your community.  Clean and disinfect frequently touched surfaces.  Avoid all cruise travel and non-essential air travel.  Call your healthcare professional if you have concerns about COVID-19 and your underlying condition or if you are sick. For more information on steps you can take to protect yourself, see CDC's How to [place] for follow-up call in 7-14 days based on severity of symptoms and risk factors.

## 2020-06-24 NOTE — PROGRESS NOTES
CREATININE 0.8 06/23/2020    CALCIUM 8.9 06/23/2020    LABGLOM 68 06/23/2020    GLUCOSE 166 06/23/2020    GLUCOSE 104 06/11/2020       Lab Results   Component Value Date    ALKPHOS 76 10/08/2017    ALT 22 10/08/2017    AST 13 10/08/2017    PROT 5.9 10/08/2017    BILITOT 0.8 10/08/2017    LABALBU 3.3 10/08/2017       Lab Results   Component Value Date    MG 2.3 01/14/2020       Lab Results   Component Value Date    INR 1.24 (H) 10/07/2017    INR 1.15 (H) 10/05/2017         Lab Results   Component Value Date    LABA1C 5.1 10/06/2017       Lab Results   Component Value Date    TRIG 92 09/24/2016    HDL 62 09/24/2016    LDLCALC 131 09/24/2016       Lab Results   Component Value Date    TSH 2.470 10/06/2017         Testing Reviewed:      I have individually reviewed the cardiac test below:    ECHO:   Results for orders placed during the hospital encounter of 10/05/17   ECHO Complete 2D W Doppler W Color    Narrative Transthoracic Echocardiography Report (TTE)     Demographics      Patient Name   Marito Lorenzo  Gender              Female      MR #           [id number]   Race                                                 Ethnicity      Account #      [de-identified]   Room Number         7892      Accession      [id number]   Date of Study       10/05/2017   Number      Date of Birth  1934  Referring Physician Karena Felipe MD      Age            80 year(s)  Sonographer         Josette Villafuerte, RDBUSHRA, RDMS,                                                  RVT                                 Interpreting        Echo reader of the week                              Physician           Jimi Zaidi MD     Procedure    Type of Study      TTE procedure:ECHOCARDIOGRAM COMPLETE 2D W DOPPLER W COLOR.      Procedure Date  Date: 10/05/2017 Start: 01:59 PM    Study Location: Bedside  Technical Quality: Adequate visualization    Indications:Dyspnea / Shortness of breath. Additional Medical History:GERD, AI, PVC's, ex smoker    Patient Status: Routine    Height: 59.84 inches Weight: 138.89 pounds BSA: 1.6 m^2 BMI: 27.27 kg/m^2    BP: 128/85 mmHg     Conclusions      Summary   Ejection fraction is visually estimated at 30%. There was moderate global hypokinesis of the left ventricle. Moderate mitral regurgitation is present. Moderate-to-severe tricuspid regurgitation. Signature      ----------------------------------------------------------------   Electronically signed by Rani Jordan MD (Interpreting   physician) on 10/05/2017 at 04:21 PM   ----------------------------------------------------------------      Findings      Mitral Valve   Moderate mitral regurgitation is present. Aortic Valve   The aortic valve leaflets were not well visualized. Aortic valve appears tricuspid. Aortic valve leaflets are somewhat thickened. Aortic valve leaflets are Mildly calcified. Mild aortic regurgitation is noted. Tricuspid Valve   Moderate-to-severe tricuspid regurgitation. Pulmonic Valve   The pulmonic valve leaflets exhibited normal thickness, no calcification,   and normal cuspal separation. DOPPLER: The transpulmonic velocity was   within the normal range with no evidence for regurgitation. Left Atrium   Mildly dilated left atrium. Left Ventricle   Ejection fraction is visually estimated at 30%. There was moderate global hypokinesis of the left ventricle. Right Atrium   Mildly enlarged right atrium size. Right Ventricle   The right ventricular size was normal with normal systolic function and   wall thickness. Pericardial Effusion   The pericardium was normal in appearance with no evidence of a pericardial   effusion. Pleural Effusion   No evidence of pleural effusion. Aorta / Great Vessels   -Aortic root dimension within normal limits.   -The Pulmonary artery is within normal limits.    -IVC size is within

## 2020-06-30 ENCOUNTER — HOSPITAL ENCOUNTER (OUTPATIENT)
Dept: NON INVASIVE DIAGNOSTICS | Age: 85
Discharge: HOME | End: 2020-06-30
Payer: MEDICARE

## 2020-06-30 VITALS — WEIGHT: 130 LBS | BODY MASS INDEX: 25.39 KG/M2

## 2020-06-30 PROCEDURE — 3430000000 HC RX DIAGNOSTIC RADIOPHARMACEUTICAL: Performed by: INTERNAL MEDICINE

## 2020-06-30 PROCEDURE — 6360000002 HC RX W HCPCS

## 2020-06-30 PROCEDURE — 78452 HT MUSCLE IMAGE SPECT MULT: CPT

## 2020-06-30 PROCEDURE — A9500 TC99M SESTAMIBI: HCPCS | Performed by: INTERNAL MEDICINE

## 2020-06-30 PROCEDURE — 93017 CV STRESS TEST TRACING ONLY: CPT | Performed by: INTERNAL MEDICINE

## 2020-06-30 RX ADMIN — Medication 11 MILLICURIE: at 07:30

## 2020-06-30 RX ADMIN — Medication 35 MILLICURIE: at 08:30

## 2020-07-08 ENCOUNTER — CARE COORDINATION (OUTPATIENT)
Dept: CARE COORDINATION | Age: 85
End: 2020-07-08

## 2020-07-08 NOTE — CARE COORDINATION
You Patient resolved from the Care Transitions episode on 7-8-20  Discussed COVID-19 related testing which was not done at this time. Test results were not done. Patient informed of results, if available? No    Patient/family has been provided the following resources and education related to COVID-19:                         Signs, symptoms and red flags related to COVID-19            CDC exposure and quarantine guidelines            [facility] exposure contact - [phone]            Contact for their local Department of Health                 Patient currently reports that the following symptoms have improved:  cold, clammy, and pale skin, low body temperature and no new/worsening symptoms     No further outreach scheduled with this CTN/ACM. Episode of Care resolved. Patient has this CTN/ACM contact information if future needs arise.

## 2020-08-06 ENCOUNTER — OFFICE VISIT (OUTPATIENT)
Dept: CARDIOLOGY CLINIC | Age: 85
End: 2020-08-06
Payer: MEDICARE

## 2020-08-06 VITALS
SYSTOLIC BLOOD PRESSURE: 110 MMHG | WEIGHT: 131.6 LBS | DIASTOLIC BLOOD PRESSURE: 62 MMHG | BODY MASS INDEX: 25.84 KG/M2 | HEART RATE: 60 BPM | HEIGHT: 60 IN

## 2020-08-06 PROCEDURE — 1036F TOBACCO NON-USER: CPT | Performed by: INTERNAL MEDICINE

## 2020-08-06 PROCEDURE — 1123F ACP DISCUSS/DSCN MKR DOCD: CPT | Performed by: INTERNAL MEDICINE

## 2020-08-06 PROCEDURE — G8428 CUR MEDS NOT DOCUMENT: HCPCS | Performed by: INTERNAL MEDICINE

## 2020-08-06 PROCEDURE — G8399 PT W/DXA RESULTS DOCUMENT: HCPCS | Performed by: INTERNAL MEDICINE

## 2020-08-06 PROCEDURE — 4040F PNEUMOC VAC/ADMIN/RCVD: CPT | Performed by: INTERNAL MEDICINE

## 2020-08-06 PROCEDURE — G8417 CALC BMI ABV UP PARAM F/U: HCPCS | Performed by: INTERNAL MEDICINE

## 2020-08-06 PROCEDURE — 1090F PRES/ABSN URINE INCON ASSESS: CPT | Performed by: INTERNAL MEDICINE

## 2020-08-06 PROCEDURE — 99213 OFFICE O/P EST LOW 20 MIN: CPT | Performed by: INTERNAL MEDICINE

## 2020-08-06 RX ORDER — ISOSORBIDE MONONITRATE 30 MG/1
30 TABLET, EXTENDED RELEASE ORAL DAILY
Qty: 30 TABLET | Refills: 3 | Status: CANCELLED | OUTPATIENT
Start: 2020-08-06

## 2020-08-06 NOTE — PROGRESS NOTES
[address] [place] [address],[place] [address] [address] Kaitlynn Betancourt [facility] [address] [place] [address]  Dept: [phone]  Dept Fax: [phone]  Loc: [phone]    Visit Date: 8/6/2020    Ms. Jimbo Reed is a 80 y.o. female  who presented for:  Chief Complaint   Patient presents with    1 Year Follow Up       HPI:   HPI   81 yo F hx of AFib, 2017 with Dx 90%, PDA 60%, and 90% AVG stenosis, presented with chest pain. Had recent stress test showing no significant ischemia. EF preserved. Taking all meds, BP and HR well controlled. Taking all meds. No issues or side effects.      Current Outpatient Medications:     isosorbide mononitrate (IMDUR) 30 MG extended release tablet, Take 1 tablet by mouth daily, Disp: 30 tablet, Rfl: 3    spironolactone (ALDACTONE) 25 MG tablet, Take 0.5 tablets by mouth daily, Disp: 15 tablet, Rfl: 5    carvedilol (COREG) 6.25 MG tablet, Take 1 tablet by mouth 2 times daily (with meals), Disp: 60 tablet, Rfl: 5    atorvastatin (LIPITOR) 40 MG tablet, Take 1 tablet by mouth nightly, Disp: 30 tablet, Rfl: 5    apixaban (ELIQUIS) 5 MG TABS tablet, Take 1 tablet by mouth 2 times daily, Disp: 60 tablet, Rfl: 5    furosemide (LASIX) 20 MG tablet, Take 1 tablet by mouth three times a week Mon-Wed-Fri (Patient taking differently: Take 20 mg by mouth See Admin Instructions Mon-Wed-Fri  Pt states she normally takes this QD unless she is going somewhere then she will not take it that day), Disp: 30 tablet, Rfl: 5    lansoprazole (PREVACID) 30 MG delayed release capsule, Take 1 capsule by mouth every other day, Disp: 30 capsule, Rfl: 5    nitroGLYCERIN (NITROSTAT) 0.4 MG SL tablet, Place 1 tablet under the tongue every 5 minutes as needed for Chest pain (SOB), Disp: 25 tablet, Rfl: 3    vitamin C (ASCORBIC ACID) 500 MG tablet, Take 500 mg by mouth daily, Disp: , Rfl:     vitamin D (CHOLECALCIFEROL) 1000 UNIT TABS tablet, Take 500 Units by mouth once a week , Disp: , Rfl:    Negative for dysuria, enuresis, flank pain and hematuria. Musculoskeletal: Negative for arthralgias, joint swelling and neck pain. Neurological: Negative for numbness and headaches. Psychiatric/Behavioral: Negative for agitation, confusion, decreased concentration and dysphoric mood. Objective:     /62   Pulse 60   Ht 5' (1.524 m)   Wt 131 lb 9.6 oz (59.7 kg)   BMI 25.70 kg/m²     Wt Readings from Last 3 Encounters:   08/06/20 131 lb 9.6 oz (59.7 kg)   06/30/20 130 lb (59 kg)   06/24/20 132 lb 3.2 oz (60 kg)     BP Readings from Last 3 Encounters:   08/06/20 110/62   06/24/20 124/72   06/23/20 111/75       Nursing note and vitals reviewed. Physical Exam   Constitutional: Oriented to person, place, and time. Appears well-developed and well-nourished. HENT:   Head: Normocephalic and atraumatic. Eyes: EOM are normal. Pupils are equal, round, and reactive to light. Neck: Normal range of motion. Neck supple. No JVD present. Cardiovascular: Normal rate, regular rhythm, normal heart sounds and intact distal pulses. No murmur heard. Pulmonary/Chest: Effort normal and breath sounds normal. No respiratory distress. No wheezes. No rales. Abdominal: Soft. Bowel sounds are normal. No distension. There is no tenderness. Musculoskeletal: Normal range of motion. No edema. Neurological: Alert and oriented to person, place, and time. No cranial nerve deficit. Coordination normal.   Skin: Skin is warm and dry. Psychiatric: Normal mood and affect.        No results found for: CKTOTAL, CKMB, CKMBINDEX    Lab Results   Component Value Date    WBC 7.5 06/23/2020    RBC 4.09 06/23/2020    RBC 4.15 08/27/2019    HGB 12.3 06/23/2020    HCT 39.7 06/23/2020    MCV 97.1 06/23/2020    MCH 30.1 06/23/2020    MCHC 31.0 06/23/2020    RDW 13.6 08/27/2019     06/23/2020    MPV 10.8 06/23/2020       Lab Results   Component Value Date     06/23/2020    K 4.4 06/23/2020     06/23/2020    CO2 21 06/23/2020    BUN 27 06/23/2020    LABALBU 3.3 10/08/2017    CREATININE 0.8 06/23/2020    CALCIUM 8.9 06/23/2020    LABGLOM 68 06/23/2020    GLUCOSE 166 06/23/2020    GLUCOSE 104 06/11/2020       Lab Results   Component Value Date    ALKPHOS 76 10/08/2017    ALT 22 10/08/2017    AST 13 10/08/2017    PROT 5.9 10/08/2017    BILITOT 0.8 10/08/2017    LABALBU 3.3 10/08/2017       Lab Results   Component Value Date    MG 2.3 01/14/2020       Lab Results   Component Value Date    INR 1.24 (H) 10/07/2017    INR 1.15 (H) 10/05/2017         Lab Results   Component Value Date    LABA1C 5.1 10/06/2017       Lab Results   Component Value Date    TRIG 92 09/24/2016    HDL 62 09/24/2016    LDLCALC 131 09/24/2016       Lab Results   Component Value Date    TSH 2.470 10/06/2017         Testing Reviewed:      I have individually reviewed the cardiac test below:    ECHO:   Results for orders placed during the hospital encounter of 10/05/17   ECHO Complete 2D W Doppler W Color    Narrative Transthoracic Echocardiography Report (TTE)     Demographics      Patient Name   Marito Lorenzo  Gender              Female      MR #           [id number]   Race                                                 Ethnicity      Account #      [de-identified]   Room Number         8871      Accession      [id number]   Date of Study       10/05/2017   Number      Date of Birth  1934  Referring Physician Kaur Yin MD      Age            80 year(s)  Sonographer         Stephanie Jeronimo, RDCS, RDMS,                                                  RVT                                 Interpreting        Echo reader of the week                              Physician           Rocio Sen MD     Procedure    Type of Study      TTE procedure:ECHOCARDIOGRAM COMPLETE 2D W DOPPLER W COLOR.      Procedure Date  Date: 10/05/2017 Start: 01:59 PM    Study Location: Bedside  Technical Quality: Adequate visualization    Indications:Dyspnea / Shortness of breath. Additional Medical History:GERD, AI, PVC's, ex smoker    Patient Status: Routine    Height: 59.84 inches Weight: 138.89 pounds BSA: 1.6 m^2 BMI: 27.27 kg/m^2    BP: 128/85 mmHg     Conclusions      Summary   Ejection fraction is visually estimated at 30%. There was moderate global hypokinesis of the left ventricle. Moderate mitral regurgitation is present. Moderate-to-severe tricuspid regurgitation. Signature      ----------------------------------------------------------------   Electronically signed by Finn Mccauley MD (Interpreting   physician) on 10/05/2017 at 04:21 PM   ----------------------------------------------------------------      Findings      Mitral Valve   Moderate mitral regurgitation is present. Aortic Valve   The aortic valve leaflets were not well visualized. Aortic valve appears tricuspid. Aortic valve leaflets are somewhat thickened. Aortic valve leaflets are Mildly calcified. Mild aortic regurgitation is noted. Tricuspid Valve   Moderate-to-severe tricuspid regurgitation. Pulmonic Valve   The pulmonic valve leaflets exhibited normal thickness, no calcification,   and normal cuspal separation. DOPPLER: The transpulmonic velocity was   within the normal range with no evidence for regurgitation. Left Atrium   Mildly dilated left atrium. Left Ventricle   Ejection fraction is visually estimated at 30%. There was moderate global hypokinesis of the left ventricle. Right Atrium   Mildly enlarged right atrium size. Right Ventricle   The right ventricular size was normal with normal systolic function and   wall thickness. Pericardial Effusion   The pericardium was normal in appearance with no evidence of a pericardial   effusion. Pleural Effusion   No evidence of pleural effusion. Aorta / Great Vessels   -Aortic root dimension within normal limits. -The Pulmonary artery is within normal limits. -IVC size is within normal limits with normal respiratory phasic changes.      M-Mode/2D Measurements & Calculations      LV Diastolic    LV Systolic Dimension: 3.6  AV Cusp Separation: 1.8 cmLA   Dimension: 4.6  cm                          Dimension: 3.3 cmAO Root   cm              LV Volume Diastolic: 86.7   Dimension: 2.8 cm   LV FS:21.7 %    ml   LV PW           LV Volume Systolic: 51.2 ml   Diastolic: 0.8  LV EDV/LV EDV Index: 97.3   cm              ml/61 m^2LV ESV/LV ESV      RV Diastolic Dimension: 3.4 cm   Septum          Index: 54.4 MZ/11 m^2   Diastolic: 0.9  EF Calculated: 44.1 %       LA/Aorta: 1.18   cm                   LV Length: 6.6 cm      LV Area   Diastolic: 40.9   cm^2   LV Area   Systolic: 43.0   cm^2     Doppler Measurements & Calculations      MV Peak E-Wave: 88.8 AV Peak Velocity: 124 LVOT Peak Velocity: 60.6 cm/s   cm/s                 cm/s                  LVOT Mean Velocity: 44.2 cm/s                        AV Peak Gradient:     LVOT Peak Gradient: 1 mmHgLVOT   MV Peak Gradient:    6.15 mmHg             Mean Gradient: 1 mmHg   3.15 mmHg            AV Mean Velocity:                        87.1 cm/s             TV Peak E-Wave: 64.7 cm/s   MV Deceleration      AV Mean Gradient: 3   Time: 208 msec       mmHg                  TV Peak Gradient: 1.67 mmHg                        AV VTI: 21.4 cm       TR Velocity:205 cm/s                                              TR Gradient:16.81 mmHg   MV E' Septal                               PV Peak Velocity: 54.8 cm/s   Velocity: 7.12 cm/s  LVOT VTI: 9.19 cm     PV Peak Gradient: 1.2 mmHg   MV A' Septal         AV P1/2t: 370 msec   Velocity: 2.24 cm/s  IVRT: 63 msec   MV E' Lateral   Velocity: 7.02 cm/s   MV A' Lateral        AV DVI (VTI): 0.43AV   Velocity: 3.7 cm/s   DVI (Vmax):0.49   E/E' septal: 12.47   E/E' lateral: 12.65   MR Velocity: 413   cm/s [email or web address]  [email or web address]        Assessment/Plan   Afib  Preserved  Cardiac cath: Dx 90%, PDA 60%, and 90% AVG stenosis  AoV Sclerosis  Doing well, stress wnl, normal EF on Lexiscan. Did discuss balanced ischemia, but she feels well now. No bleeding. Able to do ADLs. If recurrent symptoms, will need cardiac cath. Discussed diet/exercise/BP/weight loss/health lifestyle choices/lipids; the patient understands the goals and will try to comply.       Disposition:  1 year         Electronically signed by Cirilo Leigh MD   8/6/2020 at 12:28 PM EDT

## 2020-09-02 ENCOUNTER — APPOINTMENT (OUTPATIENT)
Dept: GENERAL RADIOLOGY | Age: 85
End: 2020-09-02
Payer: MEDICARE

## 2020-09-02 ENCOUNTER — HOSPITAL ENCOUNTER (EMERGENCY)
Age: 85
Discharge: HOME | End: 2020-09-02
Payer: MEDICARE

## 2020-09-02 ENCOUNTER — APPOINTMENT (OUTPATIENT)
Dept: CT IMAGING | Age: 85
End: 2020-09-02
Payer: MEDICARE

## 2020-09-02 VITALS
OXYGEN SATURATION: 96 % | SYSTOLIC BLOOD PRESSURE: 107 MMHG | WEIGHT: 130 LBS | DIASTOLIC BLOOD PRESSURE: 70 MMHG | TEMPERATURE: 99.5 F | BODY MASS INDEX: 25.39 KG/M2 | HEART RATE: 75 BPM | RESPIRATION RATE: 18 BRPM

## 2020-09-02 LAB
ALBUMIN SERPL-MCNC: 4 G/DL (ref 3.5–5.1)
ALP BLD-CCNC: 96 U/L (ref 38–126)
ALT SERPL-CCNC: 12 U/L (ref 11–66)
ANION GAP SERPL CALCULATED.3IONS-SCNC: 9 MEQ/L (ref 8–16)
APTT: 32.7 SECONDS (ref 22–38)
AST SERPL-CCNC: 15 U/L (ref 5–40)
BASOPHILS # BLD: 0.7 %
BASOPHILS ABSOLUTE: 0 THOU/MM3 (ref 0–0.1)
BILIRUB SERPL-MCNC: 0.9 MG/DL (ref 0.3–1.2)
BUN BLDV-MCNC: 23 MG/DL (ref 7–22)
CALCIUM SERPL-MCNC: 9 MG/DL (ref 8.5–10.5)
CHLORIDE BLD-SCNC: 108 MEQ/L (ref 98–111)
CO2: 23 MEQ/L (ref 23–33)
CREAT SERPL-MCNC: 0.7 MG/DL (ref 0.4–1.2)
EKG ATRIAL RATE: 67 BPM
EKG P AXIS: 17 DEGREES
EKG P-R INTERVAL: 146 MS
EKG Q-T INTERVAL: 396 MS
EKG QRS DURATION: 88 MS
EKG QTC CALCULATION (BAZETT): 418 MS
EKG R AXIS: -28 DEGREES
EKG T AXIS: 25 DEGREES
EKG VENTRICULAR RATE: 67 BPM
EOSINOPHIL # BLD: 3.4 %
EOSINOPHILS ABSOLUTE: 0.2 THOU/MM3 (ref 0–0.4)
ERYTHROCYTE [DISTWIDTH] IN BLOOD BY AUTOMATED COUNT: 13.2 % (ref 11.5–14.5)
ERYTHROCYTE [DISTWIDTH] IN BLOOD BY AUTOMATED COUNT: 47.6 FL (ref 35–45)
GFR SERPL CREATININE-BSD FRML MDRD: 79 ML/MIN/1.73M2
GLUCOSE BLD-MCNC: 96 MG/DL (ref 70–108)
HCT VFR BLD CALC: 40.2 % (ref 37–47)
HEMOGLOBIN: 12.7 GM/DL (ref 12–16)
IMMATURE GRANS (ABS): 0.03 THOU/MM3 (ref 0–0.07)
IMMATURE GRANULOCYTES: 0.4 %
INR BLD: 1.51 (ref 0.85–1.13)
LYMPHOCYTES # BLD: 20.2 %
LYMPHOCYTES ABSOLUTE: 1.4 THOU/MM3 (ref 1–4.8)
MCH RBC QN AUTO: 30.9 PG (ref 26–33)
MCHC RBC AUTO-ENTMCNC: 31.6 GM/DL (ref 32.2–35.5)
MCV RBC AUTO: 97.8 FL (ref 81–99)
MONOCYTES # BLD: 8.2 %
MONOCYTES ABSOLUTE: 0.5 THOU/MM3 (ref 0.4–1.3)
NUCLEATED RED BLOOD CELLS: 0 /100 WBC
OSMOLALITY CALCULATION: 282.9 MOSMOL/KG (ref 275–300)
PLATELET # BLD: 195 THOU/MM3 (ref 130–400)
PMV BLD AUTO: 10.9 FL (ref 9.4–12.4)
POTASSIUM REFLEX MAGNESIUM: 4.5 MEQ/L (ref 3.5–5.2)
RBC # BLD: 4.11 MILL/MM3 (ref 4.2–5.4)
SEG NEUTROPHILS: 67.1 %
SEGMENTED NEUTROPHILS ABSOLUTE COUNT: 4.5 THOU/MM3 (ref 1.8–7.7)
SODIUM BLD-SCNC: 140 MEQ/L (ref 135–145)
TOTAL PROTEIN: 6.8 G/DL (ref 6.1–8)
WBC # BLD: 6.7 THOU/MM3 (ref 4.8–10.8)

## 2020-09-02 PROCEDURE — 70486 CT MAXILLOFACIAL W/O DYE: CPT

## 2020-09-02 PROCEDURE — 12013 RPR F/E/E/N/L/M 2.6-5.0 CM: CPT

## 2020-09-02 PROCEDURE — 99283 EMERGENCY DEPT VISIT LOW MDM: CPT | Performed by: SURGERY

## 2020-09-02 PROCEDURE — 6370000000 HC RX 637 (ALT 250 FOR IP): Performed by: PHYSICIAN ASSISTANT

## 2020-09-02 PROCEDURE — 90715 TDAP VACCINE 7 YRS/> IM: CPT | Performed by: PHYSICIAN ASSISTANT

## 2020-09-02 PROCEDURE — 85025 COMPLETE CBC W/AUTO DIFF WBC: CPT

## 2020-09-02 PROCEDURE — APPSS180 APP SPLIT SHARED TIME > 60 MINUTES: Performed by: PHYSICIAN ASSISTANT

## 2020-09-02 PROCEDURE — 73080 X-RAY EXAM OF ELBOW: CPT

## 2020-09-02 PROCEDURE — 6360000002 HC RX W HCPCS: Performed by: PHYSICIAN ASSISTANT

## 2020-09-02 PROCEDURE — 73090 X-RAY EXAM OF FOREARM: CPT

## 2020-09-02 PROCEDURE — 72125 CT NECK SPINE W/O DYE: CPT

## 2020-09-02 PROCEDURE — 93005 ELECTROCARDIOGRAM TRACING: CPT | Performed by: PHYSICIAN ASSISTANT

## 2020-09-02 PROCEDURE — 99285 EMERGENCY DEPT VISIT HI MDM: CPT

## 2020-09-02 PROCEDURE — 2580000003 HC RX 258: Performed by: PHYSICIAN ASSISTANT

## 2020-09-02 PROCEDURE — 85730 THROMBOPLASTIN TIME PARTIAL: CPT

## 2020-09-02 PROCEDURE — 90471 IMMUNIZATION ADMIN: CPT | Performed by: PHYSICIAN ASSISTANT

## 2020-09-02 PROCEDURE — 29125 APPL SHORT ARM SPLINT STATIC: CPT

## 2020-09-02 PROCEDURE — 6370000000 HC RX 637 (ALT 250 FOR IP)

## 2020-09-02 PROCEDURE — 93010 ELECTROCARDIOGRAM REPORT: CPT | Performed by: INTERNAL MEDICINE

## 2020-09-02 PROCEDURE — 70450 CT HEAD/BRAIN W/O DYE: CPT

## 2020-09-02 PROCEDURE — 36415 COLL VENOUS BLD VENIPUNCTURE: CPT

## 2020-09-02 PROCEDURE — 80053 COMPREHEN METABOLIC PANEL: CPT

## 2020-09-02 PROCEDURE — 85610 PROTHROMBIN TIME: CPT

## 2020-09-02 RX ORDER — AMOXICILLIN AND CLAVULANATE POTASSIUM 875; 125 MG/1; MG/1
1 TABLET, FILM COATED ORAL 2 TIMES DAILY
Qty: 14 TABLET | Refills: 0 | Status: SHIPPED | OUTPATIENT
Start: 2020-09-02 | End: 2020-09-09

## 2020-09-02 RX ORDER — LIDOCAINE HYDROCHLORIDE AND EPINEPHRINE 10; 10 MG/ML; UG/ML
20 INJECTION, SOLUTION INFILTRATION; PERINEURAL ONCE
Status: DISCONTINUED | OUTPATIENT
Start: 2020-09-02 | End: 2020-09-02 | Stop reason: HOSPADM

## 2020-09-02 RX ORDER — BACITRACIN, NEOMYCIN, POLYMYXIN B 400; 3.5; 5 [USP'U]/G; MG/G; [USP'U]/G
OINTMENT TOPICAL
Status: COMPLETED
Start: 2020-09-02 | End: 2020-09-02

## 2020-09-02 RX ORDER — ACETAMINOPHEN AND CODEINE PHOSPHATE 300; 30 MG/1; MG/1
1 TABLET ORAL EVERY 6 HOURS PRN
Qty: 8 TABLET | Refills: 0 | Status: SHIPPED | OUTPATIENT
Start: 2020-09-02 | End: 2020-09-04

## 2020-09-02 RX ORDER — BACITRACIN, NEOMYCIN, POLYMYXIN B 400; 3.5; 5 [USP'U]/G; MG/G; [USP'U]/G
OINTMENT TOPICAL ONCE
Status: COMPLETED | OUTPATIENT
Start: 2020-09-02 | End: 2020-09-02

## 2020-09-02 RX ORDER — ACETAMINOPHEN 325 MG/1
650 TABLET ORAL ONCE
Status: COMPLETED | OUTPATIENT
Start: 2020-09-02 | End: 2020-09-02

## 2020-09-02 RX ORDER — 0.9 % SODIUM CHLORIDE 0.9 %
500 INTRAVENOUS SOLUTION INTRAVENOUS ONCE
Status: COMPLETED | OUTPATIENT
Start: 2020-09-02 | End: 2020-09-02

## 2020-09-02 RX ADMIN — ACETAMINOPHEN 650 MG: 325 TABLET ORAL at 10:06

## 2020-09-02 RX ADMIN — TETANUS TOXOID, REDUCED DIPHTHERIA TOXOID AND ACELLULAR PERTUSSIS VACCINE, ADSORBED 0.5 ML: 5; 2.5; 8; 8; 2.5 SUSPENSION INTRAMUSCULAR at 10:06

## 2020-09-02 RX ADMIN — BACITRACIN, NEOMYCIN, POLYMYXIN B 1 G: 400; 3.5; 5 OINTMENT TOPICAL at 14:01

## 2020-09-02 RX ADMIN — SODIUM CHLORIDE 500 ML: 9 INJECTION, SOLUTION INTRAVENOUS at 10:06

## 2020-09-02 ASSESSMENT — ENCOUNTER SYMPTOMS
STRIDOR: 0
NAUSEA: 0
EYE PAIN: 0
SHORTNESS OF BREATH: 0
WHEEZING: 0
TROUBLE SWALLOWING: 0
ABDOMINAL PAIN: 0
FACIAL SWELLING: 1
PHOTOPHOBIA: 0
BACK PAIN: 0
VOMITING: 0
ROS SKIN COMMENTS: LIP LACERATION

## 2020-09-02 ASSESSMENT — PAIN DESCRIPTION - ONSET: ONSET: ON-GOING

## 2020-09-02 ASSESSMENT — PAIN SCALES - GENERAL
PAINLEVEL_OUTOF10: 4
PAINLEVEL_OUTOF10: 2
PAINLEVEL_OUTOF10: 4
PAINLEVEL_OUTOF10: 4

## 2020-09-02 ASSESSMENT — PAIN DESCRIPTION - PAIN TYPE
TYPE: ACUTE PAIN
TYPE: ACUTE PAIN

## 2020-09-02 ASSESSMENT — PAIN DESCRIPTION - LOCATION: LOCATION: FACE;HIP

## 2020-09-02 ASSESSMENT — PAIN DESCRIPTION - FREQUENCY: FREQUENCY: CONTINUOUS

## 2020-09-02 ASSESSMENT — PAIN DESCRIPTION - DESCRIPTORS: DESCRIPTORS: ACHING

## 2020-09-02 ASSESSMENT — PAIN DESCRIPTION - PROGRESSION: CLINICAL_PROGRESSION: NOT CHANGED

## 2020-09-02 NOTE — CONSULTS
Trauma emergency department evaluation/consultation    Patient: Shawanda Funez  Admit date: 9/2/2020   YOB: 1934 Date of Evaluation: 9/2/2020  MRN: [id number]  Acct: [de-identified]    Injury Date: 9/2/20  Injury time: 08:20  PCP: Marisol Estrella MD   Referring physician: Ynes Dalton PA-C    Time of Trauma Surgeon Notification: 09:28 on 9/2/20  Time of AYSHA Arrival: 09:30 on 9/2/20  Time of Trauma Surgeon Arrival: 12:20 on 9/2/20    Assessment:    1. Fall  2. Closed head injury without loss of consciousness  3. Lip laceration  4. Nondisplaced right radial head fracture  5. Loose teeth  Plan:    CT and plain film images reviewed. CT head, cervical spine, and facial bones negative for any acute traumatic injuries. Plain films of the right radius/ulna and elbow revealed nondisplaced slightly impacted radial neck fracture and of questionable acuity. Lip laceration closed by ED provider. Patient updated on tetanus. Patient did not sustain any acute traumatic injuries that warrants admission under trauma surgery. Patient stable from a trauma perspective for discharge home. Trauma recommends splint to right upper extremity and follow-up at [address] [place] [address]. Patient can follow up PCP. advised patient to follow up with dentist for reported teeth malalignment. patient discharged with pain medication and oral antibiotic. Case discussed with ED provider and trauma surgeon, Dr. Davis Dear.     Activation: []Level I (Trauma Alert) [x]Level II (Injury Call) []Level III (Trauma Consult) [] Downgraded (Time:    Mode of Arrival: family  Referring Facility: none  Loss of Consciousness [x]No []Yes[]Unknown  Duration(min)  Mechanism of Injury:  []Motor Vehicle crash   []Single Vehicle [] []Passenger []Scene Fatality []Front Seat  []Restrained   []Air Bag Deployed   []Ejected []Rollover []Pedestrian []Trapped   Type of vehicle:   Protective Devices:   []Motorcycle  Wearing Helmet []Yes denied any headaches, neck pain, back pain, chest pain, shortness of breath, abdominal pain, nausea/vomiting, and paresthesias. On exam patient with tenderness palpation noted over right radial forearm. Patient with through and through mid upper lip laceration. No midline cervical, thoracic, lumbar tenderness to palpation. Chest and abdomen nontender. PMS intact in all 4 extremities. Vital signs stable. Patient taken to radiology for CT head, neck, and facial bones with plain films of right radius/ulna. CT and plain film images reviewed. CT head, cervical spine, and facial bones negative for any acute traumatic injuries. Plain films of the right radius/ulna and elbow revealed nondisplaced slightly impacted radial neck fracture and of questionable acuity. Lip laceration closed by ED provider, see her note for documentation. Patient updated on tetanus. Patient did not sustained any acute traumatic injuries that warrants admission under trauma surgery. Patient stable from a trauma perspective for discharge home. Trauma recommends splint to right upper extremity and follow-up at [address]. Patient can follow up PCP. Advised patient to follow up with dentist for reported teeth malalignment. Patient discharged with pain medication and oral antibiotic. Case discussed with ED provider and trauma surgeon, Dr. Bishop Lockwood. Review of Systems:   Review of Systems   Constitutional: Negative for chills, diaphoresis, fatigue and fever. HENT: Positive for dental problem, facial swelling and mouth sores. Negative for nosebleeds. Lip laceration and swelling, Report teeth do not feel properly aligned   Eyes: Negative for photophobia, pain and visual disturbance. Respiratory: Negative for shortness of breath, wheezing and stridor. Cardiovascular: Negative for chest pain and palpitations. Gastrointestinal: Negative for abdominal pain, nausea and vomiting.    Musculoskeletal: Positive for arthralgias. Negative for back pain and neck pain. Right arm pain   Skin: Positive for wound. Negative for rash. Lip laceration   Neurological: Positive for light-headedness. Negative for numbness and headaches. Reported some lightheadedness at baseline, no new or worsening lightheadedness this AM before and after fall   Hematological: Bruises/bleeds easily. On Eliquis and aspirin   Psychiatric/Behavioral: Negative for agitation, behavioral problems and confusion.        Amiodarone  Past Surgical History:   Procedure Laterality Date    APPENDECTOMY  1946    BACK SURGERY  3/24/14    Lumbar Laminectomy Fusion L3-5, L4-5 PLIF - Dr. Nita Hernandez Left 09/05/2017    Rodriguez Left Total Knee    ROTATOR CUFF REPAIR  3/8/06    right shoulder      Past Medical History:   Diagnosis Date    Allergic rhinitis     Arthritis     CHF (congestive heart failure) ([facility] [address]     Encounter for cardioversion procedure 10/05/2017    GERD (gastroesophageal reflux disease)     Hx of transesophageal echocardiography (KAMRAN) for monitoring 10/2017    Mild mitral regurgitation 10/2017    Nonrheumatic aortic valve insufficiency 8/29/2017    Osteoporosis     Pre-op evaluation: prior to left knee repalcement 8/29/2017    PVC's (premature ventricular contractions) 8/29/2017    S/P cardiac catheterization 10/07/2017    with Dr. Maya Woodall -diagonal 90%, circ 90%,     Torn meniscus 12/2016     Past Surgical History:   Procedure Laterality Date    APPENDECTOMY  1946    BACK SURGERY  3/24/14    Lumbar Laminectomy Fusion L3-5, L4-5 PLIF - Dr. Nita Hernandez Left 09/05/2017    Rodriguez Left Total Knee    ROTATOR CUFF REPAIR  3/8/06    right shoulder      Social History     Socioeconomic History    Marital status:      Spouse name: None    Number of children: None    Years of education: None    Highest education level: None   Occupational MONONITRATE (IMDUR) 30 MG EXTENDED RELEASE TABLET    Take 1 tablet by mouth daily    LANSOPRAZOLE (PREVACID) 30 MG DELAYED RELEASE CAPSULE    Take 1 capsule by mouth every other day    NITROGLYCERIN (NITROSTAT) 0.4 MG SL TABLET    Place 1 tablet under the tongue every 5 minutes as needed for Chest pain (SOB)    SPIRONOLACTONE (ALDACTONE) 25 MG TABLET    Take 0.5 tablets by mouth daily    VITAMIN C (ASCORBIC ACID) 500 MG TABLET    Take 500 mg by mouth daily    VITAMIN D (CHOLECALCIFEROL) 1000 UNIT TABS TABLET    Take 500 Units by mouth once a week        Hospital medications:  Scheduled Meds:   acetaminophen  650 mg Oral Once    Tdap-Dtap  0.5 mL Intramuscular Once    sodium chloride  500 mL Intravenous Once     Continuous Infusions:  PRN Meds:  Objective   ED TRIAGE VITALS  BP: (!) 155/83, Temp: 99.5 °F (37.5 °C), Pulse: 68, Resp: 18, SpO2: 98 %  [place] Coma Scale  Eye Opening: Spontaneous  Best Verbal Response: Oriented  Best Motor Response: Obeys commands  [place] Coma Scale Score: 15  No results found for this visit on 09/02/20. Physical Exam:  Patient Vitals for the past 24 hrs:   BP Temp Temp src Pulse Resp SpO2 Weight   09/02/20 0937 (!) 155/83 -- -- 68 18 98 % --   09/02/20 0931 (!) 155/83 99.5 °F (37.5 °C) Oral 67 18 98 % 130 lb (59 kg)     Primary Assessment:  Airway: Patent, trachea midline  Breathing: Breath sounds present and equal bilaterally, spontaneous, and unlabored  Circulation: Hemodynamically stable, 2+ central and peripheral pulses. Disability: GREEN x 4, following commands. GCS =15    Secondary Assessment:  General: Alert, NAD, very pleasant and cooperative with exam  Head: Normocephalic, mid face stable, Nares patent bilaterally, no epistaxis. Mouth clear of foreign bodies. Through and through lip laceration noted to the mid upper lip. Bleeding well controlled. See photos below. No tongue injury noted. No grossly obvious teeth abnormalities or trauma, unsure of baseline dentition. Right upper central incisor reported to displaced superiorly. Eyes: PERRL, EOMI, Nontraumatic  Neurologic: A & O x3. Following commands. CN 2-12 grossly intact, no signs of focal neurological deficits. Neck: No cervical collar, trachea midline. Cervical spines NTTP midline, without step-offs, crepitus or deformity. Back:TL spines are NTTP midline, without step-offs, crepitus or deformity. No abrasions, contusions, or ecchymosis noted. Lungs: Clear to auscultation bilaterally. Chest Wall: Chest rise symmetrical.  Chest wall without tenderness to palpation. No crepitus, deformities, lacerations, or abrasions. Heart: RRR. Normal S1/S2. No obvious M/G/R. Abdomen:  Soft, NTTP. No guarding. Non-peritoneal.  Pelvis:  NTTP, stable to compression. Extremities: No gross deformities. PMS intact. Radial /DP/PT pulses 2+ bilaterally. Patient with tenderness to palpation noted over radial side of right forearm. Strength equal and strong bilaterally. Skin: Skin warm and dry. Normal for ethnicity. Radiology:     XR ELBOW RIGHT (MIN 3 VIEWS)   Final Result   Nondisplaced slightly impacted radial neck fracture of questionable acuity. Clinical correlation recommended. **This report has been created using voice recognition software. It may contain minor errors which are inherent in voice recognition technology. **      Final report electronically signed by Dr. Aguilar Rahman on 9/2/2020 11:03 AM      CT HEAD WO CONTRAST   Final Result   No acute intracranial process. **This report has been created using voice recognition software. It may contain minor errors which are inherent in voice recognition technology. **      Final report electronically signed by Dr. Aguilar Rahman on 9/2/2020 10:11 AM      CT FACIAL BONES WO CONTRAST   Final Result   Unremarkable study. No facial fracture seen. **This report has been created using voice recognition software.   It may contain minor errors which are inherent in voice recognition technology. **      Final report electronically signed by Dr. Lani Bobby on 9/2/2020 10:21 AM      CT CERVICAL SPINE WO CONTRAST   Final Result   Relatively severe multifocal degenerative changes. No acute fracture. **This report has been created using voice recognition software. It may contain minor errors which are inherent in voice recognition technology. **      Final report electronically signed by Dr. Lani Bobby on 9/2/2020 10:16 AM      XR RADIUS ULNA RIGHT (2 VIEWS)   Final Result   Impression: Osteoporosis. Suspicion of a relatively subtle slightly impacted radial neck fracture. Routine elbow x-rays recommended. **This report has been created using voice recognition software. It may contain minor errors which are inherent in voice recognition technology. **      Final report electronically signed by Dr. Lani Bobby on 9/2/2020 10:08 AM        Fast Exam: No    Electronically signed by Huy Hall PA-C on 9/2/2020 at 9:45 AM    Patient seen and examined independently by me. Above discussed and I agree with APRIL Kan. See my additional comments below for updated orders and plan. Labs, cultures, and radiographs where available were reviewed. I discussed patient concerns with the patient's nurse and instructions were given. Please see our orders for the updated patient care plan. -Lip laceration repair in emergency department. Splint. Pain control. Following up with PCP and dentist.  Following up with orthopedic service as outpatient. Neurologically at baseline. Okay for discharge from a trauma standpoint.     Electronically signed by Pam Padilla MD on 9/2/20 at 9:35 PM EDT

## 2020-09-02 NOTE — ED PROVIDER NOTES
weakness, numbness and headaches. Psychiatric/Behavioral: Negative for confusion. PAST MEDICAL HISTORY    has a past medical history of Allergic rhinitis, Arthritis, CHF (congestive heart failure) ([address] Encounter for cardioversion procedure, GERD (gastroesophageal reflux disease), Hx of transesophageal echocardiography (KAMRAN) for monitoring, Mild mitral regurgitation, Nonrheumatic aortic valve insufficiency, Osteoporosis, Pre-op evaluation: prior to left knee repalcement, PVC's (premature ventricular contractions), S/P cardiac catheterization, and Torn meniscus. SURGICAL HISTORY      has a past surgical history that includes Rotator cuff repair (3/8/06); Appendectomy (1946); back surgery (3/24/14); eye surgery; and joint replacement (Left, 09/05/2017).     CURRENT MEDICATIONS       Discharge Medication List as of 9/2/2020  1:39 PM      CONTINUE these medications which have NOT CHANGED    Details   isosorbide mononitrate (IMDUR) 30 MG extended release tablet Take 1 tablet by mouth daily, Disp-30 tablet, R-3Normal      spironolactone (ALDACTONE) 25 MG tablet Take 0.5 tablets by mouth daily, Disp-15 tablet, R-5Normal      carvedilol (COREG) 6.25 MG tablet Take 1 tablet by mouth 2 times daily (with meals), Disp-60 tablet, R-5Normal      atorvastatin (LIPITOR) 40 MG tablet Take 1 tablet by mouth nightly, Disp-30 tablet, R-5Normal      apixaban (ELIQUIS) 5 MG TABS tablet Take 1 tablet by mouth 2 times daily, Disp-60 tablet, R-5Normal      furosemide (LASIX) 20 MG tablet Take 1 tablet by mouth three times a week Mon-Wed-Fri, Disp-30 tablet, R-5Normal      lansoprazole (PREVACID) 30 MG delayed release capsule Take 1 capsule by mouth every other day, Disp-30 capsule, R-5Normal      nitroGLYCERIN (NITROSTAT) 0.4 MG SL tablet Place 1 tablet under the tongue every 5 minutes as needed for Chest pain (SOB), Disp-25 tablet, R-3Normal      vitamin C (ASCORBIC ACID) 500 MG tablet Take 500 mg by mouth dailyHistorical Med vitamin D (CHOLECALCIFEROL) 1000 UNIT TABS tablet Take 500 Units by mouth once a week Historical Med      aspirin EC 81 MG EC tablet Take 1 tablet by mouth daily, Disp-30 tablet, R-0             ALLERGIES     is allergic to amiodarone. FAMILY HISTORY     She indicated that her mother is . She indicated that her father is . She indicated that her sister is alive. She indicated that only one of her three brothers is alive. She indicated that the status of her maternal grandfather is unknown.   family history includes Arthritis in her mother and sister; COPD in her sister; Cancer in her brother, brother, and sister; Diabetes in her maternal grandfather; Heart Disease in her brother, brother, and sister; High Blood Pressure in her sister. SOCIAL HISTORY      reports that she quit smoking about 55 years ago. She has a 2.50 pack-year smoking history. She has never used smokeless tobacco. She reports current alcohol use. She reports that she does not use drugs. PHYSICAL EXAM     INITIAL VITALS:  weight is 130 lb (59 kg). Her oral temperature is 99.5 °F (37.5 °C). Her blood pressure is 107/70 and her pulse is 75. Her respiration is 18 and oxygen saturation is 96%. Physical Exam  Vitals signs and nursing note reviewed. Constitutional:       Appearance: Normal appearance. HENT:      Head: Normocephalic. Laceration present. No raccoon eyes, Dumas's sign, right periorbital erythema or left periorbital erythema. Jaw: There is normal jaw occlusion. Comments: Tenderness throughout the upper incisors with some pooling blood noted at the root of the left upper central incisor. No grossly loose dentition but exam is limited due to patient discomfort. Patient does note that this is a change in her baseline dentition with the right upper central incisor being displaced superiorly. No tongue or other intraoral injury noted.   There was a through and through laceration of the upper lip, see recognition software. It may contain minor errors which are inherent in voice recognition technology. **      Final report electronically signed by Dr. Lani Bobby on 9/2/2020 10:16 AM      XR RADIUS ULNA RIGHT (2 VIEWS)   Final Result   Impression: Osteoporosis. Suspicion of a relatively subtle slightly impacted radial neck fracture. Routine elbow x-rays recommended. **This report has been created using voice recognition software. It may contain minor errors which are inherent in voice recognition technology. **      Final report electronically signed by Dr. Lani Bobby on 9/2/2020 10:08 AM          LABS:      Labs Reviewed   CBC WITH AUTO DIFFERENTIAL - Abnormal; Notable for the following components:       Result Value    RBC 4.11 (*)     MCHC 31.6 (*)     RDW-SD 47.6 (*)     All other components within normal limits   COMPREHENSIVE METABOLIC PANEL W/ REFLEX TO MG FOR LOW K - Abnormal; Notable for the following components:    BUN 23 (*)     All other components within normal limits   PROTIME-INR - Abnormal; Notable for the following components:    INR 1.51 (*)     All other components within normal limits   GLOMERULAR FILTRATION RATE, ESTIMATED - Abnormal; Notable for the following components:    Est, Glom Filt Rate 79 (*)     All other components within normal limits   APTT   ANION GAP   OSMOLALITY       EMERGENCY DEPARTMENT COURSE:   Vitals:    Vitals:    09/02/20 1003 09/02/20 1116 09/02/20 1220 09/02/20 1356   BP:  101/69 108/72 107/70   Pulse:  66 70 75   Resp: 18 17 16 18   Temp:       TempSrc:       SpO2:  98% 97% 96%   Weight:          9:43 AM EDT: The patient was seen and evaluated. Patient presents for complaints of fall that occurred shortly prior to arrival.  She is anticoagulated on 81 mg aspirin as well as Eliquis. She arrives without any neurologic deficit and no complaints of pain other than her lip laceration and her right forearm.   She is noted on imaging to have radial neck fracture but no other bony injuries. She does report change in the location of her right upper central incisor as well. Results discussed with the patient. She only desired Tylenol for pain in the department. Tetanus immunization was updated. Laceration was repaired and she tolerated this well. Signs and symptoms of wound infection as well as proper wound care discussed with the patient at bedside. She was evaluated by the trauma service during her ED stay and felt to be appropriate for discharge home. This was discussed with the patient who is agreeable, states she does not wish to stay in the hospital.  She will follow-up as an outpatient with the trauma service and is also advised to follow-up with her dentist as well as the orthopedic group, for which follow-up appointment was arranged prior to discharge. She was placed in sugar tong splint and provided with sling and is neurovascularly intact following placement. Discussed different options for pain control and she wishes to do trial of Tylenol with codeine for home pain control. She will be discharged with Augmentin as well for infection prophylaxis of her oral wound. Discussed the above plan with attending provider who is agreeable. CRITICAL CARE:   None    CONSULTS:  Trauma    PROCEDURES:  Laceration Repair Procedure Note    Indication: Laceration    Procedure: The patient was placed in the appropriate position and anesthesia around the laceration was obtained by infiltration using 1% Lidocaine with epinephrine. The area was then cleansed with Shur-Clens and draped in a sterile fashion and irrigated with Shur-Clens and normal saline. The laceration was closed in two layers. The inner mucosal layer was closed with 6-0 Vicryl using 3 interrupted sutures. The skin was closed with 7-0 Ethilon using 6 interrupted sutures. There were no additional lacerations requiring repair. The wound area was then dressed with bacitracin.       Total repaired wound length: 3.5 cm. Other Items: Suture count: 9    The patient tolerated the procedure well. Complications: None            FINAL IMPRESSION      1. Closed head injury, initial encounter    2. Anticoagulated    3. Lip laceration, initial encounter    4. Dental injury, initial encounter    5. Closed nondisplaced fracture of neck of left radius, initial encounter          DISPOSITION/PLAN   Discharge    PATIENT REFERRED TO:  Lee Tripp [address]  [address] [phone]  In 1 day  Appointment scheduled for Thursday, 9/3/2020 at 12:00 PM (Noon). Please arrive 15 minutes early with photo ID, current medications and insurance information    [facility]  [address]  [facility] [place] [address]  [phone]  Call in 1 day  to schedule follow-up for recheck and suture removal    Your dentist    Call in 1 day      [address] [facility] [address] [address] EMERGENCY DEPT  [address]    If symptoms worsen      DISCHARGEMEDICATIONS:  Discharge Medication List as of 9/2/2020  1:39 PM      START taking these medications    Details   amoxicillin-clavulanate (AUGMENTIN) 875-125 MG per tablet Take 1 tablet by mouth 2 times daily for 7 days, Disp-14 tablet,R-0Print      acetaminophen-codeine (TYLENOL/CODEINE #3) 300-30 MG per tablet Take 1 tablet by mouth every 6 hours as needed (Pain) for up to 2 days. , Disp-8 tablet,R-0Print             (Please note that portions of this note were completedwith a voice recognition program.  Efforts were made to edit the dictations but occasionally words are mis-transcribed.)        Timothy Altman PA-C  09/02/20 Antoine Morgan PA-C  09/02/20 [id number]

## 2020-09-02 NOTE — ED TRIAGE NOTES
Pt to ER for evaluation of fall. Per pt, she was waking down in the parking lot where she tripped and fell face forward. Pt noted with laceration on mid upper lip and bleeding from upper gum. Pt also reports pain in right wrist without limitation I ROM. Pt alert and oriented, reports use of Aspirin and eliquis. EKG obtained. Call light within reach. Elsy and Yamilex at bedside.

## 2020-09-05 ENCOUNTER — HOSPITAL ENCOUNTER (EMERGENCY)
Age: 85
Discharge: HOME | End: 2020-09-05
Attending: EMERGENCY MEDICINE
Payer: MEDICARE

## 2020-09-05 VITALS
RESPIRATION RATE: 18 BRPM | WEIGHT: 130 LBS | BODY MASS INDEX: 25.39 KG/M2 | OXYGEN SATURATION: 97 % | DIASTOLIC BLOOD PRESSURE: 63 MMHG | HEART RATE: 66 BPM | SYSTOLIC BLOOD PRESSURE: 101 MMHG

## 2020-09-05 LAB
APTT: 31.1 SECONDS (ref 22–38)
BASOPHILS # BLD: 0.8 %
BASOPHILS ABSOLUTE: 0.1 THOU/MM3 (ref 0–0.1)
EOSINOPHIL # BLD: 4.8 %
EOSINOPHILS ABSOLUTE: 0.3 THOU/MM3 (ref 0–0.4)
ERYTHROCYTE [DISTWIDTH] IN BLOOD BY AUTOMATED COUNT: 13.2 % (ref 11.5–14.5)
ERYTHROCYTE [DISTWIDTH] IN BLOOD BY AUTOMATED COUNT: 47.6 FL (ref 35–45)
HCT VFR BLD CALC: 37.1 % (ref 37–47)
HEMOGLOBIN: 11.6 GM/DL (ref 12–16)
IMMATURE GRANS (ABS): 0.02 THOU/MM3 (ref 0–0.07)
IMMATURE GRANULOCYTES: 0.3 %
INR BLD: 1.47 (ref 0.85–1.13)
LYMPHOCYTES # BLD: 25.5 %
LYMPHOCYTES ABSOLUTE: 1.6 THOU/MM3 (ref 1–4.8)
MCH RBC QN AUTO: 30.8 PG (ref 26–33)
MCHC RBC AUTO-ENTMCNC: 31.3 GM/DL (ref 32.2–35.5)
MCV RBC AUTO: 98.4 FL (ref 81–99)
MONOCYTES # BLD: 11.4 %
MONOCYTES ABSOLUTE: 0.7 THOU/MM3 (ref 0.4–1.3)
NUCLEATED RED BLOOD CELLS: 0 /100 WBC
PLATELET # BLD: 175 THOU/MM3 (ref 130–400)
PMV BLD AUTO: 10.9 FL (ref 9.4–12.4)
RBC # BLD: 3.77 MILL/MM3 (ref 4.2–5.4)
SEG NEUTROPHILS: 57.2 %
SEGMENTED NEUTROPHILS ABSOLUTE COUNT: 3.7 THOU/MM3 (ref 1.8–7.7)
WBC # BLD: 6.4 THOU/MM3 (ref 4.8–10.8)

## 2020-09-05 PROCEDURE — 36415 COLL VENOUS BLD VENIPUNCTURE: CPT

## 2020-09-05 PROCEDURE — 2580000003 HC RX 258: Performed by: EMERGENCY MEDICINE

## 2020-09-05 PROCEDURE — 85610 PROTHROMBIN TIME: CPT

## 2020-09-05 PROCEDURE — 2500000003 HC RX 250 WO HCPCS: Performed by: EMERGENCY MEDICINE

## 2020-09-05 PROCEDURE — 85730 THROMBOPLASTIN TIME PARTIAL: CPT

## 2020-09-05 PROCEDURE — 99282 EMERGENCY DEPT VISIT SF MDM: CPT

## 2020-09-05 PROCEDURE — 85025 COMPLETE CBC W/AUTO DIFF WBC: CPT

## 2020-09-05 PROCEDURE — 99283 EMERGENCY DEPT VISIT LOW MDM: CPT

## 2020-09-05 PROCEDURE — 96374 THER/PROPH/DIAG INJ IV PUSH: CPT

## 2020-09-05 RX ORDER — TRANEXAMIC ACID 100 MG/ML
10 INJECTION, SOLUTION INTRAVENOUS ONCE
Status: DISCONTINUED | OUTPATIENT
Start: 2020-09-05 | End: 2020-09-05 | Stop reason: SDUPTHER

## 2020-09-05 RX ADMIN — TRANEXAMIC ACID 1000 MG: 1 INJECTION, SOLUTION INTRAVENOUS at 19:08

## 2020-09-05 NOTE — ED PROVIDER NOTES
[facility]  EMERGENCY DEPARTMENT ENCOUNTER      PATIENT NAME: Analisa Samuel  MRN: [id number]  : 1934  MORENO: 2020  PROVIDER: Vickie Fernandez MD      CHIEF COMPLAINT       Chief Complaint   Patient presents with    Mouth Injury       Nurses Notes reviewed and I agreeexcept as noted in the HPI. HISTORY OF PRESENT ILLNESS    Aparna Funez is a 80 y.o. female who presents to Emergency Department with Mouth Injury      68-year-old female with past medical history of atrial fibrillation on Eliquis and aspirin, recent trauma due to fall seen at ED on 2020 with facial injury and right upper tooth subluxation presented to ED complaining of uncontrollable bleeding from her right upper tooth subluxation in last 6-8 hours. Moderate pain from right upper incisor. This HPI was provided by the patient. REVIEW OF SYSTEMS     Review of Systems   Constitutional: Negative for activity change, appetite change, chills, fatigue, fever and unexpected weight change. HENT: Negative for congestion, ear discharge, ear pain, hearing loss, nosebleeds, rhinorrhea and sore throat. Bleeding from right upper incisor subluxation site. Right upper incisor tooth pain. Eyes: Negative for photophobia, pain, discharge, redness and itching. Respiratory: Negative for cough, chest tightness, shortness of breath, wheezing and stridor. Cardiovascular: Negative for chest pain, palpitations and leg swelling. Gastrointestinal: Negative for abdominal distention, abdominal pain, constipation, diarrhea, nausea and vomiting. Endocrine: Negative for cold intolerance, heat intolerance, polydipsia and polyphagia. Genitourinary: Negative for dysuria, flank pain, frequency and hematuria. Musculoskeletal: Negative for arthralgias, back pain, gait problem, myalgias, neck pain and neck stiffness. Skin: Negative for pallor, rash and wound.    Allergic/Immunologic: Negative for environmental allergies and food allergies. Neurological: Negative for dizziness, tremors, syncope, weakness and headaches. Psychiatric/Behavioral: Negative for agitation, behavioral problems, confusion, self-injury, sleep disturbance and suicidal ideas. PAST MEDICAL HISTORY    has a past medical history of Allergic rhinitis, Arthritis, CHF (congestive heart failure) ([facility] [address] Encounter for cardioversion procedure, GERD (gastroesophageal reflux disease), Hx of transesophageal echocardiography ([facility]) for monitoring, Mild mitral regurgitation, Nonrheumatic aortic valve insufficiency, Osteoporosis, Pre-op evaluation: prior to left knee repalcement, PVC's (premature ventricular contractions), S/P cardiac catheterization, and Torn meniscus. SURGICAL HISTORY      has a past surgical history that includes Rotator cuff repair (3/8/06); Appendectomy (1946); back surgery (3/24/14); eye surgery; and joint replacement (Left, 09/05/2017).     CURRENT MEDICATIONS       Discharge Medication List as of 9/5/2020  8:13 PM      CONTINUE these medications which have NOT CHANGED    Details   amoxicillin-clavulanate (AUGMENTIN) 875-125 MG per tablet Take 1 tablet by mouth 2 times daily for 7 days, Disp-14 tablet,R-0Print      isosorbide mononitrate (IMDUR) 30 MG extended release tablet Take 1 tablet by mouth daily, Disp-30 tablet, R-3Normal      spironolactone (ALDACTONE) 25 MG tablet Take 0.5 tablets by mouth daily, Disp-15 tablet, R-5Normal      carvedilol (COREG) 6.25 MG tablet Take 1 tablet by mouth 2 times daily (with meals), Disp-60 tablet, R-5Normal      atorvastatin (LIPITOR) 40 MG tablet Take 1 tablet by mouth nightly, Disp-30 tablet, R-5Normal      apixaban (ELIQUIS) 5 MG TABS tablet Take 1 tablet by mouth 2 times daily, Disp-60 tablet, R-5Normal      furosemide (LASIX) 20 MG tablet Take 1 tablet by mouth three times a week Mon-Wed-Fri, Disp-30 tablet, R-5Normal      lansoprazole (PREVACID) 30 MG delayed release capsule Take 1 capsule by mouth every other day, Disp-30 capsule, R-5Normal      nitroGLYCERIN (NITROSTAT) 0.4 MG SL tablet Place 1 tablet under the tongue every 5 minutes as needed for Chest pain (SOB), Disp-25 tablet, R-3Normal      vitamin C (ASCORBIC ACID) 500 MG tablet Take 500 mg by mouth dailyHistorical Med      vitamin D (CHOLECALCIFEROL) 1000 UNIT TABS tablet Take 500 Units by mouth once a week Historical Med      aspirin EC 81 MG EC tablet Take 1 tablet by mouth daily, Disp-30 tablet, R-0             ALLERGIES     is allergic to amiodarone. FAMILY HISTORY     She indicated that her mother is . She indicated that her father is . She indicated that her sister is alive. She indicated that only one of her three brothers is alive. She indicated that the status of her maternal grandfather is unknown.   family history includes Arthritis in her mother and sister; COPD in her sister; Cancer in her brother, brother, and sister; Diabetes in her maternal grandfather; Heart Disease in her brother, brother, and sister; High Blood Pressure in her sister. SOCIAL HISTORY      reports that she quit smoking about 55 years ago. She has a 2.50 pack-year smoking history. She has never used smokeless tobacco. She reports current alcohol use. She reports that she does not use drugs. PHYSICAL EXAM     INITIAL VITALS:  weight is 130 lb (59 kg). Her blood pressure is 101/63 and her pulse is 66. Her respiration is 18 and oxygen saturation is 97%. Physical Exam  Vitals signs and nursing note reviewed. Constitutional:       Appearance: She is well-developed. She is not diaphoretic. HENT:      Head: Normocephalic and atraumatic. Nose: Nose normal.      Mouth/Throat:      Comments: Tenderness and hematoma around right upper incisor. Eyes:      General: No scleral icterus. Right eye: No discharge. Left eye: No discharge.       Conjunctiva/sclera: Conjunctivae normal.      Pupils: Pupils are equal, round, and reactive to light.   Neck:      Musculoskeletal: Normal range of motion and neck supple. Vascular: No JVD. Trachea: No tracheal deviation. Cardiovascular:      Rate and Rhythm: Normal rate and regular rhythm. Heart sounds: Normal heart sounds. No murmur. No friction rub. No gallop. Pulmonary:      Effort: Pulmonary effort is normal. No respiratory distress. Breath sounds: Normal breath sounds. No stridor. No wheezing or rales. Chest:      Chest wall: No tenderness. Abdominal:      General: Bowel sounds are normal. There is no distension. Palpations: Abdomen is soft. There is no mass. Tenderness: There is no abdominal tenderness. There is no guarding or rebound. Hernia: No hernia is present. Musculoskeletal:         General: No tenderness or deformity. Lymphadenopathy:      Cervical: No cervical adenopathy. Skin:     General: Skin is warm and dry. Capillary Refill: Capillary refill takes less than 2 seconds. Coloration: Skin is not pale. Findings: No erythema or rash. Neurological:      Mental Status: She is alert and oriented to person, place, and time. Cranial Nerves: No cranial nerve deficit. Sensory: No sensory deficit. Motor: No abnormal muscle tone. Coordination: Coordination normal.      Deep Tendon Reflexes: Reflexes normal.   Psychiatric:         Behavior: Behavior normal.         Thought Content:  Thought content normal.         Judgment: Judgment normal.           DIFFERENTIAL DIAGNOSIS:   Tooth subluxation, medication side effect from anticoagulation    DIAGNOSTIC RESULTS     EKG: All EKG's are interpreted by the Emergency Department Physician who either signs or Co-signsthis chart in the absence of a cardiologist.  Interpreted by me  None    RADIOLOGY: non-plain film images(s) such as CT, Ultrasound and MRI are read by the radiologist.    No orders to display       []Visualized and interpreted by me   [] Radiologist's Wet Read Report Reviewed   [] Discussed with Radiologist.    Ellsworth Hamman:   Results for orders placed or performed during the hospital encounter of 09/05/20   CBC Auto Differential   Result Value Ref Range    WBC 6.4 4.8 - 10.8 thou/mm3    RBC 3.77 (L) 4.20 - 5.40 mill/mm3    Hemoglobin 11.6 (L) 12.0 - 16.0 gm/dl    Hematocrit 37.1 37.0 - 47.0 %    MCV 98.4 81.0 - 99.0 fL    MCH 30.8 26.0 - 33.0 pg    MCHC 31.3 (L) 32.2 - 35.5 gm/dl    RDW-CV 13.2 11.5 - 14.5 %    RDW-SD 47.6 (H) 35.0 - 45.0 fL    Platelets 326 481 - 395 thou/mm3    MPV 10.9 9.4 - 12.4 fL    Seg Neutrophils 57.2 %    Lymphocytes 25.5 %    Monocytes 11.4 %    Eosinophils 4.8 %    Basophils 0.8 %    Immature Granulocytes 0.3 %    Segs Absolute 3.7 1.8 - 7.7 thou/mm3    Lymphocytes Absolute 1.6 1.0 - 4.8 thou/mm3    Monocytes Absolute 0.7 0.4 - 1.3 thou/mm3    Eosinophils Absolute 0.3 0.0 - 0.4 thou/mm3    Basophils Absolute 0.1 0.0 - 0.1 thou/mm3    Immature Grans (Abs) 0.02 0.00 - 0.07 thou/mm3    nRBC 0 /100 wbc   APTT   Result Value Ref Range    aPTT 31.1 22.0 - 38.0 seconds   Protime-INR   Result Value Ref Range    INR 1.47 (H) 0.85 - 1.13       EMERGENCY DEPARTMENT COURSE:   Vitals:    Vitals:    09/05/20 1751 09/05/20 1811 09/05/20 1834 09/05/20 1908   BP: 96/79  82/67 101/63   Pulse: 78  75 66   Resp: 18  17 18   SpO2: 95%  97% 97%   Weight:  130 lb (59 kg)         5:57 PM: Patient is seen and evaluated in a timely fashion. ACTIONS:    Large bore IV  Tele monitor  EKG  CXR  Labs  Medications:   Medications   tranexamic acid (CYKLOKAPRON) 1,000 mg in sodium chloride 0.9 % 100 mL IVPB (0 mg Intravenous Stopped 9/5/20 1918)       MEDICAL DECISION MAKINGS:    Labs were reassuring. Hemoglobin at her baseline. Patient had no active bleeding in the ED, but given that she reported persistent bleeding at home, a dose of tranexamic acid 1000 mg IV was administered. Patient was observed in the ED for an hour and a half, no more bleeding occurred.       Patient

## 2020-09-05 NOTE — ED NOTES
ED nurse-to-nurse bedside report    Chief Complaint   Patient presents with    Mouth Injury      LOC: alert and orientated to name, place, date  Vital signs   Vitals:    09/05/20 1751 09/05/20 1811 09/05/20 1834 09/05/20 1908   BP: 96/79  82/67 101/63   Pulse: 78  75 66   Resp: 18  17 18   SpO2: 95%  97% 97%   Weight:  130 lb (59 kg)        Pain:    Pain Interventions: home medication of tylenol w/ codeine  Pain Goal: pt denies pain  Oxygen: No    Current needs required none   Telemetry: No  LDAs:   Peripheral IV 09/05/20 Left Forearm (Active)   Site Assessment Intact;Dry;Clean 09/05/20 1905   Line Status Blood return noted; Flushed;Normal saline locked 09/05/20 1905   Dressing Status Clean; Intact;Dry 09/05/20 1905   Dressing Intervention New 09/05/20 1905     Continuous Infusions:   Mobility: Requires assistance * 1  Morales Fall Risk Score: Fall Risk 3/28/2019 10/17/2017 9/29/2016   2 or more falls in past year?  yes no no   Fall with injury in past year? no no no     Fall Interventions: Pt currently In bed, bed at lowest position,  at bedside, call light in reach  Report given to: Giovanna Renee RN  09/05/20 1911

## 2020-09-06 ASSESSMENT — ENCOUNTER SYMPTOMS
EYE ITCHING: 0
COUGH: 0
CHEST TIGHTNESS: 0
CONSTIPATION: 0
BACK PAIN: 0
ABDOMINAL DISTENTION: 0
EYE REDNESS: 0
SHORTNESS OF BREATH: 0
ABDOMINAL PAIN: 0
SORE THROAT: 0
PHOTOPHOBIA: 0
RHINORRHEA: 0
EYE DISCHARGE: 0
DIARRHEA: 0
VOMITING: 0
WHEEZING: 0
EYE PAIN: 0
STRIDOR: 0
NAUSEA: 0

## 2020-09-08 ENCOUNTER — TELEPHONE (OUTPATIENT)
Dept: FAMILY MEDICINE CLINIC | Age: 85
End: 2020-09-08

## 2020-09-08 NOTE — LETTER
[address]  [facility], [address]  Phone: [phone]  Fax: [phone]    September 8, 2020    Janet Funez  [address] [place] [address],[address]  [facility] [place] [address]      Dear Sonia Alvarez,    This letter is regarding your Emergency Department (ED) visit at [facility] on 9/5/20. Shaista Ashley wanted to make sure that you understand your discharge instructions and that you were able to fill any prescriptions that may have been ordered for you. Please contact the office at the above phone number if the ED advised to you follow up with Shaista Ashley, or if you have any further questions or needs. Also did you know -   *Visiting the ED for a non-emergency could result in higher co-pays than you would normally be subject to paying? *You can call your doctor even after hours so they can direct you to the most appropriate care. [facility]) practices can often offer you an appointment on the same day that you call. *We have some [facility] offices that offer Walk-in appointments; check our website for availability in your community, [email or web address].      *Evisits are now available for patients for $36 through "[employer] for certain conditions:  * Sinus, cold and or cough       * Diarrhea            * Headache  * Heartburn                                * Poison Whitney          * Back pain     * Urinary problems                         If you do not have [email or web address] and are interested, please contact the office and a staff member may assist you or go to [email or web address].     Sincerely,   Radha Elder MD and your [facility]

## 2020-09-17 ENCOUNTER — TELEPHONE (OUTPATIENT)
Dept: CARDIOLOGY CLINIC | Age: 85
End: 2020-09-17

## 2020-09-17 NOTE — TELEPHONE ENCOUNTER
Pre op Risk Assessment    Procedure Tooth Extraction (8 Teeth)  Physician Dr. Kenyon Haskins  Date of surgery/procedure TBD    Last OV 8-6-20  Last Stress 6-30-20  Last Echo 1-11-18  Last Cath 10-7-17  Last Stent None in Epic  Is patient on blood thinners Eliquis  Hold Meds/how many days Unspecified    Return fax: [phone]

## 2020-09-18 ENCOUNTER — OFFICE VISIT (OUTPATIENT)
Dept: SURGERY | Age: 85
End: 2020-09-18
Payer: MEDICARE

## 2020-09-18 VITALS
TEMPERATURE: 97.3 F | OXYGEN SATURATION: 98 % | SYSTOLIC BLOOD PRESSURE: 110 MMHG | WEIGHT: 123.5 LBS | DIASTOLIC BLOOD PRESSURE: 65 MMHG | BODY MASS INDEX: 24.25 KG/M2 | RESPIRATION RATE: 15 BRPM | HEIGHT: 60 IN | HEART RATE: 55 BPM

## 2020-09-18 PROCEDURE — G8399 PT W/DXA RESULTS DOCUMENT: HCPCS | Performed by: PHYSICIAN ASSISTANT

## 2020-09-18 PROCEDURE — 1036F TOBACCO NON-USER: CPT | Performed by: PHYSICIAN ASSISTANT

## 2020-09-18 PROCEDURE — G8420 CALC BMI NORM PARAMETERS: HCPCS | Performed by: PHYSICIAN ASSISTANT

## 2020-09-18 PROCEDURE — 1090F PRES/ABSN URINE INCON ASSESS: CPT | Performed by: PHYSICIAN ASSISTANT

## 2020-09-18 PROCEDURE — 99214 OFFICE O/P EST MOD 30 MIN: CPT | Performed by: PHYSICIAN ASSISTANT

## 2020-09-18 PROCEDURE — 4040F PNEUMOC VAC/ADMIN/RCVD: CPT | Performed by: PHYSICIAN ASSISTANT

## 2020-09-18 PROCEDURE — 1123F ACP DISCUSS/DSCN MKR DOCD: CPT | Performed by: PHYSICIAN ASSISTANT

## 2020-09-18 PROCEDURE — G8427 DOCREV CUR MEDS BY ELIG CLIN: HCPCS | Performed by: PHYSICIAN ASSISTANT

## 2020-09-18 NOTE — PROGRESS NOTES
Trauma Clinic  [facility]    Encounter Date: 9/18/2020  Patient: Debarah Apley Cox   Age: 80 y.o. YOB: 1934   MRN: [id number]      Injury Date:09/2/2020    PCP: Sean Rao MD     Subjective   Chief Complaint:   Chief Complaint   Patient presents with    Follow-Up from [facility]     Discharged [facility] ED 9/2/2020-Trauma by fall-Closed head injury,Nondisplaced right radial head fracture,laceration on mid upper lip -sutured in ER        History Obtained From: the patient  Reason for Admission: Active Problems:    * No active hospital problems. *  Resolved Problems:    * No resolved hospital problems. *    History of Present Illness:  She is a 80 y.o. female who presents to trauma clinic following fall with injury to face and teeth complains of no pain and swelling. Follow up with ortho scheduled for next Tuesday for right distal radius fracture, seeing oral surgeon and dentist for tooth injury, partial fitting and tooth removal. No LOC on initial injury, on Eliquis and ASA for A-fib. She denies headache, nausea, dizziness, and neck pain.      Chief Complaint   Patient presents with    Follow-Up from [facility]     Discharged [facility] ED 9/2/2020-Trauma by fall-Closed head injury,Nondisplaced right radial head fracture,laceration on mid upper lip -sutured in ER             Review of Systems:   General: negative headache, dizziness, lightheadedness, fever, and chills  Cardiopulmonary: negative chest pain, shortness of breath, and cough  Gastrointestinal: negative abdominal pain, nausea, vomiting, diarrhea, blood in stool  Neurological: negative numbness, paraesthesias, and weakness  Musculoskeletal: Negative point tenderness, neck pain, and back pain    History   Amiodarone  Past Surgical History:   Procedure Laterality Date    APPENDECTOMY  1946    BACK SURGERY  3/24/14    Lumbar Laminectomy Fusion L3-5, L4-5 PLIF - Dr. Sonu Clement Left 09/05/2017    Rodriguez Left Total Knee None     Gets together: None     Attends Zoroastrian service: None     Active member of club or organization: None     Attends meetings of clubs or organizations: None     Relationship status: None    Intimate partner violence     Fear of current or ex partner: None     Emotionally abused: None     Physically abused: None     Forced sexual activity: None   Other Topics Concern    None   Social History Narrative    None     Family History   Problem Relation Age of Onset    Arthritis Mother     Heart Disease Sister     High Blood Pressure Sister     Arthritis Sister     COPD Sister     Cancer Sister         Skin Cancer    Cancer Brother     Heart Disease Brother     Diabetes Maternal Grandfather     Heart Disease Brother         CHF    Cancer Brother         Lung Cancer       Home medications:    Current Outpatient Medications   Medication Sig Dispense Refill    isosorbide mononitrate (IMDUR) 30 MG extended release tablet Take 1 tablet by mouth daily 30 tablet 3    spironolactone (ALDACTONE) 25 MG tablet Take 0.5 tablets by mouth daily 15 tablet 5    carvedilol (COREG) 6.25 MG tablet Take 1 tablet by mouth 2 times daily (with meals) 60 tablet 5    atorvastatin (LIPITOR) 40 MG tablet Take 1 tablet by mouth nightly 30 tablet 5    apixaban (ELIQUIS) 5 MG TABS tablet Take 1 tablet by mouth 2 times daily 60 tablet 5    furosemide (LASIX) 20 MG tablet Take 1 tablet by mouth three times a week Mon-Wed-Fri (Patient taking differently: Take 20 mg by mouth See Admin Instructions Mon-Wed-Fri    Pt states she normally takes this QD unless she is going somewhere then she will not take it that day) 30 tablet 5    lansoprazole (PREVACID) 30 MG delayed release capsule Take 1 capsule by mouth every other day 30 capsule 5    nitroGLYCERIN (NITROSTAT) 0.4 MG SL tablet Place 1 tablet under the tongue every 5 minutes as needed for Chest pain (SOB) 25 tablet 3    vitamin C (ASCORBIC ACID) 500 MG tablet Take 500 mg by discharge or bleeding    Suture removal: 8 simple interrupted sutures removed from upper lip. No significant bleeding. No pain. Patient tolerated procedure    Radiology:   Radiology reports reviewed: yes - XR and CT - impacted radial fracture    Labs:   Laboratory Studies reviewed: yes - CBC and CMP    Assessment:       Diagnosis Orders   1. Lip laceration, subsequent encounter     2. Closed head injury, subsequent encounter         Plan:    1. Stable form trauma perspective, no signs of concussion or bleeding from oral injury, follow up PRN  2. Follow up with orthopedic surgery  3. Follow up with oral surgeon and dentist  4. Contact office is new onset pain or signs of infection.  Go to ED of symptoms severe or patient unstable    Electronically signed by Scott De La Torre PA-C on 9/18/2020 at 9:08 AM

## 2020-09-23 RX ORDER — ISOSORBIDE MONONITRATE 30 MG/1
30 TABLET, EXTENDED RELEASE ORAL DAILY
Qty: 30 TABLET | Refills: 5 | Status: SHIPPED | OUTPATIENT
Start: 2020-09-23 | End: 2021-01-12 | Stop reason: SDUPTHER

## 2021-01-12 ENCOUNTER — TELEPHONE (OUTPATIENT)
Dept: CARDIOLOGY CLINIC | Age: 86
End: 2021-01-12

## 2021-01-12 ENCOUNTER — OFFICE VISIT (OUTPATIENT)
Dept: CARDIOLOGY CLINIC | Age: 86
End: 2021-01-12
Payer: MEDICARE

## 2021-01-12 VITALS
WEIGHT: 130.8 LBS | OXYGEN SATURATION: 93 % | DIASTOLIC BLOOD PRESSURE: 60 MMHG | SYSTOLIC BLOOD PRESSURE: 110 MMHG | HEART RATE: 72 BPM | BODY MASS INDEX: 25.68 KG/M2 | HEIGHT: 60 IN

## 2021-01-12 DIAGNOSIS — I50.22 CHF (CONGESTIVE HEART FAILURE), NYHA CLASS II, CHRONIC, SYSTOLIC (HCC): Primary | ICD-10-CM

## 2021-01-12 DIAGNOSIS — I42.8 NONISCHEMIC CARDIOMYOPATHY (HCC): ICD-10-CM

## 2021-01-12 DIAGNOSIS — R06.02 SHORTNESS OF BREATH: ICD-10-CM

## 2021-01-12 LAB
ANION GAP SERPL CALCULATED.3IONS-SCNC: 15 MEQ/L (ref 8–16)
BUN BLDV-MCNC: 29 MG/DL (ref 7–22)
CALCIUM SERPL-MCNC: 9 MG/DL (ref 8.5–10.5)
CHLORIDE BLD-SCNC: 104 MEQ/L (ref 98–111)
CO2: 19 MEQ/L (ref 23–33)
CREAT SERPL-MCNC: 0.9 MG/DL (ref 0.4–1.2)
GFR SERPL CREATININE-BSD FRML MDRD: 59 ML/MIN/1.73M2
GLUCOSE BLD-MCNC: 114 MG/DL (ref 70–108)
POTASSIUM SERPL-SCNC: 5.4 MEQ/L (ref 3.5–5.2)
SODIUM BLD-SCNC: 138 MEQ/L (ref 135–145)

## 2021-01-12 PROCEDURE — 1090F PRES/ABSN URINE INCON ASSESS: CPT | Performed by: NURSE PRACTITIONER

## 2021-01-12 PROCEDURE — 1123F ACP DISCUSS/DSCN MKR DOCD: CPT | Performed by: NURSE PRACTITIONER

## 2021-01-12 PROCEDURE — G8417 CALC BMI ABV UP PARAM F/U: HCPCS | Performed by: NURSE PRACTITIONER

## 2021-01-12 PROCEDURE — 1036F TOBACCO NON-USER: CPT | Performed by: NURSE PRACTITIONER

## 2021-01-12 PROCEDURE — 4040F PNEUMOC VAC/ADMIN/RCVD: CPT | Performed by: NURSE PRACTITIONER

## 2021-01-12 PROCEDURE — 36415 COLL VENOUS BLD VENIPUNCTURE: CPT | Performed by: NURSE PRACTITIONER

## 2021-01-12 PROCEDURE — G8484 FLU IMMUNIZE NO ADMIN: HCPCS | Performed by: NURSE PRACTITIONER

## 2021-01-12 PROCEDURE — G8427 DOCREV CUR MEDS BY ELIG CLIN: HCPCS | Performed by: NURSE PRACTITIONER

## 2021-01-12 PROCEDURE — 99215 OFFICE O/P EST HI 40 MIN: CPT | Performed by: NURSE PRACTITIONER

## 2021-01-12 RX ORDER — CARVEDILOL 6.25 MG/1
6.25 TABLET ORAL 2 TIMES DAILY WITH MEALS
Qty: 180 TABLET | Refills: 3 | Status: ON HOLD | OUTPATIENT
Start: 2021-01-12 | End: 2021-07-03 | Stop reason: HOSPADM

## 2021-01-12 RX ORDER — FUROSEMIDE 20 MG/1
20 TABLET ORAL
Qty: 36 TABLET | Refills: 3 | Status: SHIPPED | OUTPATIENT
Start: 2021-01-13 | End: 2022-02-01 | Stop reason: SDUPTHER

## 2021-01-12 RX ORDER — ATORVASTATIN CALCIUM 40 MG/1
40 TABLET, FILM COATED ORAL NIGHTLY
Qty: 90 TABLET | Refills: 3 | Status: SHIPPED | OUTPATIENT
Start: 2021-01-12 | End: 2022-02-01 | Stop reason: SDUPTHER

## 2021-01-12 RX ORDER — LANSOPRAZOLE 30 MG/1
30 CAPSULE, DELAYED RELEASE ORAL DAILY
Qty: 90 CAPSULE | Refills: 3 | Status: SHIPPED | OUTPATIENT
Start: 2021-01-12 | End: 2022-02-01 | Stop reason: SDUPTHER

## 2021-01-12 RX ORDER — SPIRONOLACTONE 25 MG/1
12.5 TABLET ORAL DAILY
Qty: 45 TABLET | Refills: 3 | Status: SHIPPED | OUTPATIENT
Start: 2021-01-12 | End: 2022-02-01 | Stop reason: SDUPTHER

## 2021-01-12 RX ORDER — ISOSORBIDE MONONITRATE 30 MG/1
30 TABLET, EXTENDED RELEASE ORAL DAILY
Qty: 90 TABLET | Refills: 3 | Status: SHIPPED | OUTPATIENT
Start: 2021-01-12 | End: 2022-02-01 | Stop reason: SDUPTHER

## 2021-01-12 ASSESSMENT — ENCOUNTER SYMPTOMS
COLOR CHANGE: 0
APNEA: 0
NAUSEA: 0
ABDOMINAL DISTENTION: 0
COUGH: 0
WHEEZING: 0
SHORTNESS OF BREATH: 0
ABDOMINAL PAIN: 0
CHEST TIGHTNESS: 0

## 2021-01-12 NOTE — TELEPHONE ENCOUNTER
BMP reviewed  BUN trending up and GFR down  Potassium elevated 5.4  Please review and send her a list of high Potassium foods    I would like for her to hold the Aldactone and the Lasix for 3 days  Resume Aldactone 12.5 mg daily  Decrease the Lasix to 20 mg 3 days a week (Mon-Wed-Fri) like it was prescribed, please ask her not to take it daily so she remembers as this is likely what caused the decrease in GFR    Repeat BMP in 3 weeks

## 2021-01-12 NOTE — PATIENT INSTRUCTIONS
part of your treatment and safety. Be sure to make and go to all appointments, and call your doctor if you are having problems. It's also a good idea to know your test results and keep a list of the medicines you take. How can you care for yourself at home? Read food labels  · Read labels on cans and food packages. The labels tell you how much sodium is in each serving. Make sure that you look at the serving size. If you eat more than the serving size, you have eaten more sodium. · Food labels also tell you the Percent Daily Value for sodium. Choose products with low Percent Daily Values for sodium. · Be aware that sodium can come in forms other than salt, including monosodium glutamate (MSG), sodium citrate, and sodium bicarbonate (baking soda). MSG is often added to Asian food. When you eat out, you can sometimes ask for food without MSG or added salt. Buy low-sodium foods  · Buy foods that are labeled \"unsalted\" (no salt added), \"sodium-free\" (less than 5 mg of sodium per serving), or \"low-sodium\" (less than 140 mg of sodium per serving). Foods labeled \"reduced-sodium\" and \"light sodium\" may still have too much sodium. Be sure to read the label to see how much sodium you are getting. · Buy fresh vegetables, or frozen vegetables without added sauces. Buy low-sodium versions of canned vegetables, soups, and other canned goods. Prepare low-sodium meals  · Cut back on the amount of salt you use in cooking. This will help you adjust to the taste. Do not add salt after cooking. One teaspoon of salt has about 2,300 mg of sodium. · Take the salt shaker off the table. · Flavor your food with garlic, lemon juice, onion, vinegar, herbs, and spices. Do not use soy sauce, lite soy sauce, steak sauce, onion salt, garlic salt, celery salt, mustard, or ketchup on your food. · Use low-sodium salad dressings, sauces, and ketchup. Or make your own salad dressings and sauces without adding salt.   · Use less salt (or none) when recipes call for it. You can often use half the salt a recipe calls for without losing flavor. Other foods such as rice, pasta, and grains do not need added salt. · Rinse canned vegetables, and cook them in fresh water. This removes some--but not all--of the salt. · Avoid water that is naturally high in sodium or that has been treated with water softeners, which add sodium. Call your local water company to find out the sodium content of your water supply. If you buy bottled water, read the label and choose a sodium-free brand. Avoid high-sodium foods  · Avoid eating:  ? Smoked, cured, salted, and canned meat, fish, and poultry. ? Ham, hernandez, hot dogs, and luncheon meats. ? Regular, hard, and processed cheese and regular peanut butter. ? Crackers with salted tops, and other salted snack foods such as pretzels, chips, and salted popcorn. ? Frozen prepared meals, unless labeled low-sodium. ? Canned and dried soups, broths, and bouillon, unless labeled sodium-free or low-sodium. ? Canned vegetables, unless labeled sodium-free or low-sodium. ? [place] fries, pizza, tacos, and other fast foods. ? Pickles, olives, ketchup, and other condiments, especially soy sauce, unless labeled sodium-free or low-sodium.

## 2021-01-12 NOTE — TELEPHONE ENCOUNTER
Patient notified and verbalized understanding. Lab slip and list of high K+ foods mailed to patient.

## 2021-01-12 NOTE — PROGRESS NOTES
with Dr. Sergio Liu -diagonal 90%, circ 90%,     Torn meniscus 2016     Past Surgical History:   Procedure Laterality Date    APPENDECTOMY     [facility] SURGERY  3/24/14    Lumbar Laminectomy Fusion L3-5, L4-5 PLIF - Dr. Walter Hutchins Left 2017    Rodriguez Left Total Knee    ROTATOR CUFF REPAIR  3/8/06    right shoulder      Family History   Problem Relation Age of Onset    Arthritis Mother     Heart Disease Sister     High Blood Pressure Sister     Arthritis Sister     COPD Sister     Cancer Sister         Skin Cancer    Cancer Brother     Heart Disease Brother     Diabetes Maternal Grandfather     Heart Disease Brother         CHF    Cancer Brother         Lung Cancer     Social History     Tobacco Use    Smoking status: Former Smoker     Packs/day: 0.50     Years: 5.00     Pack years: 2.50     Quit date: 1965     Years since quittin.4    Smokeless tobacco: Never Used   Substance Use Topics    Alcohol use: Yes     Comment: occ.  red wine     Current Outpatient Medications   Medication Sig Dispense Refill    lansoprazole (PREVACID) 30 MG delayed release capsule Take 1 capsule by mouth daily 90 capsule 3    apixaban (ELIQUIS) 5 MG TABS tablet Take 1 tablet by mouth 2 times daily 180 tablet 3    atorvastatin (LIPITOR) 40 MG tablet Take 1 tablet by mouth nightly 90 tablet 3    carvedilol (COREG) 6.25 MG tablet Take 1 tablet by mouth 2 times daily (with meals) 180 tablet 3    spironolactone (ALDACTONE) 25 MG tablet Take 0.5 tablets by mouth daily 45 tablet 3    isosorbide mononitrate (IMDUR) 30 MG extended release tablet Take 1 tablet by mouth daily 90 tablet 3    furosemide (LASIX) 20 MG tablet Take 1 tablet by mouth three times a week Mon-Wed-Fri (Patient taking differently: Take 20 mg by mouth daily Mon-Wed-Fri    Pt states she normally takes this QD unless she is going somewhere then she will not take it that day) 30 tablet 5    nitroGLYCERIN (NITROSTAT) 0.4 MG SL tablet Place 1 tablet under the tongue every 5 minutes as needed for Chest pain (SOB) 25 tablet 3    vitamin C (ASCORBIC ACID) 500 MG tablet Take 500 mg by mouth daily      vitamin D (CHOLECALCIFEROL) 1000 UNIT TABS tablet Take 500 Units by mouth once a week       aspirin EC 81 MG EC tablet Take 1 tablet by mouth daily 30 tablet 0     No current facility-administered medications for this visit. Allergies   Allergen Reactions    Amiodarone Other (See Comments)     Prolonged QT; Torsades DP       SUBJECTIVE:   Review of Systems   Constitutional: Negative for activity change, appetite change, fatigue and fever. HENT: Negative for congestion. Respiratory: Negative for apnea, cough, chest tightness, shortness of breath and wheezing. Cardiovascular: Negative for chest pain, palpitations and leg swelling. Gastrointestinal: Negative for abdominal distention, abdominal pain and nausea. Genitourinary: Negative for difficulty urinating and dysuria. Musculoskeletal: Negative for arthralgias and gait problem. Skin: Negative for color change. Neurological: Negative for dizziness, numbness and headaches. Psychiatric/Behavioral: Negative for agitation, confusion and sleep disturbance. The patient is not nervous/anxious. OBJECTIVE:   Today's Vitals:  /60   Pulse 72   Ht 5' (1.524 m)   Wt 130 lb 12.8 oz (59.3 kg)   SpO2 93%   BMI 25.55 kg/m²     Physical Exam  Vitals signs reviewed. Constitutional:       Appearance: She is well-developed. HENT:      Head: Normocephalic and atraumatic. Eyes:      Pupils: Pupils are equal, round, and reactive to light. Neck:      Musculoskeletal: Normal range of motion. Vascular: No JVD. Cardiovascular:      Rate and Rhythm: Normal rate and regular rhythm. Heart sounds: Murmur present. Pulmonary:      Effort: Pulmonary effort is normal. No respiratory distress. Breath sounds: No rales. Abdominal:      General: There is no distension. Palpations: Abdomen is soft. Tenderness: There is no abdominal tenderness. Musculoskeletal:         General: No tenderness. Right lower leg: No edema. Left lower leg: No edema. Skin:     General: Skin is warm and dry. Capillary Refill: Capillary refill takes less than 2 seconds. Neurological:      Mental Status: She is alert and oriented to person, place, and time. Psychiatric:         Mood and Affect: Mood normal.         Behavior: Behavior normal.         Wt Readings from Last 3 Encounters:   01/12/21 130 lb 12.8 oz (59.3 kg)   09/18/20 123 lb 8 oz (56 kg)   09/05/20 130 lb (59 kg)     BP Readings from Last 3 Encounters:   01/12/21 110/60   09/18/20 110/65   09/05/20 101/63     Pulse Readings from Last 3 Encounters:   01/12/21 72   09/18/20 55   09/05/20 66     Body mass index is 25.55 kg/m². ECHO:   1/11/18      Summary   LV systolic function is moderately decreased, with LVEF of 40 to 45 %. Left atrial size was normal.   Leaflets exhibited mildly increased thickness and mildly reduced cuspal   separation of the aortic valve. No evidence of any pericardial effusion. Signature      ----------------------------------------------------------------   Electronically signed by Tejinder Syed MD (Interpreting   physician) on 01/11/2018 at 02:52 PM   ----------------------------------------------------------------      Findings      Aortic Valve   Leaflets exhibited mildly increased thickness and mildly reduced cuspal   separation of the aortic valve. Left Atrium   Left atrial size was normal.      Left Ventricle   LV systolic function is moderately decreased, with LVEF of 40 to 45 %. Right Atrium   Right atrial size was normal.      Right Ventricle   The right ventricular size was normal with normal systolic function and   wall thickness.       Pericardial Effusion   No evidence of any pericardial effusion. Results reviewed:  BNP:   Lab Results   Component Value Date    PROBNP 222.3 06/23/2020     CBC:   Lab Results   Component Value Date    WBC 6.4 09/05/2020    RBC 3.77 09/05/2020    RBC 4.15 08/27/2019    HGB 11.6 09/05/2020    HCT 37.1 09/05/2020     09/05/2020     CMP:    Lab Results   Component Value Date     01/12/2021    K 5.4 01/12/2021    K 4.5 09/02/2020     01/12/2021    CO2 19 01/12/2021    BUN 29 01/12/2021    CREATININE 0.9 01/12/2021    LABGLOM 59 01/12/2021    GLUCOSE 114 01/12/2021    GLUCOSE 104 06/11/2020    CALCIUM 9.0 01/12/2021     Hepatic Function Panel:    Lab Results   Component Value Date    ALKPHOS 96 09/02/2020    ALT 12 09/02/2020    AST 15 09/02/2020    PROT 6.8 09/02/2020    BILITOT 0.9 09/02/2020    LABALBU 4.0 09/02/2020     Magnesium:    Lab Results   Component Value Date    MG 2.3 01/14/2020     PT/INR:    Lab Results   Component Value Date    INR 1.47 09/05/2020     Lipids:    Lab Results   Component Value Date    TRIG 92 09/24/2016    HDL 62 09/24/2016    LDLCALC 131 09/24/2016       ASSESSMENT AND PLAN:   The patient's condition/symptoms are Stable      Diagnosis Orders   1. CHF (congestive heart failure), NYHA class II, chronic, systolic (HCC)  Echo 2D w doppler w color complete    Basic Metabolic Panel   2. Shortness of breath  Echo 2D w doppler w color complete   3. Nonischemic cardiomyopathy (HCC)         Plan:  · GDMT   · ACE/ARB/ARNi: no  · Beta Blocker: Coreg 6.25 mg bid  · Aldosterone antagonist: Aldactone 12.5 mg daily  · Diuretic/Potassium: Lasix 20 mg prescribed for 3 days a week has been taking at least 5 days  · Hydralazine/Nitrate: Imdur 30 mg daily  · Other: Eliquis, ASA 81 mg  · No signs of fluid overload. BMP today as she has been taking extra Lasix as she did not want to \"forget\" to take any doses. ECHO to evaluate EF and valves as she has been more SOB and not had one since 2018.  She is adamant that she Will Not wear a LifeVest

## 2021-01-13 ENCOUNTER — HOSPITAL ENCOUNTER (OUTPATIENT)
Dept: NON INVASIVE DIAGNOSTICS | Age: 86
Discharge: HOME | End: 2021-01-13
Payer: MEDICARE

## 2021-01-13 ENCOUNTER — TELEPHONE (OUTPATIENT)
Dept: CARDIOLOGY CLINIC | Age: 86
End: 2021-01-13

## 2021-01-13 DIAGNOSIS — I50.22 CHF (CONGESTIVE HEART FAILURE), NYHA CLASS II, CHRONIC, SYSTOLIC (HCC): ICD-10-CM

## 2021-01-13 DIAGNOSIS — I34.0 NONRHEUMATIC MITRAL VALVE REGURGITATION: Primary | ICD-10-CM

## 2021-01-13 DIAGNOSIS — Z13.9 SCREENING PROCEDURE: ICD-10-CM

## 2021-01-13 DIAGNOSIS — R06.02 SHORTNESS OF BREATH: ICD-10-CM

## 2021-01-13 LAB
LV EF: 58 %
LVEF MODALITY: NORMAL

## 2021-01-13 PROCEDURE — 93306 TTE W/DOPPLER COMPLETE: CPT

## 2021-01-18 ENCOUNTER — HOSPITAL ENCOUNTER (OUTPATIENT)
Age: 86
Discharge: HOME | End: 2021-01-18
Payer: MEDICARE

## 2021-01-18 PROCEDURE — U0003 INFECTIOUS AGENT DETECTION BY NUCLEIC ACID (DNA OR RNA); SEVERE ACUTE RESPIRATORY SYNDROME CORONAVIRUS 2 (SARS-COV-2) (CORONAVIRUS DISEASE [COVID-19]), AMPLIFIED PROBE TECHNIQUE, MAKING USE OF HIGH THROUGHPUT TECHNOLOGIES AS DESCRIBED BY CMS-2020-01-R: HCPCS

## 2021-01-19 ENCOUNTER — TELEPHONE (OUTPATIENT)
Dept: CARDIOLOGY CLINIC | Age: 86
End: 2021-01-19

## 2021-01-19 LAB
PERFORMING LAB: NORMAL
REPORT: NORMAL
SARS-COV-2: NOT DETECTED

## 2021-01-19 NOTE — TELEPHONE ENCOUNTER
///     Patient called in crescencio about a letter she received in the mail regarding her KAMRAN scheduled for this Friday and how it may affect her oral surgery on the following Monday since she will be having a scope down to her heart. She has been waiting several months for this oral surgery on Monday, they are removing 8 teeth. Patient wants to cancel KAMRAN for Friday and move to a later date for her concern with her oral appointment on Monday.

## 2021-01-20 NOTE — TELEPHONE ENCOUNTER
Spoke with patient. She is having oral surgery Monday, has to take 2 weeks to heal. Then having a bridge done that will take 2 weeks to heal. KAMRAN can be scheduled a month from now per patient for oral healing. Is that okay with you Dr Niall Christian?

## 2021-01-20 NOTE — TELEPHONE ENCOUNTER
This was ordered by Dr Klarissa Love to evaluate her MR  I am sure he will be fine with rescheduling but I have sent to him for review

## 2021-01-20 NOTE — TELEPHONE ENCOUNTER
Patient called in again and LM asking to speak to Milka Carlson regarding cancelling KAMRAN for Friday.

## 2021-01-21 NOTE — TELEPHONE ENCOUNTER
Patient wanted to wait to reschedule her KAMRAN.   She will talk to Ban Patel on 1/28 and she will call us if she wants to reschedule

## 2021-01-22 ENCOUNTER — HOSPITAL ENCOUNTER (OUTPATIENT)
Dept: NURSING | Age: 86
End: 2021-01-22
Payer: MEDICARE

## 2021-01-28 ENCOUNTER — TELEPHONE (OUTPATIENT)
Dept: CARDIOLOGY CLINIC | Age: 86
End: 2021-01-28

## 2021-01-28 ENCOUNTER — OFFICE VISIT (OUTPATIENT)
Dept: CARDIOLOGY CLINIC | Age: 86
End: 2021-01-28
Payer: MEDICARE

## 2021-01-28 VITALS
SYSTOLIC BLOOD PRESSURE: 126 MMHG | DIASTOLIC BLOOD PRESSURE: 64 MMHG | HEIGHT: 60 IN | BODY MASS INDEX: 26.11 KG/M2 | HEART RATE: 80 BPM | WEIGHT: 133 LBS

## 2021-01-28 DIAGNOSIS — I48.91 ATRIAL FIBRILLATION, UNSPECIFIED TYPE (HCC): ICD-10-CM

## 2021-01-28 DIAGNOSIS — Z13.9 SCREENING FOR CONDITION: ICD-10-CM

## 2021-01-28 DIAGNOSIS — I34.0 MITRAL VALVE INSUFFICIENCY, UNSPECIFIED ETIOLOGY: Primary | ICD-10-CM

## 2021-01-28 DIAGNOSIS — R06.02 SHORTNESS OF BREATH: ICD-10-CM

## 2021-01-28 PROCEDURE — 99213 OFFICE O/P EST LOW 20 MIN: CPT | Performed by: INTERNAL MEDICINE

## 2021-01-28 PROCEDURE — 1090F PRES/ABSN URINE INCON ASSESS: CPT | Performed by: INTERNAL MEDICINE

## 2021-01-28 PROCEDURE — G8484 FLU IMMUNIZE NO ADMIN: HCPCS | Performed by: INTERNAL MEDICINE

## 2021-01-28 PROCEDURE — 4040F PNEUMOC VAC/ADMIN/RCVD: CPT | Performed by: INTERNAL MEDICINE

## 2021-01-28 PROCEDURE — G8427 DOCREV CUR MEDS BY ELIG CLIN: HCPCS | Performed by: INTERNAL MEDICINE

## 2021-01-28 PROCEDURE — 1123F ACP DISCUSS/DSCN MKR DOCD: CPT | Performed by: INTERNAL MEDICINE

## 2021-01-28 PROCEDURE — 1036F TOBACCO NON-USER: CPT | Performed by: INTERNAL MEDICINE

## 2021-01-28 PROCEDURE — G8417 CALC BMI ABV UP PARAM F/U: HCPCS | Performed by: INTERNAL MEDICINE

## 2021-01-28 NOTE — PROGRESS NOTES
1934    Chief Complaint   Patient presents with    Follow-up     Discuss ECHO     Pierce Fuentes is a 80 y.o. female who presents to the office for a follow up. Recent echo showed moderate to severe mitral regurg. Pt had cath in 2017 showing Dx 90%, PDA 60%, and 90% AVG stenosis. Pt had afib also in 2017 and was cardioverted out of it but required a life vest for a couple of years while cardiac function improved. PT has had a couple of falls over the last few months. Pt denies syncope and dizziness before falls. PT says she will just be walking and randomly falls but can remember the whole event. She has chronic sob and osborn. She has slowed down as compared to last year. She also notes falls but no syncope. No chest pain. She continues on Eliquis.     Meds: imdur, aldactone, lipitor, coreg, eliquis, lasix, ASA          Past Medical History:   Diagnosis Date    Accidental fall 09/2002    [facility] ER- fell on face on cement    Allergic rhinitis     Arthritis     CHF [facility]congestive heart failure) ([facility] [address]     Encounter for cardioversion procedure 10/05/2017    GERD (gastroesophageal reflux disease)     Hx of transesophageal echocardiography (KAMRAN) for monitoring 10/2017    Mild mitral regurgitation 10/2017    Nonrheumatic aortic valve insufficiency 8/29/2017    Osteoporosis     Pre-op evaluation: prior to left knee repalcement 8/29/2017    PVC's (premature ventricular contractions) 8/29/2017    S/P cardiac catheterization 10/07/2017    with Dr. Rivera Layer -diagonal 90%, circ 90%,     Torn meniscus 12/2016       Past Surgical History:   Procedure Laterality Date    APPENDECTOMY  1946    BACK SURGERY  3/24/14    Lumbar Laminectomy Fusion L3-5, L4-5 PLIF - Dr. Antoni Alejandra Left 09/05/2017    Michael Left Total Knee    ROTATOR CUFF REPAIR  3/8/06    right shoulder        Current Outpatient Medications   Medication Sig Dispense Refill  lansoprazole (PREVACID) 30 MG delayed release capsule Take 1 capsule by mouth daily 90 capsule 3    apixaban (ELIQUIS) 5 MG TABS tablet Take 1 tablet by mouth 2 times daily 180 tablet 3    atorvastatin (LIPITOR) 40 MG tablet Take 1 tablet by mouth nightly 90 tablet 3    carvedilol (COREG) 6.25 MG tablet Take 1 tablet by mouth 2 times daily (with meals) 180 tablet 3    spironolactone (ALDACTONE) 25 MG tablet Take 0.5 tablets by mouth daily 45 tablet 3    isosorbide mononitrate (IMDUR) 30 MG extended release tablet Take 1 tablet by mouth daily 90 tablet 3    furosemide (LASIX) 20 MG tablet Take 1 tablet by mouth three times a week Mon-Wed-Fri 36 tablet 3    nitroGLYCERIN (NITROSTAT) 0.4 MG SL tablet Place 1 tablet under the tongue every 5 minutes as needed for Chest pain (SOB) 25 tablet 3    vitamin C (ASCORBIC ACID) 500 MG tablet Take 500 mg by mouth daily      vitamin D (CHOLECALCIFEROL) 1000 UNIT TABS tablet Take 500 Units by mouth once a week       aspirin EC 81 MG EC tablet Take 1 tablet by mouth daily 30 tablet 0     No current facility-administered medications for this visit. Allergies   Allergen Reactions    Amiodarone Other (See Comments)     Prolonged QT; Torsades DP       Social History     Socioeconomic History    Marital status:      Spouse name: Not on file    Number of children: Not on file    Years of education: Not on file    Highest education level: Not on file   Occupational History    Not on file   Social Needs    Financial resource strain: Not on file    Food insecurity     Worry: Not on file     Inability: Not on file    Transportation needs     Medical: Not on file     Non-medical: Not on file   Tobacco Use    Smoking status: Former Smoker     Packs/day: 0.50     Years: 5.00     Pack years: 2.50     Quit date: 1965     Years since quittin.4    Smokeless tobacco: Never Used   Substance and Sexual Activity    Alcohol use:  Yes Chest - clear to auscultation, no wheezes, rales or rhonchi, symmetric air entry  Heart - normal rate, regular rhythm, normal S1, S2, 7-7/3 HSM, rubs, clicks or gallops  Abdomen -soft, nontender, nondistended  Extremities - peripheral pulses normal, no pedal edema, no clubbing or cyanosis  Skin - warm and dry    Diagnostic data:   I have reviewed recent diagnostic testing including labs with patient today. Assessment and Plan: Moderate to Severe Mitral Regurgitation  -Will order KAMRAN to eval severity of MR  -Given information on watchmen device  -Continue current meds. Multi-vessel CAD  - Dx 90%, PDA 60%, and 90% AVG stenosis  - continue current meds    Attending Supervising Physician's RA [address]  I performed a history and physical examination on the patient and discussed the management with the resident physician/med student. I reviewed and agree with the findings and plan as documented in the resident's/med student's note except for as noted below. Moderate to Severe MR  MORENO  Afib  Preserved EF  Cardiac cath: Dx 90%, PDA 60%, and 90% AVG stenosis  AoV Sclerosis  She has moreno but unchanged and MR has gotten worse. Will check KAMRAN. She also has more falls and continues on Elliquis. She understands the risk of falls and [address]. She also understands the risk of being off [address] and ischemic CVA. Discussed WATCHMAN. She is going to first proceed KAMRAN and then we will discuss further based on the findings. Discussed diet/exercise/BP/weigh loss/health lifestyle choices/lipids; the patient understands the goals and will try to comply.       Electronically signed by Femi Macias MD on 1/31/21 at 12:10 PM EST

## 2021-02-04 LAB
ANION GAP SERPL CALCULATED.3IONS-SCNC: 8 MMOL/L (ref 5–15)
BUN BLDV-MCNC: 20 MG/DL (ref 5–27)
CALCIUM SERPL-MCNC: 8.6 MG/DL (ref 8.5–10.5)
CHLORIDE BLD-SCNC: 105 MMOL/L (ref 98–109)
CO2: 27 MMOL/L (ref 22–32)
CREAT SERPL-MCNC: 0.97 MG/DL (ref 0.4–1)
EGFR AFRICAN AMERICAN: >60 ML/MIN/1.73SQ.M
EGFR IF NONAFRICAN AMERICAN: 54 ML/MIN/1.73SQ.M
GLUCOSE: 91 MG/DL (ref 65–99)
POTASSIUM SERPL-SCNC: 4.3 MMOL/L (ref 3.5–5)
SODIUM BLD-SCNC: 140 MMOL/L (ref 134–146)

## 2021-02-22 ENCOUNTER — HOSPITAL ENCOUNTER (OUTPATIENT)
Age: 86
Discharge: HOME | End: 2021-02-22
Payer: MEDICARE

## 2021-02-22 DIAGNOSIS — Z13.9 SCREENING FOR CONDITION: ICD-10-CM

## 2021-02-22 PROCEDURE — U0003 INFECTIOUS AGENT DETECTION BY NUCLEIC ACID (DNA OR RNA); SEVERE ACUTE RESPIRATORY SYNDROME CORONAVIRUS 2 (SARS-COV-2) (CORONAVIRUS DISEASE [COVID-19]), AMPLIFIED PROBE TECHNIQUE, MAKING USE OF HIGH THROUGHPUT TECHNOLOGIES AS DESCRIBED BY CMS-2020-01-R: HCPCS

## 2021-02-23 LAB — SARS-COV-2: NOT DETECTED

## 2021-02-26 ENCOUNTER — PREP FOR PROCEDURE (OUTPATIENT)
Dept: CARDIOLOGY | Age: 86
End: 2021-02-26

## 2021-02-26 RX ORDER — SODIUM CHLORIDE 0.9 % (FLUSH) 0.9 %
10 SYRINGE (ML) INJECTION PRN
Status: CANCELLED | OUTPATIENT
Start: 2021-02-26

## 2021-02-26 RX ORDER — SODIUM CHLORIDE 0.9 % (FLUSH) 0.9 %
10 SYRINGE (ML) INJECTION EVERY 12 HOURS SCHEDULED
Status: CANCELLED | OUTPATIENT
Start: 2021-02-26

## 2021-02-26 RX ORDER — SODIUM CHLORIDE 9 MG/ML
INJECTION, SOLUTION INTRAVENOUS CONTINUOUS
Status: CANCELLED | OUTPATIENT
Start: 2021-02-26

## 2021-03-01 ENCOUNTER — HOSPITAL ENCOUNTER (OUTPATIENT)
Dept: NURSING | Age: 86
Discharge: HOME | End: 2021-03-01
Payer: MEDICARE

## 2021-03-01 VITALS
OXYGEN SATURATION: 98 % | HEIGHT: 60 IN | BODY MASS INDEX: 25.52 KG/M2 | TEMPERATURE: 97 F | WEIGHT: 130 LBS | DIASTOLIC BLOOD PRESSURE: 60 MMHG | SYSTOLIC BLOOD PRESSURE: 123 MMHG | HEART RATE: 65 BPM | RESPIRATION RATE: 16 BRPM

## 2021-03-01 DIAGNOSIS — I34.0 MITRAL VALVE INSUFFICIENCY, UNSPECIFIED ETIOLOGY: ICD-10-CM

## 2021-03-01 LAB
ERYTHROCYTE [DISTWIDTH] IN BLOOD BY AUTOMATED COUNT: 13.1 % (ref 11.5–14.5)
ERYTHROCYTE [DISTWIDTH] IN BLOOD BY AUTOMATED COUNT: 46.4 FL (ref 35–45)
HCT VFR BLD CALC: 40.6 % (ref 37–47)
HEMOGLOBIN: 12.8 GM/DL (ref 12–16)
LV EF: 53 %
LVEF MODALITY: NORMAL
MCH RBC QN AUTO: 30.3 PG (ref 26–33)
MCHC RBC AUTO-ENTMCNC: 31.5 GM/DL (ref 32.2–35.5)
MCV RBC AUTO: 96 FL (ref 81–99)
PLATELET # BLD: 194 THOU/MM3 (ref 130–400)
PMV BLD AUTO: 10.8 FL (ref 9.4–12.4)
RBC # BLD: 4.23 MILL/MM3 (ref 4.2–5.4)
WBC # BLD: 6.3 THOU/MM3 (ref 4.8–10.8)

## 2021-03-01 PROCEDURE — 2580000003 HC RX 258: Performed by: PHYSICIAN ASSISTANT

## 2021-03-01 PROCEDURE — 93325 DOPPLER ECHO COLOR FLOW MAPG: CPT

## 2021-03-01 PROCEDURE — 93312 ECHO TRANSESOPHAGEAL: CPT

## 2021-03-01 PROCEDURE — 85027 COMPLETE CBC AUTOMATED: CPT

## 2021-03-01 PROCEDURE — 6360000002 HC RX W HCPCS: Performed by: NUCLEAR MEDICINE

## 2021-03-01 PROCEDURE — 6370000000 HC RX 637 (ALT 250 FOR IP)

## 2021-03-01 PROCEDURE — 99152 MOD SED SAME PHYS/QHP 5/>YRS: CPT

## 2021-03-01 PROCEDURE — 93320 DOPPLER ECHO COMPLETE: CPT

## 2021-03-01 PROCEDURE — 36415 COLL VENOUS BLD VENIPUNCTURE: CPT

## 2021-03-01 RX ORDER — SODIUM CHLORIDE 0.9 % (FLUSH) 0.9 %
10 SYRINGE (ML) INJECTION PRN
Status: DISCONTINUED | OUTPATIENT
Start: 2021-03-01 | End: 2021-03-02 | Stop reason: HOSPADM

## 2021-03-01 RX ORDER — SODIUM CHLORIDE 0.9 % (FLUSH) 0.9 %
10 SYRINGE (ML) INJECTION EVERY 12 HOURS SCHEDULED
Status: DISCONTINUED | OUTPATIENT
Start: 2021-03-01 | End: 2021-03-02 | Stop reason: HOSPADM

## 2021-03-01 RX ORDER — MIDAZOLAM HYDROCHLORIDE 2 MG/2ML
1 INJECTION, SOLUTION INTRAMUSCULAR; INTRAVENOUS PRN
Status: DISCONTINUED | OUTPATIENT
Start: 2021-03-01 | End: 2021-03-02 | Stop reason: HOSPADM

## 2021-03-01 RX ORDER — NALOXONE HYDROCHLORIDE 0.4 MG/ML
0.4 INJECTION, SOLUTION INTRAMUSCULAR; INTRAVENOUS; SUBCUTANEOUS PRN
Status: DISCONTINUED | OUTPATIENT
Start: 2021-03-01 | End: 2021-03-02 | Stop reason: HOSPADM

## 2021-03-01 RX ORDER — FLUMAZENIL 0.1 MG/ML
0.2 INJECTION, SOLUTION INTRAVENOUS PRN
Status: DISCONTINUED | OUTPATIENT
Start: 2021-03-01 | End: 2021-03-02 | Stop reason: HOSPADM

## 2021-03-01 RX ORDER — FENTANYL CITRATE 50 UG/ML
25 INJECTION, SOLUTION INTRAMUSCULAR; INTRAVENOUS PRN
Status: DISCONTINUED | OUTPATIENT
Start: 2021-03-01 | End: 2021-03-02 | Stop reason: HOSPADM

## 2021-03-01 RX ORDER — SODIUM CHLORIDE 9 MG/ML
INJECTION, SOLUTION INTRAVENOUS CONTINUOUS
Status: DISCONTINUED | OUTPATIENT
Start: 2021-03-01 | End: 2021-03-02 | Stop reason: HOSPADM

## 2021-03-01 RX ADMIN — SODIUM CHLORIDE: 9 INJECTION, SOLUTION INTRAVENOUS at 13:20

## 2021-03-01 RX ADMIN — FENTANYL CITRATE 50 MCG: 50 INJECTION, SOLUTION INTRAMUSCULAR; INTRAVENOUS at 14:31

## 2021-03-01 RX ADMIN — MIDAZOLAM 1 MG: 1 INJECTION INTRAMUSCULAR; INTRAVENOUS at 14:31

## 2021-03-01 RX ADMIN — MIDAZOLAM 1 MG: 1 INJECTION INTRAMUSCULAR; INTRAVENOUS at 14:29

## 2021-03-01 RX ADMIN — FENTANYL CITRATE 50 MCG: 50 INJECTION, SOLUTION INTRAMUSCULAR; INTRAVENOUS at 14:29

## 2021-03-01 ASSESSMENT — PAIN SCALES - GENERAL
PAINLEVEL_OUTOF10: 0
PAINLEVEL_OUTOF10: 0

## 2021-03-01 NOTE — PROGRESS NOTES
1308: Patient arrived ambulatory for KAMRAN. Patient states she has been NPO since last night. Patient takes Eliquis and aspirin. Procedure explained to patient, voiced understanding. Patient's  at bedside. PT RIGHTS AND RESPONSIBILITIES OFFERED TO PT. Blood work collected and sent to lab. IV fluids running.          _m___ Safety:       (Environmental)   [place] to environment   Ensure ID band is correct and in place/ allergy band as needed   Assess for fall risk   Initiate fall precautions as applicable (fall band, side rails, etc.)   Call light within reach   Bed in low position/ wheels locked    _m___ Pain:        Assess pain level and characteristics   Administer analgesics as ordered   Assess effectiveness of pain management and report to MD as needed    _m___ Knowledge Deficit:   Assess baseline knowledge   Provide teaching at level of understanding   Provide teaching via preferred learning method   Evaluate teaching effectiveness    _m___ Hemodynamic/Respiratory Status:       (Pre and Post Procedure Monitoring)   Assess/Monitor vital signs and LOC   Assess Baseline SpO2 prior to any sedation   Obtain weight/height   Assess vital signs/ LOC until patient meets discharge criteria   Monitor procedure site and notify MD of any issues

## 2021-03-01 NOTE — PROGRESS NOTES
1430: Dr. Jennifer Ledesma at bedside. Sedation medication started. 2 L nasal cannula applied. 1432: Scope in. Vitals stable. 1437: Scope out. Patient tolerated well. Patient starting to wake up a little.  back at bedside. Vitals stable. 1500: Patient awake and alert. Provided with a beverage. No concerns voiced. Vitals stable. 1545: AVS reviewed with patient and , voiced understanding. Discharged in wheelchair.

## 2021-04-19 ENCOUNTER — OFFICE VISIT (OUTPATIENT)
Dept: CARDIOLOGY CLINIC | Age: 86
End: 2021-04-19
Payer: MEDICARE

## 2021-04-19 VITALS
SYSTOLIC BLOOD PRESSURE: 110 MMHG | HEART RATE: 76 BPM | WEIGHT: 131.2 LBS | BODY MASS INDEX: 25.76 KG/M2 | OXYGEN SATURATION: 95 % | HEIGHT: 60 IN | DIASTOLIC BLOOD PRESSURE: 66 MMHG

## 2021-04-19 DIAGNOSIS — I34.0 NONRHEUMATIC MITRAL VALVE REGURGITATION: ICD-10-CM

## 2021-04-19 DIAGNOSIS — I50.22 CHF (CONGESTIVE HEART FAILURE), NYHA CLASS II, CHRONIC, SYSTOLIC (HCC): Primary | ICD-10-CM

## 2021-04-19 DIAGNOSIS — I42.8 NONISCHEMIC CARDIOMYOPATHY (HCC): ICD-10-CM

## 2021-04-19 DIAGNOSIS — I48.0 PAROXYSMAL ATRIAL FIBRILLATION (HCC): ICD-10-CM

## 2021-04-19 PROCEDURE — 1036F TOBACCO NON-USER: CPT | Performed by: NURSE PRACTITIONER

## 2021-04-19 PROCEDURE — 1123F ACP DISCUSS/DSCN MKR DOCD: CPT | Performed by: NURSE PRACTITIONER

## 2021-04-19 PROCEDURE — 4040F PNEUMOC VAC/ADMIN/RCVD: CPT | Performed by: NURSE PRACTITIONER

## 2021-04-19 PROCEDURE — G8417 CALC BMI ABV UP PARAM F/U: HCPCS | Performed by: NURSE PRACTITIONER

## 2021-04-19 PROCEDURE — 99214 OFFICE O/P EST MOD 30 MIN: CPT | Performed by: NURSE PRACTITIONER

## 2021-04-19 PROCEDURE — 1090F PRES/ABSN URINE INCON ASSESS: CPT | Performed by: NURSE PRACTITIONER

## 2021-04-19 PROCEDURE — G8427 DOCREV CUR MEDS BY ELIG CLIN: HCPCS | Performed by: NURSE PRACTITIONER

## 2021-04-19 ASSESSMENT — ENCOUNTER SYMPTOMS
COLOR CHANGE: 0
ABDOMINAL PAIN: 0
ABDOMINAL DISTENTION: 0
WHEEZING: 0
SHORTNESS OF BREATH: 0
COUGH: 0
CHEST TIGHTNESS: 0
NAUSEA: 0
APNEA: 0

## 2021-04-19 NOTE — PATIENT INSTRUCTIONS
Use less salt (or none) when recipes call for it. You can often use half the salt a recipe calls for without losing flavor. Other foods such as rice, pasta, and grains do not need added salt. · Rinse canned vegetables, and cook them in fresh water. This removes somebut not allof the salt. · Avoid water that is naturally high in sodium or that has been treated with water softeners, which add sodium. Call your local water company to find out the sodium content of your water supply. If you buy bottled water, read the label and choose a sodium-free brand. Avoid high-sodium foods  · Avoid eating:  ? Smoked, cured, salted, and canned meat, fish, and poultry. ? Ham, hernandez, hot dogs, and luncheon meats. ? Regular, hard, and processed cheese and regular peanut butter. ? Crackers with salted tops, and other salted snack foods such as pretzels, chips, and salted popcorn. ? Frozen prepared meals, unless labeled low-sodium. ? Canned and dried soups, broths, and bouillon, unless labeled sodium-free or low-sodium. ? Canned vegetables, unless labeled sodium-free or low-sodium. ? [place] fries, pizza, tacos, and other fast foods. ? Pickles, olives, ketchup, and other condiments, especially soy sauce, unless labeled sodium-free or low-sodium.

## 2021-04-19 NOTE — PROGRESS NOTES
[place]       Visit Date: 4/19/2021  Cardiologist:  Dr. René Mckeon  Primary Care Physician: Dr. Juan Francisco Villela MD    Liv Mccann is a 80 y.o. female who presents today for:  Chief Complaint   Patient presents with    Congestive Heart Failure       HPI: Obtained from patient and chart    Liv Mccann is a 80 y.o. female who presents to the office for a follow up visit in the heart failure clinic. Hx knee surgery in Sept 2017, developed A fib RVR a few weeks later post-op. She was Cardioverted 10/9/17.  Also had Torsades while in the hospital felt to be due to Amiodarone which was stopped. Previous ECHO EF 60% (9/23/16). KAMRAN 10/9/17 EF 25-30%. Wore a LifeVest. EF improved 40-45% 1/11/18, NICM, Mar 2021 EF 50-55% mild/moderate MR. She is being evaluated for WATCHMAN. She was still deciding what she wanted to do. She can perform ADLs without SOB or fatigue. She denies orthopnea, sleeps in a bed. She makes good urine with the Lasix. Accompanied by:   Last hospital admission related to Heart Failure:   Oct 2017  Chest Pain: no  Worsening SOB: unchanged  Worsening Orthopnea/PND: no  Edema: no  Any extra diuretic use: no  Weight gain: no  Fatigue: unchanged  Abdominal bloating: no  Appetite: good  CORDELL: no  Cough: no  Compliant checking home weight: yes 128 lbs  Compliant checking blood pressure: yes      Past Medical History:   Diagnosis Date    Accidental fall 09/2002    [facility] ER- fell on face on cement    Allergic rhinitis     Arthritis     CHF [facility]congestive heart failure) ([facility] [address]     Encounter for cardioversion procedure 10/05/2017    GERD (gastroesophageal reflux disease)     Hx of transesophageal echocardiography (KAMRAN) for monitoring 10/2017    Mild mitral regurgitation 10/2017    Nonrheumatic aortic valve insufficiency 8/29/2017    Osteoporosis     Pre-op evaluation: prior to left knee repalcement 8/29/2017    PVC's (premature ventricular contractions) 8/29/2017  S/P cardiac catheterization 10/07/2017    with Dr. Louise Seo -diagonal 90%, circ 90%,     Torn meniscus 2016     Past Surgical History:   Procedure Laterality Date    APPENDECTOMY  89   [facility] SURGERY  3/24/14    Lumbar Laminectomy Fusion L3-5, L4-5 PLIF - Dr. Jp Aguilar Left 2017    Rodriguez Left Total Knee    ROTATOR CUFF REPAIR  3/8/06    right shoulder      Family History   Problem Relation Age of Onset    Arthritis Mother     Heart Disease Sister     High Blood Pressure Sister     Arthritis Sister     COPD Sister     Cancer Sister         Skin Cancer    Cancer Brother     Heart Disease Brother     Diabetes Maternal Grandfather     Heart Disease Brother         CHF    Cancer Brother         Lung Cancer     Social History     Tobacco Use    Smoking status: Former Smoker     Packs/day: 0.50     Years: 5.00     Pack years: 2.50     Quit date: 1965     Years since quittin.6    Smokeless tobacco: Never Used   Substance Use Topics    Alcohol use: Yes     Comment: occ.  red wine     Current Outpatient Medications   Medication Sig Dispense Refill    lansoprazole (PREVACID) 30 MG delayed release capsule Take 1 capsule by mouth daily 90 capsule 3    apixaban (ELIQUIS) 5 MG TABS tablet Take 1 tablet by mouth 2 times daily 180 tablet 3    atorvastatin (LIPITOR) 40 MG tablet Take 1 tablet by mouth nightly 90 tablet 3    carvedilol (COREG) 6.25 MG tablet Take 1 tablet by mouth 2 times daily (with meals) 180 tablet 3    spironolactone (ALDACTONE) 25 MG tablet Take 0.5 tablets by mouth daily 45 tablet 3    isosorbide mononitrate (IMDUR) 30 MG extended release tablet Take 1 tablet by mouth daily 90 tablet 3    furosemide (LASIX) 20 MG tablet Take 1 tablet by mouth three times a week Mon-Wed-Fri 36 tablet 3    nitroGLYCERIN (NITROSTAT) 0.4 MG SL tablet Place 1 tablet under the tongue every 5 minutes as needed for Chest pain (SOB) 25 tablet 3    vitamin C (ASCORBIC ACID) 500 MG tablet Take 500 mg by mouth daily      vitamin D (CHOLECALCIFEROL) 1000 UNIT TABS tablet Take 500 Units by mouth once a week       aspirin EC 81 MG EC tablet Take 1 tablet by mouth daily 30 tablet 0     No current facility-administered medications for this visit. Allergies   Allergen Reactions    Amiodarone Other (See Comments)     Prolonged QT; Torsades DP       SUBJECTIVE:   Review of Systems   Constitutional: Negative for activity change, appetite change, fatigue and fever. HENT: Negative for congestion. Respiratory: Negative for apnea, cough, chest tightness, shortness of breath and wheezing. Cardiovascular: Negative for chest pain, palpitations and leg swelling. Gastrointestinal: Negative for abdominal distention, abdominal pain and nausea. Genitourinary: Negative for difficulty urinating and dysuria. Musculoskeletal: Negative for arthralgias and gait problem. Skin: Negative for color change. Neurological: Negative for dizziness, numbness and headaches. Psychiatric/Behavioral: Negative for agitation, confusion and sleep disturbance. The patient is not nervous/anxious. OBJECTIVE:   Today's Vitals:  /66   Pulse 76   Ht 5' (1.524 m)   Wt 131 lb 3.2 oz (59.5 kg)   SpO2 95%   BMI 25.62 kg/m²     Physical Exam  Vitals signs reviewed. Constitutional:       Appearance: She is well-developed. HENT:      Head: Normocephalic and atraumatic. Eyes:      Pupils: Pupils are equal, round, and reactive to light. Neck:      Musculoskeletal: Normal range of motion. Vascular: No JVD. Cardiovascular:      Rate and Rhythm: Normal rate and regular rhythm. Heart sounds: Murmur present. Pulmonary:      Effort: Pulmonary effort is normal. No respiratory distress. Breath sounds: No rales. Abdominal:      General: There is no distension. Palpations: Abdomen is soft. Tenderness: There is no abdominal tenderness. Musculoskeletal:         General: No tenderness. Right lower leg: No edema. Left lower leg: No edema. Skin:     General: Skin is warm and dry. Capillary Refill: Capillary refill takes less than 2 seconds. Neurological:      Mental Status: She is alert and oriented to person, place, and time. Psychiatric:         Mood and Affect: Mood normal.         Behavior: Behavior normal.         Wt Readings from Last 3 Encounters:   04/19/21 131 lb 3.2 oz (59.5 kg)   03/01/21 130 lb (59 kg)   01/28/21 133 lb (60.3 kg)     BP Readings from Last 3 Encounters:   04/19/21 110/66   03/01/21 123/60   01/28/21 126/64     Pulse Readings from Last 3 Encounters:   04/19/21 76   03/01/21 65   01/28/21 80     Body mass index is 25.62 kg/m². ECHO:   KAMRAN 3/1/21   Summary   Probe easily inserted by Cardiologist.   Procedure performed without complications. The study included complete 2D imaging, complete spectral doppler, and   color doppler. The transesophageal approach was used. Risk benefits and alternatives were explained to the patient and informed   consent was obtained. Ejection fraction was estimated at 50-55%. There was mild-moderate mitral regurgitation. Aortic root = 3.5 cm. Signature      ----------------------------------------------------------------   Electronically signed by Luis Camejo MD (Interpreting   physician) on 03/02/2021 at 02:45 PM   ----------------------------------------------------------------      Findings      Mitral Valve   The mitral valve structure was normal with normal leaflet separation. DOPPLER: The transmitral velocity was within the normal range with no   evidence for mitral stenosis. There was mild-moderate mitral   regurgitation. Aortic Valve   The aortic valve was trileaflet with normal thickness and cuspal   separation. There was no echocardiographic evidence of a vegetation.    DOPPLER: Transaortic velocity was within the normal range with no evidence   of aortic stenosis or regurgitaiton. Tricuspid Valve   The tricuspid valve structure was normal with normal leaflet separation. There was no echocardiographic evidence of a vegetation. DOPPLER: There   was no evidence of tricuspid stenosis or regurgitation. Pulmonic Valve   The pulmonic valve leaflets exhibited normal thickness, no calcification,   and normal cuspal separation. There was no echocardiographic evidence of   vegetation. DOPPLER: The transpulmonic velocity was within the normal   range with mild regurgitation. Left Atrium   Left atrial size was normal with no thrombus identified. APPENDAGE: The   left atrial appendage size was normal with no thrombus identified. DOPPLER: The function was normal (normal emptying velocity). Left Ventricle   Normal left ventricular size and systolic function. There were no regional wall motion abnormalities. Wall thickness was within normal limits. Ejection fraction was estimated at 50-55%. Right Atrium   Right atrial size was normal with no thrombus identified. ATRIAL SEPTUM:   No defect or patent foramen ovale was identified. There was no   right-to-left shunt, with provocative maneuvers to increase right atrial   pressure. Right Ventricle   The right ventricular size was normal with normal systolic function and   wall thickness. Pericardial Effusion   The pericardium was normal in appearance with no evidence of a pericardial   effusion. Pleural Effusion   No evidence of pleural effusion.     Results reviewed:  BNP:   Lab Results   Component Value Date    PROBNP 222.3 06/23/2020     CBC:   Lab Results   Component Value Date    WBC 6.3 03/01/2021    RBC 4.23 03/01/2021    RBC 4.15 08/27/2019    HGB 12.8 03/01/2021    HCT 40.6 03/01/2021     03/01/2021     CMP:    Lab Results   Component Value Date     02/03/2021    K 4.3 02/03/2021    K 4.5 09/02/2020     02/03/2021    CO2 27 02/03/2021    BUN 20 02/03/2021    CREATININE 0.97 02/03/2021    LABGLOM 59 01/12/2021    GLUCOSE 91 02/03/2021    CALCIUM 8.6 02/03/2021     Hepatic Function Panel:    Lab Results   Component Value Date    ALKPHOS 96 09/02/2020    ALT 12 09/02/2020    AST 15 09/02/2020    PROT 6.8 09/02/2020    BILITOT 0.9 09/02/2020    LABALBU 4.0 09/02/2020     Magnesium:    Lab Results   Component Value Date    MG 2.3 01/14/2020     PT/INR:    Lab Results   Component Value Date    INR 1.47 09/05/2020     Lipids:    Lab Results   Component Value Date    TRIG 92 09/24/2016    HDL 62 09/24/2016    LDLCALC 131 09/24/2016       ASSESSMENT AND PLAN:   The patient's condition/symptoms are Stable      Diagnosis Orders   1. CHF (congestive heart failure), NYHA class II, chronic, systolic ([address]     2. Nonischemic cardiomyopathy ([address]     3. Nonrheumatic mitral valve regurgitation     4. Paroxysmal atrial fibrillation (HCC)     EF recovered to 50-55%  Mild/Moderate MR    Plan:  · GDMT   · ACE/ARB/ARNi: no  · Beta Blocker: Coreg 6.25 mg bid  · Aldosterone antagonist: Aldactone 12.5 mg daily  · Diuretic/Potassium: Lasix 20 mg three times weekly  · Hydralazine/Nitrate: Imdur 30 mg daily  · Other: Eliquis, ASA 81 mg, Lipitor  · No signs of fluid overload. BP controlled. She is not sure about WATCHMAN, wants to think about it more but leaning towards not having it. · HF Zones reviewed.   · Daily weights and record  · Fluid restriction of 2 Liters per day (64 oz)   · Limit sodium in diet to 8394-3840 mg/day  · Healthier food options were discussed   · Monitor BP daily 1 hour after meds are taken  · Increase activity as tolerated     Patient was instructed to call the [facility] for changes in the following symptoms:    Weight gain of 3 pounds in 1 day or 5 pounds in 1 week   Increased shortness of breath   Shortness of breath while laying down   Cough   Chest pain   Swelling in feet, ankles or legs   Tenderness or bloating in the abdomen   Fatigue    Decreased appetite or feeling \"full\"   Nausea    Confusion      Return in about 6 months (around 10/19/2021). or sooner if needed     Patient given educational materials - see patient instructions. We discussed the importance of weighing oneself and recording daily. We also discussed the importance of a low sodium diet, higher sodium foods to avoid and appropriate low sodium food choices. Discussed use, benefit, and side effects of prescribed medications. All patient questions answered. Patient verbalizes understanding of plan of care using teach back method, and is agreeable to the treatment plan. 36 minutes of time was spent reviewing the chart and educating the patient about HF, medications, diet, exercise, and discussing the plan of care. I personally spent more then 50% of the appt time face to face with the patient counseling/coordinating patient's care.       Electronicallysigned by ANDREA Mays CNP on 4/19/2021 at 11:14 AM

## 2021-06-15 ENCOUNTER — APPOINTMENT (OUTPATIENT)
Dept: GENERAL RADIOLOGY | Age: 86
End: 2021-06-15
Payer: MEDICARE

## 2021-06-15 ENCOUNTER — HOSPITAL ENCOUNTER (EMERGENCY)
Age: 86
Discharge: HOME | End: 2021-06-15
Attending: FAMILY MEDICINE
Payer: MEDICARE

## 2021-06-15 VITALS
HEART RATE: 76 BPM | RESPIRATION RATE: 20 BRPM | OXYGEN SATURATION: 98 % | DIASTOLIC BLOOD PRESSURE: 72 MMHG | BODY MASS INDEX: 25.39 KG/M2 | SYSTOLIC BLOOD PRESSURE: 97 MMHG | TEMPERATURE: 98 F | WEIGHT: 130 LBS

## 2021-06-15 DIAGNOSIS — I48.91 ATRIAL FIBRILLATION WITH RAPID VENTRICULAR RESPONSE (HCC): Primary | ICD-10-CM

## 2021-06-15 LAB
ALBUMIN SERPL-MCNC: 4 G/DL (ref 3.5–5.1)
ALP BLD-CCNC: 97 U/L (ref 38–126)
ALT SERPL-CCNC: 11 U/L (ref 11–66)
ANION GAP SERPL CALCULATED.3IONS-SCNC: 14 MEQ/L (ref 8–16)
AST SERPL-CCNC: 18 U/L (ref 5–40)
BASOPHILS # BLD: 0.7 %
BASOPHILS ABSOLUTE: 0 THOU/MM3 (ref 0–0.1)
BILIRUB SERPL-MCNC: 0.7 MG/DL (ref 0.3–1.2)
BILIRUBIN DIRECT: < 0.2 MG/DL (ref 0–0.3)
BUN BLDV-MCNC: 18 MG/DL (ref 7–22)
CALCIUM SERPL-MCNC: 9.3 MG/DL (ref 8.5–10.5)
CHLORIDE BLD-SCNC: 103 MEQ/L (ref 98–111)
CO2: 21 MEQ/L (ref 23–33)
CREAT SERPL-MCNC: 1 MG/DL (ref 0.4–1.2)
EKG ATRIAL RATE: 129 BPM
EKG ATRIAL RATE: 92 BPM
EKG P AXIS: 36 DEGREES
EKG P-R INTERVAL: 140 MS
EKG Q-T INTERVAL: 276 MS
EKG Q-T INTERVAL: 338 MS
EKG QRS DURATION: 80 MS
EKG QRS DURATION: 86 MS
EKG QTC CALCULATION (BAZETT): 415 MS
EKG QTC CALCULATION (BAZETT): 417 MS
EKG R AXIS: -16 DEGREES
EKG R AXIS: -30 DEGREES
EKG T AXIS: 30 DEGREES
EKG T AXIS: 95 DEGREES
EKG VENTRICULAR RATE: 136 BPM
EKG VENTRICULAR RATE: 92 BPM
EOSINOPHIL # BLD: 4.7 %
EOSINOPHILS ABSOLUTE: 0.3 THOU/MM3 (ref 0–0.4)
ERYTHROCYTE [DISTWIDTH] IN BLOOD BY AUTOMATED COUNT: 12.7 % (ref 11.5–14.5)
ERYTHROCYTE [DISTWIDTH] IN BLOOD BY AUTOMATED COUNT: 44.7 FL (ref 35–45)
GFR SERPL CREATININE-BSD FRML MDRD: 52 ML/MIN/1.73M2
GLUCOSE BLD-MCNC: 168 MG/DL (ref 70–108)
HCT VFR BLD CALC: 44.3 % (ref 37–47)
HEMOGLOBIN: 13.7 GM/DL (ref 12–16)
IMMATURE GRANS (ABS): 0.03 THOU/MM3 (ref 0–0.07)
IMMATURE GRANULOCYTES: 0.4 %
LYMPHOCYTES # BLD: 29.9 %
LYMPHOCYTES ABSOLUTE: 2.1 THOU/MM3 (ref 1–4.8)
MCH RBC QN AUTO: 29.7 PG (ref 26–33)
MCHC RBC AUTO-ENTMCNC: 30.9 GM/DL (ref 32.2–35.5)
MCV RBC AUTO: 96.1 FL (ref 81–99)
MONOCYTES # BLD: 8.7 %
MONOCYTES ABSOLUTE: 0.6 THOU/MM3 (ref 0.4–1.3)
NUCLEATED RED BLOOD CELLS: 0 /100 WBC
OSMOLALITY CALCULATION: 281.4 MOSMOL/KG (ref 275–300)
PLATELET # BLD: 233 THOU/MM3 (ref 130–400)
PMV BLD AUTO: 10.7 FL (ref 9.4–12.4)
POTASSIUM REFLEX MAGNESIUM: 4.2 MEQ/L (ref 3.5–5.2)
PRO-BNP: 2899 PG/ML (ref 0–1800)
RBC # BLD: 4.61 MILL/MM3 (ref 4.2–5.4)
SEG NEUTROPHILS: 55.6 %
SEGMENTED NEUTROPHILS ABSOLUTE COUNT: 3.9 THOU/MM3 (ref 1.8–7.7)
SODIUM BLD-SCNC: 138 MEQ/L (ref 135–145)
TOTAL PROTEIN: 7.7 G/DL (ref 6.1–8)
TROPONIN T: < 0.01 NG/ML
TSH SERPL DL<=0.05 MIU/L-ACNC: 2.27 UIU/ML (ref 0.4–4.2)
WBC # BLD: 7.1 THOU/MM3 (ref 4.8–10.8)

## 2021-06-15 PROCEDURE — 2500000003 HC RX 250 WO HCPCS: Performed by: STUDENT IN AN ORGANIZED HEALTH CARE EDUCATION/TRAINING PROGRAM

## 2021-06-15 PROCEDURE — 93010 ELECTROCARDIOGRAM REPORT: CPT | Performed by: INTERNAL MEDICINE

## 2021-06-15 PROCEDURE — 84443 ASSAY THYROID STIM HORMONE: CPT

## 2021-06-15 PROCEDURE — 2580000003 HC RX 258: Performed by: STUDENT IN AN ORGANIZED HEALTH CARE EDUCATION/TRAINING PROGRAM

## 2021-06-15 PROCEDURE — 99285 EMERGENCY DEPT VISIT HI MDM: CPT

## 2021-06-15 PROCEDURE — 2500000003 HC RX 250 WO HCPCS: Performed by: FAMILY MEDICINE

## 2021-06-15 PROCEDURE — 36415 COLL VENOUS BLD VENIPUNCTURE: CPT

## 2021-06-15 PROCEDURE — 80048 BASIC METABOLIC PNL TOTAL CA: CPT

## 2021-06-15 PROCEDURE — 84484 ASSAY OF TROPONIN QUANT: CPT

## 2021-06-15 PROCEDURE — 80076 HEPATIC FUNCTION PANEL: CPT

## 2021-06-15 PROCEDURE — 83880 ASSAY OF NATRIURETIC PEPTIDE: CPT

## 2021-06-15 PROCEDURE — 93005 ELECTROCARDIOGRAM TRACING: CPT | Performed by: STUDENT IN AN ORGANIZED HEALTH CARE EDUCATION/TRAINING PROGRAM

## 2021-06-15 PROCEDURE — 2500000003 HC RX 250 WO HCPCS

## 2021-06-15 PROCEDURE — 85025 COMPLETE CBC W/AUTO DIFF WBC: CPT

## 2021-06-15 PROCEDURE — 71045 X-RAY EXAM CHEST 1 VIEW: CPT

## 2021-06-15 PROCEDURE — 92960 CARDIOVERSION ELECTRIC EXT: CPT

## 2021-06-15 RX ORDER — 0.9 % SODIUM CHLORIDE 0.9 %
1000 INTRAVENOUS SOLUTION INTRAVENOUS ONCE
Status: COMPLETED | OUTPATIENT
Start: 2021-06-15 | End: 2021-06-15

## 2021-06-15 RX ORDER — DILTIAZEM HYDROCHLORIDE 5 MG/ML
10 INJECTION INTRAVENOUS ONCE
Status: COMPLETED | OUTPATIENT
Start: 2021-06-15 | End: 2021-06-15

## 2021-06-15 RX ORDER — DILTIAZEM HYDROCHLORIDE 5 MG/ML
INJECTION INTRAVENOUS
Status: COMPLETED
Start: 2021-06-15 | End: 2021-06-15

## 2021-06-15 RX ORDER — KETAMINE HCL IN NACL, ISO-OSM 100MG/10ML
SYRINGE (ML) INJECTION
Status: DISCONTINUED
Start: 2021-06-15 | End: 2021-06-15 | Stop reason: HOSPADM

## 2021-06-15 RX ORDER — KETAMINE HCL IN NACL, ISO-OSM 100MG/10ML
3 SYRINGE (ML) INJECTION ONCE
Status: DISCONTINUED | OUTPATIENT
Start: 2021-06-15 | End: 2021-06-15 | Stop reason: HOSPADM

## 2021-06-15 RX ORDER — KETAMINE HCL IN NACL, ISO-OSM 100MG/10ML
SYRINGE (ML) INJECTION DAILY PRN
Status: COMPLETED | OUTPATIENT
Start: 2021-06-15 | End: 2021-06-15

## 2021-06-15 RX ADMIN — DILTIAZEM HYDROCHLORIDE 10 MG: 5 INJECTION INTRAVENOUS at 13:23

## 2021-06-15 RX ADMIN — DILTIAZEM HYDROCHLORIDE 5 MG/HR: 5 INJECTION, SOLUTION INTRAVENOUS at 13:59

## 2021-06-15 RX ADMIN — Medication 50 MG: at 15:22

## 2021-06-15 RX ADMIN — SODIUM CHLORIDE 1000 ML: 9 INJECTION, SOLUTION INTRAVENOUS at 13:25

## 2021-06-15 ASSESSMENT — ENCOUNTER SYMPTOMS
NAUSEA: 0
COUGH: 0
BACK PAIN: 0
SHORTNESS OF BREATH: 1
ABDOMINAL PAIN: 0
EYE PAIN: 0
DIARRHEA: 0
CONSTIPATION: 0
VOMITING: 0
SINUS PAIN: 0

## 2021-06-15 NOTE — ED TRIAGE NOTES
Pt to the ED with c/o tachycardia. Pt states her pulse was above 112 yesterday. Earlier today it was around [de-identified]. Upon arrival the pt's RH is 165. Pt states she has been feeling SOB today.

## 2021-06-15 NOTE — ED PROVIDER NOTES
[facility] ENCOUNTER          Pt Name: Gertrudis Smith  MRN: [id number]  [place] 1934  Date of evaluation: 6/15/2021  Treating Resident Physician: Kobe Rosen MD  Supervising Physician: Dr. Justin Vera MD    [address] [place] [address]       Chief Complaint   Patient presents with    Tachycardia     History obtained from the patient and family member at bedside. HISTORY OF PRESENT ILLNESS    HPI  Gertrudis Smith is a 80 y.o. female who presents to the emergency department for evaluation of tachycardia.  at bedside states that last night she began having a little bit of palpitations checked her heart rate was 89. Patient is continued this morning and they checked her heart rate and it was 111 and then 122. Patient states she has no chest pain but has palpitations and some shortness of breath. Patient also mentions some mild abdominal pressure but no abdominal pain. Denies any leg swelling. Denies any nausea vomiting diarrhea constipation. Denies any headache. Patient is alert and oriented x3. Patient is on Eliquis. Patient has a history of A. fib RVR but states she is usually in a normal rhythm. Patient states she had a life-threatening reaction to amiodarone in the past.  Patient states recently she has had significant stressors in her life, she has family member in the hospital as well as a dog that is dying. Patient states she is compliant with her medications. Patient denies any recent viral illness or URI symptoms. Chart review shows patient is on Eliquis    Chart review shows on 4/19/2020    ECHO from 3/01/2021:  Summary   Probe easily inserted by Cardiologist.   Procedure performed without complications. The study included complete 2D imaging, complete spectral doppler, and   color doppler. The transesophageal approach was used. Risk benefits and alternatives were explained to the patient and informed   consent was obtained. Ejection fraction was estimated at 50-55%. There was mild-moderate mitral regurgitation. Aortic root = 3.5 cm. Patient denies any new headache fever chills cough chest pain nausea vomiting diarrhea constipation leg swelling. The patient has no other acute complaints at this time. REVIEW OF SYSTEMS   Review of Systems   Constitutional: Positive for fatigue. Negative for chills, diaphoresis and fever. HENT: Negative for ear pain and sinus pain. Eyes: Negative for pain. Respiratory: Positive for shortness of breath. Negative for cough. Cardiovascular: Positive for palpitations. Negative for chest pain and leg swelling. Gastrointestinal: Negative for abdominal pain, constipation, diarrhea, nausea and vomiting. Genitourinary: Negative for dysuria and flank pain. Musculoskeletal: Negative for back pain and neck pain. Skin: Negative for wound. Neurological: Negative for headaches. Psychiatric/Behavioral: Negative for confusion.          PAST MEDICAL AND SURGICAL HISTORY     Past Medical History:   Diagnosis Date    Accidental fall 09/2002    [facility] ER- fell on face on cement    Allergic rhinitis     Arthritis     CHF (congestive heart failure) ([facility] [address]     Encounter for cardioversion procedure 10/05/2017    GERD (gastroesophageal reflux disease)     Hx of transesophageal echocardiography (KAMRAN) for monitoring 10/2017    Mild mitral regurgitation 10/2017    Nonrheumatic aortic valve insufficiency 8/29/2017    Osteoporosis     Pre-op evaluation: prior to left knee repalcement 8/29/2017    PVC's (premature ventricular contractions) 8/29/2017    S/P cardiac catheterization 10/07/2017    with Dr. Rudy Felipe -diagonal 90%, circ 90%,     Torn meniscus 12/2016     Past Surgical History:   Procedure Laterality Date    APPENDECTOMY  1946    BACK SURGERY  3/24/14    Lumbar Laminectomy Fusion L3-5, L4-5 PLIF - Dr. Jeremy Mcwilliams Left 09/05/2017    Michael Left Total Knee    ROTATOR CUFF REPAIR  3/8/06    right shoulder          MEDICATIONS     Current Facility-Administered Medications:     [COMPLETED] dilTIAZem injection 10 mg, 10 mg, Intravenous, Once, 10 mg at 06/15/21 1323 **FOLLOWED BY** dilTIAZem 125 mg in dextrose 5 % 125 mL infusion, 5-15 mg/hr, Intravenous, Continuous, Ashwin Kelsey MD, Stopped at 06/15/21 1519    ketamine (KETALAR) injection 177 mg, 3 mg/kg, Intravenous, Once, Ashwin Kelsey MD    ketamine (KETALAR) 50 MG/5ML injection, , , ,     Current Outpatient Medications:     lansoprazole (PREVACID) 30 MG delayed release capsule, Take 1 capsule by mouth daily, Disp: 90 capsule, Rfl: 3    apixaban (ELIQUIS) 5 MG TABS tablet, Take 1 tablet by mouth 2 times daily, Disp: 180 tablet, Rfl: 3    atorvastatin (LIPITOR) 40 MG tablet, Take 1 tablet by mouth nightly, Disp: 90 tablet, Rfl: 3    carvedilol (COREG) 6.25 MG tablet, Take 1 tablet by mouth 2 times daily (with meals), Disp: 180 tablet, Rfl: 3    spironolactone (ALDACTONE) 25 MG tablet, Take 0.5 tablets by mouth daily, Disp: 45 tablet, Rfl: 3    isosorbide mononitrate (IMDUR) 30 MG extended release tablet, Take 1 tablet by mouth daily, Disp: 90 tablet, Rfl: 3    furosemide (LASIX) 20 MG tablet, Take 1 tablet by mouth three times a week Mon-Wed-Fri, Disp: 36 tablet, Rfl: 3    nitroGLYCERIN (NITROSTAT) 0.4 MG SL tablet, Place 1 tablet under the tongue every 5 minutes as needed for Chest pain (SOB), Disp: 25 tablet, Rfl: 3    vitamin C (ASCORBIC ACID) 500 MG tablet, Take 500 mg by mouth daily, Disp: , Rfl:     vitamin D (CHOLECALCIFEROL) 1000 UNIT TABS tablet, Take 500 Units by mouth once a week , Disp: , Rfl:     aspirin EC 81 MG EC tablet, Take 1 tablet by mouth daily, Disp: 30 tablet, Rfl: 0      SOCIAL HISTORY     Social History     Social History Narrative    Not on file     Social History     Tobacco Use    Smoking status: Former Smoker     Packs/day: 0.50     Years: 5.00 Pack years: 2.50     Quit date: 1965     Years since quittin.8    Smokeless tobacco: Never Used   Vaping Use    Vaping Use: Never used   Substance Use Topics    Alcohol use: Yes     Comment: occ. red wine    Drug use: No         ALLERGIES     Allergies   Allergen Reactions    Amiodarone Other (See Comments)     Prolonged QT; Torsades DP         FAMILY HISTORY     Family History   Problem Relation Age of Onset    Arthritis Mother     Heart Disease Sister     High Blood Pressure Sister     Arthritis Sister     COPD Sister     Cancer Sister         Skin Cancer    Cancer Brother     Heart Disease Brother     Diabetes Maternal Grandfather     Heart Disease Brother         CHF    Cancer Brother         Lung Cancer         PREVIOUS RECORDS   Previous records reviewed: Patient was seen last on 2021 for cardiology office visit regarding CHF. PHYSICAL EXAM     ED Triage Vitals   BP Temp Temp src Pulse Resp SpO2 Height Weight   -- -- -- -- -- -- -- --     Initial vital signs and nursing assessment reviewed and vitals are/show: abnormal from Tachycardic. Pulsoximetry is normal per my interpretation. Additional Vital Signs:  Vitals:    06/15/21 1620   BP: 97/72   Pulse: 76   Resp: 20   Temp:    SpO2: 98%       Physical Exam  Constitutional:       General: She is not in acute distress. Appearance: Normal appearance. She is normal weight. She is not ill-appearing, toxic-appearing or diaphoretic. HENT:      Head: Normocephalic and atraumatic. Right Ear: External ear normal.      Left Ear: External ear normal.   Eyes:      General: No scleral icterus. Right eye: No discharge. Left eye: No discharge. Cardiovascular:      Rate and Rhythm: Tachycardia present. Rhythm irregular. Comments: Pulses equal bilaterally upper extremities  Pulmonary:      Effort: Pulmonary effort is normal. No respiratory distress. Breath sounds: Normal breath sounds. No stridor.  No wheezing, rhonchi or rales. Chest:      Chest wall: No tenderness. Abdominal:      General: Abdomen is flat. There is no distension. Palpations: Abdomen is soft. Tenderness: There is no abdominal tenderness. There is no guarding or rebound. Musculoskeletal:      Cervical back: Neck supple. Right lower leg: No edema. Left lower leg: No edema. Skin:     General: Skin is warm and dry. Neurological:      Mental Status: She is alert and oriented to person, place, and time. Mental status is at baseline. Comments: GCS 15  Alert and oriented x3   Psychiatric:         Mood and Affect: Mood normal.         Behavior: Behavior normal.         Thought Content: Thought content normal.         Judgment: Judgment normal.               MEDICAL DECISION MAKING   Initial Assessment:     A. fib with RVR    Differential diagnoses include but not limited to: Arrhythmia, electrolyte disturbance, ACS, PE, CHF, COPD, URI, thyroid issue, caffeine use, drug intoxication, drug withdrawal, stress    Plan:     EKG  Labs. Imaging: Chest x-ray  IV fluids  Diltiazem bolus and infusion  Attending physician discussed case with cardiologist  Procedural sedation  Electrical cardioversion  Discharge      Patient was seen and evaluated in the emergency department for tachycardia. Patient found to be in A. fib with rapid ventricular response with a heart rate up to to the 160s. Patient was alert and oriented vital signs were stable. Patient has a history of paroxysmal atrial fibrillation and is on Eliquis. We initially started with rate control via diltiazem and IV fluids which improved her rate down to around 100 she was still in atrial fibrillation. We discussed her case with patient's cardiologist Dr. Kenyon Corea, who supported cardioversion. Patient believes that her symptoms were within 48 hours and she preferred and agreed to sedation and cardioversion. Consent was obtained.   Patient was sedated with ketamine and successfully cardioverted with 200 J. Patient returned to normal sinus rhythm. Patient tolerated procedure well. Lab work showed elevation in her BNP but otherwise was unremarkable. Patient describes multiple recent stressors which likely could be the underlying cause of her episode of atrial fibrillation with RVR. Patient was watched in the emergency department for approximately 1.5 hours after procedure without issues and patient was back to baseline and felt well. Patient discharged with cardiology follow-up          CRITICAL CARE: There was a high probability of clinically significant/life threatening deterioration in this patient's condition which required my urgent intervention. Total critical care time was 30 minutes. This excludes any time for separately reportable procedures. Cardiovert / Defib    Date/Time: 6/15/2021 3:37 PM  Performed by: Lorena Pop MD  Authorized by: Lynette Alston MD     Consent:     Consent obtained:  Written and verbal    Consent given by:  Patient    Risks discussed:  Cutaneous burn, death, induced arrhythmia and pain    Alternatives discussed:  No treatment, rate-control medication, alternative treatment and anti-coagulation medication  Pre-procedure details:     Cardioversion basis:  Elective    Rhythm:  Atrial fibrillation    Electrode placement:  Anterior-lateral  Attempt one:     Cardioversion mode:  Synchronous    Waveform:  Monophasic    Shock (Joules):  200    Shock outcome:  Conversion to normal sinus rhythm  Post-procedure details:     Patient status:  Awake    Patient tolerance of procedure: Tolerated well, no immediate complications  Procedural sedation    Date/Time: 6/15/2021 3:38 PM  Performed by: Lorena Pop MD  Authorized by: Lynette Alston MD     Consent:     Consent obtained:  Verbal and written    Consent given by:  Patient    Risks discussed:   Allergic reaction, prolonged hypoxia resulting in organ damage, prolonged sedation necessitating reversal, respiratory compromise necessitating ventilatory assistance and intubation, nausea, vomiting, inadequate sedation and dysrhythmia  Universal protocol:     Procedure explained and questions answered to patient or proxy's satisfaction: yes      Relevant documents present and verified: yes      Test results available and properly labeled: yes      Imaging studies available: yes      Required blood products, implants, devices, and special equipment available: yes      Site/side marked: yes      Immediately prior to procedure a time out was called: yes      Patient identity confirmation method:  Verbally with patient, hospital-assigned identification number, arm band and provided demographic data  Indications:     Procedure performed:  Cardioversion    Procedure necessitating sedation performed by:  Physician performing sedation    Intended level of sedation:  Moderate (conscious sedation)  Pre-sedation assessment:     NPO status caution: unable to specify NPO status      ASA classification: class 2 - patient with mild systemic disease      Pre-sedation assessments completed and reviewed: airway patency, cardiovascular function and respiratory function      Pre-sedation assessment completed:  6/15/2021 3:30 PM  Immediate pre-procedure details:     Reassessment: Patient reassessed immediately prior to procedure      Reviewed: vital signs and relevant labs/tests      Verified: bag valve mask available, emergency equipment available, intubation equipment available, IV patency confirmed, oxygen available and suction available    Procedure details (see MAR for exact dosages):     Sedation start time:  6/15/2021 3:32 PM    Preoxygenation:  Nasal cannula    Sedation:  Ketamine    Intra-procedure monitoring:  Blood pressure monitoring, continuous capnometry, continuous pulse oximetry, cardiac monitor and frequent vital sign checks    Intra-procedure events: none      Intra-procedure management:  Supplemental oxygen Sedation end time:  6/15/2021 3:40 PM  Post-procedure details:     Post-sedation assessment completed:  6/15/2021 4:30 PM    Attendance: Constant attendance by certified staff until patient recovered      Recovery: Patient returned to pre-procedure baseline      Estimated blood loss (see I/O flowsheets): no      Post-sedation assessments completed and reviewed: airway patency, cardiovascular function, mental status, nausea/vomiting, pain level and respiratory function      Specimens recovered:  None    Patient is stable for discharge or admission: yes      Patient tolerance: Tolerated well, no immediate complications        ED RESULTS   Laboratory results:  Labs Reviewed   CBC WITH AUTO DIFFERENTIAL - Abnormal; Notable for the following components:       Result Value    MCHC 30.9 (*)     All other components within normal limits   BASIC METABOLIC PANEL W/ REFLEX TO MG FOR LOW K - Abnormal; Notable for the following components:    CO2 21 (*)     Glucose 168 (*)     All other components within normal limits   BRAIN NATRIURETIC PEPTIDE - Abnormal; Notable for the following components:    Pro-BNP 2899.0 (*)     All other components within normal limits   GLOMERULAR FILTRATION RATE, ESTIMATED - Abnormal; Notable for the following components:    Est, Glom Filt Rate 52 (*)     All other components within normal limits   HEPATIC FUNCTION PANEL   TROPONIN   TSH WITH REFLEX   ANION GAP   OSMOLALITY   URINE RT REFLEX TO CULTURE       Radiologic studies results:  XR CHEST PORTABLE   Final Result      No acute intrathoracic process. **This report has been created using voice recognition software. It may contain minor errors which are inherent in voice recognition technology. **      Final report electronically signed by Dr. Jim Swift on 6/15/2021 1:48 PM          ED Medications administered this visit:   Medications   dilTIAZem injection 10 mg (10 mg Intravenous Given 6/15/21 1323)     Followed by   dilTIAZem 125 mg in dextrose 5 % 125 mL infusion (0 mg/hr Intravenous Stopped 6/15/21 1519)   ketamine (KETALAR) injection 177 mg (has no administration in time range)   ketamine (KETALAR) 50 MG/5ML injection (has no administration in time range)   0.9 % sodium chloride bolus (0 mLs Intravenous Stopped 6/15/21 1425)   ketamine (KETALAR) injection (50 mg Intravenous Given 6/15/21 1522)         ED COURSE     ED Course as of Bashir 15 1755 Tue Bashir 15, 2021   1340 Patient states she her feeling of unwellness began last night but she is unsure if her A. fib started at that time.    [CR]   1349 Heart rate 110    [CR]   1441 BNP 2899    [CR]   1508 Patient discussed with Attending Physician that she would prefer electrocardioversion. Attending spoke with Cardiologist, Dr Emelyn Robles who believes this to be feasible. Ketamine ordered. Consent obtained    [CR]   0648 IMPRESSION:     No acute intrathoracic process. [CR]   1927 Patient sedated with ketamine and electrically cardioverted. Patient returned to NSR    [CR]      ED Course User Index  [CR] Oralia Dye MD        Strict return precautions and follow up instructions were discussed with the patient prior to discharge, with which the patient agrees. MEDICATION CHANGES     Discharge Medication List as of 6/15/2021  5:15 PM            FINAL DISPOSITION     Final diagnoses:   Atrial fibrillation with rapid ventricular response ([address]     Condition: condition: stable  Dispo: Discharge to home      This transcription was electronically signed. Parts of this transcriptions may have been dictated by use of voice recognition software and electronically transcribed, and parts may have been transcribed with the assistance of an ED scribe. The transcription may contain errors not detected in proofreading. Please refer to my supervising physician's documentation if my documentation differs.     Electronically Signed: Oralia Dye MD, 06/15/21, 5:55 Mary Rice Franci Dozier MD  Resident  06/15/21 1818

## 2021-06-15 NOTE — ED NOTES
Pt resting in bed. Pt is feeling much better and appears to be acting appropriately.       Sujit Buckner RN  06/15/21 4230

## 2021-06-16 ENCOUNTER — TELEPHONE (OUTPATIENT)
Dept: FAMILY MEDICINE CLINIC | Age: 86
End: 2021-06-16

## 2021-06-16 NOTE — LETTER
[address]  [address] [facility] , [address]  Phone: [phone]  Fax: [phone]    June 16, 2021    Christin Schwab Cox  [address]  [facility] [place] [address]      Dear Lamberto Jones,    This letter is regarding your Emergency Department (ED) visit at [facility] on 6/15/21. Diamond Sánchez wanted to make sure that you understand your discharge instructions and that you were able to fill any prescriptions that may have been ordered for you. Please contact the office at the above phone number if the ED advised to you follow up with Diamond Sánchez, or if you have any further questions or needs. Also did you know -   *Visiting the ED for a non-emergency could result in higher co-pays than you would normally be subject to paying? *You can call your doctor even after hours so they can direct you to the most appropriate care. [facility]) practices can often offer you an appointment on the same day that you call. *We have some [facility] offices that offer Walk-in appointments; check our website for availability in your community, [email or web address].      *Evisits are now available for patients for $36 through [employer] for certain conditions:  * Sinus, cold and or cough       * Diarrhea            * Headache  * Heartburn                                * Poison [place]          * Back pain     * Urinary problems                         If you do not have [employer] and are interested, please contact the office and a staff member may assist you or go to [email or web address].     Sincerely,   Vesna Alvarenga MD and your [facility]

## 2021-06-30 ENCOUNTER — HOSPITAL ENCOUNTER (INPATIENT)
Age: 86
LOS: 3 days | Discharge: HOME | DRG: 308 | End: 2021-07-03
Attending: EMERGENCY MEDICINE | Admitting: FAMILY MEDICINE
Payer: MEDICARE

## 2021-06-30 DIAGNOSIS — D64.9 ANEMIA, UNSPECIFIED TYPE: ICD-10-CM

## 2021-06-30 DIAGNOSIS — I48.91 ATRIAL FIBRILLATION WITH RAPID VENTRICULAR RESPONSE (HCC): Primary | ICD-10-CM

## 2021-06-30 DIAGNOSIS — E87.1 HYPONATREMIA: ICD-10-CM

## 2021-06-30 DIAGNOSIS — R07.9 CHEST PAIN, UNSPECIFIED TYPE: ICD-10-CM

## 2021-06-30 DIAGNOSIS — Z86.79 ATRIAL FIBRILLATION, CURRENTLY IN SINUS RHYTHM: ICD-10-CM

## 2021-06-30 LAB
ALBUMIN SERPL-MCNC: 3.8 G/DL (ref 3.5–5.1)
ALP BLD-CCNC: 81 U/L (ref 38–126)
ALT SERPL-CCNC: 9 U/L (ref 11–66)
ANION GAP SERPL CALCULATED.3IONS-SCNC: 11 MEQ/L (ref 8–16)
APTT: 33.8 SECONDS (ref 22–38)
APTT: 96.4 SECONDS (ref 22–38)
AST SERPL-CCNC: 14 U/L (ref 5–40)
BASOPHILS # BLD: 0.4 %
BASOPHILS ABSOLUTE: 0 THOU/MM3 (ref 0–0.1)
BILIRUB SERPL-MCNC: 1.2 MG/DL (ref 0.3–1.2)
BUN BLDV-MCNC: 20 MG/DL (ref 7–22)
CALCIUM SERPL-MCNC: 9.1 MG/DL (ref 8.5–10.5)
CHLORIDE BLD-SCNC: 102 MEQ/L (ref 98–111)
CO2: 22 MEQ/L (ref 23–33)
CREAT SERPL-MCNC: 0.9 MG/DL (ref 0.4–1.2)
EKG Q-T INTERVAL: 292 MS
EKG QRS DURATION: 84 MS
EKG QTC CALCULATION (BAZETT): 453 MS
EKG R AXIS: -55 DEGREES
EKG T AXIS: -105 DEGREES
EKG VENTRICULAR RATE: 145 BPM
EOSINOPHIL # BLD: 1.9 %
EOSINOPHILS ABSOLUTE: 0.2 THOU/MM3 (ref 0–0.4)
ERYTHROCYTE [DISTWIDTH] IN BLOOD BY AUTOMATED COUNT: 12.5 % (ref 11.5–14.5)
ERYTHROCYTE [DISTWIDTH] IN BLOOD BY AUTOMATED COUNT: 12.6 % (ref 11.5–14.5)
ERYTHROCYTE [DISTWIDTH] IN BLOOD BY AUTOMATED COUNT: 43 FL (ref 35–45)
ERYTHROCYTE [DISTWIDTH] IN BLOOD BY AUTOMATED COUNT: 43.6 FL (ref 35–45)
GFR SERPL CREATININE-BSD FRML MDRD: 59 ML/MIN/1.73M2
GLUCOSE BLD-MCNC: 127 MG/DL (ref 70–108)
HCT VFR BLD CALC: 39.5 % (ref 37–47)
HCT VFR BLD CALC: 40.8 % (ref 37–47)
HEMOGLOBIN: 12.3 GM/DL (ref 12–16)
HEMOGLOBIN: 12.6 GM/DL (ref 12–16)
IMMATURE GRANS (ABS): 0.04 THOU/MM3 (ref 0–0.07)
IMMATURE GRANULOCYTES: 0.4 %
LYMPHOCYTES # BLD: 17.1 %
LYMPHOCYTES ABSOLUTE: 1.5 THOU/MM3 (ref 1–4.8)
MCH RBC QN AUTO: 29.3 PG (ref 26–33)
MCH RBC QN AUTO: 29.6 PG (ref 26–33)
MCHC RBC AUTO-ENTMCNC: 30.9 GM/DL (ref 32.2–35.5)
MCHC RBC AUTO-ENTMCNC: 31.1 GM/DL (ref 32.2–35.5)
MCV RBC AUTO: 94.9 FL (ref 81–99)
MCV RBC AUTO: 95.2 FL (ref 81–99)
MONOCYTES # BLD: 12 %
MONOCYTES ABSOLUTE: 1.1 THOU/MM3 (ref 0.4–1.3)
NUCLEATED RED BLOOD CELLS: 0 /100 WBC
OSMOLALITY CALCULATION: 274.3 MOSMOL/KG (ref 275–300)
PLATELET # BLD: 205 THOU/MM3 (ref 130–400)
PLATELET # BLD: 207 THOU/MM3 (ref 130–400)
PMV BLD AUTO: 10.8 FL (ref 9.4–12.4)
PMV BLD AUTO: 11 FL (ref 9.4–12.4)
POTASSIUM REFLEX MAGNESIUM: 4.6 MEQ/L (ref 3.5–5.2)
RBC # BLD: 4.15 MILL/MM3 (ref 4.2–5.4)
RBC # BLD: 4.3 MILL/MM3 (ref 4.2–5.4)
SEG NEUTROPHILS: 68.2 %
SEGMENTED NEUTROPHILS ABSOLUTE COUNT: 6.1 THOU/MM3 (ref 1.8–7.7)
SODIUM BLD-SCNC: 135 MEQ/L (ref 135–145)
TOTAL PROTEIN: 7.1 G/DL (ref 6.1–8)
TROPONIN T: < 0.01 NG/ML
TSH SERPL DL<=0.05 MIU/L-ACNC: 1.91 UIU/ML (ref 0.4–4.2)
TSH SERPL DL<=0.05 MIU/L-ACNC: 3.3 UIU/ML (ref 0.4–4.2)
WBC # BLD: 8.9 THOU/MM3 (ref 4.8–10.8)
WBC # BLD: 9 THOU/MM3 (ref 4.8–10.8)

## 2021-06-30 PROCEDURE — 2500000003 HC RX 250 WO HCPCS: Performed by: EMERGENCY MEDICINE

## 2021-06-30 PROCEDURE — 96365 THER/PROPH/DIAG IV INF INIT: CPT

## 2021-06-30 PROCEDURE — 85730 THROMBOPLASTIN TIME PARTIAL: CPT

## 2021-06-30 PROCEDURE — 84484 ASSAY OF TROPONIN QUANT: CPT

## 2021-06-30 PROCEDURE — 96361 HYDRATE IV INFUSION ADD-ON: CPT

## 2021-06-30 PROCEDURE — 99285 EMERGENCY DEPT VISIT HI MDM: CPT

## 2021-06-30 PROCEDURE — 6370000000 HC RX 637 (ALT 250 FOR IP): Performed by: FAMILY MEDICINE

## 2021-06-30 PROCEDURE — 36415 COLL VENOUS BLD VENIPUNCTURE: CPT

## 2021-06-30 PROCEDURE — 85027 COMPLETE CBC AUTOMATED: CPT

## 2021-06-30 PROCEDURE — 84443 ASSAY THYROID STIM HORMONE: CPT

## 2021-06-30 PROCEDURE — 2140000000 HC CCU INTERMEDIATE R&B

## 2021-06-30 PROCEDURE — 6360000002 HC RX W HCPCS: Performed by: FAMILY MEDICINE

## 2021-06-30 PROCEDURE — 80053 COMPREHEN METABOLIC PANEL: CPT

## 2021-06-30 PROCEDURE — 99223 1ST HOSP IP/OBS HIGH 75: CPT | Performed by: INTERNAL MEDICINE

## 2021-06-30 PROCEDURE — 99222 1ST HOSP IP/OBS MODERATE 55: CPT | Performed by: FAMILY MEDICINE

## 2021-06-30 PROCEDURE — 85025 COMPLETE CBC W/AUTO DIFF WBC: CPT

## 2021-06-30 PROCEDURE — 2580000003 HC RX 258: Performed by: EMERGENCY MEDICINE

## 2021-06-30 PROCEDURE — 96375 TX/PRO/DX INJ NEW DRUG ADDON: CPT

## 2021-06-30 PROCEDURE — 93005 ELECTROCARDIOGRAM TRACING: CPT | Performed by: EMERGENCY MEDICINE

## 2021-06-30 PROCEDURE — 96366 THER/PROPH/DIAG IV INF ADDON: CPT

## 2021-06-30 RX ORDER — SODIUM CHLORIDE 0.9 % (FLUSH) 0.9 %
5-40 SYRINGE (ML) INJECTION PRN
Status: DISCONTINUED | OUTPATIENT
Start: 2021-06-30 | End: 2021-07-03 | Stop reason: HOSPADM

## 2021-06-30 RX ORDER — HEPARIN SODIUM 10000 [USP'U]/100ML
18 INJECTION, SOLUTION INTRAVENOUS CONTINUOUS
Status: DISCONTINUED | OUTPATIENT
Start: 2021-06-30 | End: 2021-07-02

## 2021-06-30 RX ORDER — ASPIRIN 81 MG/1
81 TABLET ORAL DAILY
Status: DISCONTINUED | OUTPATIENT
Start: 2021-07-01 | End: 2021-07-03 | Stop reason: HOSPADM

## 2021-06-30 RX ORDER — ONDANSETRON 4 MG/1
4 TABLET, ORALLY DISINTEGRATING ORAL EVERY 8 HOURS PRN
Status: DISCONTINUED | OUTPATIENT
Start: 2021-06-30 | End: 2021-07-02

## 2021-06-30 RX ORDER — PANTOPRAZOLE SODIUM 40 MG/1
40 TABLET, DELAYED RELEASE ORAL
Status: DISCONTINUED | OUTPATIENT
Start: 2021-07-01 | End: 2021-07-03 | Stop reason: HOSPADM

## 2021-06-30 RX ORDER — POLYETHYLENE GLYCOL 3350 17 G/17G
17 POWDER, FOR SOLUTION ORAL DAILY PRN
Status: DISCONTINUED | OUTPATIENT
Start: 2021-06-30 | End: 2021-07-03 | Stop reason: HOSPADM

## 2021-06-30 RX ORDER — SODIUM CHLORIDE 0.9 % (FLUSH) 0.9 %
5-40 SYRINGE (ML) INJECTION EVERY 12 HOURS SCHEDULED
Status: DISCONTINUED | OUTPATIENT
Start: 2021-06-30 | End: 2021-07-03 | Stop reason: HOSPADM

## 2021-06-30 RX ORDER — ACETAMINOPHEN 650 MG/1
650 SUPPOSITORY RECTAL EVERY 6 HOURS PRN
Status: DISCONTINUED | OUTPATIENT
Start: 2021-06-30 | End: 2021-07-03 | Stop reason: HOSPADM

## 2021-06-30 RX ORDER — HEPARIN SODIUM 1000 [USP'U]/ML
40 INJECTION, SOLUTION INTRAVENOUS; SUBCUTANEOUS PRN
Status: DISCONTINUED | OUTPATIENT
Start: 2021-06-30 | End: 2021-07-03 | Stop reason: HOSPADM

## 2021-06-30 RX ORDER — HEPARIN SODIUM 1000 [USP'U]/ML
80 INJECTION, SOLUTION INTRAVENOUS; SUBCUTANEOUS ONCE
Status: COMPLETED | OUTPATIENT
Start: 2021-06-30 | End: 2021-06-30

## 2021-06-30 RX ORDER — 0.9 % SODIUM CHLORIDE 0.9 %
250 INTRAVENOUS SOLUTION INTRAVENOUS ONCE
Status: COMPLETED | OUTPATIENT
Start: 2021-06-30 | End: 2021-06-30

## 2021-06-30 RX ORDER — HEPARIN SODIUM 1000 [USP'U]/ML
80 INJECTION, SOLUTION INTRAVENOUS; SUBCUTANEOUS PRN
Status: DISCONTINUED | OUTPATIENT
Start: 2021-06-30 | End: 2021-07-03 | Stop reason: HOSPADM

## 2021-06-30 RX ORDER — CARVEDILOL 6.25 MG/1
6.25 TABLET ORAL 2 TIMES DAILY WITH MEALS
Status: DISCONTINUED | OUTPATIENT
Start: 2021-06-30 | End: 2021-07-01

## 2021-06-30 RX ORDER — ATORVASTATIN CALCIUM 40 MG/1
40 TABLET, FILM COATED ORAL NIGHTLY
Status: DISCONTINUED | OUTPATIENT
Start: 2021-06-30 | End: 2021-07-03 | Stop reason: HOSPADM

## 2021-06-30 RX ORDER — ASCORBIC ACID 500 MG
500 TABLET ORAL DAILY
Status: DISCONTINUED | OUTPATIENT
Start: 2021-06-30 | End: 2021-07-03 | Stop reason: HOSPADM

## 2021-06-30 RX ORDER — ACETAMINOPHEN 325 MG/1
650 TABLET ORAL EVERY 6 HOURS PRN
Status: DISCONTINUED | OUTPATIENT
Start: 2021-06-30 | End: 2021-07-03 | Stop reason: HOSPADM

## 2021-06-30 RX ORDER — FUROSEMIDE 20 MG/1
20 TABLET ORAL
Status: DISCONTINUED | OUTPATIENT
Start: 2021-07-02 | End: 2021-07-03 | Stop reason: HOSPADM

## 2021-06-30 RX ORDER — ONDANSETRON 2 MG/ML
4 INJECTION INTRAMUSCULAR; INTRAVENOUS EVERY 6 HOURS PRN
Status: DISCONTINUED | OUTPATIENT
Start: 2021-06-30 | End: 2021-07-02

## 2021-06-30 RX ORDER — SODIUM CHLORIDE 9 MG/ML
25 INJECTION, SOLUTION INTRAVENOUS PRN
Status: DISCONTINUED | OUTPATIENT
Start: 2021-06-30 | End: 2021-07-03 | Stop reason: HOSPADM

## 2021-06-30 RX ORDER — DILTIAZEM HYDROCHLORIDE 5 MG/ML
10 INJECTION INTRAVENOUS ONCE
Status: COMPLETED | OUTPATIENT
Start: 2021-06-30 | End: 2021-06-30

## 2021-06-30 RX ORDER — SPIRONOLACTONE 25 MG/1
12.5 TABLET ORAL DAILY
Status: DISCONTINUED | OUTPATIENT
Start: 2021-07-01 | End: 2021-07-03 | Stop reason: HOSPADM

## 2021-06-30 RX ORDER — NITROGLYCERIN 0.4 MG/1
0.4 TABLET SUBLINGUAL EVERY 5 MIN PRN
Status: DISCONTINUED | OUTPATIENT
Start: 2021-06-30 | End: 2021-07-03 | Stop reason: HOSPADM

## 2021-06-30 RX ORDER — ISOSORBIDE MONONITRATE 30 MG/1
30 TABLET, EXTENDED RELEASE ORAL DAILY
Status: DISCONTINUED | OUTPATIENT
Start: 2021-07-01 | End: 2021-07-03 | Stop reason: HOSPADM

## 2021-06-30 RX ORDER — VITAMIN B COMPLEX
500 TABLET ORAL WEEKLY
Status: DISCONTINUED | OUTPATIENT
Start: 2021-07-01 | End: 2021-07-03 | Stop reason: HOSPADM

## 2021-06-30 RX ADMIN — DILTIAZEM HYDROCHLORIDE 5 MG/HR: 5 INJECTION, SOLUTION INTRAVENOUS at 08:47

## 2021-06-30 RX ADMIN — HEPARIN SODIUM 4720 UNITS: 1000 INJECTION, SOLUTION INTRAVENOUS; SUBCUTANEOUS at 13:30

## 2021-06-30 RX ADMIN — HEPARIN SODIUM 18 UNITS/KG/HR: 10000 INJECTION, SOLUTION INTRAVENOUS at 13:31

## 2021-06-30 RX ADMIN — SODIUM CHLORIDE 250 ML: 9 INJECTION, SOLUTION INTRAVENOUS at 08:10

## 2021-06-30 RX ADMIN — ATORVASTATIN CALCIUM 40 MG: 40 TABLET, FILM COATED ORAL at 19:30

## 2021-06-30 RX ADMIN — DILTIAZEM HYDROCHLORIDE 10 MG: 5 INJECTION INTRAVENOUS at 08:00

## 2021-06-30 ASSESSMENT — PAIN SCALES - GENERAL
PAINLEVEL_OUTOF10: 2
PAINLEVEL_OUTOF10: 6

## 2021-06-30 ASSESSMENT — ENCOUNTER SYMPTOMS
SHORTNESS OF BREATH: 0
NAUSEA: 0
COUGH: 0
DIARRHEA: 0
BLOOD IN STOOL: 0
VOICE CHANGE: 0
BACK PAIN: 0
CHEST TIGHTNESS: 0
VOMITING: 0
TROUBLE SWALLOWING: 0
ABDOMINAL PAIN: 0

## 2021-06-30 ASSESSMENT — PAIN DESCRIPTION - PAIN TYPE
TYPE: ACUTE PAIN
TYPE: ACUTE PAIN

## 2021-06-30 ASSESSMENT — PAIN DESCRIPTION - LOCATION
LOCATION: CHEST
LOCATION: CHEST

## 2021-06-30 NOTE — CONSULTS
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 Euvolemic pressures with preserved cardiac  output.     PLAN:  1. Continued management of heart failure symptoms. 2.  Optimal medical therapy. 3.  Risk factor management. 4.  Multivessel disease, not amenable to PCI. Continue medical  management. 5.  Bedrest/groin care per protocol.   6.  Follow up with myself or primary cardiologist as an outpatient two  weeks post discharge.     All the above was explained to the patient and the patient's family  and they are agreeable and amenable to the plan.           Raymond Claude     D: 10/09/2017 19:01:31

## 2021-06-30 NOTE — PROGRESS NOTES
[facility] Palliative Care           Progress Note    Patient Name:  Karen Michaels  Medical Record Number:  [id number]  YOB: 1934    Date:  6/30/2021  Time:  1:57 PM  Completed By:  Ti Burgos, RN, RN    Reason for Palliative Care Evaluation: Code status discussion     Current Issues:  []  Pain  []  Fatigue  []  Nausea  [x]  Anxiety  []  Depression  []  Shortness of Breath  []  Constipation  []  Appetite  []  Other:    Advance Directives  [] [facility] DNR Form  [] Living Will  [] Medical POA    Current Code Status  [x] Full Resuscitation  [] DNR-Comfort Care-Arrest  [] DNR-Comfort Care  [] Limited   [] No CPR   [] No shock   [] No ET intubation/reintubation   [] No resuscitative medications   [] Other limitation:      Family/Patient Discussion:  Received consult for code status discussion. On floor to speak with patient. Patient resting in bed,  at bedside. Introduced as palliative RN, patient and  agreeable to speak with this RN. Asked patient about current admission. Patient stated her heart rate had been out of control before and the doctors had completed a KAMRAN. Patient stated that she had to wear a lifevest at one point and \"it was the worst 90 days of my life. I will never do that again\". This Rn asking patient about current code status. Educated patient on full code, including chest compressions with possibility of broken ribs and organ damage, shock, and intubation. Patient stating she would not want to be kept alive. This RN informing patient with current code status patient would be placed on a breathing machine if necessary. Patient stating she would not want that. This RN educated patient and  of DNR CCA. Patient stated \"that's what I would want\". This RN asked patient if her and her  would like time to think over options, due to patient seeming uncertain. Patient stated \"no that's what I would want\".  This RN explained that there is a form for patient to sign and take home and this RN would be back to have patient sign form. Patient's  stating he would be going to get lunch. This RN entered room with [facility] form and explained to patient again. Patient stating it is what she would want but would like to wait for her  to come back prior to signing anything. This RN agreed, stated this RN would follow up with patient in 45 minutes. Primary RN, Shane Akers, and Dr Johnny Snyder updated on conversations.        Plan/Follow-Up:  Will follow up with patient     Alecia Ramos, RN, RN  6/30/2021,  1:57 PM

## 2021-06-30 NOTE — PROGRESS NOTES
Patient arrived from ED on [facility]. Patient able to ambulate from [facility] to bed on her own. Patient walked without assistance to bathroom as well. Patient vitals taken. Telemetry attached. Patient assessed. Skin assessment performed by  and Juwan Washington (admission's nurse). No skin issues noted other than a few small scabbed areas.

## 2021-06-30 NOTE — PLAN OF CARE
Problem: Falls - Risk of:  Goal: Will remain free from falls  Description: Will remain free from falls  Note: Patient safety maintained. No falls or injuries to this point in shift. Will continue to monitor. Problem: Pain:  Goal: Pain level will decrease  Description: Pain level will decrease  Note: Patient with mild chest pain. Denies need for pain medication. Will continue to monitor.

## 2021-06-30 NOTE — H&P
History & Physical        Patient: Mars Rojas  YOB: 1934    MRN: [id number]     Acct: [de-identified]    PCP: Esther Martínez MD    Date of Admission: 6/30/2021    Date of Service: Pt seen/examined on 06/30/21  and Admitted toInpatient with expected LOS greater than two midnights due to medical therapy. Chief Complaint:  Chest pain       History Of Present Illness:      80 y.o. female with PMH CHF, Afib x 3 years, on eliquis, Mitral regurgitation, who presented to [address] [place] [address] with chief complaint of chest pain. Patient was just seen in [facility] ER on 6/15/21 due to Afib with RVR, where she had synchronized cardioversion and patient was sent home. Patient reports that she was doing fine until 3 days prior admission, she started having intermittent palpitations and constant chest pain, located on the whole chest, 5-6/10, non-radiating, worse with deep breathing. She reports that she has SOB on exertion for several years but has been worse for past 3 days. She also reports chronic cough for years, unchanged. She has been monitoring her heart rate at home which has been as high as 120-130. Today, chest pain and palpitations persisted, hence, she decided to come to the ED. Pt denies dizziness, fever, chills, abd pain, N/V, sweating, legs swelling, orthopnea. In ER, VS temp 98.6F, RR 18, , /75, saturating 98% on room air. EKG showed afib with RVR, rate 145, ST depression on V4, V5, V6 ( similar from prior tracing). Labs include CMP normal except for serum bicarbonate 22, AG 11. CBC normal. Trop (-). Cardizem bolus with gtt was started in ER, SBP dropped to 90s after cardizem bolus, BP eventually improved to 125/101. Hospital Medicine was called for IP management. When I saw the patient in ER, she still having chest pain in the whole chest, 2/10. No other complaint noted.         Past Medical History:          Diagnosis Date    Accidental fall 09/2002    [facility] ER- fell on face on cement    Allergic rhinitis     Arthritis     CHF (congestive heart failure) ([facility] [address]     Encounter for cardioversion procedure 10/05/2017    GERD (gastroesophageal reflux disease)     Hx of transesophageal echocardiography (KAMRAN) for monitoring 10/2017    Mild mitral regurgitation 10/2017    Nonrheumatic aortic valve insufficiency 8/29/2017    Osteoporosis     Pre-op evaluation: prior to left knee repalcement 8/29/2017    PVC's (premature ventricular contractions) 8/29/2017    S/P cardiac catheterization 10/07/2017    with Dr. Miller Staff -diagonal 90%, circ 90%,     Torn meniscus 12/2016       Past Surgical History:          Procedure Laterality Date    APPENDECTOMY  1946    BACK SURGERY  3/24/14    Lumbar Laminectomy Fusion L3-5, L4-5 PLIF - Dr. Paolo Silverman Left 09/05/2017    Rodriguez Left Total Knee    ROTATOR CUFF REPAIR  3/8/06    right shoulder        Medications Prior to Admission:      Prior to Admission medications    Medication Sig Start Date End Date Taking?  Authorizing Provider   lansoprazole (PREVACID) 30 MG delayed release capsule Take 1 capsule by mouth daily 1/12/21   ANDREA Ramirez - CNP   apixaban (ELIQUIS) 5 MG TABS tablet Take 1 tablet by mouth 2 times daily 1/12/21   ANDREA Ramirez - CNP   atorvastatin (LIPITOR) 40 MG tablet Take 1 tablet by mouth nightly 1/12/21   ANDREA Ramirez - CNP   carvedilol (COREG) 6.25 MG tablet Take 1 tablet by mouth 2 times daily (with meals) 1/12/21   ANDREA Ramirez - CNP   spironolactone (ALDACTONE) 25 MG tablet Take 0.5 tablets by mouth daily 1/12/21   ANDREA Ramirez - CNP   isosorbide mononitrate (IMDUR) 30 MG extended release tablet Take 1 tablet by mouth daily 1/12/21   ANDREA Ramirez - CNP   furosemide (LASIX) 20 MG tablet Take 1 tablet by mouth three times a week Mon-Wed-Fri 1/13/21   ANDREA Ramirez CNP   nitroGLYCERIN (NITROSTAT) 0.4 MG SL tablet Place 1 tablet under the tongue every 5 minutes as needed for Chest pain (SOB) 1/21/19   Juanita Avendano APRN - CNP   vitamin C (ASCORBIC ACID) 500 MG tablet Take 500 mg by mouth daily    Historical Provider, MD   vitamin D (CHOLECALCIFEROL) 1000 UNIT TABS tablet Take 500 Units by mouth once a week     Historical Provider, MD   aspirin EC 81 MG EC tablet Take 1 tablet by mouth daily 9/24/16   Rex Valiente MD       Allergies:  Amiodarone    Social History:      TOBACCO:   reports that she quit smoking about 55 years ago. She has a 2.50 pack-year smoking history. She has never used smokeless tobacco.  ETOH:   reports current alcohol use. Family History:            Problem Relation Age of Onset    Arthritis Mother     Heart Disease Sister     High Blood Pressure Sister     Arthritis Sister     COPD Sister     Cancer Sister         Skin Cancer    Cancer Brother     Heart Disease Brother     Diabetes Maternal Grandfather     Heart Disease Brother         CHF    Cancer Brother         Lung Cancer       Diet:  No diet orders on file    REVIEW OF SYSTEMS:   Pertinent positives as noted in the HPI. All other systems reviewed and negative. PHYSICAL EXAM:    BP (!) 125/101   Pulse 102   Temp 98.6 °F (37 °C) (Oral)   Resp 19   Ht 5' (1.524 m)   Wt 130 lb (59 kg)   SpO2 97%   BMI 25.39 kg/m²     General appearance:  Alert, not in acute distress   HEENT:  Normal cephalic, atraumatic without obvious deformity. Pupils equal, round, and reactive to light. Conjunctivae clear. Clear oral mucosa. Neck: Supple, with full range of motion. No jugular venous distention. Trachea midline. Respiratory:  Normal respiratory effort. Clear to auscultation, bilaterally without Rales/Wheezes/Rhonchi. Cardiovascular:  Slightly tachycardic, irregularly irregular rhythm no murmurs, rubs or gallops. Abdomen: Soft, non-tender, non-distended with normal bowel sounds.   Musculoskeletal:  No clubbing, cyanosis or edema bilaterally. Full range of motion without deformity. Skin: No rashes or lesions. Neurological exam: alert, oriented, normal speech, no focal findings or movement disorder noted. Exam of extremities: peripheral pulses normal, no pedal or leg edema, no clubbing or cyanosis      Labs:     Recent Labs     06/30/21  0820   WBC 9.0   HGB 12.6   HCT 40.8        Recent Labs     06/30/21  0820      K 4.6      CO2 22*   BUN 20   CREATININE 0.9   CALCIUM 9.1     Recent Labs     06/30/21  0820   AST 14   ALT 9*   BILITOT 1.2   ALKPHOS 81     No results for input(s): INR in the last 72 hours. No results for input(s): Shellia Bugler in the last 72 hours. Urinalysis:      Lab Results   Component Value Date    NITRU NEGATIVE 10/05/2017    WBCUA 10-15 10/05/2017    BACTERIA NONE 10/05/2017    RBCUA 0-2 10/05/2017    BLOODU NEGATIVE 10/05/2017    GLUCOSEU NEGATIVE 10/05/2017       Intake & Output:  No intake/output data recorded. No intake/output data recorded. DVT prophylaxis: [] Lovenox                                 [] SCDs                                 [] SQ Heparin                                 [] Encourage ambulation           [x] Already on Anticoagulation    Code Status: Prior        ASSESSMENT and PLAN:      Atrial fibrillation with RVR: CHADsVASC score 4. Cont cardizem ggt started in ER. HOLD home meds eliquis for now and start heparin gtt pending cardiology consult. Cont home meds coreg. Tele. Check TSH  Chest pain/SOB, likely related to afib with RVR: trop (-), EKG afib with RVR, ST depression on leads V4, V5, V6 ( present prior tracing). Less likely PE, pt is already on eliquis. Trend trop, repeat EKG in am. Will consider chest CTA if chest pain/sob persist or worsen in spite above management.    Hypotension, drug-induced, resolved: cont to monitor VS. Will consider midodrine if recur or worsen   Chronic HFpEF, EF 55-60%, compensated: cont home meds aldactone and lasix with holding parameters. Daily weights, I/Os, fluid and salt restrictions  Moderate to severe MR: noted on echo 01/2021. Patient follows with cardiologist Dr. Rudy Felipe   CAD: per cardiac cath in 2017: Significant multivessel disease predominantly in the LAD and circumflex territories. Cont home meds imdur, coreg, lipitor, coreg   GERD: cont PPI    Disposition:    [] Home       [] TCU       [] Rehab       [] Psych       [] SNF       [] [place]       [x] Other-admit to  under Hospital Medicine service       Thank you Romulo Florez MD for the opportunity to be involved in this patient's care.     Electronically signed by Cristel Molina MD on 6/30/2021 at 12:22 PM

## 2021-06-30 NOTE — ED PROVIDER NOTES
[facility] [address] [address] EMERGENCY DEPT    EMERGENCY MEDICINE     Pt Name: Deya Castañeda  MRN: [id number]  [place]st[place] 1934  Date of evaluation: 6/30/2021  Provider: Roxana Haines DO, [address]       Chief Complaint   Patient presents with    Chest Pain    Atrial Fibrillation       HISTORY OF PRESENT ILLNESS    Josselyn Funez is a pleasant 80 y.o. female who presents to the emergency department from home for evaluation of chest pain and palpitations. Symptoms started 3 days ago, patient reports being here 2 weeks ago for similar presentation and at that time underwent synchronized cardioversion. She is been doing well until 3 days ago when she developed the symptoms. They are mostly intermittent, but the chest pain has been persistent. She has been monitoring her heart rate at home which has been as high as 120-130. Today, the symptoms persisted and therefore she decided to come to the ED. She denies any diaphoresis but has had some shortness of breath. No lower extremity pain or swelling. No recent illnesses. She reports full compliance with all her medications including Eliquis that she is on. Last cath 2017 with multivessel disease      Triage notes and Nursing notes were reviewed by myself. Any discrepancies are addressed above.     PAST MEDICAL HISTORY     Past Medical History:   Diagnosis Date    Accidental fall 09/2002    [facility] ER- fell on face on cement    Allergic rhinitis     Arthritis     CHF (congestive heart failure) ([facility] [address]     Encounter for cardioversion procedure 10/05/2017    GERD (gastroesophageal reflux disease)     Hx of transesophageal echocardiography (KAMRAN) for monitoring 10/2017    Mild mitral regurgitation 10/2017    Nonrheumatic aortic valve insufficiency 8/29/2017    Osteoporosis     Pre-op evaluation: prior to left knee repalcement 8/29/2017    PVC's (premature ventricular contractions) 8/29/2017    S/P cardiac catheterization 10/07/2017    with Dr. Nora De La Rosa -diagonal 90%, circ 90%,     Torn meniscus 12/2016       SURGICAL HISTORY       Past Surgical History:   Procedure Laterality Date    APPENDECTOMY  1946    BACK SURGERY  3/24/14    Lumbar Laminectomy Fusion L3-5, L4-5 PLIF - Dr. Azra Lal Left 09/05/2017    Rodriguez Left Total Knee    ROTATOR CUFF REPAIR  3/8/06    right shoulder        CURRENT MEDICATIONS       Previous Medications    APIXABAN (ELIQUIS) 5 MG TABS TABLET    Take 1 tablet by mouth 2 times daily    ASPIRIN EC 81 MG EC TABLET    Take 1 tablet by mouth daily    ATORVASTATIN (LIPITOR) 40 MG TABLET    Take 1 tablet by mouth nightly    CARVEDILOL (COREG) 6.25 MG TABLET    Take 1 tablet by mouth 2 times daily (with meals)    FUROSEMIDE (LASIX) 20 MG TABLET    Take 1 tablet by mouth three times a week Mon-Wed-Fri    ISOSORBIDE MONONITRATE (IMDUR) 30 MG EXTENDED RELEASE TABLET    Take 1 tablet by mouth daily    LANSOPRAZOLE (PREVACID) 30 MG DELAYED RELEASE CAPSULE    Take 1 capsule by mouth daily    NITROGLYCERIN (NITROSTAT) 0.4 MG SL TABLET    Place 1 tablet under the tongue every 5 minutes as needed for Chest pain (SOB)    SPIRONOLACTONE (ALDACTONE) 25 MG TABLET    Take 0.5 tablets by mouth daily    VITAMIN C (ASCORBIC ACID) 500 MG TABLET    Take 500 mg by mouth daily    VITAMIN D (CHOLECALCIFEROL) 1000 UNIT TABS TABLET    Take 500 Units by mouth once a week        ALLERGIES     Amiodarone    FAMILY HISTORY       Family History   Problem Relation Age of Onset    Arthritis Mother     Heart Disease Sister     High Blood Pressure Sister     Arthritis Sister     COPD Sister     Cancer Sister         Skin Cancer    Cancer Brother     Heart Disease Brother     Diabetes Maternal Grandfather     Heart Disease Brother         CHF    Cancer Brother         Lung Cancer        SOCIAL HISTORY       Social History     Socioeconomic History    Marital status:      Spouse name: None    Number of children: None  Years of education: None    Highest education level: None   Occupational History    None   Tobacco Use    Smoking status: Former Smoker     Packs/day: 0.50     Years: 5.00     Pack years: 2.50     Quit date: 1965     Years since quittin.8    Smokeless tobacco: Never Used   Vaping Use    Vaping Use: Never used   Substance and Sexual Activity    Alcohol use: Yes     Comment: occ. red wine    Drug use: No    Sexual activity: Yes     Partners: Male   Other Topics Concern    None   Social History Narrative    None     Social Determinants of Health     Financial Resource Strain:     Difficulty of Paying Living Expenses:    Food Insecurity:     Worried About Running Out of Food in the Last Year:     Ran Out of Food in the Last Year:    Transportation Needs:     Lack of Transportation (Medical):  Lack of Transportation (Non-Medical):    Physical Activity:     Days of Exercise per Week:     Minutes of Exercise per Session:    Stress:     Feeling of Stress :    Social Connections:     Frequency of Communication with Friends and Family:     Frequency of Social Gatherings with Friends and Family:     Attends Oriental orthodox Services:     Active Member of Clubs or Organizations:     Attends Club or Organization Meetings:     Marital Status:    Intimate Partner Violence:     Fear of Current or Ex-Partner:     Emotionally Abused:     Physically Abused:     Sexually Abused:        REVIEW OF SYSTEMS     Review of Systems   Constitutional: Negative for chills, diaphoresis and fever. HENT: Negative for trouble swallowing and voice change. Eyes: Negative for visual disturbance. Respiratory: Negative for cough, chest tightness and shortness of breath. Cardiovascular: Positive for palpitations. Negative for chest pain and leg swelling. Gastrointestinal: Negative for abdominal pain, blood in stool, diarrhea, nausea and vomiting.    Genitourinary: Negative for dysuria, frequency and hematuria. Musculoskeletal: Negative for back pain and neck pain. Skin: Negative for rash and wound. Neurological: Negative for speech difficulty, weakness, numbness and headaches. Psychiatric/Behavioral: Negative for confusion. Except as noted above the remainder of the review of systems was reviewed and is. PHYSICAL EXAM    (up to 7 for level 4, 8 or more for level 5)     ED Triage Vitals [06/30/21 0745]   BP Temp Temp Source Pulse Resp SpO2 Height Weight   117/75 98.6 °F (37 °C) Oral 165 18 98 % 5' (1.524 m) 130 lb (59 kg)       Physical Exam  Vitals and nursing note reviewed. Constitutional:       General: She is not in acute distress. Appearance: She is well-developed. She is not ill-appearing, toxic-appearing or diaphoretic. HENT:      Head: Normocephalic and atraumatic. Eyes:      General: No scleral icterus. Conjunctiva/sclera: Conjunctivae normal.      Right eye: Right conjunctiva is not injected. Left eye: Left conjunctiva is not injected. Pupils: Pupils are equal, round, and reactive to light. Neck:      Thyroid: No thyromegaly. Trachea: No tracheal deviation. Cardiovascular:      Rate and Rhythm: Tachycardia present. Rhythm irregular. Heart sounds: Normal heart sounds. No murmur heard. No friction rub. No gallop. Pulmonary:      Effort: Pulmonary effort is normal. No respiratory distress. Breath sounds: Normal breath sounds. No stridor. No wheezing or rales. Abdominal:      General: Bowel sounds are normal. There is no distension. Palpations: Abdomen is soft. There is no mass. Tenderness: There is no abdominal tenderness. There is no guarding or rebound. Comments: Negative Angulo's sign  Nontender McBurney's Point  Negative Rovsig's sign  No bruising or echymosis of abdomen   Musculoskeletal:         General: No tenderness. Cervical back: Normal range of motion and neck supple.       Comments: Negative Latoya's Sign bilaterally   Lymphadenopathy:      Cervical: No cervical adenopathy. Skin:     General: Skin is warm and dry. Coloration: Skin is not pale. Findings: No erythema or rash. Neurological:      Mental Status: She is alert and oriented to person, place, and time. Cranial Nerves: No cranial nerve deficit. Motor: No abnormal muscle tone. Coordination: Coordination normal.      Comments: No nystagmus   Psychiatric:         Behavior: Behavior normal.         Thought Content: Thought content normal.         DIAGNOSTIC RESULTS     EKG:(none if blank)  All EKG's are [facility] who either signs or Co-signs this chart in the absence of a cardiologist.    EKG shows atrial fibrillation, rapid ventricular response, rate of 146, some lateral ST depressions are present and T wave inversions as well. RADIOLOGY: (none if blank)   Interpretation per the Radiologistbelow, if available at the time of this note:    No orders to display       LABS:  Labs Reviewed   CBC WITH AUTO DIFFERENTIAL   COMPREHENSIVE METABOLIC PANEL W/ REFLEX TO MG FOR LOW K   TROPONIN       All other labs were within normal range or not returned as of this dictation. Please note, any cultures that may have been sent were not resulted at the time of this patient visit. EMERGENCY DEPARTMENT COURSE andMedical Decision Making:     MDM/   *initiated cardizem drip  Pt awke and alert  BP's somewhat tenuous however chest pain/tightness improving. Repeat ekg with no axute ischemia  Pt already on ASA  Admit on cardizem drip to medicine    Critical Care  There was a high probability of life-threatening clinical deterioration in the patient's condition requiring my urgent intervention. Total critical care time with the patient was 45 minutes excluding separately reportable procedures. Critical care required due to patients hemodynamic status requiring frequent reassesmments, monitoring, intervention. ED Medications administered this visit:    Medications   dilTIAZem 125 mg in dextrose 5 % 125 mL infusion (has no administration in time range)   dilTIAZem injection 10 mg (10 mg Intravenous Given 6/30/21 0800)         Procedures: (None if blank)       CLINICAL       1. Atrial fibrillation with rapid ventricular response (HCC)    2. Chest pain, unspecified type          DISPOSITION/PLAN   DISPOSITION        PATIENT REFERRED TO:  No follow-up provider specified.     DISCHARGE MEDICATIONS:  New Prescriptions    No medications on file              (Please note that portions of this note were completed with a voice recognition program.  Efforts were made to edit the dictations but occasionallywords are mis-transcribed.)      Fiorella Roberto, DO,FACEP (electronically signed)  Attending Physician, Emergency [address], DO  06/30/21 2131       Urban De La Rosa,   06/30/21 2133

## 2021-06-30 NOTE — ED NOTES
Pt has been experiencing chest pressure and sob since Monday. Pt states she has been monitoring her blood pressure and pulse at home. This morning when she woke up it was 112 bpm which alarmed her to come to the ER. Pt has a history of afib rvr of which she was just seen in the ER 2 weeks ago for. Pt states she had to be sedated and shocked. Pt was discharged home after observation in the ER for a few hours. Pt denies any recent illness or fever. EKG completed upon arrival. Pt hooked up to monitor and telemetry. Pt alert and oriented. Skin warm, pink and dry.       Nickie Emery, CINDY  06/30/21 [id number]

## 2021-06-30 NOTE — ED NOTES
Pt resting on cot at this time. Call light in reach. No concerns voiced.      Michael Orr, CINDY  06/30/21 2153

## 2021-07-01 ENCOUNTER — APPOINTMENT (OUTPATIENT)
Dept: CARDIAC CATH/INVASIVE PROCEDURES | Age: 86
DRG: 308 | End: 2021-07-01
Payer: MEDICARE

## 2021-07-01 LAB
ABO: NORMAL
ALBUMIN SERPL-MCNC: 3.2 G/DL (ref 3.5–5.1)
ALP BLD-CCNC: 72 U/L (ref 38–126)
ALT SERPL-CCNC: 7 U/L (ref 11–66)
ANION GAP SERPL CALCULATED.3IONS-SCNC: 12 MEQ/L (ref 8–16)
ANTIBODY SCREEN: NORMAL
APTT: 119.8 SECONDS (ref 22–38)
APTT: 62.8 SECONDS (ref 22–38)
APTT: 85.9 SECONDS (ref 22–38)
AST SERPL-CCNC: 14 U/L (ref 5–40)
BASOPHILS # BLD: 0.4 %
BASOPHILS ABSOLUTE: 0 THOU/MM3 (ref 0–0.1)
BILIRUB SERPL-MCNC: 1.1 MG/DL (ref 0.3–1.2)
BUN BLDV-MCNC: 15 MG/DL (ref 7–22)
CALCIUM SERPL-MCNC: 8.6 MG/DL (ref 8.5–10.5)
CHLORIDE BLD-SCNC: 101 MEQ/L (ref 98–111)
CO2: 20 MEQ/L (ref 23–33)
CREAT SERPL-MCNC: 0.8 MG/DL (ref 0.4–1.2)
EKG ATRIAL RATE: 77 BPM
EKG P AXIS: 37 DEGREES
EKG P-R INTERVAL: 154 MS
EKG Q-T INTERVAL: 434 MS
EKG Q-T INTERVAL: 438 MS
EKG QRS DURATION: 86 MS
EKG QRS DURATION: 88 MS
EKG QTC CALCULATION (BAZETT): 495 MS
EKG QTC CALCULATION (BAZETT): 500 MS
EKG R AXIS: -34 DEGREES
EKG R AXIS: -36 DEGREES
EKG T AXIS: -162 DEGREES
EKG T AXIS: -98 DEGREES
EKG VENTRICULAR RATE: 77 BPM
EKG VENTRICULAR RATE: 80 BPM
EOSINOPHIL # BLD: 0.9 %
EOSINOPHILS ABSOLUTE: 0.1 THOU/MM3 (ref 0–0.4)
ERYTHROCYTE [DISTWIDTH] IN BLOOD BY AUTOMATED COUNT: 12.6 % (ref 11.5–14.5)
ERYTHROCYTE [DISTWIDTH] IN BLOOD BY AUTOMATED COUNT: 43.1 FL (ref 35–45)
GFR SERPL CREATININE-BSD FRML MDRD: 68 ML/MIN/1.73M2
GLUCOSE BLD-MCNC: 128 MG/DL (ref 70–108)
HCT VFR BLD CALC: 40 % (ref 37–47)
HEMOGLOBIN: 12.4 GM/DL (ref 12–16)
IMMATURE GRANS (ABS): 0.04 THOU/MM3 (ref 0–0.07)
IMMATURE GRANULOCYTES: 0.4 %
LV EF: 53 %
LVEF MODALITY: NORMAL
LYMPHOCYTES # BLD: 19.1 %
LYMPHOCYTES ABSOLUTE: 1.9 THOU/MM3 (ref 1–4.8)
MAGNESIUM: 2.1 MG/DL (ref 1.6–2.4)
MCH RBC QN AUTO: 29.2 PG (ref 26–33)
MCHC RBC AUTO-ENTMCNC: 31 GM/DL (ref 32.2–35.5)
MCV RBC AUTO: 94.1 FL (ref 81–99)
MONOCYTES # BLD: 10.8 %
MONOCYTES ABSOLUTE: 1.1 THOU/MM3 (ref 0.4–1.3)
NUCLEATED RED BLOOD CELLS: 0 /100 WBC
PLATELET # BLD: 207 THOU/MM3 (ref 130–400)
PMV BLD AUTO: 10.6 FL (ref 9.4–12.4)
POTASSIUM REFLEX MAGNESIUM: 4.3 MEQ/L (ref 3.5–5.2)
RBC # BLD: 4.25 MILL/MM3 (ref 4.2–5.4)
RH FACTOR: NORMAL
SEG NEUTROPHILS: 68.4 %
SEGMENTED NEUTROPHILS ABSOLUTE COUNT: 6.9 THOU/MM3 (ref 1.8–7.7)
SODIUM BLD-SCNC: 133 MEQ/L (ref 135–145)
TOTAL PROTEIN: 6.8 G/DL (ref 6.1–8)
WBC # BLD: 10.1 THOU/MM3 (ref 4.8–10.8)

## 2021-07-01 PROCEDURE — 6370000000 HC RX 637 (ALT 250 FOR IP)

## 2021-07-01 PROCEDURE — 85025 COMPLETE CBC W/AUTO DIFF WBC: CPT

## 2021-07-01 PROCEDURE — 93325 DOPPLER ECHO COLOR FLOW MAPG: CPT

## 2021-07-01 PROCEDURE — 5A2204Z RESTORATION OF CARDIAC RHYTHM, SINGLE: ICD-10-PCS | Performed by: INTERNAL MEDICINE

## 2021-07-01 PROCEDURE — 93005 ELECTROCARDIOGRAM TRACING: CPT | Performed by: STUDENT IN AN ORGANIZED HEALTH CARE EDUCATION/TRAINING PROGRAM

## 2021-07-01 PROCEDURE — 36415 COLL VENOUS BLD VENIPUNCTURE: CPT

## 2021-07-01 PROCEDURE — 6360000002 HC RX W HCPCS

## 2021-07-01 PROCEDURE — 2580000003 HC RX 258: Performed by: STUDENT IN AN ORGANIZED HEALTH CARE EDUCATION/TRAINING PROGRAM

## 2021-07-01 PROCEDURE — 86900 BLOOD TYPING SEROLOGIC ABO: CPT

## 2021-07-01 PROCEDURE — 99232 SBSQ HOSP IP/OBS MODERATE 35: CPT | Performed by: FAMILY MEDICINE

## 2021-07-01 PROCEDURE — 93320 DOPPLER ECHO COMPLETE: CPT

## 2021-07-01 PROCEDURE — 93312 ECHO TRANSESOPHAGEAL: CPT

## 2021-07-01 PROCEDURE — 6370000000 HC RX 637 (ALT 250 FOR IP): Performed by: FAMILY MEDICINE

## 2021-07-01 PROCEDURE — 2140000000 HC CCU INTERMEDIATE R&B

## 2021-07-01 PROCEDURE — 93005 ELECTROCARDIOGRAM TRACING: CPT | Performed by: INTERNAL MEDICINE

## 2021-07-01 PROCEDURE — B24BZZ4 ULTRASONOGRAPHY OF HEART WITH AORTA, TRANSESOPHAGEAL: ICD-10-PCS | Performed by: INTERNAL MEDICINE

## 2021-07-01 PROCEDURE — 6370000000 HC RX 637 (ALT 250 FOR IP): Performed by: INTERNAL MEDICINE

## 2021-07-01 PROCEDURE — 86850 RBC ANTIBODY SCREEN: CPT

## 2021-07-01 PROCEDURE — 80053 COMPREHEN METABOLIC PANEL: CPT

## 2021-07-01 PROCEDURE — 6370000000 HC RX 637 (ALT 250 FOR IP): Performed by: STUDENT IN AN ORGANIZED HEALTH CARE EDUCATION/TRAINING PROGRAM

## 2021-07-01 PROCEDURE — 85730 THROMBOPLASTIN TIME PARTIAL: CPT

## 2021-07-01 PROCEDURE — 99232 SBSQ HOSP IP/OBS MODERATE 35: CPT | Performed by: STUDENT IN AN ORGANIZED HEALTH CARE EDUCATION/TRAINING PROGRAM

## 2021-07-01 PROCEDURE — 2500000003 HC RX 250 WO HCPCS

## 2021-07-01 PROCEDURE — 92960 CARDIOVERSION ELECTRIC EXT: CPT

## 2021-07-01 PROCEDURE — 92960 CARDIOVERSION ELECTRIC EXT: CPT | Performed by: INTERNAL MEDICINE

## 2021-07-01 PROCEDURE — 86901 BLOOD TYPING SEROLOGIC RH(D): CPT

## 2021-07-01 PROCEDURE — 6360000002 HC RX W HCPCS: Performed by: FAMILY MEDICINE

## 2021-07-01 PROCEDURE — 83735 ASSAY OF MAGNESIUM: CPT

## 2021-07-01 RX ORDER — SODIUM CHLORIDE 9 MG/ML
25 INJECTION, SOLUTION INTRAVENOUS PRN
Status: CANCELLED | OUTPATIENT
Start: 2021-07-01

## 2021-07-01 RX ORDER — PANTOPRAZOLE SODIUM 40 MG/1
TABLET, DELAYED RELEASE ORAL
Status: COMPLETED
Start: 2021-07-01 | End: 2021-07-01

## 2021-07-01 RX ORDER — SODIUM CHLORIDE 0.9 % (FLUSH) 0.9 %
5-40 SYRINGE (ML) INJECTION PRN
Status: CANCELLED | OUTPATIENT
Start: 2021-07-01

## 2021-07-01 RX ORDER — SODIUM CHLORIDE 0.9 % (FLUSH) 0.9 %
5-40 SYRINGE (ML) INJECTION PRN
Status: DISCONTINUED | OUTPATIENT
Start: 2021-07-01 | End: 2021-07-01 | Stop reason: SDUPTHER

## 2021-07-01 RX ORDER — METOPROLOL TARTRATE 50 MG/1
50 TABLET, FILM COATED ORAL 2 TIMES DAILY
Status: DISCONTINUED | OUTPATIENT
Start: 2021-07-01 | End: 2021-07-03 | Stop reason: HOSPADM

## 2021-07-01 RX ORDER — SODIUM CHLORIDE 9 MG/ML
25 INJECTION, SOLUTION INTRAVENOUS PRN
Status: DISCONTINUED | OUTPATIENT
Start: 2021-07-01 | End: 2021-07-01 | Stop reason: SDUPTHER

## 2021-07-01 RX ORDER — SODIUM CHLORIDE 9 MG/ML
INJECTION, SOLUTION INTRAVENOUS CONTINUOUS
Status: DISCONTINUED | OUTPATIENT
Start: 2021-07-01 | End: 2021-07-03 | Stop reason: HOSPADM

## 2021-07-01 RX ORDER — NITROGLYCERIN 0.4 MG/1
0.4 TABLET SUBLINGUAL EVERY 5 MIN PRN
Status: DISCONTINUED | OUTPATIENT
Start: 2021-07-01 | End: 2021-07-01 | Stop reason: SDUPTHER

## 2021-07-01 RX ORDER — ASPIRIN 325 MG
325 TABLET ORAL ONCE
Status: COMPLETED | OUTPATIENT
Start: 2021-07-01 | End: 2021-07-01

## 2021-07-01 RX ORDER — SODIUM CHLORIDE 0.9 % (FLUSH) 0.9 %
5-40 SYRINGE (ML) INJECTION EVERY 12 HOURS SCHEDULED
Status: DISCONTINUED | OUTPATIENT
Start: 2021-07-01 | End: 2021-07-01 | Stop reason: SDUPTHER

## 2021-07-01 RX ORDER — DIPHENHYDRAMINE HYDROCHLORIDE 50 MG/ML
50 INJECTION INTRAMUSCULAR; INTRAVENOUS ONCE
Status: DISCONTINUED | OUTPATIENT
Start: 2021-07-01 | End: 2021-07-01 | Stop reason: SDUPTHER

## 2021-07-01 RX ORDER — SODIUM CHLORIDE 0.9 % (FLUSH) 0.9 %
5-40 SYRINGE (ML) INJECTION EVERY 12 HOURS SCHEDULED
Status: CANCELLED | OUTPATIENT
Start: 2021-07-01

## 2021-07-01 RX ADMIN — HEPARIN SODIUM 15 UNITS/KG/HR: 10000 INJECTION, SOLUTION INTRAVENOUS at 15:14

## 2021-07-01 RX ADMIN — METOPROLOL TARTRATE 50 MG: 50 TABLET, FILM COATED ORAL at 20:44

## 2021-07-01 RX ADMIN — ONDANSETRON 4 MG: 2 INJECTION INTRAMUSCULAR; INTRAVENOUS at 23:58

## 2021-07-01 RX ADMIN — PANTOPRAZOLE SODIUM 40 MG: 40 TABLET, DELAYED RELEASE ORAL at 07:53

## 2021-07-01 RX ADMIN — ASPIRIN 81 MG: 81 TABLET, COATED ORAL at 08:45

## 2021-07-01 RX ADMIN — SPIRONOLACTONE 12.5 MG: 25 TABLET ORAL at 08:42

## 2021-07-01 RX ADMIN — ISOSORBIDE MONONITRATE 30 MG: 30 TABLET ORAL at 08:45

## 2021-07-01 RX ADMIN — ASPIRIN 325 MG: 325 TABLET ORAL at 10:55

## 2021-07-01 RX ADMIN — ATORVASTATIN CALCIUM 40 MG: 40 TABLET, FILM COATED ORAL at 20:44

## 2021-07-01 RX ADMIN — CARVEDILOL 6.25 MG: 6.25 TABLET, FILM COATED ORAL at 08:45

## 2021-07-01 RX ADMIN — SODIUM CHLORIDE: 9 INJECTION, SOLUTION INTRAVENOUS at 10:55

## 2021-07-01 ASSESSMENT — PAIN SCALES - GENERAL
PAINLEVEL_OUTOF10: 0

## 2021-07-01 NOTE — H&P
[address] [address]  Sedation/Analgesia History & Physical    Pt Name: Inocencia Mckenzie  Account number: [de-identified]  MRN: [id number]  YOB: 1934  Provider Performing Procedure: Denice Padilla MD MD  Referring Provider: William Cortez MD   Primary Care Physician: Addie Man MD  Date: 7/1/2021    PRE-PROCEDURE    Code Status: FULL CODE  Brief History/Pre-Procedure Diagnosis:   AF RVR, uncontrolled     Consent: : I have discussed with the patient risks, benefits, and alternatives (and relevant risks, benefits, and side effects related to alternatives or not receiving care), and likelihood of the success. The patient and/or representative appear to understand and agree to proceed. The discussion encompasses risks, benefits, and side effects related to the alternatives and the risks related to not receiving the proposed care, treatment, and services. The indication, risks and benefits of the procedure and possible therapeutic consequences and alternatives were discussed with the patient. The patient was given the opportunity to ask questions and to have them answered to his/her satisfaction. Risks of the procedure include but are not limited to the following: Bleeding, hematoma including retroperitoneal hemmorhage, infection, pain and discomfort, injury to the aorta and other blood vessels, rhythm disturbance, low blood pressure, myocardial infarction, stroke, kidney damage/failure, myocardial perforation, allergic reactions to sedatives/contrast material, loss of pulse/vascular compromise requiring surgery, aneurysm/pseudoaneurysm formation, possible loss of a limb/hand/leg, needing blood transfusion, requiring emergent open heart surgery or emergent coronary intervention, the need for intubation/respiratory support, the requirement for defibrillation/cardioversion, and death. Alternatives to and omission of the suggested procedure were discussed.  The patient had no further questions and wished to proceed; the consent form was signed. MEDICAL HISTORY  []ASHD/ANGINA/MI/CHF   []Hypertension  []Diabetes  []Hyperlipidemia  []Smoking  []Family Hx of ASHD  []Additional information:       has a past medical history of Accidental fall, Allergic rhinitis, Arthritis, CHF (congestive heart failure) ([address] Encounter for cardioversion procedure, GERD (gastroesophageal reflux disease), Hx of transesophageal echocardiography (KAMRAN) for monitoring, Mild mitral regurgitation, Nonrheumatic aortic valve insufficiency, Osteoporosis, Pre-op evaluation: prior to left knee repalcement, PVC's (premature ventricular contractions), S/P cardiac catheterization, and Torn meniscus. SURGICAL HISTORY   has a past surgical history that includes Rotator cuff repair (3/8/06); Appendectomy (1946); back surgery (3/24/14); eye surgery; and joint replacement (Left, 09/05/2017).   Additional information:       ALLERGIES   Allergies as of 06/30/2021 - Fully Reviewed 06/30/2021   Allergen Reaction Noted    Amiodarone Other (See Comments) 10/12/2017    Ketamine Other (See Comments) 06/30/2021     Additional information:       MEDICATIONS   Aspirin  [] 81 mg  [] 325 mg  [] None  Antiplatelet drug therapy use last 5 days  []No []Yes  Coumadin Use Last 5 Days []No []Yes  Other anticoagulant use last 5 days  []No []Yes    Current Facility-Administered Medications:     0.9 % sodium chloride infusion, , Intravenous, Continuous, Sukhjinder Calle PA-C, Last Rate: 75 mL/hr at 07/01/21 1055, New Bag at 07/01/21 1055    dilTIAZem 125 mg in dextrose 5 % 125 mL infusion, 5-15 mg/hr, Intravenous, Continuous, Saurabh Ortiz DO, Last Rate: 2.5 mL/hr at 07/01/21 1228, 2.5 mg/hr at 07/01/21 1228    heparin (porcine) injection 4,720 Units, 80 Units/kg, Intravenous, PRN, Yisel Handy MD    heparin (porcine) injection 2,360 Units, 40 Units/kg, Intravenous, PRN, Yisel Handy MD    heparin 25,000 units in dextrose 5% 250 mL (premix) infusion, 18 Units/kg/hr, Intravenous, Continuous, Yisel Yu MD, Last Rate: 8.9 mL/hr at 07/01/21 1514, 15 Units/kg/hr at 07/01/21 1514    aspirin EC tablet 81 mg, 81 mg, Oral, Daily, Yisel Yu MD, 81 mg at 07/01/21 0845    atorvastatin (LIPITOR) tablet 40 mg, 40 mg, Oral, Nightly, Yisel Yu MD, 40 mg at 06/30/21 1930    carvedilol (COREG) tablet 6.25 mg, 6.25 mg, Oral, BID WC, Yisel Yu MD, 6.25 mg at 07/01/21 0845    [START ON 7/2/2021] furosemide (LASIX) tablet 20 mg, 20 mg, Oral, Once per day on Mon Wed Fri, Yisel Yu MD    isosorbide mononitrate (IMDUR) extended release tablet 30 mg, 30 mg, Oral, Daily, Wally Pantoja MD, 30 mg at 07/01/21 0845    pantoprazole (PROTONIX) tablet 40 mg, 40 mg, Oral, QAM AC, Yisel Yu MD, 40 mg at 07/01/21 0753    nitroGLYCERIN (NITROSTAT) SL tablet 0.4 mg, 0.4 mg, Sublingual, Q5 Min PRN, Yisel Yu MD    spironolactone (ALDACTONE) tablet 12.5 mg, 12.5 mg, Oral, Daily, Wally Pantoja MD, 12.5 mg at 07/01/21 8010    ascorbic acid (VITAMIN C) tablet 500 mg, 500 mg, Oral, Daily, Yisel Yu MD    vitamin D (CHOLECALCIFEROL) tablet 500 Units, 500 Units, Oral, Weekly, Wally Pantoja MD    sodium chloride flush 0.9 % injection 5-40 mL, 5-40 mL, Intravenous, 2 times per day, Wally Pantoja MD    sodium chloride flush 0.9 % injection 5-40 mL, 5-40 mL, Intravenous, PRN, Yisel Yu MD    0.9 % sodium chloride infusion, 25 mL, Intravenous, PRN, Yisel Yu MD    ondansetron (ZOFRAN-ODT) disintegrating tablet 4 mg, 4 mg, Oral, Q8H PRN **OR** ondansetron (ZOFRAN) injection 4 mg, 4 mg, Intravenous, Q6H PRN, Yisel Yu MD    polyethylene glycol (GLYCOLAX) packet 17 g, 17 g, Oral, Daily PRN, Yisel Yu MD    acetaminophen (TYLENOL) tablet 650 mg, 650 mg, Oral, Q6H PRN **OR** acetaminophen (TYLENOL) suppository 650 mg, 650 mg, Rectal, Q6H PRN, Wally Pantoja MD  Prior to Admission PROCEDURE   []Cath  []PCI                []Pacemaker/AICD  [x]KAMRAN             [x]Cardioversion []Peripheral angiography/PTA  []Other:      SEDATION  Planned agent:[x]Midazolam []Meperidine []Sublimaze []Morphine  []Diazepam  []Other:     ASA Classification:  []1 [x]2 []3 []4 []5   Class 1: A normal healthy patient  Class 2: Pt with mild to moderate systemic disease  Class 3: Severe systemic disease or disturbance  Class 4: Severe systemic disorders that are already life threatening. Class 5: Moribund pt with little chances of survival, for more than 24 hours. Mallampati I Airway Classification:   []1 [x]2 []3 []4     [x]Pre-procedure diagnostic studies complete and results available. Comment:    [x]Previous sedation/anesthesia experiences assessed. Comment:    [x]The patient is an appropriate candidate to undergo the planned procedure sedation and anesthesia. (Refer to nursing sedation/analgesia documentation record)  [x]Formulation and discussion of sedation/procedure plan, risks, and expectations with patient and/or responsible adult completed. []Patient examined immediately prior to the procedure.  (Refer to nursing sedation/analgesia documentation record)      Gita Collado MD, [facility]    Electronically signed 7/1/2021 at 3:55 PM

## 2021-07-01 NOTE — BRIEF OP NOTE
[address]  Sedation/Analgesia Post Sedation Record    Pt Name: Ana Emery  Account number: [de-identified]  MRN: [id number]  YOB: 1934  Procedure Performed By: Samuel Pascual MD MD   Primary Care Physician: Tasha Carlos MD  Date: 7/1/2021    POST-PROCEDURE    Physicians/Assistants: Samuel Pascual MD MD     Procedure Performed:Cardioversion      Sedation/Anesthesia: Versed/ Fentanyl and 2% xylocaine local anesthesia. Estimated Blood Loss: < 50 ml. Specimens Removed: None         Disposition of Specimen: N/A        Complications: No Immediate Complications. Post-procedure Diagnosis/Findings:        Successful DCCV     Recommendations:   Cont anticoagulation without interruption   Telemetry   BP on low side   Stop cardizem gtt   Stop coreg   Start Lopressor 50 mg po BID, titrate dose up as tolerated      Above findings and plan of care were discussed with patient and family, questions were answered, agreeable with plan.        Samuel Pascual MD, [facility]   Electronically signed 7/1/2021 at 4:57 PM  Interventional Cardiology

## 2021-07-01 NOTE — PROGRESS NOTES
This RN called [phone] to schedule cardioversion for patient. Left voicemail to call this RN back. 95 APRIL Watt with cardiology called to schedule cardioversion for 1500.

## 2021-07-01 NOTE — PROGRESS NOTES
On floor to follow up with patient. Patient resting in bed,  at bedside. This RN asked patient how she felt about the conversation with this RN yesterday. Patient stated she \"would not want anything done, just let me go\". This Rn reeducated patient and  about [address]. Patient and  agreeable to [address]. [address] form signed by patient. Copy placed in chart, faxed to medical records, and given to patient.

## 2021-07-01 NOTE — CONSULTS
[address] [place] [address]                                  CONSULTATION    PATIENT NAME: Casie Lutz                         :        1934  MED REC NO:   [id number]                           ROOM:       5885  ACCOUNT NO:   [de-identified]                           ADMIT DATE: 2021  PROVIDER:     Tara Connell. Krystin Sanchez M.D.    Linda Mutters:  2021    REASON FOR CONSULTATION:  Recurrence of atrial fibrillation with rapid  ventricular response. PRIMARY CARDIOLOGIST:  Priya Tabares MD    HISTORY OF PRESENT ILLNESS:  This is an 80-year-old pleasant   female patient known to have coronary artery disease, paroxysmal atrial  fibrillation, presented to the emergency room with episodes of  palpitations and chest discomfort over the last three days prior to  admission and the patient was having on and off type of chest  discomfort, pressure, heaviness like 3 to 4 out of 10, not radiating,  and she has been having that basically the day of admission and before  that, she has been having some palpitations, and on the day of  presentation, she has checked her heart monitor and that has shown like  heart rate of 120 to 130 beats per minute; in view of that, presented to  the emergency room and noted to be in AFib with rapid ventricular  response. Two weeks back, the patient has been here in the emergency  room for AFib with rapid ventricular response with chest discomfort and  cardioverted and sent home. The patient has history of AFib, that onset  in . At that time, she converted on to normal sinus rhythm, never  went back into AFib until two weeks back and two weeks back, she got  shock, converted, and now, she has another recurrence in less than two  weeks. Once the rate is controlled, she has been chest pain free,  stable and she is known to have multivessel coronary artery disease.    She is on medical therapy, is not amenable for intervention per the  cardiac catheterization report in 2017. Currently, no chest pain and no shortness of breath. No orthopnea or  paroxysmal nocturnal dyspnea. REVIEW OF SYSTEMS:  Negative except the above-mentioned ones. PAST MEDICAL HISTORY:  Includes the following:  Congestive heart  failure, paroxysmal atrial fibrillation, and history of cardioversion  two weeks back. Multivessel coronary artery disease, not amenable for  intervention; done cardiac catheterization in 2017. History of  osteoporosis, gastroesophageal reflux disease. PAST SURGICAL HISTORY:  Rotator cuff repair, appendectomy, back surgery,  eye surgery, joint replacement. FAMILY HISTORY:  Positive for heart disease and hypertension. SOCIAL HISTORY:  She is a former smoker, quit in 1965. Occasional  alcohol consumption. No drug use. ALLERGIES:  THE PATIENT IS ALLERGIC TO AMIODARONE AND KETAMINE. HOME MEDICATIONS PRIOR TO THIS ADMISSION:  Eliquis 5 mg twice a day, the  patient states that she never stopped her oral anticoagulation for  years. Aspirin, Lipitor, Coreg 6.25 twice a day, and basically, she is  getting also Lasix 20 three times a week. Imdur 30 daily, Prevacid,  Nitrostat, Aldactone 12.5 daily, vitamin C, vitamin D.    PHYSICAL EXAMINATION:  GENERAL:  Looks sick, in no form of distress. VITAL SIGNS:  Blood pressure 108/55, pulse rate 77, respiratory rate 18,  temperature 98. 1. HEENT:  Pink conjunctivae. Anicteric sclerae. Pupils are equal and  reactive bilaterally to light. NECK:  No lymphadenopathy. No goiter. No JVD. CHEST:  Clear to auscultation and resonant to percussion. CARDIOVASCULAR:  Arteries are felt; carotid, femoral, pedal.  No bruits. S1 and S2 well heard. No murmur or gallop rhythm. ABDOMEN:  Soft, nontender, nondistended. Bowel sounds are positive. GENITOURINARY:  No CVA, flank or suprapubic tenderness.   LOCOMOTOR:  No cyanosis, clubbing, muscle or joint tenderness. SKIN:  No rashes, ulcers, or nodules. CNS:  Alert, awake, oriented to time, person, and place. Cranial nerves  are intact. Sensation is intact to light touch and pain. Motor:   Normal muscle strength and tone. Appropriate mood and affect. WORKUP:  EKG:  Atrial fibrillation with rapid ventricular response of  145 beats per minute and there are nonspecific low QRS voltage and  nonspecific ST-T segment abnormality and no acute EKG change. On  06/15/2021, she came with AFib with rapid ventricular response. The  patient converted to normal sinus rhythm and was discharged home. BLOOD CHEMISTRY:  Sodium 135, potassium 4.6, BUN 20, creatinine 0.9. CARDIAC ENZYMES:  Troponin less than 0.01. HEMATOLOGY:  White blood cells 8.9, hemoglobin 12.3, platelets 951. Transthoracic echocardiogram, 03/2021, revealed ejection fraction of 50%  to 55%. She had cardiac catheterization in 10/2017 by Dr. Jaz Quezada,  significant multivessel disease predominantly, LAD and left circumflex  and euvolemic. Basically, that is not amenable for PCI. Medical  therapy has been recommended. Echocardiogram done in 10/2017, ejection fraction 25% to 30%, recently  50% and at that time, she had a cardioversion after she had a KAMRAN and  per the report here, I see there was a KAMRAN cardioversion at that time. ASSESSMENT:  1.  AFib with rapid ventricular response, new recurrence, and so, rate  needs to be controlled. We will continue with Cardizem drip and heparin  drip and she has been already on heparin drip and Eliquis has been held. 2.  Multivessel coronary artery disease, on medical therapy, not  amenable for intervention per the documentation from the cardiac  catheterization, 10/2017. 3.  Nonischemic dilated cardiomyopathy, ejection fraction 25% in 2017;  now, it is around 55%. 4.  Congestive heart failure, diastolic, chronic, well compensated,  stable.   5.  History of paroxysmal atrial fibrillation diagnosed in 2017, status  post cardioversion in 2017 and two weeks ago and now came with  recurrence of atrial fibrillation. PLAN AND RECOMMENDATIONS:  1. At this level, we will continue with rate control and probably  adjust the rate-controlling medication and the patient's rate needs to  be controlled. Continue with Coreg, continue with Cardizem drip, and  schedule her for direct current cardioversion tomorrow without any KAMRAN  as the  patient stated that she has been taking her Eliquis regularly  with no interruption and now, she is on heparin. So, basically, we are  going to schedule her for direct current electrical cardioversion in the  morning, n.p.o. after midnight. In the meantime, continue controlling  the rate. 2.  Continue home medication. 3.  With regards to the history of chest pain, the patient has history  of multivessel coronary artery disease. It is not amenable for  intervention, so her chest pain is only noted when she has AFib with  rapid ventricular response. Otherwise, the patient did not have any  chest pain before. So, basically, stable from that. Troponins are  negative. No acute EKG abnormality. So, I discussed with the patient  the need for cardioversion and risks and benefits explained. The  patient verbalized understanding and agrees with the plan of care. Thank you for allowing me to participate in the care of this patient. I  will follow up with you. [address] [place].  Ann Serna M.D.    D: 06/30/2021 17:33:08       T: 06/30/2021 19:34:52     PAM/SANDIP_NURYS  Job#: [id number]     Doc#: [id number]    CC:

## 2021-07-01 NOTE — PROGRESS NOTES
Hospitalist Progress Note    Patient: Gabino Arenas Funez      Unit/Bed:3B-29/029-A    YOB: 1934    MRN: [id number]       Acct: [de-identified]     PCP: Sara Sullivan MD    Date of Admission: 6/30/2021    Chief Complaint: f/u for chest pain and palpitations     Hospital Course:     Per H/P note:    \" 80 y.o. female with PMH CHF, Afib x 3 years, on eliquis, Mitral regurgitation, who presented to [address] [place] [address] with chief complaint of chest pain.      Patient was just seen in [address] [place] [address] ER on 6/15/21 due to Afib with RVR, where she had synchronized cardioversion and patient was sent home. Patient reports that she was doing fine until 3 days prior admission, she started having intermittent palpitations and constant chest pain, located on the whole chest, 5-6/10, non-radiating, worse with deep breathing. She reports that she has SOB on exertion for several years but has been worse for past 3 days. She also reports chronic cough for years, unchanged. She has been monitoring her heart rate at home which has been as high as 120-130.  Today, chest pain and palpitations persisted, hence, she decided to come to the ED.  Pt denies dizziness, fever, chills, abd pain, N/V, sweating, legs swelling, orthopnea. In ER, VS temp 98.6F, RR 18, , /75, saturating 98% on room air. EKG showed afib with RVR, rate 145, ST depression on V4, V5, V6 ( similar from prior tracing). Labs include CMP normal except for serum bicarbonate 22, AG 11. CBC normal. Trop (-). Cardizem bolus with gtt was started in ER, SBP dropped to 90s after cardizem bolus, BP eventually improved to 125/101. Hospital Medicine was called for IP management. When I saw the patient in ER, she still having chest pain in the whole chest, 2/10. No other complaint noted. \"      Cardiology was consulted, recommend cardioversion. S/p successful cardioversion on 7/1/21     Subjective:     Patient seen and examined. No overnight event noted.  Pt denies chest pain, sob, dizziness, palpitations. She reports that her cough is about the same. She denies fever and chills. Medications:  Reviewed    Infusion Medications    sodium chloride 75 mL/hr at 07/01/21 1055    dilTIAZem (CARDIZEM) 125 mg in dextrose 5% 125 mL infusion 5 mg/hr (07/01/21 0944)    heparin (PORCINE) Infusion 15 Units/kg/hr (07/01/21 1014)    sodium chloride       Scheduled Medications    aspirin EC  81 mg Oral Daily    atorvastatin  40 mg Oral Nightly    carvedilol  6.25 mg Oral BID WC    [START ON 7/2/2021] furosemide  20 mg Oral Once per day on Mon Wed Fri    isosorbide mononitrate  30 mg Oral Daily    pantoprazole  40 mg Oral QAM AC    spironolactone  12.5 mg Oral Daily    vitamin C  500 mg Oral Daily    Vitamin D  500 Units Oral Weekly    sodium chloride flush  5-40 mL Intravenous 2 times per day     PRN Meds: heparin (porcine), heparin (porcine), nitroGLYCERIN, sodium chloride flush, sodium chloride, ondansetron **OR** ondansetron, polyethylene glycol, acetaminophen **OR** acetaminophen      Intake/Output Summary (Last 24 hours) at 7/1/2021 1126  Last data filed at 7/1/2021 0251  Gross per 24 hour   Intake 1060.14 ml   Output 0 ml   Net 1060.14 ml       Diet:  Diet NPO  Diet NPO Exceptions are: Sips of Water with Meds    Exam:  /66   Pulse 93   Temp 97.7 °F (36.5 °C) (Oral)   Resp 18   Ht 5' (1.524 m)   Wt 130 lb 1.6 oz (59 kg)   SpO2 93%   BMI 25.41 kg/m²       General appearance:  Alert, not in acute distress   HEENT:  Normal cephalic, atraumatic without obvious deformity. Pupils equal, round, and reactive to light. Conjunctivae clear. Clear oral mucosa. Neck: Supple, with full range of motion. No jugular venous distention. Trachea midline. Respiratory:  Normal respiratory effort. Clear to auscultation, bilaterally without Rales/Wheezes/Rhonchi. Cardiovascular:  normal rate, irregularly irregular rhythm, no murmurs, rubs or gallops.   Abdomen: Soft, non-tender, non-distended with normal bowel sounds. Musculoskeletal:  No clubbing, cyanosis or edema bilaterally. Full range of motion without deformity. Skin: No rashes or lesions. Neurological exam: alert, oriented, normal speech, no focal findings or movement disorder noted. Exam of extremities: peripheral pulses normal, no pedal or leg edema, no clubbing or cyanosis    Labs:   Recent Labs     06/30/21  0820 06/30/21  1320 07/01/21  0331   WBC 9.0 8.9 10.1   HGB 12.6 12.3 12.4   HCT 40.8 39.5 40.0    205 207     Recent Labs     06/30/21  0820 07/01/21  0331    133*   K 4.6 4.3    101   CO2 22* 20*   BUN 20 15   CREATININE 0.9 0.8   CALCIUM 9.1 8.6     Recent Labs     06/30/21  0820 07/01/21  0331   AST 14 14   ALT 9* 7*   BILITOT 1.2 1.1   ALKPHOS 81 72     No results for input(s): INR in the last 72 hours. No results for input(s): Ramon Seed in the last 72 hours. Urinalysis:      Lab Results   Component Value Date    NITRU NEGATIVE 10/05/2017    WBCUA 10-15 10/05/2017    BACTERIA NONE 10/05/2017    RBCUA 0-2 10/05/2017    BLOODU NEGATIVE 10/05/2017    GLUCOSEU NEGATIVE 10/05/2017       Radiology:  No orders to display       Diet: Diet NPO  Diet NPO Exceptions are: Sips of Water with Meds      Assessment/Plan:      1. Atrial fibrillation with RVR, s/p successful cardioversion 7/1/21, now NSR: CHADsVASC score 4. Cont cardizem and heparin gtt. Cardiology addedd metoprolol 50 mg PO BID today. Cont to HOLD home meds eliquis for now while on heparin gtt, resume eliquis on discharge. Cont home meds coreg. Tele. TSH normal   2. Chest pain/SOB, likely related to afib with RVR, resolved: trop (-), EKG afib with RVR, ST depression on leads V4, V5, V6 ( present prior tracing). Less likely PE, pt is already on eliquis. Trop (-) x 3  3. Hypotension, drug-induced, resolved: cont to monitor VS. Will consider midodrine if recur or worsen   4.  Hyponatremia, mild likely due to diuretics: BMP in am   5. NAGMA, mild: BMP in am   6. Hyperglycemia: no DM hx, check A1C in am   7. Chronic HFpEF, EF 55-60%, compensated: cont home meds aldactone and lasix with holding parameters. Daily weights, I/Os, fluid and salt restrictions  8. Moderate to severe MR: noted on echo 01/2021. Patient follows with cardiologist Dr. Stephie Wing   9. CAD: per cardiac cath in 2017: Significant multivessel disease predominantly in the LAD and circumflex territories. Cont home meds imdur, coreg, lipitor, ASA   10.  GERD: cont PPI      Anticipated Discharge in : pending         DVT prophylaxis: [] Lovenox                                 [] SCDs                                 [] SQ Heparin                                 [] Encourage ambulation           [] Already on Anticoagulation     Disposition:    [] Home       [] TCU       [] Rehab       [] Psych       [] SNF       [] [place]       [x] Other-Plan as above       Code Status: Full Code    Electronically signed by Steffanie Beach MD on 7/1/2021 at 11:26 AM

## 2021-07-01 NOTE — PROGRESS NOTES
Cardiology Progress Note      Patient: Gertrudis Smith  YOB: 1934  MRN: [id number]   Acct: [de-identified]  Admit Date:  6/30/2021  Primary Cardiologist: Zayda Patel MD    Per Dr Dada John note:  Savana Ort FOR CONSULTATION:  Recurrence of atrial fibrillation with rapid  ventricular response.     PRIMARY CARDIOLOGIST:  Zayda Patel MD     HISTORY OF PRESENT ILLNESS:  This is an 80year-old pleasant   female patient known to have coronary artery disease, paroxysmal atrial  fibrillation, presented to the emergency room with episodes of  palpitations and chest discomfort over the last three days prior to  admission and the patient was having on and off type of chest  discomfort, pressure, heaviness like 3 to 4 out of 10, not radiating,  and she has been having that basically the day of admission and before  that, she has been having some palpitations, and on the day of  presentation, she has checked her heart monitor and that has shown like  heart rate of 120 to 130 beats per minute; in view of that, presented to  the emergency room and noted to be in AFib with rapid ventricular  response. Two weeks back, the patient has been here in the emergency  room for AFib with rapid ventricular response with chest discomfort and  cardioverted and sent home. The patient has history of AFib, that onset  in 2017. At that time, she converted on to normal sinus rhythm, never  went back into AFib until two weeks back and two weeks back, she got  shock, converted, and now, she has another recurrence in less than two  weeks. Once the rate is controlled, she has been chest pain free,  stable and she is known to have multivessel coronary artery disease. She is on medical therapy, is not amenable for intervention per the  cardiac catheterization report in 2017.     Currently, no chest pain and no shortness of breath. No orthopnea or  paroxysmal nocturnal dyspnea. \"    Subjective (Events in last 24 hours):   Pt awake, alert. NAD. Denies CP, SOB. States she is feeling well today and ready to go home. D/w patient that afib may be likely to recur and may be a candidate for afib ablation. Will send consult and f/u to Dr Ayanna Carmichael to evaluate and will get CV today. Discussed r/b/a to ablation with patient and patient and  are in agreement to see Dr Ayanna Carmichael and discuss possible ablation.      Objective:   /66   Pulse 93   Temp 97.7 °F (36.5 °C) (Oral)   Resp 18   Ht 5' (1.524 m)   Wt 130 lb 1.6 oz (59 kg)   SpO2 93%   BMI 25.41 kg/m²      bp on low side  TELEMETRY: afib, cvr     Physical Exam:  General Appearance: alert and oriented to person, place and time, in no acute distress  Cardiovascular: normal rate, irregular rhythm, normal S1 and S2, no murmurs, rubs, clicks, or gallops, distal pulses intact, no carotid bruits, no JVD  Pulmonary/Chest: clear to auscultation bilaterally- no wheezes, rales or rhonchi, normal air movement, no respiratory distress  Abdomen: soft, non-tender, non-distended, normal bowel sounds, no masses   Extremities: no cyanosis, clubbing or edema, pulse   Skin: warm and dry, no rash or erythema  Head: normocephalic and atraumatic  Eyes: pupils equal, round, and reactive to light  Neck: supple and non-tender without mass, no thyromegaly   Musculoskeletal: normal range of motion, no joint swelling, deformity or tenderness  Neurological: alert, oriented, normal speech, no focal findings or movement disorder noted    Medications:    aspirin EC  81 mg Oral Daily    atorvastatin  40 mg Oral Nightly    carvedilol  6.25 mg Oral BID WC    [START ON 7/2/2021] furosemide  20 mg Oral Once per day on Mon Wed Fri    isosorbide mononitrate  30 mg Oral Daily    pantoprazole  40 mg Oral QAM AC    spironolactone  12.5 mg Oral Daily    vitamin C  500 mg Oral Daily    Vitamin D  500 Units Oral Weekly    sodium chloride flush  5-40 mL Intravenous 2 times per day      dilTIAZem (CARDIZEM) 125 mg in dextrose 5% 125 mL infusion 7.5 mg/hr (07/01/21 0847)    heparin (PORCINE) Infusion 15 Units/kg/hr (07/01/21 0970)    sodium chloride       heparin (porcine), 80 Units/kg, PRN  heparin (porcine), 40 Units/kg, PRN  nitroGLYCERIN, 0.4 mg, Q5 Min PRN  sodium chloride flush, 5-40 mL, PRN  sodium chloride, 25 mL, PRN  ondansetron, 4 mg, Q8H PRN   Or  ondansetron, 4 mg, Q6H PRN  polyethylene glycol, 17 g, Daily PRN  acetaminophen, 650 mg, Q6H PRN   Or  acetaminophen, 650 mg, Q6H PRN        Diagnostics:  EKG:   Atrial fibrillation with rapid ventricular response  Left axis deviation  Low voltage QRS, consider pulmonary disease, pericardial effusion, or normal variant  Nonspecific ST and T wave abnormality  Abnormal ECG  When compared with ECG of 15-RENEE-2021 15:32,  Significant changes have occurred  Confirmed by LEYDA Pavon ([id number]) on 6/30/2021 5:59:23 PM  Echo:   Conclusions      Summary   Probe easily inserted by Cardiologist.   Procedure performed without complications. The study included complete 2D imaging, complete spectral doppler, and   color doppler. The transesophageal approach was used. Risk benefits and alternatives were explained to the patient and informed   consent was obtained. Ejection fraction was estimated at 50-55%. There was mild-moderate mitral regurgitation. Aortic root = 3.5 cm. Signature      ----------------------------------------------------------------   Electronically signed by Anna Borja MD (Interpreting   physician) on 03/02/2021 at 02:45 PM  Stress:     Left Heart Cath:     Lab Data:    Cardiac Enzymes:  No results for input(s): CKTOTAL, CKMB, CKMBINDEX, TROPONINI in the last 72 hours.     CBC:   Lab Results   Component Value Date    WBC 10.1 07/01/2021    RBC 4.25 07/01/2021    RBC 4.15 08/27/2019    HGB 12.4 07/01/2021    HCT 40.0 07/01/2021     07/01/2021       CMP:    Lab Results   Component Value Date     07/01/2021    K 4.3 07/01/2021     07/01/2021 CO2 20 07/01/2021    BUN 15 07/01/2021    CREATININE 0.8 07/01/2021    LABGLOM 68 07/01/2021    GLUCOSE 128 07/01/2021    GLUCOSE 91 02/03/2021    CALCIUM 8.6 07/01/2021       Hepatic Function Panel:    Lab Results   Component Value Date    ALKPHOS 72 07/01/2021    ALT 7 07/01/2021    AST 14 07/01/2021    PROT 6.8 07/01/2021    BILITOT 1.1 07/01/2021    BILIDIR <0.2 06/15/2021    LABALBU 3.2 07/01/2021       Magnesium:    Lab Results   Component Value Date    MG 2.3 01/14/2020       PT/INR:    Lab Results   Component Value Date    INR 1.47 09/05/2020       HgBA1c:    Lab Results   Component Value Date    LABA1C 5.1 10/06/2017       FLP:    Lab Results   Component Value Date    TRIG 92 09/24/2016    HDL 62 09/24/2016    LDLCALC 131 09/24/2016       TSH:    Lab Results   Component Value Date    TSH 1.910 06/30/2021         Assessment:  PAF rvr--currently cvr--on eliquis at home, currently heparin  Hx of MVCAD, not amenable to intervention  BMYEIF--23-13%  Chronic diastolic CHF     Plan: For KAMRAN/CV today. On diltiazem gtt and heparin gtt. Asa. lipitor 40 mg daily  Coreg 6.25 mg BID  Lasix 20 mg three times weekly  imdur 30 mg daily    Aldactone 12.5 mg daily    Given recurrent afib s/p cardioversion and needing it again, patient may be a candidate for afib ablation. Will schedule for EP consult upon Dr Luz Acevedo return. Patient agrees with plan. Will follow with KAMRAN/CV today.       Electronically signed by George Vallejo PA-C on 7/1/2021 at 9:43 AM

## 2021-07-01 NOTE — FLOWSHEET NOTE
06/30/21 2100 06/30/21 2110 06/30/21 2346   Vital Signs   Pulse 117  (pt moving around in bed - cardizem gtt increased to 10ml/hr ) 130  (when ambulating to the bathroom ) 103  (returned to bed )     Pt's HR increases to 125-130's when ambulating to and from the bathroom.

## 2021-07-01 NOTE — PROGRESS NOTES
Day shift Geovany RN stated that Dr. Adama Conner gave instruction to make pt NPO after midnight and if patient hadn't converted back to NSR to call [phone] to schedule a cardioversion.

## 2021-07-01 NOTE — PROCEDURES
[address] [place] [address]                            CARDIAC CATHETERIZATION    PATIENT NAME: RUTH COUCH                         :        1934  MED REC NO:   [id number]                           ROOM:       2703  ACCOUNT NO:   [de-identified]                           ADMIT DATE: 2021  PROVIDER:     Seth Arceo MD    DATE OF PROCEDURE:  2021    CARDIOVERSION    INDICATION FOR PROCEDURE:  Uncontrolled atrial fibrillation with rapid  ventricular response. PROCEDURES PERFORMED:  KAMRAN-guided direct current cardioversion. DESCRIPTION OF PROCEDURE:  After informed consent, the patient was  brought to the cardiac catheterization room. I then proceeded with  transesophageal echocardiogram, no intracardiac thrombus was noticed. A  complete echocardiogram report to follow separately. .  After the patient  received adequate sedation with fentanyl and Versed,  we proceeded with  direct current cardioversion. 300 joules of synchronized cardioversion  was administered. This resulted in restoration of normal sinus rhythm. MEDICATIONS:  See MAR. COMPLICATIONS:  None. IMPRESSION:  Successful direct current cardioversion. RECOMMENDATIONS:  Continue with anticoagulation without interruption. Continue rest of medications. We will add amiodarone 200 mg p.o. daily.           Reyes Sol MD    D: 2021 16:59:26       T: 2021 17:07:15     [id number]  Job#: [id number]     Doc#: [id number]    CC:

## 2021-07-01 NOTE — CARE COORDINATION
7/1/21, 7:07 AM EDT  DISCHARGE PLANNING EVALUATION:    Gualberto Funez       Admitted: 6/30/2021/ [address] day: 1   Location: -29/029-A Reason for admit: Atrial fibrillation with RVR ([facility] [address] [I48.91]   PMH:  has a past medical history of Accidental fall, Allergic rhinitis, Arthritis, CHF (congestive heart failure) ([facility] [address] Encounter for cardioversion procedure, GERD (gastroesophageal reflux disease), Hx of transesophageal echocardiography (KAMRAN) for monitoring, Mild mitral regurgitation, Nonrheumatic aortic valve insufficiency, Osteoporosis, Pre-op evaluation: prior to left knee repalcement, PVC's (premature ventricular contractions), S/P cardiac catheterization, and Torn meniscus. Procedure: None  Barriers to Discharge:  Admitted through ED with chest pain and palpitations. Found to be in afib with RVR. Started Cardizem gtt and heparin gtt. [address] Cardiology. Plan rate control and cardioversion this am. Troponins negative. Albumin 3.8 and 3.2. Coreg po bid. Baby ASA, Vit C, Lipitor, Imdur, Protonix, Aldactone, Vit D. Lasix tid. PCP: Dustin Mahajan MD  Readmission Risk Score: 12%    Patient Goals/Plan/Treatment Preferences: Spoke with pt and . [facility] lives at home with her . She is independent and drives. She denies any DME or HH currently or any needs at discharge. Verified pt's insurance and PCP. Transportation/Food Security/Housekeeping Addressed:  No issues identified.

## 2021-07-01 NOTE — PROGRESS NOTES
This RN called [phone] to schedule Cardioversion for pt. Message left. Updated oncoming RN Marya of this.

## 2021-07-02 ENCOUNTER — APPOINTMENT (OUTPATIENT)
Dept: GENERAL RADIOLOGY | Age: 86
DRG: 308 | End: 2021-07-02
Payer: MEDICARE

## 2021-07-02 LAB
ANION GAP SERPL CALCULATED.3IONS-SCNC: 14 MEQ/L (ref 8–16)
APTT: 72 SECONDS (ref 22–38)
AVERAGE GLUCOSE: 93 MG/DL (ref 70–126)
BUN BLDV-MCNC: 19 MG/DL (ref 7–22)
CALCIUM SERPL-MCNC: 8.1 MG/DL (ref 8.5–10.5)
CHLORIDE BLD-SCNC: 102 MEQ/L (ref 98–111)
CO2: 17 MEQ/L (ref 23–33)
CREAT SERPL-MCNC: 0.8 MG/DL (ref 0.4–1.2)
D-DIMER QUANTITATIVE: 415 NG/ML FEU (ref 0–500)
EKG ATRIAL RATE: 81 BPM
EKG P AXIS: 13 DEGREES
EKG P-R INTERVAL: 164 MS
EKG Q-T INTERVAL: 424 MS
EKG QRS DURATION: 98 MS
EKG QTC CALCULATION (BAZETT): 492 MS
EKG R AXIS: -37 DEGREES
EKG T AXIS: -49 DEGREES
EKG VENTRICULAR RATE: 81 BPM
ERYTHROCYTE [DISTWIDTH] IN BLOOD BY AUTOMATED COUNT: 12.7 % (ref 11.5–14.5)
ERYTHROCYTE [DISTWIDTH] IN BLOOD BY AUTOMATED COUNT: 45.7 FL (ref 35–45)
GFR SERPL CREATININE-BSD FRML MDRD: 68 ML/MIN/1.73M2
GLUCOSE BLD-MCNC: 129 MG/DL (ref 70–108)
HBA1C MFR BLD: 5.1 % (ref 4.4–6.4)
HCT VFR BLD CALC: 34.6 % (ref 37–47)
HEMOGLOBIN: 10.5 GM/DL (ref 12–16)
MAGNESIUM: 1.9 MG/DL (ref 1.6–2.4)
MCH RBC QN AUTO: 29.6 PG (ref 26–33)
MCHC RBC AUTO-ENTMCNC: 30.3 GM/DL (ref 32.2–35.5)
MCV RBC AUTO: 97.5 FL (ref 81–99)
PH VENOUS: 7.32 (ref 7.31–7.41)
PLATELET # BLD: 181 THOU/MM3 (ref 130–400)
PMV BLD AUTO: 11 FL (ref 9.4–12.4)
POTASSIUM SERPL-SCNC: 4.3 MEQ/L (ref 3.5–5.2)
PROCALCITONIN: 0.1 NG/ML (ref 0.01–0.09)
RBC # BLD: 3.55 MILL/MM3 (ref 4.2–5.4)
SARS-COV-2, NAAT: NOT DETECTED
SODIUM BLD-SCNC: 133 MEQ/L (ref 135–145)
TROPONIN T: < 0.01 NG/ML
WBC # BLD: 9.4 THOU/MM3 (ref 4.8–10.8)

## 2021-07-02 PROCEDURE — 83735 ASSAY OF MAGNESIUM: CPT

## 2021-07-02 PROCEDURE — 82800 BLOOD PH: CPT

## 2021-07-02 PROCEDURE — 83036 HEMOGLOBIN GLYCOSYLATED A1C: CPT

## 2021-07-02 PROCEDURE — 6360000002 HC RX W HCPCS: Performed by: STUDENT IN AN ORGANIZED HEALTH CARE EDUCATION/TRAINING PROGRAM

## 2021-07-02 PROCEDURE — 93005 ELECTROCARDIOGRAM TRACING: CPT | Performed by: PHYSICIAN ASSISTANT

## 2021-07-02 PROCEDURE — 6360000002 HC RX W HCPCS: Performed by: PHYSICIAN ASSISTANT

## 2021-07-02 PROCEDURE — 85730 THROMBOPLASTIN TIME PARTIAL: CPT

## 2021-07-02 PROCEDURE — 84145 PROCALCITONIN (PCT): CPT

## 2021-07-02 PROCEDURE — 36415 COLL VENOUS BLD VENIPUNCTURE: CPT

## 2021-07-02 PROCEDURE — 2140000000 HC CCU INTERMEDIATE R&B

## 2021-07-02 PROCEDURE — 6370000000 HC RX 637 (ALT 250 FOR IP): Performed by: PHYSICIAN ASSISTANT

## 2021-07-02 PROCEDURE — 85379 FIBRIN DEGRADATION QUANT: CPT

## 2021-07-02 PROCEDURE — 87635 SARS-COV-2 COVID-19 AMP PRB: CPT

## 2021-07-02 PROCEDURE — 6360000002 HC RX W HCPCS: Performed by: FAMILY MEDICINE

## 2021-07-02 PROCEDURE — 99232 SBSQ HOSP IP/OBS MODERATE 35: CPT | Performed by: FAMILY MEDICINE

## 2021-07-02 PROCEDURE — 6370000000 HC RX 637 (ALT 250 FOR IP): Performed by: INTERNAL MEDICINE

## 2021-07-02 PROCEDURE — 2580000003 HC RX 258: Performed by: PHYSICIAN ASSISTANT

## 2021-07-02 PROCEDURE — 94640 AIRWAY INHALATION TREATMENT: CPT

## 2021-07-02 PROCEDURE — 99232 SBSQ HOSP IP/OBS MODERATE 35: CPT | Performed by: STUDENT IN AN ORGANIZED HEALTH CARE EDUCATION/TRAINING PROGRAM

## 2021-07-02 PROCEDURE — 71045 X-RAY EXAM CHEST 1 VIEW: CPT

## 2021-07-02 PROCEDURE — 84484 ASSAY OF TROPONIN QUANT: CPT

## 2021-07-02 PROCEDURE — 2580000003 HC RX 258: Performed by: FAMILY MEDICINE

## 2021-07-02 PROCEDURE — 6370000000 HC RX 637 (ALT 250 FOR IP): Performed by: FAMILY MEDICINE

## 2021-07-02 PROCEDURE — 80048 BASIC METABOLIC PNL TOTAL CA: CPT

## 2021-07-02 PROCEDURE — 85027 COMPLETE CBC AUTOMATED: CPT

## 2021-07-02 RX ORDER — ALBUTEROL SULFATE 2.5 MG/3ML
2.5 SOLUTION RESPIRATORY (INHALATION) EVERY 6 HOURS PRN
Status: DISCONTINUED | OUTPATIENT
Start: 2021-07-02 | End: 2021-07-03 | Stop reason: HOSPADM

## 2021-07-02 RX ORDER — MAGNESIUM SULFATE IN WATER 40 MG/ML
2000 INJECTION, SOLUTION INTRAVENOUS ONCE
Status: COMPLETED | OUTPATIENT
Start: 2021-07-02 | End: 2021-07-02

## 2021-07-02 RX ORDER — DIGOXIN 0.25 MG/ML
250 INJECTION INTRAMUSCULAR; INTRAVENOUS ONCE
Status: COMPLETED | OUTPATIENT
Start: 2021-07-02 | End: 2021-07-02

## 2021-07-02 RX ORDER — DIGOXIN 125 MCG
125 TABLET ORAL DAILY
Status: DISCONTINUED | OUTPATIENT
Start: 2021-07-03 | End: 2021-07-03 | Stop reason: HOSPADM

## 2021-07-02 RX ORDER — GUAIFENESIN 600 MG/1
600 TABLET, EXTENDED RELEASE ORAL 2 TIMES DAILY
Status: DISCONTINUED | OUTPATIENT
Start: 2021-07-02 | End: 2021-07-03 | Stop reason: HOSPADM

## 2021-07-02 RX ADMIN — GUAIFENESIN 600 MG: 600 TABLET, EXTENDED RELEASE ORAL at 11:09

## 2021-07-02 RX ADMIN — ASPIRIN 81 MG: 81 TABLET, COATED ORAL at 08:43

## 2021-07-02 RX ADMIN — ACETAMINOPHEN 650 MG: 325 TABLET ORAL at 02:46

## 2021-07-02 RX ADMIN — PANTOPRAZOLE SODIUM 40 MG: 40 TABLET, DELAYED RELEASE ORAL at 04:17

## 2021-07-02 RX ADMIN — LIDOCAINE HYDROCHLORIDE: 20 SOLUTION ORAL; TOPICAL at 02:41

## 2021-07-02 RX ADMIN — MAGNESIUM SULFATE HEPTAHYDRATE 2000 MG: 40 INJECTION, SOLUTION INTRAVENOUS at 08:42

## 2021-07-02 RX ADMIN — APIXABAN 5 MG: 5 TABLET, FILM COATED ORAL at 20:06

## 2021-07-02 RX ADMIN — ALBUTEROL SULFATE 2.5 MG: 2.5 SOLUTION RESPIRATORY (INHALATION) at 05:56

## 2021-07-02 RX ADMIN — AZITHROMYCIN DIHYDRATE 500 MG: 500 INJECTION, POWDER, LYOPHILIZED, FOR SOLUTION INTRAVENOUS at 04:17

## 2021-07-02 RX ADMIN — DIGOXIN 250 MCG: 0.25 INJECTION INTRAMUSCULAR; INTRAVENOUS at 08:43

## 2021-07-02 RX ADMIN — METOPROLOL TARTRATE 50 MG: 50 TABLET, FILM COATED ORAL at 08:43

## 2021-07-02 RX ADMIN — ATORVASTATIN CALCIUM 40 MG: 40 TABLET, FILM COATED ORAL at 20:06

## 2021-07-02 RX ADMIN — METOPROLOL TARTRATE 50 MG: 50 TABLET, FILM COATED ORAL at 20:05

## 2021-07-02 RX ADMIN — APIXABAN 5 MG: 5 TABLET, FILM COATED ORAL at 11:09

## 2021-07-02 RX ADMIN — SODIUM CHLORIDE, PRESERVATIVE FREE 10 ML: 5 INJECTION INTRAVENOUS at 20:06

## 2021-07-02 RX ADMIN — GUAIFENESIN 600 MG: 600 TABLET, EXTENDED RELEASE ORAL at 20:06

## 2021-07-02 RX ADMIN — SODIUM CHLORIDE, PRESERVATIVE FREE 10 ML: 5 INJECTION INTRAVENOUS at 08:50

## 2021-07-02 RX ADMIN — CEFTRIAXONE SODIUM 1000 MG: 1 INJECTION, POWDER, FOR SOLUTION INTRAMUSCULAR; INTRAVENOUS at 04:17

## 2021-07-02 ASSESSMENT — PAIN DESCRIPTION - DESCRIPTORS: DESCRIPTORS: ACHING

## 2021-07-02 ASSESSMENT — PAIN SCALES - GENERAL
PAINLEVEL_OUTOF10: 3
PAINLEVEL_OUTOF10: 3
PAINLEVEL_OUTOF10: 0
PAINLEVEL_OUTOF10: 0

## 2021-07-02 ASSESSMENT — PAIN DESCRIPTION - ONSET: ONSET: ON-GOING

## 2021-07-02 ASSESSMENT — PAIN DESCRIPTION - LOCATION
LOCATION: ABDOMEN
LOCATION: CHEST

## 2021-07-02 ASSESSMENT — PAIN DESCRIPTION - ORIENTATION: ORIENTATION: UPPER

## 2021-07-02 ASSESSMENT — PAIN DESCRIPTION - PAIN TYPE
TYPE: ACUTE PAIN
TYPE: CHRONIC PAIN

## 2021-07-02 ASSESSMENT — PAIN DESCRIPTION - PROGRESSION: CLINICAL_PROGRESSION: NOT CHANGED

## 2021-07-02 ASSESSMENT — PAIN - FUNCTIONAL ASSESSMENT: PAIN_FUNCTIONAL_ASSESSMENT: ACTIVITIES ARE NOT PREVENTED

## 2021-07-02 ASSESSMENT — PAIN DESCRIPTION - FREQUENCY: FREQUENCY: CONTINUOUS

## 2021-07-02 NOTE — PROGRESS NOTES
Hospitalist Progress Note    Patient: Mani Funez      Unit/Bed:3B-29/029-A    YOB: 1934    MRN: [id number]       Acct: [de-identified]     PCP: Yovana Hand MD    Date of Admission: 6/30/2021    Chief Complaint: f/u for chest pain and palpitations     Hospital Course:     Per H/P note:    \" 80 y.o. female with PMH CHF, Afib x 3 years, on eliquis, Mitral regurgitation, who presented to [address] [place] [address] with chief complaint of chest pain.      Patient was just seen in [facility] ER on 6/15/21 due to Afib with RVR, where she had synchronized cardioversion and patient was sent home. Patient reports that she was doing fine until 3 days prior admission, she started having intermittent palpitations and constant chest pain, located on the whole chest, 5-6/10, non-radiating, worse with deep breathing. She reports that she has SOB on exertion for several years but has been worse for past 3 days. She also reports chronic cough for years, unchanged. She has been monitoring her heart rate at home which has been as high as 120-130.  Today, chest pain and palpitations persisted, hence, she decided to come to the ED.  Pt denies dizziness, fever, chills, abd pain, N/V, sweating, legs swelling, orthopnea. In ER, VS temp 98.6F, RR 18, , /75, saturating 98% on room air. EKG showed afib with RVR, rate 145, ST depression on V4, V5, V6 ( similar from prior tracing). Labs include CMP normal except for serum bicarbonate 22, AG 11. CBC normal. Trop (-). Cardizem bolus with gtt was started in ER, SBP dropped to 90s after cardizem bolus, BP eventually improved to 125/101. Hospital Medicine was called for IP management. When I saw the patient in ER, she still having chest pain in the whole chest, 2/10. No other complaint noted. \"      Cardiology was consulted, recommend cardioversion. S/p successful cardioversion on 7/1/21 7/2/21: overnight, pt developed fever, temp 100. 6FF on 7/2/21 at [phone] and pt had desaturation, now on 3 lpm O2 via NC. CXR showed  Left retrocardiac density may represent atelectasis or pneumonia ( new compare CXR 6/15/21)    Subjective:     Patient seen and examined. She reports that she's still having dry cough. She reports MORENO. Pt denies chest pain, sob at rest, dizziness, palpitations. Medications:  Reviewed    Infusion Medications    sodium chloride 75 mL/hr at 07/01/21 1055    dilTIAZem (CARDIZEM) 125 mg in dextrose 5% 125 mL infusion Stopped (07/01/21 1641)    heparin (PORCINE) Infusion 15 Units/kg/hr (07/02/21 0724)    sodium chloride       Scheduled Medications    cefTRIAXone (ROCEPHIN) IV  1,000 mg Intravenous Q24H    magnesium sulfate  2,000 mg Intravenous Once    [START ON 7/3/2021] digoxin  125 mcg Oral Daily    guaiFENesin  600 mg Oral BID    metoprolol tartrate  50 mg Oral BID    aspirin EC  81 mg Oral Daily    atorvastatin  40 mg Oral Nightly    furosemide  20 mg Oral Once per day on Mon Wed Fri    isosorbide mononitrate  30 mg Oral Daily    pantoprazole  40 mg Oral QAM AC    spironolactone  12.5 mg Oral Daily    vitamin C  500 mg Oral Daily    Vitamin D  500 Units Oral Weekly    sodium chloride flush  5-40 mL Intravenous 2 times per day     PRN Meds: albuterol, heparin (porcine), heparin (porcine), nitroGLYCERIN, sodium chloride flush, sodium chloride, ondansetron **OR** ondansetron, polyethylene glycol, acetaminophen **OR** acetaminophen      Intake/Output Summary (Last 24 hours) at 7/2/2021 0941  Last data filed at 7/2/2021 0237  Gross per 24 hour   Intake 1811.81 ml   Output 0 ml   Net 1811.81 ml       Diet:  ADULT DIET; Regular; Low Fat/Low Chol/High Fiber/2 gm Na    Exam:  /60   Pulse 82   Temp 97.8 °F (36.6 °C) (Oral)   Resp 18   Ht 5' (1.524 m)   Wt 130 lb 1.6 oz (59 kg)   SpO2 99%   BMI 25.41 kg/m²       General appearance:  Alert, not in acute distress   HEENT:  Normal cephalic, atraumatic without obvious deformity.  Pupils equal, round, and reactive to light. Conjunctivae clear. Clear oral mucosa. Neck: Supple, with full range of motion. No jugular venous distention. Trachea midline. Respiratory:  on 3 lpm O2 via NC. (+) fine crackles on RLL, no wheezing, no rhonchi. Cardiovascular:  normal rate, regular rhythm, no murmurs, rubs or gallops. Abdomen: Soft, non-tender, non-distended with normal bowel sounds. Musculoskeletal:  No clubbing, cyanosis or edema bilaterally. Full range of motion without deformity. Skin: No rashes or lesions. Neurological exam: alert, oriented, normal speech, no focal findings or movement disorder noted. Exam of extremities: peripheral pulses normal, no pedal or leg edema, no clubbing or cyanosis    Labs:   Recent Labs     06/30/21  1320 07/01/21  0331 07/02/21  0332   WBC 8.9 10.1 9.4   HGB 12.3 12.4 10.5*   HCT 39.5 40.0 34.6*    207 181     Recent Labs     06/30/21  0820 07/01/21  0331 07/02/21  0332    133* 133*   K 4.6 4.3 4.3    101 102   CO2 22* 20* 17*   BUN 20 15 19   CREATININE 0.9 0.8 0.8   CALCIUM 9.1 8.6 8.1*     Recent Labs     06/30/21  0820 07/01/21  0331   AST 14 14   ALT 9* 7*   BILITOT 1.2 1.1   ALKPHOS 81 72     No results for input(s): INR in the last 72 hours. No results for input(s): [place]te Dec in the last 72 hours. Urinalysis:      Lab Results   Component Value Date    NITRU NEGATIVE 10/05/2017    WBCUA 10-15 10/05/2017    BACTERIA NONE 10/05/2017    RBCUA 0-2 10/05/2017    BLOODU NEGATIVE 10/05/2017    GLUCOSEU NEGATIVE 10/05/2017       Radiology:  XR CHEST PORTABLE   Final Result   Impression:   1. Left retrocardiac density may represent atelectasis or pneumonia. 2.  Hiatal hernia      This document has been electronically signed by: Sarmad Adams MD on    07/02/2021 02:26 AM          Diet: ADULT DIET; Regular; Low Fat/Low Chol/High Fiber/2 gm Na      Assessment/Plan:      1.  Atrial fibrillation with RVR, s/p successful cardioversion 7/1/21, Code Status: Full Code    Electronically signed by Benjamin Adams MD on 7/2/2021 at 9:41 AM

## 2021-07-02 NOTE — PROGRESS NOTES
Cardiology Progress Note      Patient: Gertrudis Smith  YOB: 1934  MRN: [id number]   Acct: [de-identified]  Admit Date:  6/30/2021  Primary Cardiologist: Zayda Patel MD    Per Dr Dada John note:  Savana Ort FOR CONSULTATION:  Recurrence of atrial fibrillation with rapid  ventricular response.     PRIMARY CARDIOLOGIST:  Zayda Patel MD     HISTORY OF PRESENT ILLNESS:  This is an 80year-old pleasant   female patient known to have coronary artery disease, paroxysmal atrial  fibrillation, presented to the emergency room with episodes of  palpitations and chest discomfort over the last three days prior to  admission and the patient was having on and off type of chest  discomfort, pressure, heaviness like 3 to 4 out of 10, not radiating,  and she has been having that basically the day of admission and before  that, she has been having some palpitations, and on the day of  presentation, she has checked her heart monitor and that has shown like  heart rate of 120 to 130 beats per minute; in view of that, presented to  the emergency room and noted to be in AFib with rapid ventricular  response. Two weeks back, the patient has been here in the emergency  room for AFib with rapid ventricular response with chest discomfort and  cardioverted and sent home. The patient has history of AFib, that onset  in 2017. At that time, she converted on to normal sinus rhythm, never  went back into AFib until two weeks back and two weeks back, she got  shock, converted, and now, she has another recurrence in less than two  weeks. Once the rate is controlled, she has been chest pain free,  stable and she is known to have multivessel coronary artery disease. She is on medical therapy, is not amenable for intervention per the  cardiac catheterization report in 2017.     Currently, no chest pain and no shortness of breath. No orthopnea or  paroxysmal nocturnal dyspnea. \"    Subjective (Events in last 24 hours):   Pt awake, alert. NAD. Denies CP, SOB. States she is feeling well today and ready to go home. D/w patient about CV and that we are adjusting medications to try to keep HR in rhythm and BP stable. D/w patient that if afib recurs, may need transfer to another facility for ablation as it may be a while before Dr Ruma Herrera can see and proceed with procedure. Patient expressed understanding and agrees with plan.      Objective:   /60   Pulse 82   Temp 97.8 °F (36.6 °C) (Oral)   Resp 18   Ht 5' (1.524 m)   Wt 130 lb 1.6 oz (59 kg)   SpO2 99%   BMI 25.41 kg/m²      bp on low side  TELEMETRY: nsr, 80s     Physical Exam:  General Appearance: alert and oriented to person, place and time, in no acute distress  Cardiovascular: normal rate, regular rhythm, normal S1 and S2, no murmurs, rubs, clicks, or gallops, distal pulses intact, no carotid bruits, no JVD  Pulmonary/Chest: clear to auscultation bilaterally- no wheezes, rales or rhonchi, normal air movement, no respiratory distress  Abdomen: soft, non-tender, non-distended, normal bowel sounds, no masses   Extremities: no cyanosis, clubbing or edema, pulse   Skin: warm and dry, no rash or erythema  Head: normocephalic and atraumatic  Eyes: pupils equal, round, and reactive to light  Neck: supple and non-tender without mass, no thyromegaly   Musculoskeletal: normal range of motion, no joint swelling, deformity or tenderness  Neurological: alert, oriented, normal speech, no focal findings or movement disorder noted    Medications:    cefTRIAXone (ROCEPHIN) IV  1,000 mg Intravenous Q24H    magnesium sulfate  2,000 mg Intravenous Once    [START ON 7/3/2021] digoxin  125 mcg Oral Daily    metoprolol tartrate  50 mg Oral BID    aspirin EC  81 mg Oral Daily    atorvastatin  40 mg Oral Nightly    furosemide  20 mg Oral Once per day on Mon Wed Fri    isosorbide mononitrate  30 mg Oral Daily    pantoprazole  40 mg Oral QAM AC    spironolactone  12.5 mg Oral Daily    vitamin C cardioversion.     DESCRIPTION OF PROCEDURE:  After informed consent, the patient was  brought to the cardiac catheterization room. I then proceeded with  transesophageal echocardiogram, no intracardiac thrombus was noticed. A  complete echocardiogram report to follow separately. .  After the patient  received adequate sedation with fentanyl and Versed,  we proceeded with  direct current cardioversion. 300 joules of synchronized cardioversion  was administered. This resulted in restoration of normal sinus rhythm.     MEDICATIONS:  See MAR.     COMPLICATIONS:  None.     IMPRESSION:  Successful direct current cardioversion.     RECOMMENDATIONS:  Continue with anticoagulation without interruption. Continue rest of medications. We will add amiodarone 200 mg p.o. daily.              Octavia Palomo MD     D: 07/01/2021 16:59:26       T: 07/01/2021 17:07:15         Lab Data:    Cardiac Enzymes:  No results for input(s): CKTOTAL, CKMB, CKMBINDEX, TROPONINI in the last 72 hours.     CBC:   Lab Results   Component Value Date    WBC 9.4 07/02/2021    RBC 3.55 07/02/2021    RBC 4.15 08/27/2019    HGB 10.5 07/02/2021    HCT 34.6 07/02/2021     07/02/2021       CMP:    Lab Results   Component Value Date     07/02/2021    K 4.3 07/02/2021    K 4.3 07/01/2021     07/02/2021    CO2 17 07/02/2021    BUN 19 07/02/2021    CREATININE 0.8 07/02/2021    LABGLOM 68 07/02/2021    GLUCOSE 129 07/02/2021    GLUCOSE 91 02/03/2021    CALCIUM 8.1 07/02/2021       Hepatic Function Panel:    Lab Results   Component Value Date    ALKPHOS 72 07/01/2021    ALT 7 07/01/2021    AST 14 07/01/2021    PROT 6.8 07/01/2021    BILITOT 1.1 07/01/2021    BILIDIR <0.2 06/15/2021    LABALBU 3.2 07/01/2021       Magnesium:    Lab Results   Component Value Date    MG 1.9 07/02/2021       PT/INR:    Lab Results   Component Value Date    INR 1.47 09/05/2020       HgBA1c:    Lab Results   Component Value Date    LABA1C 5.1 07/02/2021 FLP:    Lab Results   Component Value Date    TRIG 92 09/24/2016    HDL 62 09/24/2016    LDLCALC 131 09/24/2016       TSH:    Lab Results   Component Value Date    TSH 1.910 06/30/2021         Assessment:  PAF rvr--currently cvr--on eliquis at home  S/p successful CV converted to nsr  Hx of MVCAD, not amenable to intervention  MMXIRX--58-60%  Chronic diastolic CHF     Plan:  Asa. lipitor 40 mg daily  Lopressor 50 mg BID  Lasix 20 mg three times weekly  imdur 30 mg daily    Aldactone 12.5 mg daily    Patient has a hx of reaction to amiodarone with prolonged QTc. BP has been low, causing issue with increasing AVN blockers. Will try digoxin for rate/rhythm control with loading dose. Start digoxin 125 mcg daily. Patient may benefit from afib ablation given recurrence. Will schedule appt with Dr Jacoby Cedillo upon his return. If afib continues given change in medications, may need referral to an outside EP for more urgent management. If patient remains in NSR, stable from cardiology standpoint and is okay for d/c.       Electronically signed by Cynthia Oglesby PA-C on 7/2/2021 at 9:00 AM

## 2021-07-02 NOTE — CARE COORDINATION
7/2/21, 2:05 PM EDT    DISCHARGE  Denise Jackson day: 2  Location: [facility]29/029-A Reason for admit: Atrial fibrillation with RVR [facility]) [I48.91]   Procedure:   7/1 KAMRAN/CV  Barriers to Discharge: Possible new pneumonia, started on IV rocephin. Having issues with bp dropping again. Heparin stopped, placed on eliquis. New digoxin added today (unable to have amiodarone r/t prolonged QT). PCP: Rony Tobias MD  Readmission Risk Score: 16%  Patient Goals/Plan/Treatment Preferences: Plans to return home with . She denied discharge needs, Remi Dimas normally drives and is very independent.

## 2021-07-02 NOTE — PROGRESS NOTES
0117 Patient complaining of heartburn stating that she may have eaten something that didn't agree with her stomach and that it was painful to breathe. [employer]	[place] on the floor at this time. Received an order for a GI cocktail and portable CXR.      [address] of CXR results. 1.  Left retrocardiac density may represent atelectasis or pneumonia.    2.  Hiatal hernia

## 2021-07-02 NOTE — PLAN OF CARE
Problem: Falls - Risk of:  Goal: Will remain free from falls  Description: Will remain free from falls  7/2/2021 1955 by Danyell Martínez RN  Outcome: Ongoing  7/2/2021 1954 by Danyell Martínez RN  Note: Pt free from falls    Goal: Absence of physical injury  Description: Absence of physical injury  7/2/2021 1955 by Danyell Martínez RN  Outcome: Ongoing  7/2/2021 1954 by Danyell Martínez RN  Note: Pt without physical injury       Problem: Pain:  Goal: Pain level will decrease  Description: Pain level will decrease  7/2/2021 1955 by Danyell Martínez RN  Outcome: Ongoing  7/2/2021 1954 by Danyell Martínez RN  Note: Pt denies pain  Goal: Control of acute pain  Description: Control of acute pain  7/2/2021 1955 by Danyell Martínez RN  Outcome: Ongoing  7/2/2021 1954 by Danyell Martínez RN  Note: Pt denies pain  Goal: Control of chronic pain  Description: Control of chronic pain  7/2/2021 1955 by Danyell Martínez RN  Outcome: Ongoing  7/2/2021 1954 by Danyell Martínez RN  Note: Pt denies pain       . Care plan reviewed with patient. Patient verbalize understanding of the plan of care and contribute to goal setting.

## 2021-07-02 NOTE — FLOWSHEET NOTE
07/02/21 0237   Vitals   Temp 100.6 °F (38.1 °C)   Temp Source Oral   Pulse 88   Heart Rate Source Monitor   Resp 20   BP (!) 97/56   MAP (mmHg) 70   BP Location Right upper arm   BP Upper/Lower Upper   BP Method Automatic   Patient Position Semi fowlers   Level of Consciousness Alert (0)   MEWS Score 2   Patient Currently in Pain Denies   Cardiac Rhythm NSR   Pain Assessment   Pain Assessment 0-10   Pain Level 0   Oxygen Therapy   SpO2 (!) 89 %   Pulse Oximeter Device Mode Intermittent   Pulse Oximeter Device Location Left   O2 Device None (Room air)     Informed Eliseo Jenkins of current vitals.   Orders received for labs to be drawn, atb started and breathing tx prn

## 2021-07-03 VITALS
WEIGHT: 136.69 LBS | DIASTOLIC BLOOD PRESSURE: 53 MMHG | RESPIRATION RATE: 16 BRPM | BODY MASS INDEX: 26.84 KG/M2 | SYSTOLIC BLOOD PRESSURE: 92 MMHG | HEIGHT: 60 IN | TEMPERATURE: 98.1 F | OXYGEN SATURATION: 93 % | HEART RATE: 70 BPM

## 2021-07-03 LAB
ANION GAP SERPL CALCULATED.3IONS-SCNC: 8 MEQ/L (ref 8–16)
BASOPHILS # BLD: 0.2 %
BASOPHILS ABSOLUTE: 0 THOU/MM3 (ref 0–0.1)
BUN BLDV-MCNC: 14 MG/DL (ref 7–22)
CALCIUM SERPL-MCNC: 8.2 MG/DL (ref 8.5–10.5)
CHLORIDE BLD-SCNC: 102 MEQ/L (ref 98–111)
CO2: 22 MEQ/L (ref 23–33)
CREAT SERPL-MCNC: 0.7 MG/DL (ref 0.4–1.2)
EOSINOPHIL # BLD: 0.9 %
EOSINOPHILS ABSOLUTE: 0.1 THOU/MM3 (ref 0–0.4)
ERYTHROCYTE [DISTWIDTH] IN BLOOD BY AUTOMATED COUNT: 12.8 % (ref 11.5–14.5)
ERYTHROCYTE [DISTWIDTH] IN BLOOD BY AUTOMATED COUNT: 44 FL (ref 35–45)
GFR SERPL CREATININE-BSD FRML MDRD: 79 ML/MIN/1.73M2
GLUCOSE BLD-MCNC: 107 MG/DL (ref 70–108)
HCT VFR BLD CALC: 32 % (ref 37–47)
HEMOGLOBIN: 9.8 GM/DL (ref 12–16)
IMMATURE GRANS (ABS): 0.05 THOU/MM3 (ref 0–0.07)
IMMATURE GRANULOCYTES: 0.6 %
LYMPHOCYTES # BLD: 15.8 %
LYMPHOCYTES ABSOLUTE: 1.4 THOU/MM3 (ref 1–4.8)
MAGNESIUM: 2.6 MG/DL (ref 1.6–2.4)
MCH RBC QN AUTO: 29 PG (ref 26–33)
MCHC RBC AUTO-ENTMCNC: 30.6 GM/DL (ref 32.2–35.5)
MCV RBC AUTO: 94.7 FL (ref 81–99)
MONOCYTES # BLD: 12.1 %
MONOCYTES ABSOLUTE: 1.1 THOU/MM3 (ref 0.4–1.3)
NUCLEATED RED BLOOD CELLS: 0 /100 WBC
PLATELET # BLD: 163 THOU/MM3 (ref 130–400)
PMV BLD AUTO: 10.6 FL (ref 9.4–12.4)
POTASSIUM SERPL-SCNC: 4.2 MEQ/L (ref 3.5–5.2)
RBC # BLD: 3.38 MILL/MM3 (ref 4.2–5.4)
SEG NEUTROPHILS: 70.4 %
SEGMENTED NEUTROPHILS ABSOLUTE COUNT: 6.3 THOU/MM3 (ref 1.8–7.7)
SODIUM BLD-SCNC: 132 MEQ/L (ref 135–145)
WBC # BLD: 9 THOU/MM3 (ref 4.8–10.8)

## 2021-07-03 PROCEDURE — 83735 ASSAY OF MAGNESIUM: CPT

## 2021-07-03 PROCEDURE — 2580000003 HC RX 258: Performed by: FAMILY MEDICINE

## 2021-07-03 PROCEDURE — 85025 COMPLETE CBC W/AUTO DIFF WBC: CPT

## 2021-07-03 PROCEDURE — 80048 BASIC METABOLIC PNL TOTAL CA: CPT

## 2021-07-03 PROCEDURE — 6370000000 HC RX 637 (ALT 250 FOR IP): Performed by: INTERNAL MEDICINE

## 2021-07-03 PROCEDURE — 94761 N-INVAS EAR/PLS OXIMETRY MLT: CPT

## 2021-07-03 PROCEDURE — 6370000000 HC RX 637 (ALT 250 FOR IP): Performed by: STUDENT IN AN ORGANIZED HEALTH CARE EDUCATION/TRAINING PROGRAM

## 2021-07-03 PROCEDURE — 99239 HOSP IP/OBS DSCHRG MGMT >30: CPT | Performed by: FAMILY MEDICINE

## 2021-07-03 PROCEDURE — 2580000003 HC RX 258: Performed by: PHYSICIAN ASSISTANT

## 2021-07-03 PROCEDURE — 36415 COLL VENOUS BLD VENIPUNCTURE: CPT

## 2021-07-03 PROCEDURE — 6370000000 HC RX 637 (ALT 250 FOR IP): Performed by: FAMILY MEDICINE

## 2021-07-03 PROCEDURE — 6360000002 HC RX W HCPCS: Performed by: PHYSICIAN ASSISTANT

## 2021-07-03 RX ORDER — GUAIFENESIN 600 MG/1
600 TABLET, EXTENDED RELEASE ORAL 2 TIMES DAILY
Qty: 14 TABLET | Refills: 0 | Status: SHIPPED | OUTPATIENT
Start: 2021-07-03 | End: 2021-07-10

## 2021-07-03 RX ORDER — METOPROLOL TARTRATE 50 MG/1
50 TABLET, FILM COATED ORAL 2 TIMES DAILY
Qty: 60 TABLET | Refills: 0 | Status: SHIPPED | OUTPATIENT
Start: 2021-07-03 | End: 2021-07-27 | Stop reason: SDUPTHER

## 2021-07-03 RX ORDER — DIGOXIN 125 MCG
125 TABLET ORAL DAILY
Qty: 30 TABLET | Refills: 0 | Status: SHIPPED | OUTPATIENT
Start: 2021-07-04 | End: 2021-07-21 | Stop reason: ALTCHOICE

## 2021-07-03 RX ORDER — DOXYCYCLINE HYCLATE 100 MG
100 TABLET ORAL 2 TIMES DAILY
Qty: 10 TABLET | Refills: 0 | Status: SHIPPED | OUTPATIENT
Start: 2021-07-03 | End: 2021-07-08

## 2021-07-03 RX ADMIN — PANTOPRAZOLE SODIUM 40 MG: 40 TABLET, DELAYED RELEASE ORAL at 06:12

## 2021-07-03 RX ADMIN — SODIUM CHLORIDE, PRESERVATIVE FREE 10 ML: 5 INJECTION INTRAVENOUS at 09:28

## 2021-07-03 RX ADMIN — ASPIRIN 81 MG: 81 TABLET, COATED ORAL at 09:26

## 2021-07-03 RX ADMIN — METOPROLOL TARTRATE 50 MG: 50 TABLET, FILM COATED ORAL at 09:24

## 2021-07-03 RX ADMIN — GUAIFENESIN 600 MG: 600 TABLET, EXTENDED RELEASE ORAL at 09:26

## 2021-07-03 RX ADMIN — CEFTRIAXONE SODIUM 1000 MG: 1 INJECTION, POWDER, FOR SOLUTION INTRAMUSCULAR; INTRAVENOUS at 04:52

## 2021-07-03 RX ADMIN — APIXABAN 5 MG: 5 TABLET, FILM COATED ORAL at 09:26

## 2021-07-03 RX ADMIN — DIGOXIN 125 MCG: 125 TABLET ORAL at 09:26

## 2021-07-03 ASSESSMENT — PAIN SCALES - GENERAL
PAINLEVEL_OUTOF10: 0

## 2021-07-03 NOTE — PROGRESS NOTES
A home oxygen evaluation has been completed. [x]Patient is an inpatient. It is expected that the patient will be discharged within the next 48 hours. Qualified provider to write orders for possible sleep study or home oxygen prescription. Social service/care managers will arrange for home oxygen if ordered. If patient is active, arrange for Home Medical supplier to assess for Oxygen Conserving Device per pulse oximetry. []Patient is an outpatient. Results will be faxed to the ordering provider. Qualified provider to write orders for possible sleep study or home oxygen prescription. Patient has been on room air for 130 minutes. SpO2 was 94% on room air at rest. Patients SpO2 was 89% or above and did not qualify for home oxygen. Patient was walked for 7 minutes. SpO2 was 92% during walking. Patients SpO2 was 89% or above and did not qualify for home oxygen. Patient does not have a positive pressure airway device at home. Patient is not  diagnosed with Obstructive Sleep Apnea.  Patient will not be set up/instructed on a nocturnal study due to order says to cancel nocturnal pulse ox part of home O2 eval.

## 2021-07-03 NOTE — DISCHARGE SUMMARY
Hospital Medicine Discharge Summary      Patient Identification:   La Mcnamara   : 1934  MRN: [id number]   Account: [de-identified]      Patient's PCP: Rocky Garrido MD    Admit Date: 2021     Discharge Date:   7/3/2021     Admitting Physician: Benjamin Adams MD     Discharge Physician: Benjamin Adams MD     Discharge Diagnoses with Hospital course: La Mcnamara is a 80 y.o. female admitted to [facility] on 2021 for Atrial fibrillation with RVR. Per H/P note:     \" 80 y.o. female with PMH CHF, Afib x 3 years, on eliquis, Mitral regurgitation, who presented to [facility] with chief complaint of chest pain.      Patient was just seen in [facility] ER on 6/15/21 due to Afib with RVR, where she had synchronized cardioversion and patient was sent home. Patient reports that she was doing fine until 3 days prior admission, she started having intermittent palpitations and constant chest pain, located on the whole chest, 5-6/10, non-radiating, worse with deep breathing. She reports that she has SOB on exertion for several years but has been worse for past 3 days. She also reports chronic cough for years, unchanged. She has been monitoring her heart rate at home which has been as high as 120-130.  Today, chest pain and palpitations persisted, hence, she decided to come to the ED.  Pt denies dizziness, fever, chills, abd pain, N/V, sweating, legs swelling, orthopnea.    In ER, VS temp 98.6F, RR 18, , /75, saturating 98% on room air. EKG showed afib with RVR, rate 145, ST depression on V4, V5, V6 ( similar from prior tracing). Labs include CMP normal except for serum bicarbonate 22, AG 11. CBC normal. Trop (-). Cardizem bolus with gtt was started in ER, SBP dropped to 90s after cardizem bolus, BP eventually improved to 125/101. Hospital Medicine was called for IP management.    When I saw the patient in ER, she still having chest pain in the whole chest, 2/10. No other complaint noted. \"      Cardiology was consulted, recommend cardioversion. S/p successful cardioversion on 7/1/21 7/2/21: overnight, pt developed fever, temp 100. 6FF on 7/2/21 at 0237 and pt had desaturation, now on 3 lpm O2 via NC. CXR showed  Left retrocardiac density may represent atelectasis or pneumonia ( new compare CXR 6/15/21)    Please see below for details of hospital course:    1. Atrial fibrillation with RVR, s/p successful cardioversion 7/1/21, now NSR: CHADsVASC score 4. Off cardizem gtt. stop heparin gtt and start home meds eliquis. Cardiology addedd metoprolol 50 mg PO BID on 7/1/21. Coreg stopped, switched to lopressor. Tele. TSH normal. Cardiology added digoxin. No amiodarone due to prolonged QTc  2. Acute febrile illness, possible pna: started on 7/1/2021 rocephin and zithromax overnight. Stopped zithromax due to prolonged qtc. COVID-19 negative. Pt is having dry cough. 3. Possible pneumonia vs atelectasis: IS. Patient received 2 doses of rocephin. Start Doxycyline 100 mg PO BID x 5 days    4. Chest pain/SOB, likely related to afib with RVR, resolved: trop (-), EKG afib with RVR, ST depression on leads V4, V5, V6 ( present prior tracing). Less likely PE, pt is already on eliquis. Trop (-) x 3  5. Hypotension, drug-induced, resolved  6. Hyponatremia, mild likely due to diuretics, stable: Repeat BMP in 1 week and follow-up with PCP  7. NAGMA, mild, resolving: venous pH 7.32. repeat BMP in 1 week and follow-up with PCP  8. Hyperglycemia: no DM hx, A1C 5.1%  9. Prolonged QTc: keep magnesium at least 2.0. tele. Stopped zithromax, zofran and phenergan   10. Chronic HFpEF, EF 55-60%, compensated: cont home meds aldactone and lasix with holding parameters. Daily weights, I/Os, fluid and salt restrictions  11. Moderate to severe MR: noted on echo 01/2021. Patient follows with cardiologist Dr. Zayda Crawford   12.  CAD: per cardiac cath in 2017: Significant multivessel disease predominantly in the following most recent labs are provided:      CBC:    Lab Results   Component Value Date    WBC 9.0 07/03/2021    HGB 9.8 07/03/2021    HCT 32.0 07/03/2021     07/03/2021       Renal:    Lab Results   Component Value Date     07/03/2021    K 4.2 07/03/2021    K 4.3 07/01/2021     07/03/2021    CO2 22 07/03/2021    BUN 14 07/03/2021    CREATININE 0.7 07/03/2021    CALCIUM 8.2 07/03/2021    PHOS 3.6 10/11/2017         Significant Diagnostic Studies    Radiology:   XR CHEST PORTABLE   Final Result   Impression:   1. Left retrocardiac density may represent atelectasis or pneumonia. 2.  Hiatal hernia      This document has been electronically signed by: Hugh Peters MD on    07/02/2021 02:26 AM             Consults:     IP CONSULT TO CARDIOLOGY  IP CONSULT TO PALLIATIVE CARE    Disposition:    [x] Home       [] TCU       [] Rehab       [] Psych       [] SNF       [] [place]       [] Other-    Condition at Discharge: Stable    Code Status:  Full Code     Patient Instructions:    Discharge lab work: BMP, magnesium, H/H 1 week   Activity: activity as tolerated  Diet: ADULT DIET;  Regular; Low Fat/Low Chol/High Fiber/2 gm Na      Follow-up visits:   Sarah Marroquin MD  [address] [place] [address]  [phone]    In 1 week      Stormy Verdugo MD  [place] [address]  [phone]    In 3 weeks      Kenny Flores MD  [address] [place] [address]  [phone]      schedule an appointment as soon as possible          Discharge Medications:      Dee Dee [address] [place] [address] [address] Medication Instructions [id number]    Printed on:07/03/21 1306   Medication Information                      apixaban (ELIQUIS) 5 MG TABS tablet  Take 1 tablet by mouth 2 times daily             aspirin EC 81 MG EC tablet  Take 1 tablet by mouth daily             atorvastatin (LIPITOR) 40 MG tablet  Take 1 tablet by mouth nightly             digoxin (LANOXIN) 125 MCG tablet  Take 1 tablet by mouth daily             doxycycline hyclate (VIBRA-TABS) 100 MG tablet  Take 1 tablet by mouth 2 times daily for 5 days             furosemide (LASIX) 20 MG tablet  Take 1 tablet by mouth three times a week Mon-Wed-Fri             guaiFENesin (MUCINEX) 600 MG extended release tablet  Take 1 tablet by mouth 2 times daily for 7 days             isosorbide mononitrate (IMDUR) 30 MG extended release tablet  Take 1 tablet by mouth daily             lansoprazole (PREVACID) 30 MG delayed release capsule  Take 1 capsule by mouth daily             metoprolol tartrate (LOPRESSOR) 50 MG tablet  Take 1 tablet by mouth 2 times daily             nitroGLYCERIN (NITROSTAT) 0.4 MG SL tablet  Place 1 tablet under the tongue every 5 minutes as needed for Chest pain (SOB)             spironolactone (ALDACTONE) 25 MG tablet  Take 0.5 tablets by mouth daily             vitamin C (ASCORBIC ACID) 500 MG tablet  Take 500 mg by mouth daily             vitamin D (CHOLECALCIFEROL) 1000 UNIT TABS tablet  Take 500 Units by mouth once a week                  Time Spent on discharge is more than 30 minutes in the examination, evaluation, counseling and review of medications and discharge plan. Signed: Thank you Romulo Florez MD for the opportunity to be involved in this patient's care.     Electronically signed by Cristel Molina MD on 7/3/2021 at 9:36 AM

## 2021-07-03 NOTE — PLAN OF CARE
Problem: Falls - Risk of:  Goal: Will remain free from falls  Description: Will remain free from falls  Outcome: Met This Shift  Goal: Absence of physical injury  Description: Absence of physical injury  Outcome: Met This Shift     Problem: Pain:  Description: Pain management should include both nonpharmacologic and pharmacologic interventions. Goal: Pain level will decrease  Description: Pain level will decrease  Outcome: Met This Shift  Goal: Control of acute pain  Description: Control of acute pain  Outcome: Met This Shift  Goal: Control of chronic pain  Description: Control of chronic pain  Outcome: Met This Shift     Patient discharged to home in stable condition. AVS, follow-up and medication changes discussed. Patient verbalized understanding, questions answered.

## 2021-07-06 ENCOUNTER — CARE COORDINATION (OUTPATIENT)
Dept: CASE MANAGEMENT | Age: 86
End: 2021-07-06

## 2021-07-06 NOTE — CARE COORDINATION
Care Transitions Outreach Attempt -  Left voicemail message    Call within 2 business days of discharge: Yes   Attempted to reach patient for transitions of care follow up. Unable to reach patient. Patient: Kalyn Funez Patient : 1934 MRN: [id number]    Last Discharge [address] [address]       Complaint Diagnosis Description Type Department Provider    21 Chest Pain; Atrial Fibrillation Atrial fibrillation with rapid ventricular response ([facility] [address] . .. ED to Hosp-Admission (Discharged) (ADMITTED) CRUZIT[facility] 3B Tracy Lopez MD; Michael Fish... Was this an external facility discharge?  No Discharge Facility: [facility]aud D[facility]    Noted following upcoming appointments from discharge chart review:   [facility] follow up appointment(s):   Future Appointments   Date Time Provider Priscilla Finley   2021 12:00 PM Andrés Swanson MD N [facility] [place]   2021 10:30 AM ANDREA Ramirez - CNP N [facility] CHF [facility] - [place]     Non-[facility] follow up appointment(s):

## 2021-07-07 ENCOUNTER — CARE COORDINATION (OUTPATIENT)
Dept: CASE MANAGEMENT | Age: 86
End: 2021-07-07

## 2021-07-07 LAB
ANION GAP SERPL CALCULATED.3IONS-SCNC: 9 MMOL/L (ref 5–15)
BUN BLDV-MCNC: 17 MG/DL (ref 5–27)
CALCIUM SERPL-MCNC: 9.4 MG/DL (ref 8.5–10.5)
CHLORIDE BLD-SCNC: 109 MMOL/L (ref 98–109)
CO2: 23 MMOL/L (ref 22–32)
CREAT SERPL-MCNC: 0.99 MG/DL (ref 0.4–1)
EGFR AFRICAN AMERICAN: >60 ML/MIN/1.73SQ.M
EGFR IF NONAFRICAN AMERICAN: 53 ML/MIN/1.73SQ.M
GLUCOSE: 101 MG/DL (ref 65–99)
POTASSIUM SERPL-SCNC: 5.1 MMOL/L (ref 3.5–5)
SODIUM BLD-SCNC: 141 MMOL/L (ref 134–146)

## 2021-07-07 NOTE — CARE COORDINATION
Care Transitions Outreach Attempt -  Left voicemail message 2nd attempt    Call within 2 business days of discharge: Yes   Attempted to reach patient for transitions of care follow up. Unable to reach patient. Patient: Birder Fothergill Cox Patient : 1934 MRN: [id number]    Last Discharge [facility]       Complaint Diagnosis Description Type Department Provider    21 Chest Pain; Atrial Fibrillation Atrial fibrillation with rapid ventricular response ([address] . .. ED to Hosp-Admission (Discharged) (ADMITTED) CR[facility] 3B Kinga Marie MD; Cristian Flores... Was this an external facility discharge?  No Discharge Facility: Sheryle Grade    Noted following upcoming appointments from discharge chart review:   [address] follow up appointment(s):   Future Appointments   Date Time Provider Priscilla Finley   2021  9:30 AM Valdo Serrano MD [facility] [address]   2021  8:30 AM Nathan Lyons PA-C N [address] [facility]   2021  9:00 AM Aristides Cullen MD N [facility] Heart [facility] [facility]   2021 12:00 PM Jodee Morris MD N [facility] Heart [facility] - [address] [facility]   2021 10:30 AM ANDREA Ramirez - CNP N [facility]PX CHF [facility] - [address] [place] [address]     Non-[facility] follow up appointment(s):      Denice Peace [phone]  Care Transitions Nurse

## 2021-07-12 ENCOUNTER — CARE COORDINATION (OUTPATIENT)
Dept: CASE MANAGEMENT | Age: 86
End: 2021-07-12

## 2021-07-12 NOTE — CARE COORDINATION
Fredy 45 Transitions Follow Up Call    2021    Patient: Aura Sandifer Cox  Patient : 1934   MRN: <[id number]>  Reason for Admission:   Discharge Date: 7/3/21 RARS: Readmission Risk Score: 19    Attempted to contact patient for BPCI-A follow up. Unable to reach patient. Left message with contact information and request for call back.       Follow Up  Future Appointments   Date Time Provider Priscilla Finley   2021  9:30 AM Radha Shen MD [facility] [place] [address]   2021  8:30 AM Alphonse Zavaleta PA-C N [facility] Heart [address]   2021  9:00 AM Tammie Melendez MD N [facility] Heart [address]   2021 12:00 PM Shea Holloway MD N [facility] Heart [address]   2021 10:30 AM ANDREA Eldridge - CNP N SRPX CHF [facility] - [place] RN

## 2021-07-13 ENCOUNTER — OFFICE VISIT (OUTPATIENT)
Dept: FAMILY MEDICINE CLINIC | Age: 86
End: 2021-07-13
Payer: MEDICARE

## 2021-07-13 VITALS
TEMPERATURE: 98.1 F | OXYGEN SATURATION: 97 % | DIASTOLIC BLOOD PRESSURE: 60 MMHG | HEART RATE: 88 BPM | SYSTOLIC BLOOD PRESSURE: 98 MMHG | RESPIRATION RATE: 18 BRPM | WEIGHT: 126 LBS | BODY MASS INDEX: 24.61 KG/M2

## 2021-07-13 DIAGNOSIS — I48.91 ATRIAL FIBRILLATION WITH RVR (HCC): Primary | ICD-10-CM

## 2021-07-13 DIAGNOSIS — I25.10 CAD, MULTIPLE VESSEL: ICD-10-CM

## 2021-07-13 DIAGNOSIS — R94.31 PROLONGED QT INTERVAL: ICD-10-CM

## 2021-07-13 PROCEDURE — 99495 TRANSJ CARE MGMT MOD F2F 14D: CPT | Performed by: FAMILY MEDICINE

## 2021-07-13 PROCEDURE — 1111F DSCHRG MED/CURRENT MED MERGE: CPT | Performed by: FAMILY MEDICINE

## 2021-07-13 SDOH — ECONOMIC STABILITY: FOOD INSECURITY: WITHIN THE PAST 12 MONTHS, YOU WORRIED THAT YOUR FOOD WOULD RUN OUT BEFORE YOU GOT MONEY TO BUY MORE.: NEVER TRUE

## 2021-07-13 SDOH — ECONOMIC STABILITY: FOOD INSECURITY: WITHIN THE PAST 12 MONTHS, THE FOOD YOU BOUGHT JUST DIDN'T LAST AND YOU DIDN'T HAVE MONEY TO GET MORE.: NEVER TRUE

## 2021-07-13 ASSESSMENT — PATIENT HEALTH QUESTIONNAIRE - PHQ9
1. LITTLE INTEREST OR PLEASURE IN DOING THINGS: 0
SUM OF ALL RESPONSES TO PHQ9 QUESTIONS 1 & 2: 0
SUM OF ALL RESPONSES TO PHQ QUESTIONS 1-9: 0
2. FEELING DOWN, DEPRESSED OR HOPELESS: 0

## 2021-07-13 ASSESSMENT — SOCIAL DETERMINANTS OF HEALTH (SDOH): HOW HARD IS IT FOR YOU TO PAY FOR THE VERY BASICS LIKE FOOD, HOUSING, MEDICAL CARE, AND HEATING?: NOT HARD AT ALL

## 2021-07-19 ENCOUNTER — TELEPHONE (OUTPATIENT)
Dept: CARDIOLOGY CLINIC | Age: 86
End: 2021-07-19

## 2021-07-19 ENCOUNTER — CARE COORDINATION (OUTPATIENT)
Dept: CASE MANAGEMENT | Age: 86
End: 2021-07-19

## 2021-07-19 NOTE — CARE COORDINATION
Fredy 45 Transitions Follow Up Call    2021    Patient: Angel Funez  Patient : 1934   MRN: <[id number]>  Reason for Admission:   Discharge Date: 7/3/21 RARS: Readmission Risk Score: 23         Spoke with:  Patient's spouse    Received return phone call from patient. She left message stating she is returning phone call and would like an explanation on reason for the call. She can be reached at [phone]. CTN attempted to call patient back. Patient's spouse answered the phone. He stated Kayy Chino come to the phone right now. Juan Manuel Walton has not been doing all that well. He informed CTN that she has been very tired and has been getting sick to her stomach. He reports she saw her PCP last week. He stated PCP is aware of Munir Tinajero being tired and getting sick to her stomach at times. Was prescribed medication but unsure of the name of the medication. He informed CTN that Munir Tinajero plans on calling her doctor. CTN explained Medicare bundle program and 90 day follow up. He is in agreement with follow up calls. He will let Munir Tinajero know that CTN called her back. Will continue to follow.      Follow Up  Future Appointments   Date Time Provider Priscilla Roisti   2021  8:30 AM Misty Pak PA-C N [facility] Heart [facility] [place]   2021  9:00 AM MD CHRISTA Alatorre [facility] [place]   2021 12:00 PM MD CHRISTA Treviño [facility] [place]   10/13/2021  9:30 AM Yoanna Contreras MD [facility] [place] [facility] [place]   2021 10:30 AM ANDREA Vo - CNP N [facility] CHF [facility] - [place] RN

## 2021-07-19 NOTE — TELEPHONE ENCOUNTER
Pt states since being on dig and metoprolol she has no appetite, food makes her sick     Wondering if its meds?

## 2021-07-19 NOTE — CARE COORDINATION
Fredy 45 Transitions Follow Up Call    2021    Patient: Filomena Funez  Patient : 1934   MRN: <[id number]>  Reason for Admission:   Discharge Date: 7/3/21 RARS: Readmission Risk Score: 19    Attempted to contact patient for BPCI-A follow up. Unable to reach patient. Left message with contact information and request for call back.       Follow Up  Future Appointments   Date Time Provider Priscilla Fani   2021  8:30 AM Neymar Soliman PA-C N [facility] [place]   2021  9:00 AM Niharika Craft MD N [facility] Heart [facility] [place]   2021 12:00 PM Nicolas Lenz MD N [facility] Heart [facility] [place]   10/13/2021  9:30 AM Wili Gates MD [facility] [place]  [facility] [place]   2021 10:30 AM ANDREA Johnson - CNP N [facility]PX CHF [facility] - [place] RN

## 2021-07-20 LAB
ABSOLUTE BASO #: 0 X10E9/L (ref 0–0.2)
ABSOLUTE EOS #: 0.2 X10E9/L (ref 0–0.4)
ABSOLUTE LYMPH #: 1.4 X10E9/L (ref 1–3.5)
ABSOLUTE MONO #: 0.6 X10E9/L (ref 0–0.9)
ABSOLUTE NEUT #: 4 X10E9/L (ref 1.5–6.6)
ALBUMIN SERPL-MCNC: 3.5 G/DL (ref 3.2–5.3)
ALK PHOSPHATASE: 86 U/L (ref 39–130)
ALT SERPL-CCNC: 24 U/L (ref 0–31)
ANION GAP SERPL CALCULATED.3IONS-SCNC: 10 MMOL/L (ref 5–15)
AST SERPL-CCNC: 20 U/L (ref 0–41)
BASOPHILS RELATIVE PERCENT: 0.8 %
BILIRUB SERPL-MCNC: 0.5 MG/DL (ref 0.3–1.2)
BUN BLDV-MCNC: 21 MG/DL (ref 5–27)
CALCIUM SERPL-MCNC: 8.7 MG/DL (ref 8.5–10.5)
CHLORIDE BLD-SCNC: 108 MMOL/L (ref 98–109)
CO2: 23 MMOL/L (ref 22–32)
CREAT SERPL-MCNC: 0.98 MG/DL (ref 0.4–1)
DIGOXIN LEVEL: 1.3 NG/ML (ref 0.8–2)
DOSE TIME: NORMAL
EGFR AFRICAN AMERICAN: >60 ML/MIN/1.73SQ.M
EGFR IF NONAFRICAN AMERICAN: 54 ML/MIN/1.73SQ.M
EOSINOPHILS RELATIVE PERCENT: 2.7 %
GLUCOSE: 112 MG/DL (ref 65–99)
HCT VFR BLD CALC: 32 % (ref 35–47)
HEMOGLOBIN: 10.8 G/DL (ref 11.7–15.5)
LYMPHOCYTE %: 21.9 %
MAGNESIUM: 2 MG/DL (ref 1.8–2.6)
MCH RBC QN AUTO: 30 PG (ref 27–34)
MCHC RBC AUTO-ENTMCNC: 33.6 G/DL (ref 32–36)
MCV RBC AUTO: 89 FL (ref 80–100)
MONOCYTES # BLD: 10.4 %
NEUTROPHILS RELATIVE PERCENT: 64.2 %
PDW BLD-RTO: 13.5 % (ref 11.5–15)
PLATELETS: 334 X10E9/L (ref 150–450)
PMV BLD AUTO: 9 FL (ref 7–12)
POTASSIUM SERPL-SCNC: 4.7 MMOL/L (ref 3.5–5)
RBC: 3.58 X10E12/L (ref 3.8–5.2)
SODIUM BLD-SCNC: 141 MMOL/L (ref 134–146)
TOTAL PROTEIN: 7 G/DL (ref 6–8)
WBC: 6.2 X10E9/L (ref 4–11)

## 2021-07-20 NOTE — TELEPHONE ENCOUNTER
Can stop dig not toxic but possibly could cause symptoms      If not improving then May need gi follow up

## 2021-07-23 NOTE — PROGRESS NOTES
[facility] PROFESSIONAL SERVICES  [facility]   [address] [facility] [place]  [place] [address]   Dept: [phone]   Dept Fax: [phone]: [phone]      Chief Complaint   Patient presents with    Follow-Up from Hospital     afib rvr     Cardiologist:  Dr. Angela Albarado  80year old female present for hospital f/u after being found to be in afib rvr, s/p DCCV x 2 now. Also found to have PNA. 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 Patient has had continued fatigue, did not have an appetite on digoxin and lost a little weight, which was stopped. Has been better since then. Has difficulty going up and down steps without pausing. Gets tired easily. Denies SOB at rest, CP. No chest pain, angina, MORENO, orthopnea, PND, sob at rest, LE edema, or syncope. Patient states she just hasnt felt like herself since the afib started. Monitors HR and BP at home and her HR never seems to be above 100.       General:   No fever, no chills, No fatigue or weight loss  Pulmonary:    No dyspnea, no wheezing  Cardiac:    Denies recent chest pain   GI:     No nausea or vomiting, no abdominal pain  Neuro:    No dizziness or light headedness  Musculoskeletal:  No recent active issues  Extremities:   No edema, good peripheral pulses      Past Medical History:   Diagnosis Date    Accidental fall 09/2002    [facility] ER- fell on face on cement    Allergic rhinitis     Arthritis     CHF (congestive heart failure) ([facility] [address]     Encounter for cardioversion procedure 10/05/2017    GERD (gastroesophageal reflux disease)     Hx of transesophageal echocardiography (KAMRAN) for monitoring 10/2017    Mild mitral regurgitation 10/2017    Nonrheumatic aortic valve insufficiency 8/29/2017    Osteoporosis     Pre-op evaluation: prior to left knee repalcement 8/29/2017    PVC's (premature ventricular contractions) 8/29/2017    S/P cardiac catheterization 10/07/2017 with Dr. Park Ma -diagonal 90%, circ 90%,     Torn meniscus 2016       Allergies   Allergen Reactions    Amiodarone Other (See Comments)     Prolonged QT; Torsades DP    Ketamine Other (See Comments)     Pt \"made her very anxious/crazy\" when recovering from this drug. Current Outpatient Medications   Medication Sig Dispense Refill    metoprolol tartrate (LOPRESSOR) 50 MG tablet Take 1 tablet by mouth 2 times daily 180 tablet 1    lansoprazole (PREVACID) 30 MG delayed release capsule Take 1 capsule by mouth daily 90 capsule 3    apixaban (ELIQUIS) 5 MG TABS tablet Take 1 tablet by mouth 2 times daily 180 tablet 3    atorvastatin (LIPITOR) 40 MG tablet Take 1 tablet by mouth nightly 90 tablet 3    spironolactone (ALDACTONE) 25 MG tablet Take 0.5 tablets by mouth daily 45 tablet 3    isosorbide mononitrate (IMDUR) 30 MG extended release tablet Take 1 tablet by mouth daily 90 tablet 3    furosemide (LASIX) 20 MG tablet Take 1 tablet by mouth three times a week Mon-Wed-Fri 36 tablet 3    nitroGLYCERIN (NITROSTAT) 0.4 MG SL tablet Place 1 tablet under the tongue every 5 minutes as needed for Chest pain (SOB) 25 tablet 3    vitamin C (ASCORBIC ACID) 500 MG tablet Take 500 mg by mouth daily      vitamin D (CHOLECALCIFEROL) 1000 UNIT TABS tablet Take 500 Units by mouth once a week       aspirin EC 81 MG EC tablet Take 1 tablet by mouth daily 30 tablet 0     No current facility-administered medications for this visit.        Social History     Socioeconomic History    Marital status:      Spouse name: Orlebar Brown Number of children: 3    Years of education: None    Highest education level: None   Occupational History    None   Tobacco Use    Smoking status: Former Smoker     Packs/day: 0.50     Years: 5.00     Pack years: 2.50     Quit date: 1965     Years since quittin.9    Smokeless tobacco: Never Used   Vaping Use    Vaping Use: Never used   Substance and Sexual Activity    Alcohol use: Yes     Comment: occ. red wine    Drug use: No    Sexual activity: Yes     Partners: Male   Other Topics Concern    None   Social History Narrative    None     Social Determinants of Health     Financial Resource Strain: Low Risk     Difficulty of Paying Living Expenses: Not hard at all   Food Insecurity: No Food Insecurity    Worried About Running Out of Food in the Last Year: Never true    Nora of Food in the Last Year: Never true   Transportation Needs:     Lack of Transportation (Medical):  Lack of Transportation (Non-Medical):    Physical Activity:     Days of Exercise per Week:     Minutes of Exercise per Session:    Stress:     Feeling of Stress :    Social Connections:     Frequency of Communication with Friends and Family:     Frequency of Social Gatherings with Friends and Family:     Attends Voodoo Services:     Active Member of Clubs or Organizations:     Attends Club or Organization Meetings:     Marital Status:    Intimate Partner Violence:     Fear of Current or Ex-Partner:     Emotionally Abused:     Physically Abused:     Sexually Abused:        Family History   Problem Relation Age of Onset    Arthritis Mother     Heart Disease Sister     High Blood Pressure Sister     Arthritis Sister     COPD Sister     Cancer Sister         Skin Cancer    Cancer Brother     Heart Disease Brother     Diabetes Maternal Grandfather     Heart Disease Brother         CHF    Cancer Brother         Lung Cancer       Blood pressure (!) 97/55, pulse 92, height 5' (1.524 m), weight 124 lb 12.8 oz (56.6 kg), not currently breastfeeding. General:   Well developed, well nourished  Lungs:   Clear to auscultation  Heart:    Normal S1 S2, No murmur, rubs, or gallops  Abdomen:   Soft, non tender, no organomegalies, positive bowel sounds  Extremities:   No edema, no cyanosis, good peripheral pulses  Neurological:   Awake, alert, oriented.  No obvious focal deficits  Musculoskeletal:  No obvious deformities    EKG:   ECHO:  Conclusions      Summary   Normal left ventricular size and systolic function. There were no regional wall motion abnormalities. Wall thickness was within normal limits. Ejection fraction was estimated at 55-60%. Doppler parameters were consistent with abnormal left ventricular   relaxation (grade 1 diastolic dysfunction). Left atrial size was severely dilated. There was mild aortic regurgitation. The mitral valve structure demonstrated posterior mitral annular   calcification. DOPPLER: The transmitral velocity was within the normal   range with no evidence for mitral stenosis. There was moderate to severe   mitral regurgitation (ERO 0.5 cm2, RV 88 cc). Consider KAMRAN for further   evaluation as the color flow appear to be moderate while the hemodynamic   suggest severe. There was mild tricuspid regurgitation. Assuming RAP = 5 mmHg, the   estimated RVSP = 34 mmHg. Ascending aorta = 3.6 cm. IVC size is within normal limits with normal respiratory phasic changes. Signature      ----------------------------------------------------------------   Electronically signed by Clark Logan MD (Interpreting   physician) on 01/13/2021 at 11:20 AM    KAMRAN:  Conclusions      Summary   Normal left ventricle size and systolic function. Ejection fraction was   estimated at 50-55%. There were no regional left ventricular wall motion   abnormalities and wall thickness was within normal limits. Structurally normal mitral valve. Mild to Moderate mitral regurgitation is   present. Mild aortic regurgitation. Signature      ----------------------------------------------------------------   Electronically signed by Yovani Ruiz MD (Interpreting   physician) on 07/02/2021 at 02:16 PM    [facility]:  SUMMARY:  Significant multivessel disease predominantly in the LAD and  circumflex territories.   Euvolemic pressures with preserved

## 2021-07-24 NOTE — PROGRESS NOTES
Post-Discharge Transitional Care Management Services or Hospital Follow Up      Mary Funez   YOB: 1934    Date of Office Visit:  7/13/2021  Date of Hospital Admission: 6/30/21  Date of Hospital Discharge: 7/3/21  Risk of hospital readmission (high >=14%. Medium >=10%) :Readmission Risk Score: 19      Care management risk score Rising risk (score 2-5) and Complex Care (Scores >=6): 2     Non face to face  following discharge, date last encounter closed (first attempt may have been earlier): *No documented post hospital discharge outreach found in the last 14 days    Call initiated 2 business days of discharge: *No response recorded in the last 14 days    Patient Active Problem List   Diagnosis    GERD (gastroesophageal reflux disease)    Osteoporosis    Allergic rhinitis    Spinal stenosis of lumbar region    Status post laminectomy with spinal fusion    OA (osteoarthritis of spine), severe    Transient global amnesia    Vertigo    PVC's (premature ventricular contractions)    Nonrheumatic aortic valve insufficiency    Rapid atrial fibrillation (HCC)    SOB (shortness of breath)    Chest pain    Atrial fibrillation with RVR (HCC)    Acute systolic CHF (congestive heart failure) ([address]    CAD, multiple vessel    Ischemic dilated cardiomyopathy ([address]    Hypotension    Atrial fibrillation, currently in sinus rhythm    Severe tricuspid regurgitation    Moderate mitral regurgitation    NSVT (nonsustained ventricular tachycardia) (HCC)    Prolonged QT interval    Atrial fibrillation with rapid ventricular response (HCC)    Hyponatremia    Hyperglycemia       Allergies   Allergen Reactions    Amiodarone Other (See Comments)     Prolonged QT; Torsades DP    Ketamine Other (See Comments)     Pt \"made her very anxious/crazy\" when recovering from this drug.        Medications listed as ordered at the time of discharge from hospital   [place], [address] Medication Instructions IVY: Printed on:07/24/21 0933   Medication Information                      apixaban (ELIQUIS) 5 MG TABS tablet  Take 1 tablet by mouth 2 times daily             aspirin EC 81 MG EC tablet  Take 1 tablet by mouth daily             atorvastatin (LIPITOR) 40 MG tablet  Take 1 tablet by mouth nightly             furosemide (LASIX) 20 MG tablet  Take 1 tablet by mouth three times a week Mon-Wed-Fri             isosorbide mononitrate (IMDUR) 30 MG extended release tablet  Take 1 tablet by mouth daily             lansoprazole (PREVACID) 30 MG delayed release capsule  Take 1 capsule by mouth daily             metoprolol tartrate (LOPRESSOR) 50 MG tablet  Take 1 tablet by mouth 2 times daily             nitroGLYCERIN (NITROSTAT) 0.4 MG SL tablet  Place 1 tablet under the tongue every 5 minutes as needed for Chest pain (SOB)             spironolactone (ALDACTONE) 25 MG tablet  Take 0.5 tablets by mouth daily             vitamin C (ASCORBIC ACID) 500 MG tablet  Take 500 mg by mouth daily             vitamin D (CHOLECALCIFEROL) 1000 UNIT TABS tablet  Take 500 Units by mouth once a week                    Medications marked \"taking\" at this time  Outpatient Medications Marked as Taking for the 7/13/21 encounter (Office Visit) with Leslie Edouard MD   Medication Sig Dispense Refill    metoprolol tartrate (LOPRESSOR) 50 MG tablet Take 1 tablet by mouth 2 times daily 60 tablet 0    [DISCONTINUED] digoxin (LANOXIN) 125 MCG tablet Take 1 tablet by mouth daily 30 tablet 0    lansoprazole (PREVACID) 30 MG delayed release capsule Take 1 capsule by mouth daily 90 capsule 3    apixaban (ELIQUIS) 5 MG TABS tablet Take 1 tablet by mouth 2 times daily 180 tablet 3    atorvastatin (LIPITOR) 40 MG tablet Take 1 tablet by mouth nightly 90 tablet 3    spironolactone (ALDACTONE) 25 MG tablet Take 0.5 tablets by mouth daily 45 tablet 3    isosorbide mononitrate (IMDUR) 30 MG extended release tablet Take 1 tablet by mouth daily 90 tablet 3    furosemide (LASIX) 20 MG tablet Take 1 tablet by mouth three times a week Mon-Wed-Fri 36 tablet 3    vitamin C (ASCORBIC ACID) 500 MG tablet Take 500 mg by mouth daily      vitamin D (CHOLECALCIFEROL) 1000 UNIT TABS tablet Take 500 Units by mouth once a week       aspirin EC 81 MG EC tablet Take 1 tablet by mouth daily 30 tablet 0        Medications patient taking as of now reconciled against medications ordered at time of hospital discharge: Yes    Chief Complaint   Patient presents with    Follow-up     ED follow up STR A-fib with rapid ventricular response 06/30. Still has nausea, fatigue, no appetite, shortness of breath. History of Present illness - Follow up of Hospital diagnosis(es):    Diagnosis Orders   1. Atrial fibrillation with RVR (HCC)  CBC Auto Differential    Comprehensive Metabolic Panel    Digoxin Level    Magnesium   2. CAD, multiple vessel     3. Prolonged QT interval           Inpatient course: Discharge summary reviewed- see chart. Interval history/Current status: stable    A comprehensive review of systems was negative except for what was noted in the HPI. Vitals:    07/13/21 0931   BP: 98/60   Pulse: 88   Resp: 18   Temp: 98.1 °F (36.7 °C)   TempSrc: Oral   SpO2: 97%   Weight: 126 lb (57.2 kg)     Body mass index is 24.61 kg/m².    Wt Readings from Last 3 Encounters:   07/13/21 126 lb (57.2 kg)   07/03/21 136 lb 11 oz (62 kg)   06/15/21 130 lb (59 kg)     BP Readings from Last 3 Encounters:   07/13/21 98/60   07/03/21 (!) 92/53   06/15/21 97/72        Physical Exam:  General Appearance: alert and oriented to person, place and time, well developed and well- nourished, in no acute distress  Skin: warm and dry, no rash or erythema  Head: normocephalic and atraumatic  Eyes: pupils equal, round, and reactive to light, extraocular eye movements intact, conjunctivae normal  ENT: tympanic membrane, external ear and ear canal normal bilaterally, nose without deformity, nasal mucosa and

## 2021-07-26 ENCOUNTER — CARE COORDINATION (OUTPATIENT)
Dept: CASE MANAGEMENT | Age: 86
End: 2021-07-26

## 2021-07-26 NOTE — CARE COORDINATION
[facility] Transitions Follow Up Call    2021    Patient: Josselyn Funez  Patient : 1934   MRN: <[id number]>  Reason for Admission:   Discharge Date: 7/3/21 RARS: Readmission Risk Score: 19    Attempted to contact patient for BPCI-A follow up. Unable to reach patient. Left message with contact information and request for call back.       Follow Up  Future Appointments   Date Time Provider Priscilla Finley   2021  8:30 AM Leland Mejia PA-C [facility] [address]   2021  9:00 AM Maris Neumann MD [facility] [address]   2021 12:00 PM Harpal Bucio MD [facility] [address]   10/13/2021  9:30 AM Reyes Pitts MD [facility] [address]   2021 10:30 AM ANDREA Bartlett CNP N [facility] RN

## 2021-07-26 NOTE — CARE COORDINATION
[facility] Transitions Follow Up Call    2021    Patient: Heaven Funez  Patient : 1934   MRN: <[id number]>  Reason for Admission:   Discharge Date: 7/3/21 RARS: Readmission Risk Score: 19    Received return phone call from patient. She left message stating that she received a call and not sure what it's about. CTN attempted to call patient back. Unable to reach patient. No answer. CTN did not leave a second message. Will try again at a later time. Received call from patient. Evan Bain stated she is doing better since stopping the digoxin. Reports she no longer gets sick to her stomach. Stated she has an appointment with cardiology tomorrow and will discuss her breathing and lightheadedness then. Reports her breathing is \"about the same\". No distress. She thanked CTN for calling. No questions or concerns for CTN at this time. Will continue to follow.     Follow Up  Future Appointments   Date Time Provider Priscilla Finley   2021  8:30 AM Marino Mantilla PA-C [facility] [place]   2021  9:00 AM Shira Kevin MD [facility] [place]   2021 12:00 PM Gustavo Hutchison MD [facility] [place]   10/13/2021  9:30 AM Nadia Guzman MD [facility]  [facility] [place]   2021 10:30 AM Atif Camp APRN - CNP N [facility] [place] RN

## 2021-07-27 ENCOUNTER — OFFICE VISIT (OUTPATIENT)
Dept: CARDIOLOGY CLINIC | Age: 86
End: 2021-07-27
Payer: MEDICARE

## 2021-07-27 VITALS
SYSTOLIC BLOOD PRESSURE: 97 MMHG | DIASTOLIC BLOOD PRESSURE: 55 MMHG | BODY MASS INDEX: 24.5 KG/M2 | WEIGHT: 124.8 LBS | HEART RATE: 92 BPM | HEIGHT: 60 IN

## 2021-07-27 DIAGNOSIS — I10 ESSENTIAL HYPERTENSION: ICD-10-CM

## 2021-07-27 DIAGNOSIS — I34.0 NONRHEUMATIC MITRAL VALVE REGURGITATION: ICD-10-CM

## 2021-07-27 DIAGNOSIS — I50.32 CHRONIC DIASTOLIC CHF (CONGESTIVE HEART FAILURE) (HCC): ICD-10-CM

## 2021-07-27 DIAGNOSIS — I48.91 ATRIAL FIBRILLATION STATUS POST CARDIOVERSION (HCC): ICD-10-CM

## 2021-07-27 DIAGNOSIS — E78.5 HYPERLIPIDEMIA, UNSPECIFIED HYPERLIPIDEMIA TYPE: ICD-10-CM

## 2021-07-27 DIAGNOSIS — I48.0 PAROXYSMAL ATRIAL FIBRILLATION (HCC): Primary | ICD-10-CM

## 2021-07-27 DIAGNOSIS — I25.10 CAD, MULTIPLE VESSEL: ICD-10-CM

## 2021-07-27 PROCEDURE — 1036F TOBACCO NON-USER: CPT | Performed by: STUDENT IN AN ORGANIZED HEALTH CARE EDUCATION/TRAINING PROGRAM

## 2021-07-27 PROCEDURE — 1090F PRES/ABSN URINE INCON ASSESS: CPT | Performed by: STUDENT IN AN ORGANIZED HEALTH CARE EDUCATION/TRAINING PROGRAM

## 2021-07-27 PROCEDURE — 1111F DSCHRG MED/CURRENT MED MERGE: CPT | Performed by: STUDENT IN AN ORGANIZED HEALTH CARE EDUCATION/TRAINING PROGRAM

## 2021-07-27 PROCEDURE — G8427 DOCREV CUR MEDS BY ELIG CLIN: HCPCS | Performed by: STUDENT IN AN ORGANIZED HEALTH CARE EDUCATION/TRAINING PROGRAM

## 2021-07-27 PROCEDURE — 1123F ACP DISCUSS/DSCN MKR DOCD: CPT | Performed by: STUDENT IN AN ORGANIZED HEALTH CARE EDUCATION/TRAINING PROGRAM

## 2021-07-27 PROCEDURE — G8420 CALC BMI NORM PARAMETERS: HCPCS | Performed by: STUDENT IN AN ORGANIZED HEALTH CARE EDUCATION/TRAINING PROGRAM

## 2021-07-27 PROCEDURE — 99213 OFFICE O/P EST LOW 20 MIN: CPT | Performed by: STUDENT IN AN ORGANIZED HEALTH CARE EDUCATION/TRAINING PROGRAM

## 2021-07-27 PROCEDURE — 4040F PNEUMOC VAC/ADMIN/RCVD: CPT | Performed by: STUDENT IN AN ORGANIZED HEALTH CARE EDUCATION/TRAINING PROGRAM

## 2021-07-27 RX ORDER — METOPROLOL TARTRATE 50 MG/1
50 TABLET, FILM COATED ORAL 2 TIMES DAILY
Qty: 180 TABLET | Refills: 1 | Status: ON HOLD | OUTPATIENT
Start: 2021-07-27 | End: 2021-09-03 | Stop reason: ALTCHOICE

## 2021-07-27 NOTE — PROGRESS NOTES
Pt C/O SOB, dizziness, very fatigued, appetite is still not really good.        Pt denies CP, Headache, heart palpitations, swelling

## 2021-08-02 ENCOUNTER — CARE COORDINATION (OUTPATIENT)
Dept: CASE MANAGEMENT | Age: 86
End: 2021-08-02

## 2021-08-05 ENCOUNTER — APPOINTMENT (OUTPATIENT)
Dept: GENERAL RADIOLOGY | Age: 86
DRG: 308 | End: 2021-08-05
Payer: MEDICARE

## 2021-08-05 ENCOUNTER — OFFICE VISIT (OUTPATIENT)
Dept: FAMILY MEDICINE CLINIC | Age: 86
End: 2021-08-05
Payer: MEDICARE

## 2021-08-05 ENCOUNTER — HOSPITAL ENCOUNTER (INPATIENT)
Age: 86
LOS: 2 days | Discharge: HOME | DRG: 308 | End: 2021-08-07
Attending: FAMILY MEDICINE | Admitting: FAMILY MEDICINE
Payer: MEDICARE

## 2021-08-05 VITALS
TEMPERATURE: 98.8 F | DIASTOLIC BLOOD PRESSURE: 66 MMHG | WEIGHT: 124 LBS | SYSTOLIC BLOOD PRESSURE: 92 MMHG | BODY MASS INDEX: 24.35 KG/M2 | HEIGHT: 60 IN | HEART RATE: 88 BPM | RESPIRATION RATE: 24 BRPM

## 2021-08-05 DIAGNOSIS — R07.9 CHEST PAIN, UNSPECIFIED TYPE: ICD-10-CM

## 2021-08-05 DIAGNOSIS — I34.0 NONRHEUMATIC MITRAL VALVE REGURGITATION: ICD-10-CM

## 2021-08-05 DIAGNOSIS — R07.9 CHEST PAIN, UNSPECIFIED TYPE: Primary | ICD-10-CM

## 2021-08-05 DIAGNOSIS — I48.91 ATRIAL FIBRILLATION WITH RVR (HCC): ICD-10-CM

## 2021-08-05 DIAGNOSIS — I48.91 ATRIAL FIBRILLATION WITH RVR (HCC): Primary | ICD-10-CM

## 2021-08-05 DIAGNOSIS — R06.02 SOB (SHORTNESS OF BREATH): ICD-10-CM

## 2021-08-05 LAB
ALBUMIN SERPL-MCNC: 3.3 G/DL (ref 3.5–5.1)
ALP BLD-CCNC: 83 U/L (ref 38–126)
ALT SERPL-CCNC: 13 U/L (ref 11–66)
ANION GAP SERPL CALCULATED.3IONS-SCNC: 12 MEQ/L (ref 8–16)
AST SERPL-CCNC: 15 U/L (ref 5–40)
BASOPHILS # BLD: 0.4 %
BASOPHILS ABSOLUTE: 0 THOU/MM3 (ref 0–0.1)
BILIRUB SERPL-MCNC: 1 MG/DL (ref 0.3–1.2)
BUN BLDV-MCNC: 27 MG/DL (ref 7–22)
CALCIUM SERPL-MCNC: 8.3 MG/DL (ref 8.5–10.5)
CHLORIDE BLD-SCNC: 103 MEQ/L (ref 98–111)
CO2: 20 MEQ/L (ref 23–33)
CREAT SERPL-MCNC: 1 MG/DL (ref 0.4–1.2)
DIGOXIN LEVEL: < 0.3 NG/ML (ref 0.5–2)
EKG Q-T INTERVAL: 304 MS
EKG QRS DURATION: 86 MS
EKG QTC CALCULATION (BAZETT): 453 MS
EKG R AXIS: -24 DEGREES
EKG T AXIS: 167 DEGREES
EKG VENTRICULAR RATE: 134 BPM
EOSINOPHIL # BLD: 0.5 %
EOSINOPHILS ABSOLUTE: 0.1 THOU/MM3 (ref 0–0.4)
ERYTHROCYTE [DISTWIDTH] IN BLOOD BY AUTOMATED COUNT: 14.5 % (ref 11.5–14.5)
ERYTHROCYTE [DISTWIDTH] IN BLOOD BY AUTOMATED COUNT: 51.6 FL (ref 35–45)
GFR SERPL CREATININE-BSD FRML MDRD: 52 ML/MIN/1.73M2
GLUCOSE BLD-MCNC: 109 MG/DL (ref 70–108)
HCT VFR BLD CALC: 38.3 % (ref 37–47)
HEMOGLOBIN: 11.1 GM/DL (ref 12–16)
IMMATURE GRANS (ABS): 0.07 THOU/MM3 (ref 0–0.07)
IMMATURE GRANULOCYTES: 0.6 %
LYMPHOCYTES # BLD: 11.8 %
LYMPHOCYTES ABSOLUTE: 1.3 THOU/MM3 (ref 1–4.8)
MCH RBC QN AUTO: 28.2 PG (ref 26–33)
MCHC RBC AUTO-ENTMCNC: 29 GM/DL (ref 32.2–35.5)
MCV RBC AUTO: 97.5 FL (ref 81–99)
MONOCYTES # BLD: 11.4 %
MONOCYTES ABSOLUTE: 1.3 THOU/MM3 (ref 0.4–1.3)
NUCLEATED RED BLOOD CELLS: 0 /100 WBC
OSMOLALITY CALCULATION: 275.8 MOSMOL/KG (ref 275–300)
PLATELET # BLD: 224 THOU/MM3 (ref 130–400)
PMV BLD AUTO: 11.2 FL (ref 9.4–12.4)
POTASSIUM REFLEX MAGNESIUM: 4.4 MEQ/L (ref 3.5–5.2)
PRO-BNP: 8436 PG/ML (ref 0–1800)
RBC # BLD: 3.93 MILL/MM3 (ref 4.2–5.4)
SEG NEUTROPHILS: 75.3 %
SEGMENTED NEUTROPHILS ABSOLUTE COUNT: 8.4 THOU/MM3 (ref 1.8–7.7)
SODIUM BLD-SCNC: 135 MEQ/L (ref 135–145)
TOTAL PROTEIN: 6.7 G/DL (ref 6.1–8)
TROPONIN T: < 0.01 NG/ML
TSH SERPL DL<=0.05 MIU/L-ACNC: 2.54 UIU/ML (ref 0.4–4.2)
WBC # BLD: 11.1 THOU/MM3 (ref 4.8–10.8)

## 2021-08-05 PROCEDURE — 83880 ASSAY OF NATRIURETIC PEPTIDE: CPT

## 2021-08-05 PROCEDURE — 1036F TOBACCO NON-USER: CPT | Performed by: NURSE PRACTITIONER

## 2021-08-05 PROCEDURE — 80162 ASSAY OF DIGOXIN TOTAL: CPT

## 2021-08-05 PROCEDURE — 2580000003 HC RX 258: Performed by: FAMILY MEDICINE

## 2021-08-05 PROCEDURE — 96360 HYDRATION IV INFUSION INIT: CPT

## 2021-08-05 PROCEDURE — 99233 SBSQ HOSP IP/OBS HIGH 50: CPT | Performed by: PHYSICIAN ASSISTANT

## 2021-08-05 PROCEDURE — 36415 COLL VENOUS BLD VENIPUNCTURE: CPT

## 2021-08-05 PROCEDURE — 93005 ELECTROCARDIOGRAM TRACING: CPT | Performed by: NURSE PRACTITIONER

## 2021-08-05 PROCEDURE — 99284 EMERGENCY DEPT VISIT MOD MDM: CPT

## 2021-08-05 PROCEDURE — 1090F PRES/ABSN URINE INCON ASSESS: CPT | Performed by: NURSE PRACTITIONER

## 2021-08-05 PROCEDURE — 85025 COMPLETE CBC W/AUTO DIFF WBC: CPT

## 2021-08-05 PROCEDURE — 84484 ASSAY OF TROPONIN QUANT: CPT

## 2021-08-05 PROCEDURE — G8427 DOCREV CUR MEDS BY ELIG CLIN: HCPCS | Performed by: NURSE PRACTITIONER

## 2021-08-05 PROCEDURE — 99214 OFFICE O/P EST MOD 30 MIN: CPT | Performed by: NURSE PRACTITIONER

## 2021-08-05 PROCEDURE — 71045 X-RAY EXAM CHEST 1 VIEW: CPT

## 2021-08-05 PROCEDURE — 93000 ELECTROCARDIOGRAM COMPLETE: CPT | Performed by: NURSE PRACTITIONER

## 2021-08-05 PROCEDURE — 2580000003 HC RX 258: Performed by: PHYSICIAN ASSISTANT

## 2021-08-05 PROCEDURE — 2500000003 HC RX 250 WO HCPCS

## 2021-08-05 PROCEDURE — 1123F ACP DISCUSS/DSCN MKR DOCD: CPT | Performed by: NURSE PRACTITIONER

## 2021-08-05 PROCEDURE — 84443 ASSAY THYROID STIM HORMONE: CPT

## 2021-08-05 PROCEDURE — 6360000002 HC RX W HCPCS: Performed by: INTERNAL MEDICINE

## 2021-08-05 PROCEDURE — 4040F PNEUMOC VAC/ADMIN/RCVD: CPT | Performed by: NURSE PRACTITIONER

## 2021-08-05 PROCEDURE — 2140000000 HC CCU INTERMEDIATE R&B

## 2021-08-05 PROCEDURE — G8420 CALC BMI NORM PARAMETERS: HCPCS | Performed by: NURSE PRACTITIONER

## 2021-08-05 PROCEDURE — 2500000003 HC RX 250 WO HCPCS: Performed by: PHYSICIAN ASSISTANT

## 2021-08-05 PROCEDURE — 6370000000 HC RX 637 (ALT 250 FOR IP): Performed by: PHYSICIAN ASSISTANT

## 2021-08-05 PROCEDURE — 80053 COMPREHEN METABOLIC PANEL: CPT

## 2021-08-05 PROCEDURE — 6370000000 HC RX 637 (ALT 250 FOR IP)

## 2021-08-05 PROCEDURE — 96361 HYDRATE IV INFUSION ADD-ON: CPT

## 2021-08-05 RX ORDER — ACETAMINOPHEN 325 MG/1
650 TABLET ORAL EVERY MORNING
Status: ON HOLD | COMMUNITY
End: 2021-09-03 | Stop reason: ALTCHOICE

## 2021-08-05 RX ORDER — ISOSORBIDE MONONITRATE 30 MG/1
30 TABLET, EXTENDED RELEASE ORAL DAILY
Status: DISCONTINUED | OUTPATIENT
Start: 2021-08-06 | End: 2021-08-06

## 2021-08-05 RX ORDER — POLYETHYLENE GLYCOL 3350 17 G/17G
17 POWDER, FOR SOLUTION ORAL DAILY PRN
Status: DISCONTINUED | OUTPATIENT
Start: 2021-08-05 | End: 2021-08-07 | Stop reason: HOSPADM

## 2021-08-05 RX ORDER — 0.9 % SODIUM CHLORIDE 0.9 %
1000 INTRAVENOUS SOLUTION INTRAVENOUS ONCE
Status: COMPLETED | OUTPATIENT
Start: 2021-08-05 | End: 2021-08-05

## 2021-08-05 RX ORDER — DILTIAZEM HYDROCHLORIDE 5 MG/ML
10 INJECTION INTRAVENOUS ONCE
Status: COMPLETED | OUTPATIENT
Start: 2021-08-05 | End: 2021-08-05

## 2021-08-05 RX ORDER — DIGOXIN 0.25 MG/ML
500 INJECTION INTRAMUSCULAR; INTRAVENOUS ONCE
Status: COMPLETED | OUTPATIENT
Start: 2021-08-05 | End: 2021-08-05

## 2021-08-05 RX ORDER — ACETAMINOPHEN 325 MG/1
325 TABLET ORAL NIGHTLY
Status: ON HOLD | COMMUNITY
End: 2021-09-03 | Stop reason: ALTCHOICE

## 2021-08-05 RX ORDER — ATORVASTATIN CALCIUM 40 MG/1
40 TABLET, FILM COATED ORAL NIGHTLY
Status: DISCONTINUED | OUTPATIENT
Start: 2021-08-05 | End: 2021-08-07 | Stop reason: HOSPADM

## 2021-08-05 RX ORDER — ASCORBIC ACID 500 MG
500 TABLET ORAL DAILY
Status: DISCONTINUED | OUTPATIENT
Start: 2021-08-05 | End: 2021-08-07 | Stop reason: HOSPADM

## 2021-08-05 RX ORDER — SODIUM CHLORIDE 9 MG/ML
25 INJECTION, SOLUTION INTRAVENOUS PRN
Status: DISCONTINUED | OUTPATIENT
Start: 2021-08-05 | End: 2021-08-07 | Stop reason: HOSPADM

## 2021-08-05 RX ORDER — ACETAMINOPHEN 325 MG/1
650 TABLET ORAL EVERY 6 HOURS PRN
Status: DISCONTINUED | OUTPATIENT
Start: 2021-08-05 | End: 2021-08-07 | Stop reason: HOSPADM

## 2021-08-05 RX ORDER — ASPIRIN 81 MG/1
81 TABLET ORAL DAILY
Status: DISCONTINUED | OUTPATIENT
Start: 2021-08-06 | End: 2021-08-07 | Stop reason: HOSPADM

## 2021-08-05 RX ORDER — VITAMIN B COMPLEX
500 TABLET ORAL WEEKLY
Status: DISCONTINUED | OUTPATIENT
Start: 2021-08-05 | End: 2021-08-07 | Stop reason: HOSPADM

## 2021-08-05 RX ORDER — SODIUM CHLORIDE 9 MG/ML
INJECTION, SOLUTION INTRAVENOUS CONTINUOUS
Status: DISCONTINUED | OUTPATIENT
Start: 2021-08-05 | End: 2021-08-07 | Stop reason: HOSPADM

## 2021-08-05 RX ORDER — SODIUM CHLORIDE 0.9 % (FLUSH) 0.9 %
5-40 SYRINGE (ML) INJECTION PRN
Status: DISCONTINUED | OUTPATIENT
Start: 2021-08-05 | End: 2021-08-07 | Stop reason: HOSPADM

## 2021-08-05 RX ORDER — SODIUM CHLORIDE 0.9 % (FLUSH) 0.9 %
5-40 SYRINGE (ML) INJECTION EVERY 12 HOURS SCHEDULED
Status: DISCONTINUED | OUTPATIENT
Start: 2021-08-05 | End: 2021-08-07 | Stop reason: HOSPADM

## 2021-08-05 RX ORDER — ACETAMINOPHEN 650 MG/1
650 SUPPOSITORY RECTAL EVERY 6 HOURS PRN
Status: DISCONTINUED | OUTPATIENT
Start: 2021-08-05 | End: 2021-08-07 | Stop reason: HOSPADM

## 2021-08-05 RX ORDER — DILTIAZEM HYDROCHLORIDE 5 MG/ML
INJECTION INTRAVENOUS
Status: COMPLETED
Start: 2021-08-05 | End: 2021-08-05

## 2021-08-05 RX ORDER — PANTOPRAZOLE SODIUM 40 MG/1
40 TABLET, DELAYED RELEASE ORAL
Refills: 3 | Status: DISCONTINUED | OUTPATIENT
Start: 2021-08-06 | End: 2021-08-07 | Stop reason: HOSPADM

## 2021-08-05 RX ORDER — ONDANSETRON 2 MG/ML
4 INJECTION INTRAMUSCULAR; INTRAVENOUS EVERY 6 HOURS PRN
Status: DISCONTINUED | OUTPATIENT
Start: 2021-08-05 | End: 2021-08-07 | Stop reason: HOSPADM

## 2021-08-05 RX ORDER — 0.9 % SODIUM CHLORIDE 0.9 %
500 INTRAVENOUS SOLUTION INTRAVENOUS ONCE
Status: COMPLETED | OUTPATIENT
Start: 2021-08-05 | End: 2021-08-05

## 2021-08-05 RX ORDER — NITROGLYCERIN 0.4 MG/1
0.4 TABLET SUBLINGUAL EVERY 5 MIN PRN
Status: DISCONTINUED | OUTPATIENT
Start: 2021-08-05 | End: 2021-08-07 | Stop reason: HOSPADM

## 2021-08-05 RX ORDER — ONDANSETRON 4 MG/1
4 TABLET, ORALLY DISINTEGRATING ORAL EVERY 8 HOURS PRN
Status: DISCONTINUED | OUTPATIENT
Start: 2021-08-05 | End: 2021-08-07 | Stop reason: HOSPADM

## 2021-08-05 RX ADMIN — APIXABAN 2.5 MG: 2.5 TABLET, FILM COATED ORAL at 20:34

## 2021-08-05 RX ADMIN — SODIUM CHLORIDE 500 ML: 9 INJECTION, SOLUTION INTRAVENOUS at 15:48

## 2021-08-05 RX ADMIN — SODIUM CHLORIDE 1000 ML: 9 INJECTION, SOLUTION INTRAVENOUS at 11:37

## 2021-08-05 RX ADMIN — DILTIAZEM HYDROCHLORIDE 10 MG: 5 INJECTION INTRAVENOUS at 14:20

## 2021-08-05 RX ADMIN — DIGOXIN 500 MCG: 0.25 INJECTION INTRAMUSCULAR; INTRAVENOUS at 18:09

## 2021-08-05 RX ADMIN — ATORVASTATIN CALCIUM 40 MG: 40 TABLET, FILM COATED ORAL at 20:32

## 2021-08-05 RX ADMIN — SODIUM CHLORIDE: 9 INJECTION, SOLUTION INTRAVENOUS at 14:34

## 2021-08-05 RX ADMIN — DILTIAZEM HYDROCHLORIDE 2.5 MG/HR: 5 INJECTION, SOLUTION INTRAVENOUS at 20:27

## 2021-08-05 ASSESSMENT — PAIN DESCRIPTION - PAIN TYPE: TYPE: ACUTE PAIN

## 2021-08-05 ASSESSMENT — PAIN DESCRIPTION - FREQUENCY: FREQUENCY: INTERMITTENT

## 2021-08-05 ASSESSMENT — PAIN SCALES - GENERAL: PAINLEVEL_OUTOF10: 6

## 2021-08-05 ASSESSMENT — PAIN DESCRIPTION - LOCATION: LOCATION: CHEST

## 2021-08-05 NOTE — PROGRESS NOTES
Patient admitted to 22 with Afib RVR. Two person skin assessment performed by this RN and Robert Vuong RN. Oriented patient to room, policies, and hourly rounding expectation. Cardizem 10 mg bolus given when patient arrived for Afib; heart rate up to 150's. After bolus given; SBP 80's, patient asymptomatic. This RN sent consults to Dr. Emelyn Robles and discussed blood pressure after cardizem administration, planning to hold cardizem drip at this time and discussed possibility of cardioversion with patient and patient denies wanting cardioversion at this time.

## 2021-08-05 NOTE — ED NOTES
ED to inpatient nurses report    Chief Complaint   Patient presents with    Chest Pain      Present to ED from home  LOC: alert and orientated to name, place, date  Vital signs   Vitals:    08/05/21 1125 08/05/21 1152 08/05/21 1220 08/05/21 1252   BP: 100/75 (!) 84/59 95/83 97/70   Pulse: 139 139 142 132   Resp: 22 20 21 16   Temp:       TempSrc:       SpO2: 96% 96% 95% 95%   Weight:       Height:          Oxygen Baseline none    Current needs required none   LDAs:   Peripheral IV 08/05/21 Left Wrist (Active)   Site Assessment Clean;Dry; Intact 08/05/21 1113   Line Status Blood return noted;Capped;Flushed 08/05/21 1113   Dressing Status Clean;Dry; Intact 08/05/21 1113     Mobility: Independent  Pending ED orders: none  Present condition: stable  Electronically signed by Sandra Whiteside RN on 8/5/2021 at 1:40 PM     Sandra Whiteside RN  08/05/21 [id number]

## 2021-08-05 NOTE — ED TRIAGE NOTES
Pt states I was at [facility] & had EKG and they sent me here.   Pt c/o chest pain, arrives with EKG fromDr's office & given to ER Physician, connected to monitor

## 2021-08-05 NOTE — PATIENT INSTRUCTIONS
Patient Education        Learning About Catheter Ablation for Heart Rhythm Problems  What is catheter ablation? Catheter ablation is a procedure that treats heart rhythm problems. These problems include atrial fibrillation, supraventricular tachycardia (SVT), atrial flutter, and ventricular tachycardia. Your heart should have a strong, steady beat. That beat is controlled by the heart's electrical system. Sometimes that system misfires. This causes a heartbeat that is too fast and isn't steady. Catheter ablation is a way to get into your heart and fix the problem. Ablation is not surgery. How is catheter ablation done? Your doctor inserts thin tubes called catheters into a blood vessel in your groin, arm, or neck. Then your doctor feeds them into the heart. Wires in the catheters help the doctor find the problem areas. Then the doctor uses the wires to send energy to destroy the tiny areas of heart tissue that are causing the problems. It may seem like a bad idea to destroy parts of your heart on purpose. But the areas that are destroyed are very tiny. They should not affect your heart's ability to do its job. You may be awake during the procedure. Or you may be asleep. The doctor will give you medicines to help you feel relaxed and to numb the areas where the catheters go in. You may feel a little uncomfortable, but you should not feel pain. What can you expect after catheter ablation? You may stay overnight in the hospital. How long you stay in the hospital depends on the type of ablation you have. Do not exercise hard or lift anything heavy for a week. You will probably be able to go back to work and to your normal routine in 1 or 2 days. You may have swelling, bruising, or a small lump around the site where the catheters went into your body. These should go away in 3 to 4 weeks. You may have to take some medicines for a while. Follow-up care is a key part of your treatment and safety.  Be sure to make and go to all appointments, and call your doctor if you are having problems. It's also a good idea to know your test results and keep a list of the medicines you take. Where can you learn more? Go to https://carlos.[employer]e [employer]TD. org and sign in to your [email or web address] account. Enter Y803 in the [facility] box to learn more about \"Learning About Catheter Ablation for Heart Rhythm Problems. \"     If you do not have an account, please click on the \"Sign Up Now\" link. Current as of: August 31, 2020               Content Version: 12.9  © 2006-2021 [email or web address]. Care instructions adapted under license by [facility] ([facility]). If you have questions about a medical condition or this instruction, always ask your healthcare professional. [address] any warranty or liability for your use of this information. Patient Education        Learning About Atrial Fibrillation  What is atrial fibrillation? Atrial fibrillation (say \"AY-tree-te lni-nbof-VPT-shun\") is a common type of irregular heartbeat (arrhythmia). Normally, the heart beats in a strong, steady rhythm. In atrial fibrillation, a problem with the heart's electrical system causes the two upper chambers of the heart (called the atria) to quiver, or fibrillate. Atrial fibrillation can be dangerous. This is because if the heartbeat isn't strong and steady, blood can collect, or pool, in the atria. And pooled blood is more likely to form clots. Clots can travel to the brain, block blood flow, and cause a stroke. Atrial fibrillation can also lead to heart failure. This condition also upsets the normal rhythm between the atria and the lower chambers of the heart. (These chambers are called the ventricles.) The ventricles may beat fast and without a regular rhythm. What are the symptoms? Some people feel symptoms when they have episodes of atrial fibrillation. But other people don't notice any symptoms.   If you have symptoms, you may feel:  · A fluttering, racing, or pounding feeling in your chest called palpitations. · Weak or tired. · Dizzy or lightheaded. · Short of breath. · Chest pain. · Confused. You may notice signs of atrial fibrillation when you check your pulse. Your pulse may seem uneven or fast.  What can you expect when you have atrial fibrillation? At first, spells of atrial fibrillation may come on suddenly and last a short time. They may go away on their own or with treatment. Over time, the spells may last longer and occur more often. They often don't go away on their own. How is it treated? Treatments can help you feel better and prevent future problems, especially stroke and heart failure. Your treatment will depend on the cause of your atrial fibrillation, your symptoms, and your risk for stroke. Types of treatment include:  · Heart rate treatment. Medicine may be used to slow your heart rate. Your heartbeat may still be irregular. But these medicines keep your heart from beating too fast. They may also help relieve symptoms. · Heart rhythm treatment. Different treatments may be used to try to stop atrial fibrillation and keep it from returning. They can also relieve symptoms. These treatments include medicine, electrical cardioversion to shock the heart back to a normal rhythm, a procedure called catheter ablation, and heart surgery. · Stroke prevention. You and your doctor can decide how to lower your risk. You may decide to take a blood-thinning medicine called an anticoagulant. What is a heart-healthy lifestyle for atrial fibrillation? You can live well and help manage atrial fibrillation by having a heart-healthy lifestyle. This lifestyle may help reduce how often you have episodes of atrial fibrillation. Don't smoke. Avoid secondhand smoke too. Quitting smoking is the best thing you can do for your heart. Be active.     Talk to your doctor about what type and level of exercise is safe for you. Eat a heart-healthy diet. These foods include vegetables, fruits, nuts, beans, lean meat, fish, and whole grains. Limit sodium, alcohol, and sugar. Avoid alcohol if it triggers symptoms. Stay at a healthy weight. Lose weight if you need to. Losing weight can help relieve symptoms. Manage other health problems. These problems include diabetes, high blood pressure, and high cholesterol. If you think you may have a problem with alcohol or drug use, talk to your doctor. Manage stress. Options like yoga, biofeedback, and meditation may help. Where can you learn more? Go to https://Whiskey MediapeRoughHandseb.Gratci. org and sign in to your PlaceIQ account. Enter F691 in the [employer] box to learn more about \"Learning About Atrial Fibrillation. \"     If you do not have an account, please click on the \"Sign Up Now\" link. Current as of: August 31, 2020               Content Version: 12.9  © 2006-2021 Healthwise, Incorporated. Care instructions adapted under license by [facility] ([facility]). If you have questions about a medical condition or this instruction, always ask your healthcare professional. [place] [address] any warranty or liability for your use of this information.

## 2021-08-05 NOTE — FLOWSHEET NOTE
08/05/21 1719   Provider Notification   Reason for Communication Review case  (HR 140s)   Provider Name Dr. THIERRY PACHECO [facility]   Provider Notification Physician   Method of Communication Secure Message   Response See orders   Notification Time [id number]     Patient Afib RVR with HR up to 140's; discussed case with Dr. THIERRY BAUGH, patient with low blood pressures with cardizem, holding off of initiating cardizem drip at this time, patient with noted AMIO allergy, orders received to give 500 mcg IV Digoxin for one dose. Planning cardioversion tomorrow per Dr. THIERRY BAUGH and to keep patient NPO after midnight.

## 2021-08-05 NOTE — H&P
Hospitalist - History & Physical      Patient: Robert Funez    Unit/Bed:01/001A  YOB: 1934  MRN: [id number]   Acct: [de-identified]   PCP: Alexandru Arrington MD    Date of Service: Pt seen/examined on 08/05/21  and Admitted to Inpatient with expected LOS greater than two midnights due to medical therapy. Chief Complaint:  Chest pain     Assessment and Plan:-  1. Afib with RVR, on OAC: confirmed on EKG. Continue with Eliquis. Established care with Cardiology, consulted. Plan for ablation next week with Dr. Nomi Escoto, consult EP. Cardizem bolus and gtt-> drop in BP. May need to consider digoxin. Hemodynamically stable at this time. Pt does not wish for cardioversion (done previously). Hydrate. Tele. Appreciate Cardiology recs. 2. Hypotension: noted. Medication induced s/p Cardizem bolus. Stop gtt. Hold diuretics. One time 500 mL bolus and reassess. Pt is asymptomatic. Continue to monitor closely. 3. Chest pain: likely secondary to above. Initial Trop neg, trend. EKG showing a-fib with RVR. 4. Acute normocytic anemia: Hgb noted at 11.1, no gross bleeding identified. Continue t monitor and transfuse for Hgb < 7 or hemodynamic instability   5. Chronic HFpEF without decompensation: most recent KAMRAN noted with EF 50-55% 7/2021. No overt signs of decompensation. No b/l LE swelling, no rales on exam. BNP noted at ~8K. Careful monitoring with fluids from #1. Hold diuretics for now. 6. HLD: statin  7. GERD: PPI  8. Severe protein calorie malnutrition: dietitian       History Of Present Illness:    Pt is a 79 yo female with PMH of arthritis, CHF, GERD, mitral regurgitation, osteoporosis, and a-fib who presents to [facility] ED with complaint of chest pain. Hx obtained via chart review and patient. Pt states that beginning on Tuesday she began to feel unwell with night sweats, dyspnea, left sided chest pain and her heart \"feeling funny\".  Pt states that she seen here at [facility] for this same problem last month and was told she would need an ablation. At that time pt underwent DCCV x2. Pt was to have an appointment next week with Dr. Riut Duckworth for this however saw her family doctor today who reported she should go into the ED for evaluation. Pt denies any fever/chills, cough, congestion or any urinary symptoms including dysuria, increased urgency or frequency. She denies abd pain, n/v/d/c. Pt will be admitted to stepdown with Cardiology and EP consults. Past Medical History:        Diagnosis Date    Accidental fall 09/2002    [facility] ER- fell on face on cement    Allergic rhinitis     Arthritis     CHF (congestive heart failure) ([facility] [address]     Encounter for cardioversion procedure 10/05/2017    GERD (gastroesophageal reflux disease)     Hx of transesophageal echocardiography (KAMRAN) for monitoring 10/2017    Mild mitral regurgitation 10/2017    Nonrheumatic aortic valve insufficiency 8/29/2017    Osteoporosis     Pre-op evaluation: prior to left knee repalcement 8/29/2017    PVC's (premature ventricular contractions) 8/29/2017    S/P cardiac catheterization 10/07/2017    with Dr. Lerner Self -diagonal 90%, circ 90%,     Torn meniscus 12/2016       Past Surgical History:        Procedure Laterality Date    APPENDECTOMY  1946    BACK SURGERY  3/24/14    Lumbar Laminectomy Fusion L3-5, L4-5 PLIF - Dr. Sunni Velazquez Left 09/05/2017    [place] Left Total Knee    ROTATOR CUFF REPAIR  3/8/06    right shoulder        Home Medications:   No current facility-administered medications on file prior to encounter.      Current Outpatient Medications on File Prior to Encounter   Medication Sig Dispense Refill    metoprolol tartrate (LOPRESSOR) 50 MG tablet Take 1 tablet by mouth 2 times daily 180 tablet 1    lansoprazole (PREVACID) 30 MG delayed release capsule Take 1 capsule by mouth daily 90 capsule 3    apixaban (ELIQUIS) 5 MG TABS tablet Take 1 tablet by mouth 2 times daily 180 tablet 3    atorvastatin (LIPITOR) 40 MG tablet Take 1 tablet by mouth nightly 90 tablet 3    spironolactone (ALDACTONE) 25 MG tablet Take 0.5 tablets by mouth daily 45 tablet 3    isosorbide mononitrate (IMDUR) 30 MG extended release tablet Take 1 tablet by mouth daily 90 tablet 3    furosemide (LASIX) 20 MG tablet Take 1 tablet by mouth three times a week Mon-Wed-Fri 36 tablet 3    vitamin C (ASCORBIC ACID) 500 MG tablet Take 500 mg by mouth daily      vitamin D (CHOLECALCIFEROL) 1000 UNIT TABS tablet Take 500 Units by mouth once a week       aspirin EC 81 MG EC tablet Take 1 tablet by mouth daily 30 tablet 0    nitroGLYCERIN (NITROSTAT) 0.4 MG SL tablet Place 1 tablet under the tongue every 5 minutes as needed for Chest pain (SOB) 25 tablet 3       Allergies:    Amiodarone and Ketamine    Social History:    reports that she quit smoking about 55 years ago. She has a 2.50 pack-year smoking history. She has never used smokeless tobacco. She reports previous alcohol use. She reports that she does not use drugs. Family History:       Problem Relation Age of Onset    Arthritis Mother     Heart Disease Sister     High Blood Pressure Sister     Arthritis Sister     COPD Sister     Cancer Sister         Skin Cancer    Cancer Brother     Heart Disease Brother     Diabetes Maternal Grandfather     Heart Disease Brother         CHF    Cancer Brother         Lung Cancer       Diet:  No diet orders on file    Review of systems:   Pertinent positives as noted in the HPI. All other systems reviewed and negative. PHYSICAL EXAM:  BP 97/70   Pulse 132   Temp 98 °F (36.7 °C) (Oral)   Resp 16   Ht 5' (1.524 m)   Wt 124 lb (56.2 kg)   SpO2 95%   BMI 24.22 kg/m²   General appearance: No apparent distress, appears stated age and cooperative. HEENT: elderly, pleasant, Normal cephalic, atraumatic without obvious deformity. Pupils equal, round, and reactive to light. Extra ocular muscles intact.  Conjunctivae/corneas clear.  Neck: Supple, with full range of motion. No jugular venous distention. Trachea midline. Respiratory:  Normal respiratory effort. Diminished to auscultation, bilaterally without Rales/Wheezes/Rhonchi. Cardiovascular: IRIR without murmurs, rubs or gallops. Abdomen: Soft, non-tender, non-distended with normal bowel sounds. Musculoskeletal:  No clubbing, cyanosis or edema bilaterally. Skin: Skin color, texture, turgor normal.  No rashes or lesions. Neurologic:  Neurovascularly intact without any focal sensory/motor deficits. Cranial nerves: II-XII intact, grossly non-focal.  Psychiatric: Alert and oriented x4, thought content appropriate, normal insight  Capillary Refill: Brisk,< 3 seconds   Peripheral Pulses: +2 palpable, equal bilaterally     Labs:   Recent Labs     08/05/21  1149   WBC 11.1*   HGB 11.1*   HCT 38.3        Recent Labs     08/05/21  1149      K 4.4      CO2 20*   BUN 27*   CREATININE 1.0   CALCIUM 8.3*     Recent Labs     08/05/21  1149   AST 15   ALT 13   BILITOT 1.0   ALKPHOS 83     No results for input(s): INR in the last 72 hours. No results for input(s): Michell Zulema in the last 72 hours. Urinalysis:    Lab Results   Component Value Date    NITRU NEGATIVE 10/05/2017    WBCUA 10-15 10/05/2017    BACTERIA NONE 10/05/2017    RBCUA 0-2 10/05/2017    BLOODU NEGATIVE 10/05/2017    GLUCOSEU NEGATIVE 10/05/2017       Radiology:   XR CHEST PORTABLE   Final Result   1. No acute cardiopulmonary disease. **This report has been created using voice recognition software. It may contain minor errors which are inherent in voice recognition technology. **      Final report electronically signed by Dr. Carmen Montes on 8/5/2021 1:01 PM        XR CHEST PORTABLE    Result Date: 8/5/2021  PROCEDURE: XR CHEST PORTABLE CLINICAL INFORMATION: palpitations COMPARISON: 7/2/2021 TECHNIQUE: AP portable chest radiograph performed. FINDINGS: POSTSURGICAL CHANGES: None. LINES/TUBES/MECHANICAL DEVICES: None. TRACHEA/HEART/MEDIASTINUM/HILUM: Unremarkable. LUNG FIELDS: Normal. OTHER: Hiatal hernia. PNEUMOTHORAX:  None. OSSEOUS STRUCTURES:  Degenerative changes bilateral shoulders. 1. No acute cardiopulmonary disease. **This report has been created using voice recognition software. It may contain minor errors which are inherent in voice recognition technology. ** Final report electronically signed by Dr. Mingo Hi on 8/5/2021 1:01 PM        EKG: atrial fibrillation with RVR    Electronically signed by Chau Mercado PA-C on 8/5/2021 at 1:16 PM

## 2021-08-05 NOTE — LETTER
Beneficiary Notification Letter  BPCI Advanced     Your Doctor or [address],    We wanted to let you know that your health care provider, Mavis, has volunteered to take part in our Centers for Medicare & Medicaid Services (CMS) Bundled Payments for [facility] (BPCI Advanced). This doesnt change your Medicare rights or benefits and you dont need to do anything. What are bundled payments? A bundled payment combines, or bundles together, payments that Medicare makes to your health care providers for the many different kinds of medical services you might get in a specific time period. In BPCI Advanced, this time period could include a hospital inpatient stay or outpatient procedure, plus 90 days. Why would Medicare bundle payments? Bundled payments are thought of as a value-based way to pay because health care providers are responsible for both the quality and cost of medical care they give. This is a relatively new way of paying health care providers compared to thefee-for-service way Medicare has traditionally paid, where providers are paid separately for each service they provide. Bundled payments encourage these providers to work together to provide better, more coordinated care during your hospital stay, or outpatient procedure, and through your recovery. What does BPCI Advance mean for me? Youre more likely to get even better care when hospitals, doctors, and other health care providers work together. In BPCI Advanced, hospitals, doctors, and other health care providers may be rewarded for providing better, more coordinated health care. Medicare will watch BPCI Advanced participants closely to make sure that you and other patients keep getting efficient, high quality care. What do I need to know about BPCI Advanced?   Whats most important for you to know is that your Medicare rights and benefits wont change because your health care provider is participating in [facility]. Medicare will keep covering all of your medically necessary services. Even though Medicare will pay your doctor in a different way under BPCI Advanced, how much you have to pay wont change. Health care providers and suppliers who are enrolled in Medicare will submit their Medicare claims like they always have. Youll have all the same Medicare rights and protections, including the right to choose which hospital, doctor, or other health care provider you see. If you dont want to get care from a health care provider whos participating in [facility], then youll have to choose a different health care provider whos not participating in the Model. How can I give feedback about my health care? Medicare might ask you to take a voluntary survey about the services and care you received from Marito Lorenzo during your hospital stay or outpatient procedure and for a specific period of time afterwards. You can decide whether you want to take the voluntary survey, but if you do, itll help Medicare make BPCI Advanced and the care of other Medicare patients better. If you have concerns or complaints about your care, you can:   · Talk to your doctor or health care provider. · Contact your Beneficiary and Family Centered Care Quality Improvement   Organization [facility]). You can get your BFCC-QIOs phone number  at  Medicare.gov/contacts or by calling 1-800-MEDICARE. TTY users can call  8-755.636.7867. Where can I learn more about BPCI Advanced? Learn more about BPCI Advanced at https://innovation.cms.gov/initiatives/bpci-advanced/:  · A list of all the hospitals and physician group practices in the country participating in [address] [place] [address]. · All of the inpatient and outpatient Clinical Episodes that are currently included under BPCI Advanced.  A Clinical Episode is

## 2021-08-05 NOTE — PROGRESS NOTES
Pharmacy Medication History Note      List of current medications patient is taking is complete. Source of information: patient, outside dispense report    Changes made to medication list:  Medications removed (include reason, ex. therapy complete or physician discontinued):   Vitamin D 1000 unit tabs 500 units once weekly (dosage form/direction mismatch)    Medications added/doses adjusted:  Tylenol 325 mg po 2 tabs AM   Tylenol 325 mg 1 tab PM  Vitamin D 1000 iu capsules weekly    Other notes (ex. Recent course of antibiotics, Coumadin dosing):  Patient was not very familiar with any past medication history but understood her current medications. She stated that she takes Tylenol every morning and \"the stuff that is in Tylenol to help her sleep. \"  She does not remember ever taking carvedilol, which was last filled in May 2021 for a 90 day supply, but does currently take metoprolol. Patient states that she takes the weekly dose of Vitamin D. The patient did not know the strength, but did state that it was \"yellow-consuelo liquid and looked like a little egg and isn't a very high dose. \" Based on this information, she is most likely taking Vitamin D3 1000 iu weekly. She is no longer taking digoxin as it made her sick within 2 and 1/2 weeks after its initiation. Denies use of other OTC or herbal medications.       Allergies reviewed      Electronically signed by Dorla Kussmaul on 8/5/2021 at 4:23 PM

## 2021-08-05 NOTE — ED PROVIDER NOTES
Tim ENCOUNTER          Pt Name: Ignacio Earl  MRN: [id number]  [place] 1934  Date of evaluation: 8/5/2021  Treating Resident Physician: Ameya Floyd MD  Supervising Physician:  Martínez Cohen MD    CHIEF COMPLAINT       Chief Complaint   Patient presents with    Chest Pain     History obtained from the patient. HISTORY OF PRESENT ILLNESS    HPI  Ignacio Earl is a 80 y.o. female who presents to the emergency department for evaluation of chest pain. Patient states that she feels like her heart is racing and this is been intermittent for the last 2 days. Patient states she has had similar symptoms in the past and is scheduled to see Dr. Ying Farfan next week for possible ablation. Patient states she does have some mild shortness of breath associated with it but this is improved. Patient denies any nausea vomiting or syncope. The patient has no other acute complaints at this time. REVIEW OF SYSTEMS   Review of Systems   Constitutional: Negative for fatigue and fever. HENT: Negative for ear pain, rhinorrhea and sore throat. Respiratory: Positive for shortness of breath. Negative for cough and wheezing. Cardiovascular: Positive for chest pain. Negative for palpitations and leg swelling. Gastrointestinal: Negative for abdominal distention, constipation, diarrhea, nausea and vomiting. Endocrine: Negative for polydipsia and polyuria. Genitourinary: Negative for difficulty urinating and dysuria. Musculoskeletal: Negative for arthralgias. Skin: Negative for color change, pallor and rash. Neurological: Negative for dizziness, seizures, syncope, weakness and numbness.          PAST MEDICAL AND SURGICAL HISTORY     Past Medical History:   Diagnosis Date    Accidental fall 09/2002    [facility] ER- fell on face on cement    Allergic rhinitis     Arthritis     CHF (congestive heart failure) ([facility] [address]     Encounter for cardioversion procedure 10/05/2017    GERD (gastroesophageal reflux disease)     Hx of transesophageal echocardiography (KAMRAN) for monitoring 10/2017    Mild mitral regurgitation 10/2017    Nonrheumatic aortic valve insufficiency 8/29/2017    Osteoporosis     Pre-op evaluation: prior to left knee repalcement 8/29/2017    PVC's (premature ventricular contractions) 8/29/2017    S/P cardiac catheterization 10/07/2017    with Dr. Susanna Frederick -diagonal 90%, circ 90%,     Torn meniscus 12/2016     Past Surgical History:   Procedure Laterality Date    APPENDECTOMY  1946    BACK SURGERY  3/24/14    Lumbar Laminectomy Fusion L3-5, L4-5 PLIF - Dr. Fidel Escobar Left 09/05/2017    Rodriguez Left Total Knee    ROTATOR CUFF REPAIR  3/8/06    right shoulder          MEDICATIONS     Current Facility-Administered Medications:     0.9 % sodium chloride bolus, 1,000 mL, Intravenous, Once, Marino Jimenez MD, Last Rate: 1,000 mL/hr at 08/05/21 1137, 1,000 mL at 08/05/21 1137    Current Outpatient Medications:     metoprolol tartrate (LOPRESSOR) 50 MG tablet, Take 1 tablet by mouth 2 times daily, Disp: 180 tablet, Rfl: 1    lansoprazole (PREVACID) 30 MG delayed release capsule, Take 1 capsule by mouth daily, Disp: 90 capsule, Rfl: 3    apixaban (ELIQUIS) 5 MG TABS tablet, Take 1 tablet by mouth 2 times daily, Disp: 180 tablet, Rfl: 3    atorvastatin (LIPITOR) 40 MG tablet, Take 1 tablet by mouth nightly, Disp: 90 tablet, Rfl: 3    spironolactone (ALDACTONE) 25 MG tablet, Take 0.5 tablets by mouth daily, Disp: 45 tablet, Rfl: 3    isosorbide mononitrate (IMDUR) 30 MG extended release tablet, Take 1 tablet by mouth daily, Disp: 90 tablet, Rfl: 3    furosemide (LASIX) 20 MG tablet, Take 1 tablet by mouth three times a week Mon-Wed-Fri, Disp: 36 tablet, Rfl: 3    vitamin C (ASCORBIC ACID) 500 MG tablet, Take 500 mg by mouth daily, Disp: , Rfl:     vitamin D (CHOLECALCIFEROL) 1000 UNIT TABS tablet, Take 500 Units by mouth once a week , Disp: , Rfl:     aspirin EC 81 MG EC tablet, Take 1 tablet by mouth daily, Disp: 30 tablet, Rfl: 0    nitroGLYCERIN (NITROSTAT) 0.4 MG SL tablet, Place 1 tablet under the tongue every 5 minutes as needed for Chest pain (SOB), Disp: 25 tablet, Rfl: 3      SOCIAL HISTORY     Social History     Social History Narrative    Not on file     Social History     Tobacco Use    Smoking status: Former Smoker     Packs/day: 0.50     Years: 5.00     Pack years: 2.50     Quit date: 1965     Years since quittin.9    Smokeless tobacco: Never Used   Vaping Use    Vaping Use: Never used   Substance Use Topics    Alcohol use: Not Currently     Comment: occ. 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 FAMILY HISTORY     Family History   Problem Relation Age of Onset    Arthritis Mother     Heart Disease Sister     High Blood Pressure Sister     Arthritis Sister     COPD Sister     Cancer Sister         Skin Cancer    Cancer Brother     Heart Disease Brother     Diabetes Maternal Grandfather     Heart Disease Brother         CHF    Cancer Brother         Lung Cancer         PREVIOUS RECORDS   Previous records reviewed: Today      PHYSICAL EXAM     ED Triage Vitals [21 1036]   BP Temp Temp Source Pulse Resp SpO2 Height Weight   110/87 98 °F (36.7 °C) Oral 150 20 96 % 5' (1.524 m) 124 lb (56.2 kg)     Initial vital signs and nursing assessment reviewed and normal. Body mass index is 24.22 kg/m². Pulsoximetry is normal per my interpretation. Additional Vital Signs:  Vitals:    21 1152   BP: (!) 84/59   Pulse: 139   Resp: 20   Temp:    SpO2: 96%       Physical Exam  Constitutional:       Appearance: Normal appearance. HENT:      Head: Normocephalic.       Right Ear: External ear normal.      Left Ear: External ear normal.      Nose: Nose normal.      Mouth/Throat:      Mouth: Mucous membranes are moist.      Pharynx: Oropharynx is clear. Eyes:      Extraocular Movements: Extraocular movements intact. Conjunctiva/sclera: Conjunctivae normal.      Pupils: Pupils are equal, round, and reactive to light. Cardiovascular:      Rate and Rhythm: Tachycardia present. Rhythm irregular. Heart sounds: No murmur heard. No friction rub. No gallop. Pulmonary:      Effort: Pulmonary effort is normal.      Breath sounds: Normal breath sounds. Abdominal:      General: Bowel sounds are normal.      Palpations: Abdomen is soft. Musculoskeletal:         General: Normal range of motion. Cervical back: Normal range of motion and neck supple. Skin:     General: Skin is warm and dry. Capillary Refill: Capillary refill takes less than 2 seconds. Neurological:      General: No focal deficit present. Mental Status: She is alert and oriented to person, place, and time. MEDICAL DECISION MAKING   Initial Assessment:   Is a 51-year-old female with complaint of chest pain that she describes as rapid heartbeat. Patient has been seen twice in the past for this by Dr. Bobbi Lares and has an appointment with Dr. Nereida Cho for next week for possible ablation. Patient has differential diagnosis to include but is not limited to A. fib with RVR, myocardial infarction, myocarditis, dehydration, and electrolyte imbalance. Will complete EKG and additional labs for further evaluation. Patient will need to be admitted at this time for further evaluation and treatment.     Plan:   1L NS bolus at this time  Cardiac monitoring   Patient will be admitted to hospitalist.           ED RESULTS   Laboratory results:  Labs Reviewed   CBC WITH AUTO DIFFERENTIAL - Abnormal; Notable for the following components:       Result Value    WBC 11.1 (*)     RBC 3.93 (*)     Hemoglobin 11.1 (*)     MCHC 29.0 (*)     RDW-SD 51.6 (*)     Segs Absolute 8.4 (*)     All other components within normal limits   COMPREHENSIVE METABOLIC PANEL W/ REFLEX TO MG FOR LOW K   TROPONIN   DIGOXIN LEVEL       Radiologic studies results:  No orders to display       ED Medications administered this visit:   Medications   0.9 % sodium chloride bolus (1,000 mLs Intravenous New Bag 8/5/21 1139)         ED COURSE            MEDICATION CHANGES     New Prescriptions    No medications on file         FINAL DISPOSITION     Final diagnoses:   Chest pain, unspecified type   Atrial fibrillation with RVR (HCC)     Condition: condition: good  Dispo: Admit to telemetry      This transcription was electronically signed. Parts of this transcriptions may have been dictated by use of voice recognition software and electronically transcribed, and parts may have been transcribed with the assistance of an ED scribe. The transcription may contain errors not detected in proofreading. Please refer to my supervising physician's documentation if my documentation differs.     Electronically Signed: Lorie Gallegos MD, 08/05/21, 12:29 PM       Lorie Gallegos MD  Resident  08/05/21 Zachary Melara MD  Resident  08/06/21 [id number]

## 2021-08-06 ENCOUNTER — APPOINTMENT (OUTPATIENT)
Dept: CARDIAC CATH/INVASIVE PROCEDURES | Age: 86
DRG: 308 | End: 2021-08-06
Payer: MEDICARE

## 2021-08-06 LAB
ANION GAP SERPL CALCULATED.3IONS-SCNC: 12 MEQ/L (ref 8–16)
BUN BLDV-MCNC: 11 MG/DL (ref 7–22)
CALCIUM SERPL-MCNC: 8.3 MG/DL (ref 8.5–10.5)
CHLORIDE BLD-SCNC: 107 MEQ/L (ref 98–111)
CO2: 20 MEQ/L (ref 23–33)
CREAT SERPL-MCNC: 0.7 MG/DL (ref 0.4–1.2)
EKG ATRIAL RATE: 82 BPM
EKG P AXIS: 47 DEGREES
EKG P-R INTERVAL: 140 MS
EKG Q-T INTERVAL: 346 MS
EKG QRS DURATION: 86 MS
EKG QTC CALCULATION (BAZETT): 404 MS
EKG R AXIS: -15 DEGREES
EKG T AXIS: 53 DEGREES
EKG VENTRICULAR RATE: 82 BPM
GFR SERPL CREATININE-BSD FRML MDRD: 79 ML/MIN/1.73M2
GLUCOSE BLD-MCNC: 73 MG/DL (ref 70–108)
GLUCOSE BLD-MCNC: 77 MG/DL (ref 70–108)
INR BLD: 1.75 (ref 0.85–1.13)
LV EF: 53 %
LVEF MODALITY: NORMAL
MAGNESIUM: 2 MG/DL (ref 1.6–2.4)
POTASSIUM SERPL-SCNC: 4.5 MEQ/L (ref 3.5–5.2)
SODIUM BLD-SCNC: 139 MEQ/L (ref 135–145)

## 2021-08-06 PROCEDURE — 93325 DOPPLER ECHO COLOR FLOW MAPG: CPT

## 2021-08-06 PROCEDURE — 5A2204Z RESTORATION OF CARDIAC RHYTHM, SINGLE: ICD-10-PCS | Performed by: INTERNAL MEDICINE

## 2021-08-06 PROCEDURE — 80048 BASIC METABOLIC PNL TOTAL CA: CPT

## 2021-08-06 PROCEDURE — 93312 ECHO TRANSESOPHAGEAL: CPT

## 2021-08-06 PROCEDURE — 85610 PROTHROMBIN TIME: CPT

## 2021-08-06 PROCEDURE — 6360000002 HC RX W HCPCS: Performed by: INTERNAL MEDICINE

## 2021-08-06 PROCEDURE — 6370000000 HC RX 637 (ALT 250 FOR IP): Performed by: INTERNAL MEDICINE

## 2021-08-06 PROCEDURE — 36415 COLL VENOUS BLD VENIPUNCTURE: CPT

## 2021-08-06 PROCEDURE — 99232 SBSQ HOSP IP/OBS MODERATE 35: CPT | Performed by: FAMILY MEDICINE

## 2021-08-06 PROCEDURE — 93005 ELECTROCARDIOGRAM TRACING: CPT | Performed by: FAMILY MEDICINE

## 2021-08-06 PROCEDURE — 6360000002 HC RX W HCPCS

## 2021-08-06 PROCEDURE — 83735 ASSAY OF MAGNESIUM: CPT

## 2021-08-06 PROCEDURE — 92960 CARDIOVERSION ELECTRIC EXT: CPT | Performed by: INTERNAL MEDICINE

## 2021-08-06 PROCEDURE — 2500000003 HC RX 250 WO HCPCS

## 2021-08-06 PROCEDURE — 2580000003 HC RX 258: Performed by: PHYSICIAN ASSISTANT

## 2021-08-06 PROCEDURE — 82948 REAGENT STRIP/BLOOD GLUCOSE: CPT

## 2021-08-06 PROCEDURE — 6370000000 HC RX 637 (ALT 250 FOR IP): Performed by: PHYSICIAN ASSISTANT

## 2021-08-06 PROCEDURE — 93320 DOPPLER ECHO COMPLETE: CPT

## 2021-08-06 PROCEDURE — 99223 1ST HOSP IP/OBS HIGH 75: CPT | Performed by: INTERNAL MEDICINE

## 2021-08-06 PROCEDURE — B24BZZ4 ULTRASONOGRAPHY OF HEART WITH AORTA, TRANSESOPHAGEAL: ICD-10-PCS | Performed by: INTERNAL MEDICINE

## 2021-08-06 PROCEDURE — 2140000000 HC CCU INTERMEDIATE R&B

## 2021-08-06 PROCEDURE — 99232 SBSQ HOSP IP/OBS MODERATE 35: CPT | Performed by: PHYSICIAN ASSISTANT

## 2021-08-06 RX ORDER — METOPROLOL SUCCINATE 25 MG/1
25 TABLET, EXTENDED RELEASE ORAL DAILY
Status: DISCONTINUED | OUTPATIENT
Start: 2021-08-06 | End: 2021-08-06

## 2021-08-06 RX ORDER — FUROSEMIDE 10 MG/ML
40 INJECTION INTRAMUSCULAR; INTRAVENOUS ONCE
Status: COMPLETED | OUTPATIENT
Start: 2021-08-06 | End: 2021-08-06

## 2021-08-06 RX ORDER — METOPROLOL SUCCINATE 50 MG/1
50 TABLET, EXTENDED RELEASE ORAL DAILY
Status: DISCONTINUED | OUTPATIENT
Start: 2021-08-07 | End: 2021-08-07 | Stop reason: HOSPADM

## 2021-08-06 RX ORDER — METOPROLOL SUCCINATE 25 MG/1
25 TABLET, EXTENDED RELEASE ORAL ONCE
Status: COMPLETED | OUTPATIENT
Start: 2021-08-06 | End: 2021-08-06

## 2021-08-06 RX ADMIN — PANTOPRAZOLE SODIUM 40 MG: 40 TABLET, DELAYED RELEASE ORAL at 08:40

## 2021-08-06 RX ADMIN — APIXABAN 2.5 MG: 2.5 TABLET, FILM COATED ORAL at 08:40

## 2021-08-06 RX ADMIN — SODIUM CHLORIDE, PRESERVATIVE FREE 10 ML: 5 INJECTION INTRAVENOUS at 19:28

## 2021-08-06 RX ADMIN — FUROSEMIDE 40 MG: 10 INJECTION, SOLUTION INTRAMUSCULAR; INTRAVENOUS at 19:27

## 2021-08-06 RX ADMIN — ATORVASTATIN CALCIUM 40 MG: 40 TABLET, FILM COATED ORAL at 21:22

## 2021-08-06 RX ADMIN — ASPIRIN 81 MG: 81 TABLET, COATED ORAL at 08:40

## 2021-08-06 RX ADMIN — OXYCODONE HYDROCHLORIDE AND ACETAMINOPHEN 500 MG: 500 TABLET ORAL at 08:40

## 2021-08-06 RX ADMIN — METOPROLOL SUCCINATE 25 MG: 25 TABLET, FILM COATED, EXTENDED RELEASE ORAL at 23:56

## 2021-08-06 RX ADMIN — APIXABAN 2.5 MG: 2.5 TABLET, FILM COATED ORAL at 21:22

## 2021-08-06 RX ADMIN — METOPROLOL SUCCINATE 25 MG: 25 TABLET, FILM COATED, EXTENDED RELEASE ORAL at 08:44

## 2021-08-06 ASSESSMENT — ENCOUNTER SYMPTOMS
CONSTIPATION: 0
WHEEZING: 0
SORE THROAT: 0
ABDOMINAL DISTENTION: 0
DIARRHEA: 0
VOMITING: 0
COLOR CHANGE: 0
RHINORRHEA: 0
COUGH: 0
SHORTNESS OF BREATH: 1
NAUSEA: 0

## 2021-08-06 NOTE — PROGRESS NOTES
symptoms including dysuria, increased urgency or frequency. She denies abd pain, n/v/d/c.     Pt will be admitted to stepdown with Cardiology and EP consults. Subjective:   Patient seen and examined, appears comfortable in bed, without any signs of cardiorespiratory distress. VS stable, HR in the 90's, afebrile, saturating well on room air. Feels better today, although still feels a bit short of breath. Plan for cardioversion today. Medications:  Reviewed    Infusion Medications    sodium chloride      sodium chloride Stopped (08/06/21 0842)    dilTIAZem 5 mg/hr (08/06/21 0836)     Scheduled Medications    metoprolol succinate  25 mg Oral Daily    sodium chloride flush  5-40 mL Intravenous 2 times per day    aspirin EC  81 mg Oral Daily    atorvastatin  40 mg Oral Nightly    vitamin C  500 mg Oral Daily    Vitamin D  500 Units Oral Weekly    apixaban  2.5 mg Oral BID    pantoprazole  40 mg Oral QAM AC     PRN Meds: sodium chloride flush, sodium chloride, ondansetron **OR** ondansetron, polyethylene glycol, acetaminophen **OR** acetaminophen, nitroGLYCERIN      Intake/Output Summary (Last 24 hours) at 8/6/2021 0912  Last data filed at 8/6/2021 0500  Gross per 24 hour   Intake 1896.38 ml   Output 300 ml   Net 1596.38 ml       Diet:  Diet NPO    Exam:  BP (!) 102/52   Pulse 97   Temp 98.1 °F (36.7 °C) (Oral)   Resp 18   Ht 5' (1.524 m)   Wt 121 lb 11.2 oz (55.2 kg)   SpO2 97%   BMI 23.77 kg/m²     General appearance: No apparent distress, appears stated age and cooperative. HEENT: Pupils equal, round, and reactive to light. Conjunctivae/corneas clear. Neck: Supple, with full range of motion. No jugular venous distention. Trachea midline. Respiratory:  Normal respiratory effort. Clear to auscultation, bilaterally without Rales/Wheezes/Rhonchi. Cardiovascular: irregularly irregular with normal S1/S2 without murmurs, rubs or gallops.   Abdomen: Soft, non-tender, non-distended with normal bowel sounds. Musculoskeletal: passive and active ROM x 4 extremities. Skin: Skin color, texture, turgor normal.  No rashes or lesions. Neurologic:  Neurovascularly intact without any focal sensory/motor deficits. Cranial nerves: II-XII intact, grossly non-focal.  Psychiatric: Alert and oriented, thought content appropriate, normal insight  Capillary Refill: Brisk,< 3 seconds   Peripheral Pulses: +2 palpable, equal bilaterally       Labs:   Recent Labs     08/05/21  1149   WBC 11.1*   HGB 11.1*   HCT 38.3        Recent Labs     08/05/21  1149      K 4.4      CO2 20*   BUN 27*   CREATININE 1.0   CALCIUM 8.3*     Recent Labs     08/05/21  1149   AST 15   ALT 13   BILITOT 1.0   ALKPHOS 83     Recent Labs     08/06/21  0354   INR 1.75*     No results for input(s): David Clap in the last 72 hours. Urinalysis:      Lab Results   Component Value Date    NITRU NEGATIVE 10/05/2017    WBCUA 10-15 10/05/2017    BACTERIA NONE 10/05/2017    RBCUA 0-2 10/05/2017    BLOODU NEGATIVE 10/05/2017    GLUCOSEU NEGATIVE 10/05/2017       Radiology:  XR CHEST PORTABLE   Final Result   1. No acute cardiopulmonary disease. **This report has been created using voice recognition software. It may contain minor errors which are inherent in voice recognition technology. **      Final report electronically signed by Dr. Mingo Hi on 8/5/2021 1:01 PM          Diet: Diet NPO    DVT prophylaxis: [] Lovenox                                 [] SCDs                                 [] SQ Heparin                                 [] Encourage ambulation           [] Already on Anticoagulation     Disposition:    [] Home       [] TCU       [] Rehab       [] Psych       [] SNF       [] [place]       [] Other-    Code Status: Full Code    PT/OT Eval Status:         Electronically signed by Dg Mccann MD on 8/6/2021 at 9:12 AM

## 2021-08-06 NOTE — CARE COORDINATION
8/6/21, 7:19 AM EDT  DISCHARGE PLANNING EVALUATION:    Ambrocio Funez       Admitted: 8/5/2021/ [address] [facility],[address] day: 1   Location: [facility]22/022-A Reason for admit: Atrial fibrillation with rapid ventricular response ([address] [I48.91]  Atrial fibrillation with RVR ([address] [I48.91]  Chest pain, unspecified type [R07.9]   PMH:  has a past medical history of A-fib ([address] Accidental fall, Allergic rhinitis, Arthritis, CHF (congestive heart failure) ([address] Encounter for cardioversion procedure, GERD (gastroesophageal reflux disease), Hx of transesophageal echocardiography (KAMRAN) for monitoring, Mild mitral regurgitation, Nonrheumatic aortic valve insufficiency, Osteoporosis, Pre-op evaluation: prior to left knee repalcement, PVC's (premature ventricular contractions), S/P cardiac catheterization, and Torn meniscus. Procedure:   8/6 Cardioversion planned  Barriers to Discharge:  Admitted through ED with chest pain. Consulted Cardiology and EP. Troponins negative. BNP 8436.0. Found to be in afib with RVR. O2 on at 2L/nc, sats 94-98%. Heart rate high of 150. Started Cardizem gtt, BP dropped to 84/54. Given iv Digoxin x1. Planning Cardioversion today. IVF. Eliquis, Vit C, baby ASA, Lipitor, Imdur, Protonix, Vit D.   PCP: Wilma Esquivel MD  Readmission Risk Score: 23%    Patient Goals/Plan/Treatment Preferences: Spoke with Starla Barrera and her . They live at home together. Starla Barrera is very independent and drives. She has a cane and walker at home from a previous surgery, but does not use them currently. [facility]. Planning home with , possibly later today. Verified pt's insurance and PCP. Transportation/Food Security/Housekeeping Addressed:  No issues identified. 8/6/21, 10:56 AM EDT    Patient goals/plan/ treatment preferences discussed by  and . Patient goals/plan/ treatment preferences reviewed with patient/ family.   Patient/ family verbalize understanding of discharge plan and are in agreement with goal/plan/treatment preferences. Understanding was demonstrated using the teach back method. AVS provided by RN at time of discharge, which includes all necessary medical information pertaining to the patients current course of illness, treatment, post-discharge goals of care, and treatment preferences.         IMM Letter  IMM Letter given to Patient/Family/Significant other/Guardian/POA/by[de-identified]   IMM Letter date given[de-identified] 08/06/21  IMM Letter time given[de-identified] 4953

## 2021-08-06 NOTE — FLOWSHEET NOTE
Pt was anointed     08/06/21 6570   Encounter Summary   Services provided to: Patient   Referral/Consult From: [facility]   Place of [facility] Visiting Yes  (8/6 )   Complexity of Encounter Low   Length of Encounter 15 minutes   Routine   Type Initial   Assessment Approachable;Calm   Intervention Prayer;[place];Nurtured hope   Outcome Acceptance;Expressed gratitude;Encouraged; Hopeful;Receptive   Sacraments   Sacrament of Sick-Anointing Anointed

## 2021-08-06 NOTE — OP NOTE
Flumazenil) IV. Neurologic status was assessed and the patient was completely intact without any deficits. Post Procedure:  Orders placed, monitor x 4 hours    Summary:  Successful, uncomplicated KAMRAN-DCCV with moderate sedation. Plan:    1) Monitor post procedure  2) Post procedure OAC  3) EP consult pending. Case and findings discussed with the patient and family. They were agreeable an amenable to the plan.         Electronically signed by Kelton Alex MD on 8/6/2021 at 1:47 PM

## 2021-08-06 NOTE — PROGRESS NOTES
Patient successfully cardioverted today, remains in normal sinus rhythm. Patient with tachypnea upon minimal exertion even after cardioversion. This RN notified Dr. Estefany Villegas and received orders for 40 lasix IV one time and spoke with Dr. Macho Gray in department who stated he would see patient inpatient tomorrow morning.

## 2021-08-06 NOTE — H&P
[facility]  Sedation/Analgesia History & Physical    Pt Name: Tenisha John  Account number: [de-identified]  MRN: [id number]  YOB: 1934  Provider Performing Procedure: Deon Schrader MD MD  Referring Provider: Jose Gardner MD   Primary Care Physician: Giovana Neumann MD  Date: 8/6/2021    PRE-PROCEDURE    Code Status: FULL CODE  Brief History/Pre-Procedure Diagnosis:   Afib RVR     Consent: : I have discussed with the patient risks, benefits, and alternatives (and relevant risks, benefits, and side effects related to alternatives or not receiving care), and likelihood of the success. The patient and/or representative appear to understand and agree to proceed. The discussion encompasses risks, benefits, and side effects related to the alternatives and the risks related to not receiving the proposed care, treatment, and services. The indication, risks and benefits of the procedure and possible therapeutic consequences and alternatives were discussed with the patient. The patient was given the opportunity to ask questions and to have them answered to his/her satisfaction. Risks of the procedure include but are not limited to the following: Bleeding, hematoma including retroperitoneal hemmorhage, infection, pain and discomfort, injury to the aorta and other blood vessels, rhythm disturbance, low blood pressure, myocardial infarction, stroke, kidney damage/failure, myocardial perforation, allergic reactions to sedatives/contrast material, loss of pulse/vascular compromise requiring surgery, aneurysm/pseudoaneurysm formation, possible loss of a limb/hand/leg, needing blood transfusion, requiring emergent open heart surgery or emergent coronary intervention, the need for intubation/respiratory support, the requirement for defibrillation/cardioversion, and death. Alternatives to and omission of the suggested procedure were discussed.  The patient had no further questions and wished to proceed; the consent form was signed. MEDICAL HISTORY  [x]ASHD/ANGINA/MI/CHF   [x]Hypertension  []Diabetes  [x]Hyperlipidemia  []Smoking  []Family Hx of ASHD  [x]Additional information:       has a past medical history of A-fib ([facility] [address] Accidental fall, Allergic rhinitis, Arthritis, CHF (congestive heart failure) ([facility] [address] Encounter for cardioversion procedure, GERD (gastroesophageal reflux disease), Hx of transesophageal echocardiography (KAMRAN) for monitoring, Mild mitral regurgitation, Nonrheumatic aortic valve insufficiency, Osteoporosis, Pre-op evaluation: prior to left knee repalcement, PVC's (premature ventricular contractions), S/P cardiac catheterization, and Torn meniscus. SURGICAL HISTORY   has a past surgical history that includes Rotator cuff repair (3/8/06); Appendectomy (1946); back surgery (3/24/14); eye surgery; and joint replacement (Left, 09/05/2017).   Additional information:       ALLERGIES   Allergies as of 08/05/2021 - Fully Reviewed 08/05/2021   Allergen Reaction Noted    Amiodarone Other (See Comments) 10/12/2017    Ketamine Other (See Comments) 06/30/2021     Additional information:       MEDICATIONS   Aspirin  [x] 81 mg  [] 325 mg  [] None  Antiplatelet drug therapy use last 5 days  [x]No []Yes  Coumadin Use Last 5 Days [x]No []Yes  Other anticoagulant use last 5 days  []No [x]Yes    Current Facility-Administered Medications:     metoprolol succinate (TOPROL XL) extended release tablet 25 mg, 25 mg, Oral, Daily, Gustavo Hutchison MD    sodium chloride flush 0.9 % injection 5-40 mL, 5-40 mL, Intravenous, 2 times per day, Lila Ruiz PA-C    sodium chloride flush 0.9 % injection 5-40 mL, 5-40 mL, Intravenous, PRN, Merary Mena PA-C    0.9 % sodium chloride infusion, 25 mL, Intravenous, PRN, Merary Mena PA-C    ondansetron (ZOFRAN-ODT) disintegrating tablet 4 mg, 4 mg, Oral, Q8H PRN **OR** ondansetron (ZOFRAN) injection 4 mg, 4 mg, Intravenous, Q6H PRN, Lila Ruiz BRANDI    polyethylene glycol (GLYCOLAX) packet 17 g, 17 g, Oral, Daily PRN, Marc Mckeonyang BRANDI Mena    acetaminophen (TYLENOL) tablet 650 mg, 650 mg, Oral, Q6H PRN **OR** acetaminophen (TYLENOL) suppository 650 mg, 650 mg, Rectal, Q6H PRN, Merary Mena PA-C    0.9 % sodium chloride infusion, , Intravenous, Continuous, Merary Mena PA-C, Last Rate: 75 mL/hr at 08/05/21 1434, New Bag at 08/05/21 1434    [COMPLETED] dilTIAZem injection 10 mg, 10 mg, Intravenous, Once, 10 mg at 08/05/21 1420 **FOLLOWED BY** dilTIAZem 125 mg in dextrose 5 % 125 mL infusion, 5-15 mg/hr, Intravenous, Continuous, Merary Mena PA-C, Last Rate: 5 mL/hr at 08/05/21 2229, 5 mg/hr at 08/05/21 2229    aspirin EC tablet 81 mg, 81 mg, Oral, Daily, Merary Mena PA-C    atorvastatin (LIPITOR) tablet 40 mg, 40 mg, Oral, Nightly, Merary Mena PA-C, 40 mg at 08/05/21 2032    nitroGLYCERIN (NITROSTAT) SL tablet 0.4 mg, 0.4 mg, Sublingual, Q5 Min PRN, [facility], BRANDI    ascorbic acid (VITAMIN C) tablet 500 mg, 500 mg, Oral, Daily, Merary Mena PA-C    vitamin D (CHOLECALCIFEROL) tablet 500 Units, 500 Units, Oral, Weekly, Merary Mena PA-C    apixaban (ELIQUIS) tablet 2.5 mg, 2.5 mg, Oral, BID, Merary Mena PA-C, 2.5 mg at 08/05/21 2034    pantoprazole (PROTONIX) tablet 40 mg, 40 mg, Oral, QAM AC, Merary Mena PA-C  Prior to Admission medications    Medication Sig Start Date End Date Taking?  Authorizing Provider   acetaminophen (TYLENOL) 325 MG tablet Take 650 mg by mouth every morning   Yes Historical Provider, MD   acetaminophen (TYLENOL) 325 MG tablet Take 325 mg by mouth nightly   Yes Historical Provider, MD   vitamin D 25 MCG (1000 UT) CAPS Take 1 capsule by mouth once a week   Yes Historical Provider, MD   metoprolol tartrate (LOPRESSOR) 50 MG tablet Take 1 tablet by mouth 2 times daily 7/27/21  Yes Jesu Kidney, PA-C   apixaban (ELIQUIS) 5 MG TABS tablet Take 1 tablet by mouth 2 times daily 1/12/21  Yes [facility] ANDREA Thomas CNP   atorvastatin (LIPITOR) 40 MG tablet Take 1 tablet by mouth nightly 1/12/21  Yes ANDREA Ramirez CNP   spironolactone (ALDACTONE) 25 MG tablet Take 0.5 tablets by mouth daily 1/12/21  Yes ANDREA Ramirez CNP   isosorbide mononitrate (IMDUR) 30 MG extended release tablet Take 1 tablet by mouth daily 1/12/21  Yes ANDREA Ramirez CNP   furosemide (LASIX) 20 MG tablet Take 1 tablet by mouth three times a week Mon-Wed-Fri 1/13/21  Yes ANDREA Ramirez CNP   vitamin C (ASCORBIC ACID) 500 MG tablet Take 500 mg by mouth daily   Yes Historical Provider, MD   aspirin EC 81 MG EC tablet Take 1 tablet by mouth daily 9/24/16  Yes Glorine ClubMD dennis   lansoprazole (PREVACID) 30 MG delayed release capsule Take 1 capsule by mouth daily 1/12/21   ANDREA Ramirez CNP   nitroGLYCERIN (NITROSTAT) 0.4 MG SL tablet Place 1 tablet under the tongue every 5 minutes as needed for Chest pain (SOB) 1/21/19   ANDREA Ramirez CNP     Additional information:       VITAL SIGNS   Vitals:    08/06/21 0244   BP: 95/62   Pulse: 95   Resp: 19   Temp: 98.2 °F (36.8 °C)   SpO2: 94%       PHYSICAL:   General: No acute distress  HEENT:  Unremarkable for age  Neck: without increased JVD, carotid pulses 2+ bilaterally without bruits  Heart: Irreg-irreg, S1 & S2 WNL, S4 gallop, without murmurs or rubs   NYHA: 2  Lungs: Clear to auscultation    Abdomen: BS present, without HSM, masses, or tenderness    Extremities: without C,C,E.  Pulses 2+ bilaterally  Mental Status: Alert & Oriented        PLANNED PROCEDURE   []Cath  []PCI                []Pacemaker/AICD  [x]KAMRAN             [x]Cardioversion []Peripheral angiography/PTA  []Other:      SEDATION  Planned agent:[x]Midazolam []Meperidine [x]Sublimaze []Morphine  []Diazepam  []Other:     ASA Classification:  []1 []2 [x]3 []4 []5  Class 1: A normal healthy patient  Class 2: Pt with mild to moderate systemic disease  Class 3: Severe systemic disease or disturbance  Class 4: Severe systemic disorders that are already life threatening. Class 5: Moribund pt with little chances of survival, for more than 24 hours. Mallampati I Airway Classification:   []1 [x]2 []3 []4    [x]Pre-procedure diagnostic studies complete and results available. Comment:    [x]Previous sedation/anesthesia experiences assessed. Comment:    [x]The patient is an appropriate candidate to undergo the planned procedure sedation and anesthesia. (Refer to nursing sedation/analgesia documentation record)  [x]Formulation and discussion of sedation/procedure plan, risks, and expectations with patient and/or responsible adult completed. [x]Patient examined immediately prior to the procedure.  (Refer to nursing sedation/analgesia documentation record)    Gustavo Hutchison MD MD   Electronically signed 8/6/2021 at 7:41 AM

## 2021-08-06 NOTE — CONSULTS
[facility]    Patient's Name/Date of Birth: Bri Ny / 1934 (33 y.o.)    Date: August 6, 2021     Referring Provider: Leonel Aguirre MD    CHIEF COMPLAINT: Afib      HPI: This is a pleasant 80 y.o. female presents with hx of EF 50-55%, mild-mod MR, mild AI, with secondary vessel stenoses who presents for evaluation of palpitations and progressive osborn. One week of symptoms again. 1 month ago, she came in with Afib RVR associated with chest pain and underwent DCCV. She is supposed to see  [facility] as outpatient for EP evaluation. She is on Eliquis. She has a hx of falls and unsteady gait. She has no GI bleeding. Denies f/c/ns. Her HR in the ED was 120-130s, her BP runs 80-90s. She is allergic to Amiodarone--resulted in TdP. ECG shows no acute ischemic changes. She was started on IV Diltiazem, and due to low BP, Digoxin 500 mcg IV X 1 was given. Biggest complaint is sob. Echo: Results for orders placed during the hospital encounter of 01/13/21    Echo 2D w doppler w color complete    Narrative  Transthoracic Echocardiography Report (TTE)    Demographics    Patient Name     [address] Lorenzo  Gender                 Female    MR #             [id number]   Race                       Ethnicity    Account #        [de-identified]   Room Number    Accession Number [id number]  Date of Study          01/13/2021    Date of Birth    1934  Referring Physician    Garland Rodriguez CNP    Age              80 year(s)  Eliza Dhillon RD    Interpreting Physician Hansel Munoz MD    Procedure    Type of Study    TTE procedure:ECHOCARDIOGRAM COMPLETE 2D W DOPPLER W COLOR. Procedure Date  Date: 01/13/2021 Start: 10:15 AM    Study Location: Echo Lab  Technical Quality: Adequate visualization    Indications:Evaluate left ventricular function and Congestive heart failure.     Additional Medical History:Ex Smoker, Ischemic Cardiomyopathy, GERD, Atrial  fibrillation, Congestive heart failure, Coronary artery disease,  Osteoarthritis, Vertigo, PVC's, Shortness of breath, Chest Pain, NSVT. Patient Status: Routine    Height: 60 inches Weight: 130 pounds BSA: 1.55 m^2 BMI: 25.39 kg/m^2    BP: 110/60 mmHg    Allergies  - No Known Allergies.    - No Known Allergies. Conclusions    Summary  Normal left ventricular size and systolic function. There were no regional wall motion abnormalities. Wall thickness was within normal limits. Ejection fraction was estimated at 55-60%. Doppler parameters were consistent with abnormal left ventricular  relaxation (grade 1 diastolic dysfunction). Left atrial size was severely dilated. There was mild aortic regurgitation. The mitral valve structure demonstrated posterior mitral annular  calcification. DOPPLER: The transmitral velocity was within the normal  range with no evidence for mitral stenosis. There was moderate to severe  mitral regurgitation (ERO 0.5 cm2, RV 88 cc). Consider KAMRAN for further  evaluation as the color flow appear to be moderate while the hemodynamic  suggest severe. There was mild tricuspid regurgitation. Assuming RAP = 5 mmHg, the  estimated RVSP = 34 mmHg. Ascending aorta = 3.6 cm. IVC size is within normal limits with normal respiratory phasic changes. Signature    ----------------------------------------------------------------  Electronically signed by Jasson Miller MD (Interpreting  physician) on 01/13/2021 at 11:20 AM  ----------------------------------------------------------------    Findings    Mitral Valve  The mitral valve structure demonstrated posterior mitral annular  calcification. DOPPLER: The transmitral velocity was within the normal  range with no evidence for mitral stenosis. There was moderate to severe  mitral regurgitation (ERO 0.5 cm2, RV 88 cc).  Consider KAMRAN for further  evaluation as the color flow appear to be moderate while the hemodynamic  suggest severe. Aortic Valve  The aortic valve was trileaflet with increased thickness/calcification and  preserved cuspal separation. DOPPLER: Transaortic velocity was within the  normal range with no evidence of aortic stenosis. There was mild aortic  regurgitation. Tricuspid Valve  The tricuspid valve structure was normal with normal leaflet separation. DOPPLER: There was no evidence of tricuspid stenosis. There was mild  tricuspid regurgitation. Assuming RAP = 5 mmHg, the estimated RVSP = 34  mmHg. Pulmonic Valve  The pulmonic valve leaflets exhibited normal thickness, no calcification,  and normal cuspal separation. DOPPLER: The transpulmonic velocity was  within the normal range with trace regurgitation. Left Atrium  Left atrial size was severely dilated. Left Ventricle  Normal left ventricular size and systolic function. There were no regional wall motion abnormalities. Wall thickness was within normal limits. Ejection fraction was estimated at 55-60%. Doppler parameters were consistent with abnormal left ventricular  relaxation (grade 1 diastolic dysfunction). Right Atrium  Right atrial size was normal.    Right Ventricle  The right ventricular size was normal with normal systolic function and  wall thickness. Pericardial Effusion  The pericardium was normal in appearance with no evidence of a pericardial  effusion. Pleural Effusion  No evidence of pleural effusion. Aorta / Great Vessels  -Ascending aorta = 3.6 cm. -IVC size is within normal limits with normal respiratory phasic changes.     M-Mode/2D Measurements & Calculations    LV Diastolic    LV Systolic Dimension:    AV Cusp Separation: 1.7 cmLA  Dimension: 4.5  3.2 cm                    Dimension: 3.5 cmAO Root  cm              LV Volume Diastolic: 19.4 Dimension: 3 cmLA Area: 17.7  LV FS:28.9 %    ml                        cm^2  LV PW           LV Volume Systolic: 41 ml  Diastolic: 1.1  LV EDV/LV EDV Index: 92.4  cm              ml/60 m^2LV ESV/LV ESV  Septum          Index: 41 ml/26 m^2       RV Diastolic Dimension: 3.5 cm  Diastolic: 1.1  EF Calculated: 55.6 %  cm                                        LA/Aorta: 1.17  Ascending Aorta: 3.6 cm  LA volume/Index: 43.4 ml /28m^2    Doppler Measurements & Calculations    MV Peak E-Wave: 64.7 cm/s         AV Peak Velocity: LVOT Peak Velocity:  MV Peak A-Wave: 85.7 cm/s         147 cm/s          90.4 cm/s  MV E/A Ratio: 0.75                AV Peak Gradient: LVOT Peak Gradient: 3  MV Peak Gradient: 1.67 mmHg       8.64 mmHg         mmHg    MV Deceleration Time: 257 msec                      TV Peak E-Wave: 51.8  cm/s  MV Area (PISA): 0.5 cm^2                            TV Peak A-Wave: 65.6  MV E' Septal Velocity: 5 cm/s     AV P1/2t: 652     cm/s  MV A' Septal Velocity: 8.5 cm/s   msec  MV E' Lateral Velocity: 8.1 cm/s  IVRT: 88 msec     TV Peak Gradient: 1.07  MV A' Lateral Velocity: 11.5 cm/s                   mmHg  E/E' septal: 12.94                                  TR Velocity:263 cm/s  E/E' lateral: 7.99                AV DVI            TR Gradient:27.67 mmHg  MR Velocity: 494 cm/s             (Vmax):0.61       PV Peak Velocity: 56.6  MV LIANA PISA: 0.49 cm^2                              cm/s  MR VTI: 176 cm                                      PV Peak Gradient: 1.28  Alias Velocity: 38.5 cm/sPISA                       mmHg  Radius: 1 cm  MV ERO Volumetric: 0.5 cm^2  PISA area: 6.28 cm^2MR flow rate:                   MD ED Velocity: 130  241.78 ml/sMR volume:86.24 ml                       cm/s    [email or web address]  [id number]       All labs, EKG's, diagnostic testing and images as well as cardiac cath, stress testing were reviewed during this encounter    Past Medical History:   Diagnosis Date    A-fib [facility])     Accidental fall 09/2002    [facility] ER- fell on face on cement    Allergic rhinitis     Arthritis     [facility]congestive heart failure) ([facility])     Encounter for cardioversion procedure 10/05/2017    GERD (gastroesophageal reflux disease)     Hx of transesophageal echocardiography (KAMRAN) for monitoring 10/2017    Mild mitral regurgitation 10/2017    Nonrheumatic aortic valve insufficiency 8/29/2017    Osteoporosis     Pre-op evaluation: prior to left knee repalcement 8/29/2017    PVC's (premature ventricular contractions) 8/29/2017    S/P cardiac catheterization 10/07/2017    with Dr. Ballesteros Postin -diagonal 90%, circ 90%,     Torn meniscus 12/2016     Past Surgical History:   Procedure Laterality Date    APPENDECTOMY  1946    BACK SURGERY  3/24/14    Lumbar Laminectomy Fusion L3-5, L4-5 PLIF - Dr. Roberto Monroy Left 09/05/2017    Rodriguez Left Total Knee    ROTATOR CUFF REPAIR  3/8/06    right shoulder      Current Facility-Administered Medications   Medication Dose Route Frequency Provider Last Rate Last Admin    sodium chloride flush 0.9 % injection 5-40 mL  5-40 mL Intravenous 2 times per day [facility], PA-C        sodium chloride flush 0.9 % injection 5-40 mL  5-40 mL Intravenous PRN Merary Mena PA-C        0.9 % sodium chloride infusion  25 mL Intravenous PRN Merary Mena PA-C        ondansetron (ZOFRAN-ODT) disintegrating tablet 4 mg  4 mg Oral Q8H PRN Merary Mena PA-C        Or    ondansetron (ZOFRAN) injection 4 mg  4 mg Intravenous Q6H PRN Merary Mena PA-C        polyethylene glycol (GLYCOLAX) packet 17 g  17 g Oral Daily PRN Merary Mena PA-C        acetaminophen (TYLENOL) tablet 650 mg  650 mg Oral Q6H PRN Merary Mena PA-C        Or    acetaminophen (TYLENOL) suppository 650 mg  650 mg Rectal Q6H PRN Merary Mena PA-C        0.9 % sodium chloride infusion   Intravenous Continuous Merary Mena PA-C 75 mL/hr at 08/05/21 1434 New Bag at 08/05/21 1434    dilTIAZem 125 mg in dextrose 5 % 125 mL infusion  5-15 mg/hr Intravenous Continuous Merary Mena PA-C 5 mL/hr at 08/05/21 2229 5 mg/hr at 08/05/21 2229    aspirin EC tablet 81 mg  81 mg Oral Daily Merary Mena PA-C        atorvastatin (LIPITOR) tablet 40 mg  40 mg Oral Nightly Merary Mena PA-C   40 mg at 08/05/21 2032    isosorbide mononitrate (IMDUR) extended release tablet 30 mg  30 mg Oral Daily Merary Mena PA-C        nitroGLYCERIN (NITROSTAT) SL tablet 0.4 mg  0.4 mg Sublingual Q5 Min PRN Merary Mena PA-C        ascorbic acid (VITAMIN C) tablet 500 mg  500 mg Oral Daily Merary Mena PA-C        vitamin D (CHOLECALCIFEROL) tablet 500 Units  500 Units Oral Weekly Merary Mena PA-C        apixaban (ELIQUIS) tablet 2.5 mg  2.5 mg Oral BID Merary Mena PA-C   2.5 mg at 08/05/21 2034    pantoprazole (PROTONIX) tablet 40 mg  40 mg Oral [facility]er[facility] Loc Coleman PA-C         Prior to Admission medications    Medication Sig Start Date End Date Taking?  Authorizing Provider   acetaminophen (TYLENOL) 325 MG tablet Take 650 mg by mouth every morning   Yes Historical Provider, MD   acetaminophen (TYLENOL) 325 MG tablet Take 325 mg by mouth nightly   Yes Historical Provider, MD   vitamin D 25 MCG (1000 UT) CAPS Take 1 capsule by mouth once a week   Yes Historical Provider, MD   metoprolol tartrate (LOPRESSOR) 50 MG tablet Take 1 tablet by mouth 2 times daily 7/27/21  Yes Diane Man PA-C   apixaban (ELIQUIS) 5 MG TABS tablet Take 1 tablet by mouth 2 times daily 1/12/21  Yes ANDREA Ramirez CNP   atorvastatin (LIPITOR) 40 MG tablet Take 1 tablet by mouth nightly 1/12/21  Yes ANDREA Ramirez CNP   spironolactone (ALDACTONE) 25 MG tablet Take 0.5 tablets by mouth daily 1/12/21  Yes ANDREA Ramirez CNP   isosorbide mononitrate (IMDUR) 30 MG extended release tablet Take 1 tablet by mouth daily 1/12/21  Yes Juanita Avendano APRN - CNP   furosemide (LASIX) 20 MG tablet Take 1 tablet by mouth three times a week Mon-Wed-Fri 1/13/21  Yes ANDREA Ramirez CNP   vitamin C (ASCORBIC ACID) 500 MG tablet Take 500 mg by mouth daily   Yes Historical Provider, MD   aspirin EC 81 MG EC tablet Take 1 tablet by mouth daily 9/24/16  Yes Beck Zelaya MD   lansoprazole (PREVACID) 30 MG delayed release capsule Take 1 capsule by mouth daily 1/12/21   ANDREA Ramirez CNP   nitroGLYCERIN (NITROSTAT) 0.4 MG SL tablet Place 1 tablet under the tongue every 5 minutes as needed for Chest pain (SOB) 1/21/19   ANDREA Ramirez CNP   Scheduled Meds:   sodium chloride flush  5-40 mL Intravenous 2 times per day    aspirin EC  81 mg Oral Daily    atorvastatin  40 mg Oral Nightly    isosorbide mononitrate  30 mg Oral Daily    vitamin C  500 mg Oral Daily    Vitamin D  500 Units Oral Weekly    apixaban  2.5 mg Oral BID    pantoprazole  40 mg Oral QAM AC     Continuous Infusions:   sodium chloride      sodium chloride 75 mL/hr at 08/05/21 1434    dilTIAZem 5 mg/hr (08/05/21 2229)     PRN Meds:.sodium chloride flush, sodium chloride, ondansetron **OR** ondansetron, polyethylene glycol, acetaminophen **OR** acetaminophen, nitroGLYCERIN    Allergies   Allergen Reactions    Amiodarone Other (See Comments)     Prolonged QT; Torsades DP    Ketamine Other (See Comments)     Pt \"made her very anxious/crazy\" when recovering from this drug.      Family History   Problem Relation Age of Onset    Arthritis Mother     Heart Disease Sister     High Blood Pressure Sister     Arthritis Sister     COPD Sister     Cancer Sister         Skin Cancer    Cancer Brother     Heart Disease Brother     Diabetes Maternal Grandfather     Heart Disease Brother         CHF    Cancer Brother         Lung Cancer     Social History     Socioeconomic History    Marital status:      Spouse name: Lum [place] Number of children: 3    Years of education: Not on file    Highest education level: Not on file Occupational History    Not on file   Tobacco Use    Smoking status: Former Smoker     Packs/day: 0.50     Years: 5.00     Pack years: 2.50     Quit date: 1965     Years since quittin.9    Smokeless tobacco: Never Used   Vaping Use    Vaping Use: Never used   Substance and Sexual Activity    Alcohol use: Not Currently     Comment: occ. red wine    Drug use: No    Sexual activity: Yes     Partners: Male   Other Topics Concern    Not on file   Social History Narrative    Not on file     Social Determinants of Health     Financial Resource Strain: Low Risk     Difficulty of Paying Living Expenses: Not hard at all   Food Insecurity: No Food Insecurity    Worried About Running Out of Food in the Last Year: Never true    [address] in the Last Year: Never true   Transportation Needs:     Lack of Transportation (Medical):  Lack of Transportation (Non-Medical):    Physical Activity:     Days of Exercise per Week:     Minutes of Exercise per Session:    Stress:     Feeling of Stress :    Social Connections:     Frequency of Communication with Friends and Family:     Frequency of Social Gatherings with Friends and Family:     Attends Christian Services:     Active Member of Clubs or Organizations:     Attends Club or Organization Meetings:     Marital Status:    Intimate Partner Violence:     Fear of Current or Ex-Partner:     Emotionally Abused:     Physically Abused:     Sexually Abused:      ROS:   Constitutional: Denies any recent wt change. Eyes:  Denies any blurring or double vision, no glaucoma  Ears/Nose/Mouth/Throat:  Denies any chronic sinus/rhinitis, bleeding gums  Cardiovascular:  As described above.     Respiratory:  Denies any frequent cough, wheezing or coughing up blood  Genitourinary:  Denies difficulty with urination and kidney stones  Gastrointestinal:  Denies any chronic problems with abdominal pain, nausea, vomiting or diarrhea  Musculoskeletal:  Denies any joint pain, back pain, or difficulty walking  Integumentary:  Denies any rash  Neurological:  No numbness or tingling  Endocrine:  Denies any polydipsia. Hematologic/Lymphatic:  Denies any hemorrhage or lymphatic drainage problems. Labs:  CBC:   Recent Labs     08/05/21  1149   WBC 11.1*   HGB 11.1*   HCT 38.3   MCV 97.5        BMP:   Recent Labs     08/05/21  1149      K 4.4      CO2 20*   BUN 27*   CREATININE 1.0     Accucheck Glucoses: No results for input(s): POCGLU in the last 72 hours. Cardiac Enzymes: No results for input(s): CKTOTAL, CKMB, CKMBINDEX, TROPONINI in the last 72 hours. PT/INR:   Recent Labs     08/06/21  0354   INR 1.75*     APTT: No results for input(s): APTT in the last 72 hours.   Liver Profile:  Lab Results   Component Value Date    AST 15 08/05/2021    ALT 13 08/05/2021    BILIDIR <0.2 06/15/2021    BILITOT 1.0 08/05/2021    ALKPHOS 83 08/05/2021     Lab Results   Component Value Date    CHOL 211 09/24/2016    HDL 62 09/24/2016    TRIG 92 09/24/2016     TSH:   Lab Results   Component Value Date    TSH 2.540 08/05/2021     UA:   Lab Results   Component Value Date    COLORU YELLOW 10/05/2017    PHUR 5.5 10/05/2017    WBCUA 10-15 10/05/2017    RBCUA 0-2 10/05/2017    YEAST NONE SEEN 10/05/2017    BACTERIA NONE 10/05/2017    LEUKOCYTESUR MODERATE 10/05/2017    UROBILINOGEN 0.2 10/05/2017    BILIRUBINUR NEGATIVE 10/05/2017    BLOODU NEGATIVE 10/05/2017    GLUCOSEU NEGATIVE 10/05/2017         Physical Exam:  Vitals:    08/06/21 0244   BP: 95/62   Pulse: 95   Resp: 19   Temp: 98.2 °F (36.8 °C)   SpO2: 94%        Intake/Output Summary (Last 24 hours) at 8/6/2021 0731  Last data filed at 8/6/2021 0500  Gross per 24 hour   Intake 1896.38 ml   Output 300 ml   Net 1596.38 ml      General:  No acute distress  Neck: Supple, no JVD, no carotid bruits  Heart: Irreg-irreg, normal S1 and S2, no rubs, murmurs or gallops  Lungs: clear to ascultation no rales, wheezes, or rhonchi  Abdomen: positive bowel sounds, soft, non-tender, non-distended, no bruits, no masses  Extremities:no clubbing, cyanosis or edema  Neurologic: alert and oriented x 3, cranial nerves 2-12 grossly intact, motor and sensory intact, moving all extremities  Skin: No rashes    Assessment:  Atrial Fibrillation, [id number] = 6  Chronic Diastolic CHF, NHYA II  Cardiac cath: Dx 90%, PDA 60%, and 90% AVG stenosis  Mild-moderate MR  Preserved EF  Amiodarone intolerance - TdP reaction    Plan:  1. Continue IV Diltiazem as tolerated  2. NPO after midnight  3. Add Metoprolol 25 mg XL q day   4. D/c Imdur  5. KAMRAN-DCCV  6. Continue Eliquis  7. EP consult as inpatient for Afib ablation  8. Continue IVF  9. Further recommendations based on results and clinical course    Thank you for allowing us to participate in the care of this patient. Please do not hesitate to call us with questions.     Electronically signed by John Narayan MD on 8/6/2021 at 7:31 AM    Interventional Cardiology - [facility]

## 2021-08-06 NOTE — CARE COORDINATION
Patient/family seen: Yes       Informed patient/family of BPCI-A Medical Bundle Program with potential outreach by either Care Transitions Team or [facility]Health Team based on hospital admission and location. BPCI-A Notification Letter given: Yes         Current discharge plan: Home with .

## 2021-08-06 NOTE — PROGRESS NOTES
Cardiology Progress Note      Patient: Sancho Vincent  YOB: 1934  MRN: [id number]   Acct: [de-identified]  Admit Date:  8/5/2021  Primary Cardiologist: Gisela Srinivasan MD    Note per dr Funes Medico: Afib        HPI: This is a pleasant 80 y.o. female presents with hx of EF 50-55%, mild-mod MR, mild AI, with secondary vessel stenoses who presents for evaluation of palpitations and progressive osborn. One week of symptoms again. 1 month ago, she came in with Afib RVR associated with chest pain and underwent DCCV. She is supposed to see Dr. Ayanna Carmichael as outpatient for EP evaluation. She is on Eliquis. She has a hx of falls and unsteady gait. She has no GI bleeding. Denies f/c/ns. Her HR in the ED was 120-130s, her BP runs 80-90s. She is allergic to Amiodarone--resulted in TdP. ECG shows no acute ischemic changes. She was started on IV Diltiazem, and due to low BP, Digoxin 500 mcg IV X 1 was given. Biggest complaint is sob. \"    Subjective (Events in last 24 hours): pt awake and  Alert. NAD. No cp. Mild sob. Denies palpitations. Pt doesn't feel well while in afib.   On RA    On cdz gtt at 2.5 mg/hr    Objective:   BP (!) 102/52   Pulse 97   Temp 98.1 °F (36.7 °C) (Oral)   Resp 18   Ht 5' (1.524 m)   Wt 121 lb 11.2 oz (55.2 kg)   SpO2 97%   BMI 23.77 kg/m²        TELEMETRY: afib cvr 90s    Physical Exam:  General Appearance: alert and oriented to person, place and time, in no acute distress  Cardiovascular: irregularly irregular  Pulmonary/Chest: clear to auscultation bilaterally- no wheezes, rales or rhonchi, normal air movement, no respiratory distress  Abdomen: soft, non-tender, non-distended, normal bowel sounds, no masses Extremities: no cyanosis, clubbing or edema, pulse   Skin: warm and dry  Head: normocephalic and atraumatic  Eyes: pupils equal, round, and reactive to light  Neck: supple and non-tender without mass, no thyromegaly   Neurological: alert, oriented, normal speech, no Date    ALKPHOS 83 08/05/2021    ALT 13 08/05/2021    AST 15 08/05/2021    PROT 6.7 08/05/2021    BILITOT 1.0 08/05/2021    BILIDIR <0.2 06/15/2021    LABALBU 3.3 08/05/2021       Magnesium:    Lab Results   Component Value Date    MG 2.0 07/19/2021       PT/INR:    Lab Results   Component Value Date    INR 1.75 08/06/2021       HgBA1c:    Lab Results   Component Value Date    LABA1C 5.1 07/02/2021       FLP:    Lab Results   Component Value Date    TRIG 92 09/24/2016    HDL 62 09/24/2016    LDLCALC 131 09/24/2016       TSH:    Lab Results   Component Value Date    TSH 2.540 08/05/2021         Assessment:    Recurrent afib rvr - rate now controlled   chadsvasc = 6   On eliquis 2.5 bid (reduced due to age/weight)   Hx KAMRAN/CV 7/1/21 to NSR  Chronic diastolic CHF  Ef 10-51 per KAMRAN 7/2/21  CAD - multivessel CAD not amenable to PCI per cath 2017  Mild-mod MR  amio intolerance - hx TdP reaction  Dyslipidemia  Hx HTN, now with currently low bp      Plan:    Keep mag >2 and K >4  Normal TSH 8/5/21  Npo  KAMRAN/CV today  Consult sent to EP yesterday  Wean cdz  Cont asa/statin/BB  Pt states she is intolerant to digoxin        Electronically signed by Stan Becerra PA-C on 8/6/2021 at 9:03 AM

## 2021-08-07 VITALS
WEIGHT: 124 LBS | HEART RATE: 72 BPM | HEIGHT: 60 IN | DIASTOLIC BLOOD PRESSURE: 55 MMHG | OXYGEN SATURATION: 97 % | SYSTOLIC BLOOD PRESSURE: 91 MMHG | RESPIRATION RATE: 16 BRPM | BODY MASS INDEX: 24.35 KG/M2 | TEMPERATURE: 97.8 F

## 2021-08-07 LAB
ANION GAP SERPL CALCULATED.3IONS-SCNC: 12 MEQ/L (ref 8–16)
BUN BLDV-MCNC: 11 MG/DL (ref 7–22)
CALCIUM SERPL-MCNC: 8.3 MG/DL (ref 8.5–10.5)
CHLORIDE BLD-SCNC: 105 MEQ/L (ref 98–111)
CO2: 21 MEQ/L (ref 23–33)
CREAT SERPL-MCNC: 0.8 MG/DL (ref 0.4–1.2)
ERYTHROCYTE [DISTWIDTH] IN BLOOD BY AUTOMATED COUNT: 14.4 % (ref 11.5–14.5)
ERYTHROCYTE [DISTWIDTH] IN BLOOD BY AUTOMATED COUNT: 49.1 FL (ref 35–45)
GFR SERPL CREATININE-BSD FRML MDRD: 68 ML/MIN/1.73M2
GLUCOSE BLD-MCNC: 85 MG/DL (ref 70–108)
HCT VFR BLD CALC: 31.6 % (ref 37–47)
HEMOGLOBIN: 9.6 GM/DL (ref 12–16)
MAGNESIUM: 1.8 MG/DL (ref 1.6–2.4)
MCH RBC QN AUTO: 28.3 PG (ref 26–33)
MCHC RBC AUTO-ENTMCNC: 30.4 GM/DL (ref 32.2–35.5)
MCV RBC AUTO: 93.2 FL (ref 81–99)
PLATELET # BLD: 206 THOU/MM3 (ref 130–400)
PMV BLD AUTO: 10.6 FL (ref 9.4–12.4)
POTASSIUM SERPL-SCNC: 4.2 MEQ/L (ref 3.5–5.2)
RBC # BLD: 3.39 MILL/MM3 (ref 4.2–5.4)
SODIUM BLD-SCNC: 138 MEQ/L (ref 135–145)
WBC # BLD: 7.2 THOU/MM3 (ref 4.8–10.8)

## 2021-08-07 PROCEDURE — 85027 COMPLETE CBC AUTOMATED: CPT

## 2021-08-07 PROCEDURE — 80048 BASIC METABOLIC PNL TOTAL CA: CPT

## 2021-08-07 PROCEDURE — 99222 1ST HOSP IP/OBS MODERATE 55: CPT | Performed by: INTERNAL MEDICINE

## 2021-08-07 PROCEDURE — 6370000000 HC RX 637 (ALT 250 FOR IP): Performed by: PHYSICIAN ASSISTANT

## 2021-08-07 PROCEDURE — 83735 ASSAY OF MAGNESIUM: CPT

## 2021-08-07 PROCEDURE — 2580000003 HC RX 258: Performed by: PHYSICIAN ASSISTANT

## 2021-08-07 PROCEDURE — 36415 COLL VENOUS BLD VENIPUNCTURE: CPT

## 2021-08-07 PROCEDURE — 99239 HOSP IP/OBS DSCHRG MGMT >30: CPT | Performed by: FAMILY MEDICINE

## 2021-08-07 RX ORDER — FLECAINIDE ACETATE 50 MG/1
50 TABLET ORAL 2 TIMES DAILY
Status: DISCONTINUED | OUTPATIENT
Start: 2021-08-07 | End: 2021-08-07 | Stop reason: HOSPADM

## 2021-08-07 RX ORDER — FLECAINIDE ACETATE 50 MG/1
50 TABLET ORAL 2 TIMES DAILY
Qty: 60 TABLET | Refills: 1 | Status: SHIPPED | OUTPATIENT
Start: 2021-08-07 | End: 2021-08-30

## 2021-08-07 RX ADMIN — SODIUM CHLORIDE, PRESERVATIVE FREE 10 ML: 5 INJECTION INTRAVENOUS at 09:09

## 2021-08-07 RX ADMIN — APIXABAN 2.5 MG: 2.5 TABLET, FILM COATED ORAL at 09:09

## 2021-08-07 RX ADMIN — PANTOPRAZOLE SODIUM 40 MG: 40 TABLET, DELAYED RELEASE ORAL at 07:40

## 2021-08-07 RX ADMIN — ASPIRIN 81 MG: 81 TABLET, COATED ORAL at 09:09

## 2021-08-07 NOTE — CONSULTS
[address] [facility] – [place]  Dept: [phone]       CARDIAC ELECTROPHYSIOLOGY: CONSULTATION NOTE  PATIENT DEMOGRAPHICS:  Date:   8/7/2021  Patient name: Deya Castañeda  YOB: 1934  Sex: female   MRN:   [id number]    PRIMARY CARE PHYSICIAN:   Reyes Pitts MD    REFERRING PROVIDER:  Benny Bacon MD    REASON FOR CONSULTATION:  Drug refractory/intolerant persistent atrial fibrillation, evaluation for catheter ablation. HISTORY OF PRESENT ILLNESS:  Ms. Tamir Wolff is an 80years old lady who was incidentally diagnosed proximal atrial fibrillation in 2016 on Holter monitor when she presented with intermittent palpitation. Due to low burden and minimal symptoms no medications were prescribed. She was hospitalized with shortness of breath, palpitations and fatigue in 10//2017 and noted to have atrial fibrillation with rapid ventricular rate. She was noted to have new drop in her LVEF, 30%. Coronary angiogram showed non-obstructive coronary artery disease. She underwent KAMRNA guided cardioversion by Dr. Dominik Overton and was started on amiodarone. However, she could not tolerated and was discontinued. Over the course of her time her LVEF normalized. She remained well until 4 weeks ago when she had recurrent symptomic recurrences of atrial fibrillation (palpitations, shortness of breath and fatigue) requiring hospitalization and 3 cardioversions, most recent on 8/6/2021. She is currently on accommodation of metoprolol and digoxin and is anticoagulated with apixaban. The patient reports be feeling well today. Denies chest pain, shortness of breath, syncope or lower extremity swelling. REVIEW OF SYSTEMS:    Constitutional: Negative for chills and fever  HENT: Negative for congestion, sinus pressure, sneezing and sore throat. Eyes: Negative for pain, discharge, redness and itching.    Respiratory: Negative for apnea, cough  Gastrointestinal: Negative for blood in stool, constipation, diarrhea   Endocrine: Negative for cold intolerance, heat intolerance, polydipsia. Genitourinary: Negative for dysuria, enuresis, flank pain and hematuria. Musculoskeletal: Negative for arthralgias, joint swelling and neck pain. Neurological: Negative for numbness and headaches. Psychiatric/Behavioral: Negative for agitation, confusion, decreased concentration and dysphoric mood.       PAST MEDICAL HISTORY:  Past Medical History:   Diagnosis Date    A-fib [facility])     Accidental fall 09/2002    [facility] ER- fell on face on cement    Allergic rhinitis     Arthritis     CHF (congestive heart failure) ([facility] [address]     Encounter for cardioversion procedure 10/05/2017    GERD (gastroesophageal reflux disease)     Hx of transesophageal echocardiography (KAMRAN) for monitoring 10/2017    Mild mitral regurgitation 10/2017    Nonrheumatic aortic valve insufficiency 8/29/2017    Osteoporosis     Pre-op evaluation: prior to left knee repalcement 8/29/2017    PVC's (premature ventricular contractions) 8/29/2017    S/P cardiac catheterization 10/07/2017    with Dr. Emelyn Robles -diagonal 90%, circ 90%,     Torn meniscus 12/2016       PSH:  Past Surgical History:   Procedure Laterality Date    APPENDECTOMY  1946    BACK SURGERY  3/24/14    Lumbar Laminectomy Fusion L3-5, L4-5 PLIF - Dr. Rosemary Millan Left 09/05/2017    Rodriguez Left Total Knee    ROTATOR CUFF REPAIR  3/8/06    right shoulder        FAMILY HISTORY:  Family History   Problem Relation Age of Onset    Arthritis Mother     Heart Disease Sister     High Blood Pressure Sister     Arthritis Sister     COPD Sister     Cancer Sister         Skin Cancer    Cancer Brother     Heart Disease Brother     Diabetes Maternal Grandfather     Heart Disease Brother         CHF    Cancer Brother         Lung Cancer        SOCIAL HISTORY:  Social History     Socioeconomic History    Marital status:      Spouse name: Julianne Boston Number of children: 3    Years of education: None    Highest education level: None   Occupational History    None   Tobacco Use    Smoking status: Former Smoker     Packs/day: 0.50     Years: 5.00     Pack years: 2.50     Quit date: 1965     Years since quittin.9    Smokeless tobacco: Never Used   Vaping Use    Vaping Use: Never used   Substance and Sexual Activity    Alcohol use: Not Currently     Comment: occ. red wine    Drug use: No    Sexual activity: Yes     Partners: Male   Other Topics Concern    None   Social History Narrative    None     Social Determinants of Health     Financial Resource Strain: Low Risk     Difficulty of Paying Living Expenses: Not hard at all   Food Insecurity: No Food Insecurity    Worried About Running Out of Food in the Last Year: Never true    Nora of Food in the Last Year: Never true   Transportation Needs:     Lack of Transportation (Medical):  Lack of Transportation (Non-Medical):    Physical Activity:     Days of Exercise per Week:     Minutes of Exercise per Session:    Stress:     Feeling of Stress :    Social Connections:     Frequency of Communication with Friends and Family:     Frequency of Social Gatherings with Friends and Family:     Attends Faith Services:     Active Member of Clubs or Organizations:     Attends Club or Organization Meetings:     Marital Status:    Intimate Partner Violence:     Fear of Current or Ex-Partner:     Emotionally Abused:     Physically Abused:     Sexually Abused: ALLERGY HISTORY:  Allergies   Allergen Reactions    Amiodarone Other (See Comments)     Prolonged QT; Torsades DP    Ketamine Other (See Comments)     Pt \"made her very anxious/crazy\" when recovering from this drug.         MEDICATIONS:  Current Facility-Administered Medications   Medication Dose Route Frequency Provider Last Rate Last Admin    metoprolol (1.524 m)   Wt 124 lb (56.2 kg)   SpO2 97%   BMI 24.22 kg/m²     Intake/Output Summary (Last 24 hours) at 8/7/2021 1212  Last data filed at 8/7/2021 0400  Gross per 24 hour   Intake 210 ml   Output 950 ml   Net -740 ml     Patient Vitals for the past 96 hrs (Last 3 readings):   Weight   08/07/21 0400 124 lb (56.2 kg)   08/06/21 0244 121 lb 11.2 oz (55.2 kg)   08/05/21 1405 125 lb 4 oz (56.8 kg)     GENERAL: Alert and oriented. No distress. EYES: No pallor or icterus. ENT: No cyanosis. No thyromegaly or cervical LAP. VESSELS: No jugular venous distension or carotid bruits. HEART: Normal S1/S2. No murmur, rub or gallop. LUNGS: Clear to auscultation. ABDOMEN: Soft and non-tender. EXTREMITIES: No lower extremity edema. Feet are warm. NEUROLOGICAL: Grossly normal.     LABORATORY DATA AND DIAGNOSTIC DATA:  I have personally reviewed and interpreted the results of the following diagnostic testing    Lab Results   Component Value Date    WBC 7.2 08/07/2021    HGB 9.6 (L) 08/07/2021    HCT 31.6 (L) 08/07/2021     08/07/2021    CHOL 211 (H) 09/24/2016    TRIG 92 09/24/2016    HDL 62 09/24/2016    ALT 13 08/05/2021    AST 15 08/05/2021     08/07/2021    K 4.2 08/07/2021     08/07/2021    CREATININE 0.8 08/07/2021    BUN 11 08/07/2021    CO2 21 (L) 08/07/2021    TSH 2.540 08/05/2021    INR 1.75 (H) 08/06/2021    LABA1C 5.1 07/02/2021   Echocardiogram 8/5/2021: LVEF 50 to 55%, mild to moderate mitral regurgitation, mild aortic regurgitation, wall thickness 1.1 cm, LA volume index 43.4  ECG 8/6/2021: Atrial fibrillation with rapid rate, 124 bpm.  Coronary angiogram 2016: Nonoperative coronary artery disease. IMPRESSION:  · Symptomatic persistent atrial fibrillation s/p DCCV, currently in sinus rhythm. · Amiodarone intolerance. · Hx of tachycardia cardiomyopathy, resolved EF 50-55%. · Nonoperative coronary artery disease.   · Mild to moderate mitral regurgitation and mild aortic regurgitation. ASSESSMENT AND RECOMMENDATIONS:  The patient has had several emergency room visits/hospitalizations and cardioversions over the past 6 weeks. She has been intolerant to amiodarone. She has history of tachycardia cardiomyopathy. I think she will be benefited by radiofrequency catheter ablation. The risk and the benefits of the procedure were discussed with the patient in details. For the time being we will start her on flecainide 50 mg twice daily. She will continue anticoagulation with apixaban for stroke prevention. We will schedule for catheter ablation as an outpatient. Patient's questions answered and she verbalized understanding of the discussion. Thank you for allowing me to participate in the care of your patient. Please call me if you have any questions. **This report has been created using voice recognition software. It may contain minor errors which are inherent in voice recognition technology. **       Electronically signed by Usha Weaver MD, [facility], Hesham Coats on 8/7/2021 at 12:12 PM

## 2021-08-07 NOTE — DISCHARGE SUMMARY
increased urgency or frequency. She denies abd pain, n/v/d/c.     Pt will be admitted to stepdown with Cardiology and EP consults. Patient had another successful KAMRAN DCCV with Dr. Sylvie Zamora on 8/6. Dr. Elmira Goldberg added Flecainide. [id number] Barbara Mclean for discharge per cardio/EP and follow up on Wednesday, with plan for possible ablation. Exam:     Vitals:  Vitals:    08/07/21 0400 08/07/21 0900 08/07/21 1106 08/07/21 1130   BP: (!) 91/54 101/66 (!) 88/51 (!) 91/55   Pulse: 85 74 74 72   Resp: 18 20 16    Temp: 99.1 °F (37.3 °C) 98.2 °F (36.8 °C) 97.8 °F (36.6 °C)    TempSrc: Oral Oral Oral    SpO2: 93% 94% 97%    Weight: 124 lb (56.2 kg)      Height:         Weight: Weight: 124 lb (56.2 kg)     24 hour intake/output:    Intake/Output Summary (Last 24 hours) at 8/7/2021 1406  Last data filed at 8/7/2021 0400  Gross per 24 hour   Intake 210 ml   Output 950 ml   Net -740 ml         General appearance:  No apparent distress, appears stated age and cooperative. HEENT:  Normal cephalic, atraumatic without obvious deformity. Pupils equal, round, and reactive to light. Extra ocular muscles intact. Conjunctivae/corneas clear. Neck: Supple, with full range of motion. No jugular venous distention. Trachea midline. Respiratory:  Normal respiratory effort. Clear to auscultation, bilaterally without Rales/Wheezes/Rhonchi. Cardiovascular:  Regular rate and rhythm with normal S1/S2 without murmurs, rubs or gallops. Abdomen: Soft, non-tender, non-distended with normal bowel sounds. Musculoskeletal:  No clubbing, cyanosis or edema bilaterally. Full range of motion without deformity. Skin: Skin color, texture, turgor normal.  No rashes or lesions. Neurologic:  Neurovascularly intact without any focal sensory/motor deficits.  Cranial nerves: II-XII intact, grossly non-focal.  Psychiatric:  Alert and oriented, thought content appropriate, normal insight  Capillary Refill: Brisk,< 3 seconds   Peripheral Pulses: +2 palpable, equal bilaterally Labs: For convenience and continuity at follow-up the following most recent labs are provided:      CBC:    Lab Results   Component Value Date    WBC 7.2 08/07/2021    HGB 9.6 08/07/2021    HCT 31.6 08/07/2021     08/07/2021       Renal:    Lab Results   Component Value Date     08/07/2021    K 4.2 08/07/2021    K 4.4 08/05/2021     08/07/2021    CO2 21 08/07/2021    BUN 11 08/07/2021    CREATININE 0.8 08/07/2021    CALCIUM 8.3 08/07/2021    PHOS 3.6 10/11/2017         Significant Diagnostic Studies    Radiology:   XR CHEST PORTABLE   Final Result   1. No acute cardiopulmonary disease. **This report has been created using voice recognition software. It may contain minor errors which are inherent in voice recognition technology. **      Final report electronically signed by Dr. Demetrice Arrington on 8/5/2021 1:01 PM             Consults:     IP CONSULT TO CARDIOLOGY  IP CONSULT TO DIETITIAN  IP CONSULT TO ELECTROPHYSIOLOGY    Disposition:    [x] Home       [] TCU       [] Rehab       [] Psych       [] SNF       [] [place]       [] Other-    Condition at Discharge: Stable    Code Status:  Full Code     Patient Instructions:    Discharge lab work: Activity: activity as tolerated  Diet: ADULT DIET; Regular  Adult Oral Nutrition Supplement; Standard High Calorie/High Protein Oral Supplement      Follow-up visits:   No follow-up provider specified.        Discharge Medications:      Emili Funez [address] Medication Instructions ADD:[id number]    Printed on:08/07/21 5201   Medication Information                      acetaminophen (TYLENOL) 325 MG tablet  Take 650 mg by mouth every morning             acetaminophen (TYLENOL) 325 MG tablet  Take 325 mg by mouth nightly             apixaban (ELIQUIS) 2.5 MG TABS tablet  Take 1 tablet by mouth 2 times daily             aspirin EC 81 MG EC tablet  Take 1 tablet by mouth daily             atorvastatin (LIPITOR) 40 MG tablet  Take 1 tablet by mouth nightly             flecainide (TAMBOCOR) 50 MG tablet  Take 1 tablet by mouth 2 times daily             furosemide (LASIX) 20 MG tablet  Take 1 tablet by mouth three times a week Mon-Wed-Fri             isosorbide mononitrate (IMDUR) 30 MG extended release tablet  Take 1 tablet by mouth daily             lansoprazole (PREVACID) 30 MG delayed release capsule  Take 1 capsule by mouth daily             metoprolol tartrate (LOPRESSOR) 50 MG tablet  Take 1 tablet by mouth 2 times daily             nitroGLYCERIN (NITROSTAT) 0.4 MG SL tablet  Place 1 tablet under the tongue every 5 minutes as needed for Chest pain (SOB)             spironolactone (ALDACTONE) 25 MG tablet  Take 0.5 tablets by mouth daily             vitamin C (ASCORBIC ACID) 500 MG tablet  Take 500 mg by mouth daily             vitamin D 25 MCG (1000 UT) CAPS  Take 1 capsule by mouth once a week                 Time Spent on discharge is more than 45 minutes in the examination, evaluation, counseling and review of medications and discharge plan. Signed: Thank you Jonelle Ly MD for the opportunity to be involved in this patient's care.     Electronically signed by Reed Manuel MD on 8/7/2021 at 2:06 PM

## 2021-08-07 NOTE — PLAN OF CARE
Problem: Nutrition  Goal: Optimal nutrition therapy  Outcome: Ongoing   Nutrition Problem #1: Severe malnutrition, In context of acute illness or injury  Intervention: Food and/or Nutrient Delivery: Continue Current Diet, Start Oral Nutrition Supplement  Nutritional Goals: Patient will consume 75% or greater of meals and ONS during LOS

## 2021-08-07 NOTE — PROGRESS NOTES
Focused Physical Findings, Skin, Weight     Discharge Planning:     Too soon to determine     Electronically signed by Naya Lorenzana RD, LD on 8/7/21 at 12:14 PM EDT    Contact: [phone]

## 2021-08-08 ASSESSMENT — ENCOUNTER SYMPTOMS: SHORTNESS OF BREATH: 1

## 2021-08-08 NOTE — PROGRESS NOTES
[address] [place] [address] [address]  Dept: [phone]  Dept Fax: [phone]  Loc: [phone]  PROGRESS NOTE      VisitDate: 8/5/2021    Heaven Funez is a 80 y.o. female who presents today for:     Chief Complaint   Patient presents with    Shortness of Breath     She does have a cough. It is not productive. No fever. No edema.  Chest Pain     She developed pneumonia while she was in hospital with A-fib over the 4th of July. Pain is on left side of chest and wraps around the chest, pain with breathing. No energy, no appetite. She is sweating at night like she did with the previous pneumonia. Subjective:  Patient presents with complaint of shortness of breath, fatigue, weakness for the past 3 days. Denies any chest pain, syncope, fever or edema. Recent admission for pneumonia/new onset A. fib 7/4/2021. Melia Martínez History of A. fib falls, arthritis, CHF, GERD osteoporosis. Review of Systems   Constitutional: Positive for fatigue. Respiratory: Positive for shortness of breath. Cardiovascular: Negative for chest pain and palpitations. Neurological: Positive for weakness.      Past Medical History:   Diagnosis Date    A-fib [facility])     Accidental fall 09/2002    [address] [facility] ER- fell on face on cement    Allergic rhinitis     Arthritis     [facility]est[facility] failure) ([facility] [address]     Encounter for cardioversion procedure 10/05/2017    GERD (gastroesophageal reflux disease)     Hx of transesophageal echocardiography (KAMRAN) for monitoring 10/2017    Mild mitral regurgitation 10/2017    Nonrheumatic aortic valve insufficiency 8/29/2017    Osteoporosis     Pre-op evaluation: prior to left knee repalcement 8/29/2017    PVC's (premature ventricular contractions) 8/29/2017    S/P cardiac catheterization 10/07/2017    with Dr. Estefany Villegas -diagonal 90%, circ 90%,     Torn meniscus 12/2016      Past Surgical History:   Procedure Laterality Date    APPENDECTOMY  194    BACK SURGERY  3/24/14    Lumbar Laminectomy Fusion L3-5, L4-5 PLIF - Dr. Ludmila Acevedo Left 2017    Rodriguez Left Total Knee    ROTATOR CUFF REPAIR  3/8/06    right shoulder      Family History   Problem Relation Age of Onset    Arthritis Mother     Heart Disease Sister     High Blood Pressure Sister     Arthritis Sister     COPD Sister     Cancer Sister         Skin Cancer    Cancer Brother     Heart Disease Brother     Diabetes Maternal Grandfather     Heart Disease Brother         CHF    Cancer Brother         Lung Cancer     Social History     Tobacco Use    Smoking status: Former Smoker     Packs/day: 0.50     Years: 5.00     Pack years: 2.50     Quit date: 1965     Years since quittin.9    Smokeless tobacco: Never Used   Substance Use Topics    Alcohol use: Not Currently     Comment: occ.  red wine      Current Outpatient Medications   Medication Sig Dispense Refill    metoprolol tartrate (LOPRESSOR) 50 MG tablet Take 1 tablet by mouth 2 times daily 180 tablet 1    lansoprazole (PREVACID) 30 MG delayed release capsule Take 1 capsule by mouth daily 90 capsule 3    atorvastatin (LIPITOR) 40 MG tablet Take 1 tablet by mouth nightly 90 tablet 3    spironolactone (ALDACTONE) 25 MG tablet Take 0.5 tablets by mouth daily 45 tablet 3    isosorbide mononitrate (IMDUR) 30 MG extended release tablet Take 1 tablet by mouth daily 90 tablet 3    furosemide (LASIX) 20 MG tablet Take 1 tablet by mouth three times a week Mon-Wed-Fri 36 tablet 3    nitroGLYCERIN (NITROSTAT) 0.4 MG SL tablet Place 1 tablet under the tongue every 5 minutes as needed for Chest pain (SOB) 25 tablet 3    vitamin C (ASCORBIC ACID) 500 MG tablet Take 500 mg by mouth daily      aspirin EC 81 MG EC tablet Take 1 tablet by mouth daily 30 tablet 0    apixaban (ELIQUIS) 2.5 MG TABS tablet Take 1 tablet by mouth 2 times daily 60 tablet 0    flecainide General: Normal range of motion. Cervical back: Neck supple. Skin:     General: Skin is warm and dry. Neurological:      General: No focal deficit present. Mental Status: She is alert and oriented to person, place, and time. Psychiatric:         Mood and Affect: Mood normal.         Thought Content: Thought content normal.         Judgment: Judgment normal.       BP 92/66   Pulse 88   Temp 98.8 °F (37.1 °C) (Oral)   Resp 24   Ht 5' 0.25\" (1.53 m)   Wt 124 lb (56.2 kg)   BMI 24.02 kg/m²     EKG in office indicates A. fib with heart rate of 140  Impression/Plan:  1. Atrial fibrillation with RVR (HCC)    2. Chest pain, unspecified type    3. SOB (shortness of breath)      Requested Prescriptions      No prescriptions requested or ordered in this encounter     Orders Placed This Encounter   Procedures    EKG 12 Lead     Order Specific Question:   Reason for Exam?     Answer:   Irregular heart rate     Order Specific Question:   Reason for Exam?     Answer:   Chest pain       Patient giveneducational materials - see patient instructions. Discussed use, benefit, and side effects of prescribed medications. All patient questions answered. Pt voiced understanding. Reviewed health maintenance. Patient agreedwith treatment plan. Follow up as directed. Patient to go by private car per spouse to ED for further evaluation A. fib. Verbalized agreement. ED notified.        Electronically signed by ANDREA Guardado CNP on 8/8/2021 at 7:40 PM

## 2021-08-09 ENCOUNTER — CARE COORDINATION (OUTPATIENT)
Dept: CASE MANAGEMENT | Age: 86
End: 2021-08-09

## 2021-08-09 ENCOUNTER — TELEPHONE (OUTPATIENT)
Dept: CARDIOLOGY CLINIC | Age: 86
End: 2021-08-09

## 2021-08-09 DIAGNOSIS — I48.91 RAPID ATRIAL FIBRILLATION (HCC): Primary | ICD-10-CM

## 2021-08-09 NOTE — TELEPHONE ENCOUNTER
asking if Dr. Torey Cabello is ok with patient's Eliquis being changed to 2.5 mg BID instead of 5 mg BID?   Asking if Dr. Torey Cabello is ok with Flecainide that was started in hospital?

## 2021-08-09 NOTE — CARE COORDINATION
Fredy [facility] Transitions Initial Follow Up Call    Call within 2 business days of discharge: Yes    Patient: Sirisha Funez Patient : 1934   MRN: [id number]  Reason for Admission:  Chest pain  Discharge Date: 21 RARS: Readmission Risk Score: 22      Last Discharge [facility]       Complaint Diagnosis Description Type Department Provider    21 Chest Pain Chest pain, unspecified type . .. ED to Hosp-Admission (Discharged) (ADMITTED) CRUZITO Gardner MD; Juan Carlos Robert... Spoke with: Sally Lew and her . She states she is doing well at home. She does sound short of breath on the phone to me, we discussed she states she does not notice it but her family does. She does not have a follow up appt yet, we discussed Dr. Tang Flores office should be reaching out soon according to Epic notes, her  wanted to clarify her medication change. Hospitalist changed her Eliquis from 5mg BID to 2.5 mg BID. Pt reports her numbers are \"good\". /60 heart rate between 60-70. She has been taking Flecanide which is new and feels like it's working based on pulse readings but reports some \"unsteadiness\" with walking. She denies the need to use a cane, and feels this is manageable. I called Dr. Brooks Manning office and spoke with nurse Hollice Duverney, she will put a message out to Dr. Carla Rose regarding medication dose change of Eliquis. Transitions of Care Initial Call    Was this an external facility discharge? No Discharge Facility: [facility]    Challenges to be reviewed by the provider   Additional needs identified to be addressed with provider: Yes  medications-clarification of medication change             Method of communication with provider : phone      Advance Care Planning:   Does patient have an Advance Directive: reviewed and current. Was this a readmission?  Yes  Patient stated reason for admission: Chest Pain  Patients top risk factors for readmission: medical condition-Atrial Fibrillation, medication management and polypharmacy    Care Transition Nurse (CTN) contacted the patient by telephone to perform post hospital discharge assessment. Verified name and  with patient as identifiers. Provided introduction to self, and explanation of the CTN role. CTN reviewed discharge instructions, medical action plan and red flags with patient who verbalized understanding. Patient given an opportunity to ask questions and does not have any further questions or concerns at this time. Were discharge instructions available to patient? Yes. Reviewed appropriate site of care based on symptoms and resources available to patient including: PCP and Specialist. The patient agrees to contact the PCP office for questions related to their healthcare. Medication reconciliation was performed with patient, who verbalizes understanding of administration of home medications. Advised obtaining a 90-day supply of all daily and as-needed medications. Covid Risk Education     Educated patient about risk for severe COVID-19 due to risk factors according to CDC guidelines. CTN reviewed discharge instructions, medical action plan and red flag symptoms with the patient who verbalized understanding. Discussed COVID vaccination status: No. Education provided on COVID-19 vaccination as appropriate. Discussed exposure protocols and quarantine with CDC Guidelines. Patient was given an opportunity to verbalize any questions and concerns and agrees to contact CTN or health care provider for questions related to their healthcare. Reviewed and educated patient on any new and changed medications related to discharge diagnosis. Was patient discharged with a pulse oximeter? No Discussed and confirmed pulse oximeter discharge instructions and when to notify provider or seek emergency care. CTN provided contact information. Plan for follow-up call in 3-5 days based on severity of symptoms and risk factors.   Plan for next call: symptom management-shortness of breath, chest pain, follow up appointment-cardiology, procedure and medication management-Eliquis, flecanide (new)    Non-face-to-face services provided:  Obtained and reviewed discharge summary and/or continuity of care documents  Communication with specialists who will assume or re-assume care of the patient's system-specific problems- medications change    Care Transitions 24 Hour Call    Do you have any ongoing symptoms?: Yes  Patient-reported symptoms: Shortness of Breath (Comment: PT states she tolerates it ok but family notices it)  Interventions for patient-reported symptoms: Notified PCP/Physician (Comment: Call to Dr. Monico Nageotte office regarding Eliquis dose change)  Do you have a copy of your discharge instructions?: Yes  Do you have all of your prescriptions and are they filled?: Yes  Have you been contacted by a [facility] Pharmacist?: No  Have you scheduled your follow up appointment?: No  Were you discharged with any Home Care or Post Acute Services: No  Do you feel like you have everything you need to keep you well at home?: Yes  Care Transitions Interventions         Follow Up  Future Appointments   Date Time Provider Priscilla Finley   10/13/2021  9:30 AM Sarah Marroquin MD SRPX ALVAREZ [facility] [address] [place] [address]   11/9/2021 10:30 AM ANDREA Cespedes - CNP N SRPX CHF [address]   1/24/2022 10:15 AM Stormy Verdugo MD N SUSHANT Heart [facility] - Rufus Montiel RN

## 2021-08-10 NOTE — CARE COORDINATION
Called Shawanda back this a.m. to update it appears Dr. Torrey Jacome is ok with change in medication dose but they have a message out to Dr. Fabian Hoffman regarding this since he was the one who made the changes. [facility] was very appreciative for follow up and understands I will only call back if this information or dosage changes.     Lia Quiros RN

## 2021-08-12 NOTE — TELEPHONE ENCOUNTER
Verbal order from [facility] given on Monday ok to cancel office appt and schedule pt for AF ablation ~Wanda has orders for AF ablation    Pt wanting to decreased dose of eliquis from 5mg to 2.5mg bid- ok'd with Dea (below)~med list says she is already taking 2.5mg bid    How long does pt need to be on her flecainide (started with last hospitalization on 8/7/21) before she can be scheduled for the ablation?

## 2021-08-16 ENCOUNTER — CARE COORDINATION (OUTPATIENT)
Dept: CASE MANAGEMENT | Age: 86
End: 2021-08-16

## 2021-08-16 NOTE — TELEPHONE ENCOUNTER
Procedure:  Afib ablation   Date: 09.13.2021  Arrival Time: 5am  Meds to Hold: Eliquis the evening prior. Flecainide 4 days prior    Incision check with the device clinic nurses- 09.20.2021 at 11:30am   1 mo f/u with Dea on 10.20.2021 at 10am    Instructions verbalized to the patient. Instructions mailed to the patient.

## 2021-08-16 NOTE — CARE COORDINATION
[facility] Transitions Follow Up Call    2021    Patient: Yolande Funez  Patient : 1934   MRN: [id number]  Reason for Admission:   Discharge Date: 21 RARS: Readmission Risk Score: 22         Spoke with: Melquiades Puga, patient    Contacted patient for BPCI-A follow up. Brittanie Lambert stated that she is doing alright. Denies having any chest pain/discomfort, noticeable palpitations. Reports becoming short of breath with exertion. Stated she is fine when sitting down. She stated she has a \"tiny bit\" of lightheadedness/dizziness but \"nothing to be concerned about\". Denies having any falls or syncopal episodes. She stated she will be having an ablation. Waiting on call back from cardiology to schedule appointment. She is eating and drinking fluids w/o difficulties. Briefly discussed when to contact her provider. She verbalized understanding. No questions or concerns for CTN at this time. Will continue to follow. Care Transitions Subsequent and Final Call    Subsequent and Final Calls  Do you have any ongoing symptoms?: Yes  Patient-reported symptoms: Shortness of Breath  Have your medications changed?: No  Do you have any questions related to your medications?: No  Do you currently have any active services?: No  Do you have any needs or concerns that I can assist you with?: No  Care Transitions Interventions  Other Interventions:            Follow Up  Future Appointments   Date Time Provider Priscilla Finley   10/13/2021  9:30 AM MD SHARRI MartinX ALVAREZ [facility] - [address] [facility]   2021 10:30 AM ANDREA Macdonald - CNP N SUSHANT CHF [facility] [place]   2022 10:15 AM Tyrell Reardon MD N SUSHANT Heart P - Lauren Freeman RN

## 2021-08-23 ENCOUNTER — CARE COORDINATION (OUTPATIENT)
Dept: CASE MANAGEMENT | Age: 86
End: 2021-08-23

## 2021-08-23 NOTE — CARE COORDINATION
[facility] Transitions Follow Up Call    2021    Patient: Corey Funez  Patient : 1934   MRN: <[id number]>  Reason for Admission:   Discharge Date: 21 RARS: Readmission Risk Score: 22         Spoke with: Shawanda  Attempted to contact patient for follow up BPCI-A transition call. Patient reports she is pretty good today. She denies cp, fever, chills, palpitations, sob, cough, dizziness or nv. Pulse has been under 100. Appetite and fluid intake is good. Bladder and bowel elimination good. Patient gets constipated at times she takes medication for it. States she is going to have an ablation soon. Will continue to follow. Care Transitions Subsequent and Final Call    Subsequent and Final Calls  Care Transitions Interventions  Other Interventions:            Follow Up  Future Appointments   Date Time Provider Priscilla Finley   2021 11:30 AM SCHEDULE, SRPS PACER NURSE N [facility]PX PACER [facility] AM [address] [address]   10/13/2021  9:30 AM Addie Man MD SR[facility] AM [address] [address]   10/20/2021 10:00 AM Sergio Muñoz MD N [facility]PX Heart [facility] AM [place] [address]   2021 10:30 AM ANDREA Noriega - CNP N [facility]PX CHF [facility] [place]   2022 10:15 AM Tyrell Valencia MD N SRPX Heart [facility] - Frances Dunaway LPN

## 2021-08-30 ENCOUNTER — CARE COORDINATION (OUTPATIENT)
Dept: CASE MANAGEMENT | Age: 86
End: 2021-08-30

## 2021-08-30 ENCOUNTER — OFFICE VISIT (OUTPATIENT)
Dept: CARDIOLOGY CLINIC | Age: 86
End: 2021-08-30
Payer: MEDICARE

## 2021-08-30 VITALS
WEIGHT: 124.2 LBS | HEIGHT: 60 IN | SYSTOLIC BLOOD PRESSURE: 99 MMHG | BODY MASS INDEX: 24.39 KG/M2 | HEART RATE: 111 BPM | DIASTOLIC BLOOD PRESSURE: 69 MMHG

## 2021-08-30 DIAGNOSIS — I48.91 RAPID ATRIAL FIBRILLATION (HCC): Primary | ICD-10-CM

## 2021-08-30 DIAGNOSIS — R06.02 SHORTNESS OF BREATH: ICD-10-CM

## 2021-08-30 DIAGNOSIS — I48.91 ATRIAL FIBRILLATION STATUS POST CARDIOVERSION (HCC): ICD-10-CM

## 2021-08-30 PROCEDURE — 1090F PRES/ABSN URINE INCON ASSESS: CPT | Performed by: INTERNAL MEDICINE

## 2021-08-30 PROCEDURE — G8427 DOCREV CUR MEDS BY ELIG CLIN: HCPCS | Performed by: INTERNAL MEDICINE

## 2021-08-30 PROCEDURE — 93000 ELECTROCARDIOGRAM COMPLETE: CPT | Performed by: INTERNAL MEDICINE

## 2021-08-30 PROCEDURE — 1036F TOBACCO NON-USER: CPT | Performed by: INTERNAL MEDICINE

## 2021-08-30 PROCEDURE — 1111F DSCHRG MED/CURRENT MED MERGE: CPT | Performed by: INTERNAL MEDICINE

## 2021-08-30 PROCEDURE — 99214 OFFICE O/P EST MOD 30 MIN: CPT | Performed by: INTERNAL MEDICINE

## 2021-08-30 PROCEDURE — 4040F PNEUMOC VAC/ADMIN/RCVD: CPT | Performed by: INTERNAL MEDICINE

## 2021-08-30 PROCEDURE — G8420 CALC BMI NORM PARAMETERS: HCPCS | Performed by: INTERNAL MEDICINE

## 2021-08-30 PROCEDURE — 1123F ACP DISCUSS/DSCN MKR DOCD: CPT | Performed by: INTERNAL MEDICINE

## 2021-08-30 RX ORDER — FLECAINIDE ACETATE 100 MG/1
100 TABLET ORAL 2 TIMES DAILY
COMMUNITY
End: 2021-08-31 | Stop reason: SDUPTHER

## 2021-08-30 RX ORDER — FLECAINIDE ACETATE 100 MG/1
100 TABLET ORAL 2 TIMES DAILY
Qty: 60 TABLET | Refills: 5 | Status: CANCELLED | OUTPATIENT
Start: 2021-08-30

## 2021-08-30 NOTE — CARE COORDINATION
[facility] Transitions Follow Up Call    2021    Patient: Birder Fothergill Cox  Patient : 1934   MRN: [id number]  Reason for Admission:   Discharge Date: 21 RARS: Readmission Risk Score: 22         Spoke with: Kaelyn Boles, patient    Contacted patient for BPCI-A follow up. Crista Li stated that she is \"not all that well\". Stated that she is in AFIB,  HR in the 100's. Has been elevated to 120's. She stated today her HR was in the low 100's. Becomes short of breath with minimal exertion. Stated she already called her cardiologist and has an appointment today at 1:45 pm.  Crista Li also reports that she does not have an appetite. May become nauseated when eating at times. She is aware of when to call 911 and/or report to the ER. She does not have any questions for CTN at this time. Will continue to follow. Care Transitions Subsequent and Final Call    Subsequent and Final Calls  Do you have any ongoing symptoms?: Yes  Patient-reported symptoms: Shortness of Breath  Do you currently have any active services?: No  Care Transitions Interventions  Other Interventions:            Follow Up  Future Appointments   Date Time Provider Priscilla Finley   2021  1:45 PM Jodee Morris MD N [facility] Heart [facility]   2021 11:30 AM BOBBI IQBAL PACER NURSE N SRPX PACER [facility]   10/13/2021  9:30 AM Valdo Serrano MD SRPMAIKEL ALVAREZ [facility]   10/20/2021 10:00  [address], MD N SRPX Heart [facility]   2021 10:30 AM Albania Kohler APRN - CNP N SRPX CHF [facility]   2022 10:15 AM [facility] Awilda Rose RN

## 2021-08-30 NOTE — PROGRESS NOTES
[id number] [facility] [place] [address]  [place] [address]  Dept: [phone]  Dept Fax: [phone]  Loc: [phone]    Visit Date: 8/30/2021    Ms. Gisela Travis is a 80 y.o. female  who presented for:  Chief Complaint   Patient presents with    Shortness of Breath       HPI:   81yo F w/ PMHx Afib-RVR s/p KAMRAN DCCV x4 weeks ago, chronic HFpEF w/o decompensation, mitral regurgiation, aortic valvular insufficiency, HLD, and GERD presenting today in-office after checking  in for appointment with SOB. She states she has had SOB every day since D/C after her inpatient admission earlier this month during which she had the KAMRAN DCCV and affirms palpitations but denies dizziness, lightheadedness, CP and also denies peripheral edema at this time. She claims she has been compliant with all medications prescribed to her and will need refills. She states her primary concern is that she feels she cannot wait for 2 weeks when she is scheduled to have an ablation completed with Dr. Richard Young.          Current Outpatient Medications:     flecainide (TAMBOCOR) 100 MG tablet, Take 100 mg by mouth 2 times daily, Disp: , Rfl:     apixaban (ELIQUIS) 2.5 MG TABS tablet, Take 1 tablet by mouth 2 times daily, Disp: 60 tablet, Rfl: 0    acetaminophen (TYLENOL) 325 MG tablet, Take 650 mg by mouth every morning , Disp: , Rfl:     acetaminophen (TYLENOL) 325 MG tablet, Take 325 mg by mouth nightly , Disp: , Rfl:     vitamin D 25 MCG (1000 UT) CAPS, Take 1 capsule by mouth once a week, Disp: , Rfl:     metoprolol tartrate (LOPRESSOR) 50 MG tablet, Take 1 tablet by mouth 2 times daily, Disp: 180 tablet, Rfl: 1    lansoprazole (PREVACID) 30 MG delayed release capsule, Take 1 capsule by mouth daily, Disp: 90 capsule, Rfl: 3    atorvastatin (LIPITOR) 40 MG tablet, Take 1 tablet by mouth nightly, Disp: 90 tablet, Rfl: 3    spironolactone (ALDACTONE) 25 MG tablet, Take 0.5 tablets by mouth daily, Disp: 45 tablet, Rfl: 3    isosorbide mononitrate (IMDUR) 30 MG extended release tablet, Take 1 tablet by mouth daily, Disp: 90 tablet, Rfl: 3    furosemide (LASIX) 20 MG tablet, Take 1 tablet by mouth three times a week Mon-Wed-Fri, Disp: 36 tablet, Rfl: 3    nitroGLYCERIN (NITROSTAT) 0.4 MG SL tablet, Place 1 tablet under the tongue every 5 minutes as needed for Chest pain (SOB), Disp: 25 tablet, Rfl: 3    vitamin C (ASCORBIC ACID) 500 MG tablet, Take 500 mg by mouth daily, Disp: , Rfl:     aspirin EC 81 MG EC tablet, Take 1 tablet by mouth daily, Disp: 30 tablet, Rfl: 0    Past Medical History  Vipin Whitley  has a past medical history of A-fib ([facility] [address] Accidental fall, Allergic rhinitis, Arthritis, CHF (congestive heart failure) ([facility] [address] Encounter for cardioversion procedure, GERD (gastroesophageal reflux disease), Hx of transesophageal echocardiography (KAMRAN) for monitoring, Mild mitral regurgitation, Nonrheumatic aortic valve insufficiency, Osteoporosis, Pre-op evaluation: prior to left knee repalcement, PVC's (premature ventricular contractions), S/P cardiac catheterization, and Torn meniscus. Social History  Vipin Whitley  reports that she quit smoking about 56 years ago. She has a 2.50 pack-year smoking history. She has never used smokeless tobacco. She reports previous alcohol use. She reports that she does not use drugs. Family History  Shawanda family history includes Arthritis in her mother and sister; COPD in her sister; Cancer in her brother, brother, and sister; Diabetes in her maternal grandfather; Heart Disease in her brother, brother, and sister; High Blood Pressure in her sister. There is no family history of bicuspid aortic valve, aneurysms, heart transplant, pacemakers, defibrillators, or sudden cardiac death.       Past Surgical History   Past Surgical History:   Procedure Laterality Date    APPENDECTOMY  1946    BACK SURGERY  3/24/14    Lumbar Laminectomy Fusion L3-5, L4-5 PLIF - Dr. Disla List Left 09/05/2017    Micahel Left Total Knee    ROTATOR CUFF REPAIR  3/8/06    right shoulder        Review of Systems   Constitutional: Negative for chills and fever  HENT: Negative for congestion, sinus pressure, sneezing and sore throat. Eyes: Negative for pain, discharge, redness and itching. Respiratory: Negative for apnea, cough, Positive for dyspnea  Gastrointestinal: Negative for blood in stool, constipation, diarrhea   Endocrine: Negative for cold intolerance, heat intolerance, polydipsia. Genitourinary: Negative for dysuria, enuresis, flank pain and hematuria. Musculoskeletal: Negative for arthralgias, joint swelling and neck pain. Neurological: Negative for numbness and headaches. Psychiatric/Behavioral: Negative for agitation, confusion, decreased concentration and dysphoric mood. Objective:     BP 99/69   Pulse 111   Ht 5' (1.524 m)   Wt 124 lb 3.2 oz (56.3 kg)   BMI 24.26 kg/m²     Wt Readings from Last 3 Encounters:   08/30/21 124 lb 3.2 oz (56.3 kg)   08/07/21 124 lb (56.2 kg)   08/05/21 124 lb (56.2 kg)     BP Readings from Last 3 Encounters:   08/30/21 99/69   08/07/21 (!) 91/55   08/05/21 92/66       Nursing note and vitals reviewed. Physical Exam   Constitutional: Oriented to person, place, and time. Appears well-developed and well-nourished. HENT:   Head: Normocephalic and atraumatic. Eyes: EOM are normal. Pupils are equal, round, and reactive to light. Neck: Normal range of motion. Neck supple. No JVD present. Cardiovascular: Tachycardic, irregular rhythm, normal heart sounds and intact distal pulses. Murmur heard. Pulmonary/Chest: Effort normal and breath sounds normal. No respiratory distress. No wheezes. No rales. Abdominal: Soft. Bowel sounds are normal. No distension. There is no tenderness. Musculoskeletal: Normal range of motion. No edema.    Neurological: Alert and oriented to person, place, and time. No cranial nerve deficit. Coordination normal.   Skin: Skin is warm and dry. Psychiatric: Normal mood and affect.        No results found for: CKTOTAL, CKMB, CKMBINDEX    Lab Results   Component Value Date    WBC 7.2 08/07/2021    RBC 3.39 08/07/2021    RBC 3.58 07/19/2021    HGB 9.6 08/07/2021    HCT 31.6 08/07/2021    MCV 93.2 08/07/2021    MCH 28.3 08/07/2021    MCHC 30.4 08/07/2021    RDW 13.5 07/19/2021     08/07/2021    MPV 10.6 08/07/2021       Lab Results   Component Value Date     08/07/2021    K 4.2 08/07/2021    K 4.4 08/05/2021     08/07/2021    CO2 21 08/07/2021    BUN 11 08/07/2021    LABALBU 3.3 08/05/2021    CREATININE 0.8 08/07/2021    CALCIUM 8.3 08/07/2021    LABGLOM 68 08/07/2021    GLUCOSE 85 08/07/2021    GLUCOSE 112 07/19/2021       Lab Results   Component Value Date    ALKPHOS 83 08/05/2021    ALT 13 08/05/2021    AST 15 08/05/2021    PROT 6.7 08/05/2021    BILITOT 1.0 08/05/2021    BILIDIR <0.2 06/15/2021    LABALBU 3.3 08/05/2021       Lab Results   Component Value Date    MG 1.8 08/07/2021       Lab Results   Component Value Date    INR 1.75 (H) 08/06/2021    INR 1.47 (H) 09/05/2020    INR 1.51 (H) 09/02/2020         Lab Results   Component Value Date    LABA1C 5.1 07/02/2021       Lab Results   Component Value Date    TRIG 92 09/24/2016    HDL 62 09/24/2016    LDLCALC 131 09/24/2016       Lab Results   Component Value Date    TSH 2.540 08/05/2021         Testing Reviewed:      I have individually reviewed the cardiac test below:    ECHO: Results for orders placed during the hospital encounter of 01/13/21    Echo 2D w doppler w color complete    Narrative  Transthoracic Echocardiography Report (TTE)    Demographics    Patient Name     [address] [facility] [address]  Gender                 Female    MR #             [id number]   Race                       Ethnicity    Account #        [de-identified]   Room Number    Accession Number [id number]  Date of Study 01/13/2021    Date of Birth    1934  Referring Physician    Carl Parekh MD  [address] [facility]    Age              80 year(s)  Jaylon Peter, [facility]    Interpreting Physician Mana Smith MD    Procedure    Type of Study    TTE procedure:ECHOCARDIOGRAM COMPLETE 2D W DOPPLER W COLOR. Procedure Date  Date: 01/13/2021 Start: 10:15 AM    Study Location: Echo Lab  Technical Quality: Adequate visualization    Indications:Evaluate left ventricular function and Congestive heart failure. Additional Medical History:Ex Smoker, Ischemic Cardiomyopathy, GERD, Atrial  fibrillation, Congestive heart failure, Coronary artery disease,  Osteoarthritis, Vertigo, PVC's, Shortness of breath, Chest Pain, NSVT. Patient Status: Routine    Height: 60 inches Weight: 130 pounds BSA: 1.55 m^2 BMI: 25.39 kg/m^2    BP: 110/60 mmHg    Allergies  - No Known Allergies.    - No Known Allergies. Conclusions    Summary  Normal left ventricular size and systolic function. There were no regional wall motion abnormalities. Wall thickness was within normal limits. Ejection fraction was estimated at 55-60%. Doppler parameters were consistent with abnormal left ventricular  relaxation (grade 1 diastolic dysfunction). Left atrial size was severely dilated. There was mild aortic regurgitation. The mitral valve structure demonstrated posterior mitral annular  calcification. DOPPLER: The transmitral velocity was within the normal  range with no evidence for mitral stenosis. There was moderate to severe  mitral regurgitation (ERO 0.5 cm2, RV 88 cc). Consider KAMRAN for further  evaluation as the color flow appear to be moderate while the hemodynamic  suggest severe. There was mild tricuspid regurgitation. Assuming RAP = 5 mmHg, the  estimated RVSP = 34 mmHg. Ascending aorta = 3.6 cm.   IVC size is within normal limits with normal respiratory phasic changes. Signature    ----------------------------------------------------------------  Electronically signed by Arturo Macias MD (Interpreting  physician) on 01/13/2021 at 11:20 AM  ----------------------------------------------------------------    Findings    Mitral Valve  The mitral valve structure demonstrated posterior mitral annular  calcification. DOPPLER: The transmitral velocity was within the normal  range with no evidence for mitral stenosis. There was moderate to severe  mitral regurgitation (ERO 0.5 cm2, RV 88 cc). Consider KAMRAN for further  evaluation as the color flow appear to be moderate while the hemodynamic  suggest severe. Aortic Valve  The aortic valve was trileaflet with increased thickness/calcification and  preserved cuspal separation. DOPPLER: Transaortic velocity was within the  normal range with no evidence of aortic stenosis. There was mild aortic  regurgitation. Tricuspid Valve  The tricuspid valve structure was normal with normal leaflet separation. DOPPLER: There was no evidence of tricuspid stenosis. There was mild  tricuspid regurgitation. Assuming RAP = 5 mmHg, the estimated RVSP = 34  mmHg. Pulmonic Valve  The pulmonic valve leaflets exhibited normal thickness, no calcification,  and normal cuspal separation. DOPPLER: The transpulmonic velocity was  within the normal range with trace regurgitation. Left Atrium  Left atrial size was severely dilated. Left Ventricle  Normal left ventricular size and systolic function. There were no regional wall motion abnormalities. Wall thickness was within normal limits. Ejection fraction was estimated at 55-60%. Doppler parameters were consistent with abnormal left ventricular  relaxation (grade 1 diastolic dysfunction). Right Atrium  Right atrial size was normal.    Right Ventricle  The right ventricular size was normal with normal systolic function and  wall thickness.     Pericardial Effusion  The pericardium was normal in appearance with no evidence of a pericardial  effusion. Pleural Effusion  No evidence of pleural effusion. Aorta / Great Vessels  -Ascending aorta = 3.6 cm. -IVC size is within normal limits with normal respiratory phasic changes.     M-Mode/2D Measurements & Calculations    LV Diastolic    LV Systolic Dimension:    AV Cusp Separation: 1.7 cmLA  Dimension: 4.5  3.2 cm                    Dimension: 3.5 cmAO Root  cm              LV Volume Diastolic: 57.1 Dimension: 3 cmLA Area: 17.7  LV FS:28.9 %    ml                        cm^2  LV PW           LV Volume Systolic: 41 ml  Diastolic: 1.1  LV EDV/LV EDV Index: 92.4  cm              ml/60 m^2LV ESV/LV ESV  Septum          Index: 41 ml/26 m^2       RV Diastolic Dimension: 3.5 cm  Diastolic: 1.1  EF Calculated: 55.6 %  cm                                        LA/Aorta: 1.17  Ascending Aorta: 3.6 cm  LA volume/Index: 43.4 ml /28m^2    Doppler Measurements & Calculations    MV Peak E-Wave: 64.7 cm/s         AV Peak Velocity: LVOT Peak Velocity:  MV Peak A-Wave: 85.7 cm/s         147 cm/s          90.4 cm/s  MV E/A Ratio: 0.75                AV Peak Gradient: LVOT Peak Gradient: 3  MV Peak Gradient: 1.67 mmHg       8.64 mmHg         mmHg    MV Deceleration Time: 257 msec                      TV Peak E-Wave: 51.8  cm/s  MV Area (PISA): 0.5 cm^2                            TV Peak A-Wave: 65.6  MV E' Septal Velocity: 5 cm/s     AV P1/2t: 652     cm/s  MV A' Septal Velocity: 8.5 cm/s   msec  MV E' Lateral Velocity: 8.1 cm/s  IVRT: 88 msec     TV Peak Gradient: 1.07  MV A' Lateral Velocity: 11.5 cm/s                   mmHg  E/E' septal: 12.94                                  TR Velocity:263 cm/s  E/E' lateral: 7.99                AV DVI            TR Gradient:27.67 mmHg  MR Velocity: 494 cm/s             (Vmax):0.61       PV Peak Velocity: 56.6  MV LIANA PISA: 0.49 cm^2                              cm/s  MR VTI: 176 cm                                      PV Peak Gradient: 1.28  Alias Velocity: 38.5 cm/sPISA                       mmHg  Radius: 1 cm  MV ERO Volumetric: 0.5 cm^2  PISA area: 6.28 cm^2MR flow rate:                   CO ED Velocity: 130  241.78 ml/sMR volume:86.24 ml                       cm/s    [email or web address]  [id number]       Assessment/Plan   Atrial fibrillation RVR  Atrial fibrillation s/p cardioversion  Shortness of breath  -EKG completed in-office demonstrated A-fib RVR with HR at 111 and x1 PVC  -Rhythm irregularly irregular and tachycardic on physical exam but respiratory effort does not appear increased. -Increase flecainide to 100mg PO BID, no other medication changes at this time  -Working to move ablation with Dr. Paulino Mosley to a sooner date      Disposition:  Follow-up in 6 months      Attending Supervising Vincenzo Musa  I performed a history and physical examination on the patient and discussed the management with the resident physician/med student. I reviewed and agree with the findings and plan as documented in the resident's/med student's note.     Electronically signed by Aicha Pulido MD on 9/3/21 at 11:58 AM EDT

## 2021-09-01 ENCOUNTER — TELEPHONE (OUTPATIENT)
Dept: CARDIOLOGY CLINIC | Age: 86
End: 2021-09-01

## 2021-09-01 ENCOUNTER — CARE COORDINATION (OUTPATIENT)
Dept: CASE MANAGEMENT | Age: 86
End: 2021-09-01

## 2021-09-01 RX ORDER — FLECAINIDE ACETATE 100 MG/1
100 TABLET ORAL 2 TIMES DAILY
Qty: 180 TABLET | Refills: 3 | Status: SHIPPED | OUTPATIENT
Start: 2021-09-01 | End: 2022-03-14

## 2021-09-01 NOTE — CARE COORDINATION
[facility] Transitions Follow Up Call    2021    Patient: Isac Funez  Patient : 1934   MRN: [id number]  Reason for Admission:   Discharge Date: 21 RARS: Readmission Risk Score: 22         Spoke with: Idris Lin, patient and her spouse    Contacted patient for BPCI-A follow up. Spoke briefly with patient's spouse who answered the phone. He stated that she is \"not doing so good\". He informed CTN that he already called the cardiology office to tell them that her HR has been high. He stated her procedure was moved up to Friday, 9/3/21. He then placed [facility] on the phone. [facility] stated her pulse has been fluctuating. Reports BP has been low and pulse high. She stated she is having an ablation on Friday, 9/3/21. She is aware of the changes to her medications. She stated she has been short of breath and will be taking it easy until the ablation. She is aware of when to contact her doctor and when to call 911 and/or report to the ER. She does not have any questions or needs for CTN at this time. Will continue to follow. Care Transitions Subsequent and Final Call    Subsequent and Final Calls  Do you have any ongoing symptoms?: Yes  Patient-reported symptoms: Shortness of Breath  Do you currently have any active services?: No  Do you have any needs or concerns that I can assist you with?: No  Care Transitions Interventions  Other Interventions:            Follow Up  Future Appointments   Date Time Provider Priscilla Finley   9/3/2021  9:00 AM STR CVI ROOM 2E05 STRZ 2E [facility]   9/3/2021 11:00 AM STRZ CL CARDIAC CATH RM 4 STRZ CATH LB [facility]   9/10/2021 11:30 AM SCHEDULE, SRPS PACER NURSE N SRPX PACER [facility]   10/13/2021  9:30 AM MD SUSHANT Dean [facility]   10/20/2021 10:00  [address], MD N SRPX Heart [facility] - [facility]   2021 10:30 AM Lopez Avendano APRN - CNP N SRPX CHF [facility] - [place]   3/14/2022 12:30 PM Tyrell Pascual MD N SRPX [facility] [place] [facility] [place]

## 2021-09-01 NOTE — TELEPHONE ENCOUNTER
Patient's  called. Stating patient's HR has been 90's, 120's, 80's. Patient has taken her morning dose of Flecainide. Advised  patient needs to take medications as prescribed. Patient is scheduled for ablation on 9-13-21. Spoke to Dr. Carr Records staff. They will try to move patient's ablation to a sooner date.

## 2021-09-03 ENCOUNTER — ANESTHESIA EVENT (OUTPATIENT)
Dept: CARDIAC CATH/INVASIVE PROCEDURES | Age: 86
End: 2021-09-03
Payer: MEDICARE

## 2021-09-03 ENCOUNTER — HOSPITAL ENCOUNTER (OUTPATIENT)
Age: 86
Setting detail: OBSERVATION
Discharge: HOME | End: 2021-09-04
Attending: INTERNAL MEDICINE | Admitting: INTERNAL MEDICINE
Payer: MEDICARE

## 2021-09-03 ENCOUNTER — APPOINTMENT (OUTPATIENT)
Dept: CARDIAC CATH/INVASIVE PROCEDURES | Age: 86
End: 2021-09-03
Attending: INTERNAL MEDICINE
Payer: MEDICARE

## 2021-09-03 ENCOUNTER — ANESTHESIA (OUTPATIENT)
Dept: CARDIAC CATH/INVASIVE PROCEDURES | Age: 86
End: 2021-09-03
Payer: MEDICARE

## 2021-09-03 VITALS
RESPIRATION RATE: 24 BRPM | OXYGEN SATURATION: 98 % | DIASTOLIC BLOOD PRESSURE: 54 MMHG | TEMPERATURE: 99.5 F | SYSTOLIC BLOOD PRESSURE: 94 MMHG

## 2021-09-03 PROBLEM — Z98.890 S/P ABLATION OF ATRIAL FIBRILLATION: Status: ACTIVE | Noted: 2021-09-03

## 2021-09-03 PROBLEM — Z86.79 S/P ABLATION OF ATRIAL FIBRILLATION: Status: ACTIVE | Noted: 2021-09-03

## 2021-09-03 LAB
ABO: NORMAL
ACTIVATED CLOTTING TIME: 235 SECONDS (ref 1–150)
ACTIVATED CLOTTING TIME: 246 SECONDS (ref 1–150)
ACTIVATED CLOTTING TIME: 279 SECONDS (ref 1–150)
ACTIVATED CLOTTING TIME: 345 SECONDS (ref 1–150)
ANION GAP SERPL CALCULATED.3IONS-SCNC: 11 MEQ/L (ref 8–16)
ANTIBODY SCREEN: NORMAL
APTT: 29.5 SECONDS (ref 22–38)
BUN BLDV-MCNC: 30 MG/DL (ref 7–22)
CALCIUM SERPL-MCNC: 9 MG/DL (ref 8.5–10.5)
CHLORIDE BLD-SCNC: 105 MEQ/L (ref 98–111)
CO2: 22 MEQ/L (ref 23–33)
CREAT SERPL-MCNC: 1 MG/DL (ref 0.4–1.2)
EKG ATRIAL RATE: 58 BPM
EKG P AXIS: 60 DEGREES
EKG P-R INTERVAL: 168 MS
EKG Q-T INTERVAL: 492 MS
EKG QRS DURATION: 98 MS
EKG QTC CALCULATION (BAZETT): 482 MS
EKG R AXIS: -40 DEGREES
EKG T AXIS: -37 DEGREES
EKG VENTRICULAR RATE: 58 BPM
ERYTHROCYTE [DISTWIDTH] IN BLOOD BY AUTOMATED COUNT: 15.6 % (ref 11.5–14.5)
ERYTHROCYTE [DISTWIDTH] IN BLOOD BY AUTOMATED COUNT: 54.4 FL (ref 35–45)
GFR SERPL CREATININE-BSD FRML MDRD: 52 ML/MIN/1.73M2
GLUCOSE BLD-MCNC: 88 MG/DL (ref 70–108)
HCT VFR BLD CALC: 33.6 % (ref 37–47)
HEMOGLOBIN: 9.9 GM/DL (ref 12–16)
INR BLD: 1.38 (ref 0.85–1.13)
LV EF: 53 %
LVEF MODALITY: NORMAL
MCH RBC QN AUTO: 28.4 PG (ref 26–33)
MCHC RBC AUTO-ENTMCNC: 29.5 GM/DL (ref 32.2–35.5)
MCV RBC AUTO: 96.3 FL (ref 81–99)
PLATELET # BLD: 258 THOU/MM3 (ref 130–400)
PMV BLD AUTO: 10.2 FL (ref 9.4–12.4)
POTASSIUM SERPL-SCNC: 5 MEQ/L (ref 3.5–5.2)
RBC # BLD: 3.49 MILL/MM3 (ref 4.2–5.4)
RH FACTOR: NORMAL
SODIUM BLD-SCNC: 138 MEQ/L (ref 135–145)
WBC # BLD: 7 THOU/MM3 (ref 4.8–10.8)

## 2021-09-03 PROCEDURE — G0378 HOSPITAL OBSERVATION PER HR: HCPCS

## 2021-09-03 PROCEDURE — C1760 CLOSURE DEV, VASC: HCPCS

## 2021-09-03 PROCEDURE — 2580000003 HC RX 258: Performed by: INTERNAL MEDICINE

## 2021-09-03 PROCEDURE — 93613 INTRACARDIAC EPHYS 3D MAPG: CPT | Performed by: INTERNAL MEDICINE

## 2021-09-03 PROCEDURE — C1732 CATH, EP, DIAG/ABL, 3D/VECT: HCPCS

## 2021-09-03 PROCEDURE — C1893 INTRO/SHEATH, FIXED,NON-PEEL: HCPCS

## 2021-09-03 PROCEDURE — 93656 COMPRE EP EVAL ABLTJ ATR FIB: CPT | Performed by: INTERNAL MEDICINE

## 2021-09-03 PROCEDURE — 7100000000 HC PACU RECOVERY - FIRST 15 MIN

## 2021-09-03 PROCEDURE — 2580000003 HC RX 258: Performed by: PHYSICIAN ASSISTANT

## 2021-09-03 PROCEDURE — C1730 CATH, EP, 19 OR FEW ELECT: HCPCS

## 2021-09-03 PROCEDURE — 85347 COAGULATION TIME ACTIVATED: CPT

## 2021-09-03 PROCEDURE — 6360000002 HC RX W HCPCS

## 2021-09-03 PROCEDURE — 80048 BASIC METABOLIC PNL TOTAL CA: CPT

## 2021-09-03 PROCEDURE — 6370000000 HC RX 637 (ALT 250 FOR IP): Performed by: INTERNAL MEDICINE

## 2021-09-03 PROCEDURE — 86901 BLOOD TYPING SEROLOGIC RH(D): CPT

## 2021-09-03 PROCEDURE — 2500000003 HC RX 250 WO HCPCS

## 2021-09-03 PROCEDURE — 99219 PR INITIAL OBSERVATION CARE/DAY 50 MINUTES: CPT | Performed by: INTERNAL MEDICINE

## 2021-09-03 PROCEDURE — 93005 ELECTROCARDIOGRAM TRACING: CPT | Performed by: PHYSICIAN ASSISTANT

## 2021-09-03 PROCEDURE — 85027 COMPLETE CBC AUTOMATED: CPT

## 2021-09-03 PROCEDURE — 86900 BLOOD TYPING SEROLOGIC ABO: CPT

## 2021-09-03 PROCEDURE — C1759 CATH, INTRA ECHOCARDIOGRAPHY: HCPCS

## 2021-09-03 PROCEDURE — G0269 OCCLUSIVE DEVICE IN VEIN ART: HCPCS | Performed by: INTERNAL MEDICINE

## 2021-09-03 PROCEDURE — 93005 ELECTROCARDIOGRAM TRACING: CPT | Performed by: INTERNAL MEDICINE

## 2021-09-03 PROCEDURE — C1894 INTRO/SHEATH, NON-LASER: HCPCS

## 2021-09-03 PROCEDURE — 86850 RBC ANTIBODY SCREEN: CPT

## 2021-09-03 PROCEDURE — 93662 INTRACARDIAC ECG (ICE): CPT | Performed by: INTERNAL MEDICINE

## 2021-09-03 PROCEDURE — 93010 ELECTROCARDIOGRAM REPORT: CPT | Performed by: NUCLEAR MEDICINE

## 2021-09-03 PROCEDURE — C1766 INTRO/SHEATH,STRBLE,NON-PEEL: HCPCS

## 2021-09-03 PROCEDURE — 93306 TTE W/DOPPLER COMPLETE: CPT

## 2021-09-03 PROCEDURE — 7100000001 HC PACU RECOVERY - ADDTL 15 MIN

## 2021-09-03 PROCEDURE — 2720000010 HC SURG SUPPLY STERILE

## 2021-09-03 PROCEDURE — 2709999900 HC NON-CHARGEABLE SUPPLY

## 2021-09-03 PROCEDURE — 3700000001 HC ADD 15 MINUTES (ANESTHESIA)

## 2021-09-03 PROCEDURE — 85730 THROMBOPLASTIN TIME PARTIAL: CPT

## 2021-09-03 PROCEDURE — 6360000002 HC RX W HCPCS: Performed by: NURSE ANESTHETIST, CERTIFIED REGISTERED

## 2021-09-03 PROCEDURE — 36415 COLL VENOUS BLD VENIPUNCTURE: CPT

## 2021-09-03 PROCEDURE — 3700000000 HC ANESTHESIA ATTENDED CARE

## 2021-09-03 PROCEDURE — 2500000003 HC RX 250 WO HCPCS: Performed by: NURSE ANESTHETIST, CERTIFIED REGISTERED

## 2021-09-03 PROCEDURE — 85610 PROTHROMBIN TIME: CPT

## 2021-09-03 RX ORDER — ATORVASTATIN CALCIUM 40 MG/1
40 TABLET, FILM COATED ORAL NIGHTLY
Status: DISCONTINUED | OUTPATIENT
Start: 2021-09-03 | End: 2021-09-04 | Stop reason: HOSPADM

## 2021-09-03 RX ORDER — PHENYLEPHRINE HCL IN 0.9% NACL 1 MG/10 ML
SYRINGE (ML) INTRAVENOUS PRN
Status: DISCONTINUED | OUTPATIENT
Start: 2021-09-03 | End: 2021-09-03 | Stop reason: SDUPTHER

## 2021-09-03 RX ORDER — METOPROLOL TARTRATE 50 MG/1
75 TABLET, FILM COATED ORAL 2 TIMES DAILY
Status: ON HOLD | COMMUNITY
End: 2021-09-04 | Stop reason: HOSPADM

## 2021-09-03 RX ORDER — FLECAINIDE ACETATE 50 MG/1
100 TABLET ORAL 2 TIMES DAILY
Status: DISCONTINUED | OUTPATIENT
Start: 2021-09-03 | End: 2021-09-04 | Stop reason: HOSPADM

## 2021-09-03 RX ORDER — PANTOPRAZOLE SODIUM 20 MG/1
20 TABLET, DELAYED RELEASE ORAL
Status: DISCONTINUED | OUTPATIENT
Start: 2021-09-04 | End: 2021-09-03 | Stop reason: CLARIF

## 2021-09-03 RX ORDER — LIDOCAINE HYDROCHLORIDE 20 MG/ML
INJECTION, SOLUTION INTRAVENOUS PRN
Status: DISCONTINUED | OUTPATIENT
Start: 2021-09-03 | End: 2021-09-03 | Stop reason: SDUPTHER

## 2021-09-03 RX ORDER — ONDANSETRON 2 MG/ML
INJECTION INTRAMUSCULAR; INTRAVENOUS PRN
Status: DISCONTINUED | OUTPATIENT
Start: 2021-09-03 | End: 2021-09-03 | Stop reason: SDUPTHER

## 2021-09-03 RX ORDER — ACETAMINOPHEN 500 MG
500 TABLET ORAL DAILY
COMMUNITY

## 2021-09-03 RX ORDER — SODIUM CHLORIDE 9 MG/ML
25 INJECTION, SOLUTION INTRAVENOUS PRN
Status: DISCONTINUED | OUTPATIENT
Start: 2021-09-03 | End: 2021-09-03 | Stop reason: SDUPTHER

## 2021-09-03 RX ORDER — GLYCOPYRROLATE 1 MG/5 ML
SYRINGE (ML) INTRAVENOUS PRN
Status: DISCONTINUED | OUTPATIENT
Start: 2021-09-03 | End: 2021-09-03 | Stop reason: SDUPTHER

## 2021-09-03 RX ORDER — PROTAMINE SULFATE 10 MG/ML
INJECTION, SOLUTION INTRAVENOUS PRN
Status: DISCONTINUED | OUTPATIENT
Start: 2021-09-03 | End: 2021-09-03 | Stop reason: SDUPTHER

## 2021-09-03 RX ORDER — HEPARIN SODIUM 1000 [USP'U]/ML
INJECTION, SOLUTION INTRAVENOUS; SUBCUTANEOUS PRN
Status: DISCONTINUED | OUTPATIENT
Start: 2021-09-03 | End: 2021-09-03 | Stop reason: SDUPTHER

## 2021-09-03 RX ORDER — SUCCINYLCHOLINE/SOD CL,ISO/PF 200MG/10ML
SYRINGE (ML) INTRAVENOUS PRN
Status: DISCONTINUED | OUTPATIENT
Start: 2021-09-03 | End: 2021-09-03 | Stop reason: SDUPTHER

## 2021-09-03 RX ORDER — METOPROLOL TARTRATE 50 MG/1
50 TABLET, FILM COATED ORAL 2 TIMES DAILY
Status: DISCONTINUED | OUTPATIENT
Start: 2021-09-03 | End: 2021-09-04

## 2021-09-03 RX ORDER — ACETAMINOPHEN 325 MG/1
650 TABLET ORAL EVERY 4 HOURS PRN
Status: DISCONTINUED | OUTPATIENT
Start: 2021-09-03 | End: 2021-09-04 | Stop reason: HOSPADM

## 2021-09-03 RX ORDER — FENTANYL CITRATE 50 UG/ML
INJECTION, SOLUTION INTRAMUSCULAR; INTRAVENOUS PRN
Status: DISCONTINUED | OUTPATIENT
Start: 2021-09-03 | End: 2021-09-03 | Stop reason: SDUPTHER

## 2021-09-03 RX ORDER — SODIUM CHLORIDE 0.9 % (FLUSH) 0.9 %
5-40 SYRINGE (ML) INJECTION PRN
Status: DISCONTINUED | OUTPATIENT
Start: 2021-09-03 | End: 2021-09-04 | Stop reason: HOSPADM

## 2021-09-03 RX ORDER — DEXAMETHASONE SODIUM PHOSPHATE 10 MG/ML
INJECTION, EMULSION INTRAMUSCULAR; INTRAVENOUS PRN
Status: DISCONTINUED | OUTPATIENT
Start: 2021-09-03 | End: 2021-09-03 | Stop reason: SDUPTHER

## 2021-09-03 RX ORDER — ACETAMINOPHEN 325 MG/1
650 TABLET ORAL EVERY MORNING
Status: DISCONTINUED | OUTPATIENT
Start: 2021-09-04 | End: 2021-09-04 | Stop reason: HOSPADM

## 2021-09-03 RX ORDER — EPHEDRINE SULFATE/0.9% NACL/PF 50 MG/5 ML
SYRINGE (ML) INTRAVENOUS PRN
Status: DISCONTINUED | OUTPATIENT
Start: 2021-09-03 | End: 2021-09-03 | Stop reason: SDUPTHER

## 2021-09-03 RX ORDER — FUROSEMIDE 10 MG/ML
INJECTION INTRAMUSCULAR; INTRAVENOUS PRN
Status: DISCONTINUED | OUTPATIENT
Start: 2021-09-03 | End: 2021-09-03 | Stop reason: SDUPTHER

## 2021-09-03 RX ORDER — SODIUM CHLORIDE 0.9 % (FLUSH) 0.9 %
5-40 SYRINGE (ML) INJECTION EVERY 12 HOURS SCHEDULED
Status: DISCONTINUED | OUTPATIENT
Start: 2021-09-03 | End: 2021-09-04 | Stop reason: HOSPADM

## 2021-09-03 RX ORDER — SPIRONOLACTONE 25 MG/1
12.5 TABLET ORAL DAILY
Status: DISCONTINUED | OUTPATIENT
Start: 2021-09-03 | End: 2021-09-04 | Stop reason: HOSPADM

## 2021-09-03 RX ORDER — PANTOPRAZOLE SODIUM 40 MG/1
40 TABLET, DELAYED RELEASE ORAL
Status: DISCONTINUED | OUTPATIENT
Start: 2021-09-04 | End: 2021-09-04 | Stop reason: HOSPADM

## 2021-09-03 RX ORDER — FUROSEMIDE 20 MG/1
20 TABLET ORAL
Status: DISCONTINUED | OUTPATIENT
Start: 2021-09-03 | End: 2021-09-04 | Stop reason: HOSPADM

## 2021-09-03 RX ORDER — SODIUM CHLORIDE 9 MG/ML
25 INJECTION, SOLUTION INTRAVENOUS PRN
Status: DISCONTINUED | OUTPATIENT
Start: 2021-09-03 | End: 2021-09-04 | Stop reason: HOSPADM

## 2021-09-03 RX ORDER — 0.9 % SODIUM CHLORIDE 0.9 %
250 INTRAVENOUS SOLUTION INTRAVENOUS ONCE
Status: COMPLETED | OUTPATIENT
Start: 2021-09-03 | End: 2021-09-03

## 2021-09-03 RX ORDER — ASPIRIN 81 MG/1
81 TABLET ORAL DAILY
Status: DISCONTINUED | OUTPATIENT
Start: 2021-09-03 | End: 2021-09-04 | Stop reason: HOSPADM

## 2021-09-03 RX ORDER — PROPOFOL 10 MG/ML
INJECTION, EMULSION INTRAVENOUS PRN
Status: DISCONTINUED | OUTPATIENT
Start: 2021-09-03 | End: 2021-09-03 | Stop reason: SDUPTHER

## 2021-09-03 RX ADMIN — PROPOFOL 20 MG: 10 INJECTION, EMULSION INTRAVENOUS at 12:53

## 2021-09-03 RX ADMIN — Medication 100 MCG: at 11:34

## 2021-09-03 RX ADMIN — Medication 100 MCG: at 12:54

## 2021-09-03 RX ADMIN — Medication 100 MCG: at 11:32

## 2021-09-03 RX ADMIN — Medication 5 MG: at 13:58

## 2021-09-03 RX ADMIN — APIXABAN 2.5 MG: 2.5 TABLET, FILM COATED ORAL at 23:28

## 2021-09-03 RX ADMIN — Medication 0.2 MG: at 12:38

## 2021-09-03 RX ADMIN — Medication 100 MCG: at 13:06

## 2021-09-03 RX ADMIN — DEXAMETHASONE SODIUM PHOSPHATE 10 MG: 10 INJECTION, EMULSION INTRAMUSCULAR; INTRAVENOUS at 11:20

## 2021-09-03 RX ADMIN — Medication 150 MCG: at 13:58

## 2021-09-03 RX ADMIN — HEPARIN SODIUM 10000 UNITS: 1000 INJECTION INTRAVENOUS; SUBCUTANEOUS at 12:08

## 2021-09-03 RX ADMIN — Medication 10 MG: at 11:32

## 2021-09-03 RX ADMIN — Medication 200 MCG: at 12:32

## 2021-09-03 RX ADMIN — Medication 100 MCG: at 13:45

## 2021-09-03 RX ADMIN — Medication 5 MG: at 12:35

## 2021-09-03 RX ADMIN — FUROSEMIDE 20 MG: 10 INJECTION, SOLUTION INTRAMUSCULAR; INTRAVENOUS at 14:22

## 2021-09-03 RX ADMIN — FENTANYL CITRATE 25 MCG: 50 INJECTION, SOLUTION INTRAMUSCULAR; INTRAVENOUS at 12:54

## 2021-09-03 RX ADMIN — Medication 150 MCG: at 11:58

## 2021-09-03 RX ADMIN — Medication 100 MCG: at 13:53

## 2021-09-03 RX ADMIN — PROTAMINE SULFATE 20 MG: 10 INJECTION, SOLUTION INTRAVENOUS at 14:22

## 2021-09-03 RX ADMIN — Medication 150 MCG: at 11:44

## 2021-09-03 RX ADMIN — FLECAINIDE ACETATE 100 MG: 50 TABLET ORAL at 23:32

## 2021-09-03 RX ADMIN — HEPARIN SODIUM 4000 UNITS: 1000 INJECTION INTRAVENOUS; SUBCUTANEOUS at 12:57

## 2021-09-03 RX ADMIN — Medication 10 MG: at 11:26

## 2021-09-03 RX ADMIN — Medication 0.4 MG: at 11:11

## 2021-09-03 RX ADMIN — Medication 10 MG: at 11:14

## 2021-09-03 RX ADMIN — Medication 100 MCG: at 12:15

## 2021-09-03 RX ADMIN — Medication 10 MG: at 12:13

## 2021-09-03 RX ADMIN — Medication 0.2 MG: at 14:01

## 2021-09-03 RX ADMIN — ACETAMINOPHEN 650 MG: 325 TABLET ORAL at 23:28

## 2021-09-03 RX ADMIN — SODIUM CHLORIDE: 9 INJECTION, SOLUTION INTRAVENOUS at 12:33

## 2021-09-03 RX ADMIN — HEPARIN SODIUM 4000 UNITS: 1000 INJECTION INTRAVENOUS; SUBCUTANEOUS at 13:33

## 2021-09-03 RX ADMIN — Medication 120 MG: at 10:57

## 2021-09-03 RX ADMIN — SODIUM CHLORIDE 250 ML: 9 INJECTION, SOLUTION INTRAVENOUS at 21:20

## 2021-09-03 RX ADMIN — Medication 200 MCG: at 12:49

## 2021-09-03 RX ADMIN — LIDOCAINE HYDROCHLORIDE 60 MG: 20 INJECTION, SOLUTION INTRAVENOUS at 10:54

## 2021-09-03 RX ADMIN — Medication 100 MCG: at 12:10

## 2021-09-03 RX ADMIN — Medication 5 MG: at 13:06

## 2021-09-03 RX ADMIN — Medication 10 MG: at 11:58

## 2021-09-03 RX ADMIN — FENTANYL CITRATE 25 MCG: 50 INJECTION, SOLUTION INTRAMUSCULAR; INTRAVENOUS at 10:54

## 2021-09-03 RX ADMIN — ATORVASTATIN CALCIUM 40 MG: 40 TABLET, FILM COATED ORAL at 23:28

## 2021-09-03 RX ADMIN — SODIUM CHLORIDE: 9 INJECTION, SOLUTION INTRAVENOUS at 10:47

## 2021-09-03 RX ADMIN — PROPOFOL 80 MG: 10 INJECTION, EMULSION INTRAVENOUS at 11:09

## 2021-09-03 RX ADMIN — ONDANSETRON 4 MG: 2 INJECTION INTRAMUSCULAR; INTRAVENOUS at 14:32

## 2021-09-03 RX ADMIN — Medication 5 MG: at 12:49

## 2021-09-03 RX ADMIN — Medication 150 MCG: at 13:14

## 2021-09-03 RX ADMIN — Medication 5 MG: at 13:53

## 2021-09-03 RX ADMIN — Medication 100 MCG: at 12:23

## 2021-09-03 ASSESSMENT — PULMONARY FUNCTION TESTS
PIF_VALUE: 14
PIF_VALUE: 15
PIF_VALUE: 13
PIF_VALUE: 15
PIF_VALUE: 15
PIF_VALUE: 14
PIF_VALUE: 20
PIF_VALUE: 21
PIF_VALUE: 15
PIF_VALUE: 14
PIF_VALUE: 13
PIF_VALUE: 0
PIF_VALUE: 0
PIF_VALUE: 15
PIF_VALUE: 14
PIF_VALUE: 14
PIF_VALUE: 15
PIF_VALUE: 14
PIF_VALUE: 11
PIF_VALUE: 0
PIF_VALUE: 14
PIF_VALUE: 15
PIF_VALUE: 14
PIF_VALUE: 15
PIF_VALUE: 15
PIF_VALUE: 14
PIF_VALUE: 15
PIF_VALUE: 15
PIF_VALUE: 14
PIF_VALUE: 15
PIF_VALUE: 19
PIF_VALUE: 14
PIF_VALUE: 15
PIF_VALUE: 14
PIF_VALUE: 14
PIF_VALUE: 0
PIF_VALUE: 14
PIF_VALUE: 14
PIF_VALUE: 15
PIF_VALUE: 15
PIF_VALUE: 14
PIF_VALUE: 13
PIF_VALUE: 15
PIF_VALUE: 14
PIF_VALUE: 15
PIF_VALUE: 16
PIF_VALUE: 15
PIF_VALUE: 14
PIF_VALUE: 19
PIF_VALUE: 14
PIF_VALUE: 14
PIF_VALUE: 16
PIF_VALUE: 13
PIF_VALUE: 13
PIF_VALUE: 15
PIF_VALUE: 19
PIF_VALUE: 15
PIF_VALUE: 16
PIF_VALUE: 1
PIF_VALUE: 14
PIF_VALUE: 15
PIF_VALUE: 17
PIF_VALUE: 15
PIF_VALUE: 14
PIF_VALUE: 15
PIF_VALUE: 14
PIF_VALUE: 13
PIF_VALUE: 15
PIF_VALUE: 14
PIF_VALUE: 14
PIF_VALUE: 15
PIF_VALUE: 14
PIF_VALUE: 13
PIF_VALUE: 15
PIF_VALUE: 14
PIF_VALUE: 13
PIF_VALUE: 14
PIF_VALUE: 15
PIF_VALUE: 15
PIF_VALUE: 2
PIF_VALUE: 15
PIF_VALUE: 15
PIF_VALUE: 17
PIF_VALUE: 19
PIF_VALUE: 19
PIF_VALUE: 0
PIF_VALUE: 15
PIF_VALUE: 14
PIF_VALUE: 13
PIF_VALUE: 15
PIF_VALUE: 17
PIF_VALUE: 1
PIF_VALUE: 14
PIF_VALUE: 1
PIF_VALUE: 15
PIF_VALUE: 14
PIF_VALUE: 15
PIF_VALUE: 19
PIF_VALUE: 14
PIF_VALUE: 16
PIF_VALUE: 14
PIF_VALUE: 19
PIF_VALUE: 15
PIF_VALUE: 13
PIF_VALUE: 14
PIF_VALUE: 14
PIF_VALUE: 15
PIF_VALUE: 15
PIF_VALUE: 14
PIF_VALUE: 13
PIF_VALUE: 14
PIF_VALUE: 14
PIF_VALUE: 15
PIF_VALUE: 14
PIF_VALUE: 15
PIF_VALUE: 13
PIF_VALUE: 0
PIF_VALUE: 14
PIF_VALUE: 14
PIF_VALUE: 16
PIF_VALUE: 15
PIF_VALUE: 19
PIF_VALUE: 15
PIF_VALUE: 14
PIF_VALUE: 15
PIF_VALUE: 16
PIF_VALUE: 14
PIF_VALUE: 15
PIF_VALUE: 13
PIF_VALUE: 16
PIF_VALUE: 1
PIF_VALUE: 19
PIF_VALUE: 14
PIF_VALUE: 13
PIF_VALUE: 14
PIF_VALUE: 15
PIF_VALUE: 13
PIF_VALUE: 14
PIF_VALUE: 13
PIF_VALUE: 15
PIF_VALUE: 13
PIF_VALUE: 14
PIF_VALUE: 15
PIF_VALUE: 14
PIF_VALUE: 15
PIF_VALUE: 13
PIF_VALUE: 14
PIF_VALUE: 15
PIF_VALUE: 14
PIF_VALUE: 14
PIF_VALUE: 15
PIF_VALUE: 15
PIF_VALUE: 13
PIF_VALUE: 15
PIF_VALUE: 15
PIF_VALUE: 25
PIF_VALUE: 14
PIF_VALUE: 17
PIF_VALUE: 15
PIF_VALUE: 15
PIF_VALUE: 14
PIF_VALUE: 16
PIF_VALUE: 15
PIF_VALUE: 16
PIF_VALUE: 14
PIF_VALUE: 15
PIF_VALUE: 16
PIF_VALUE: 15
PIF_VALUE: 16
PIF_VALUE: 14
PIF_VALUE: 13
PIF_VALUE: 0
PIF_VALUE: 15
PIF_VALUE: 14
PIF_VALUE: 15
PIF_VALUE: 14
PIF_VALUE: 15
PIF_VALUE: 15
PIF_VALUE: 14
PIF_VALUE: 0
PIF_VALUE: 15
PIF_VALUE: 0
PIF_VALUE: 15
PIF_VALUE: 15
PIF_VALUE: 0
PIF_VALUE: 14
PIF_VALUE: 0
PIF_VALUE: 14
PIF_VALUE: 14
PIF_VALUE: 20
PIF_VALUE: 14
PIF_VALUE: 16
PIF_VALUE: 17
PIF_VALUE: 0
PIF_VALUE: 14
PIF_VALUE: 15
PIF_VALUE: 14
PIF_VALUE: 13
PIF_VALUE: 19
PIF_VALUE: 15
PIF_VALUE: 2
PIF_VALUE: 25
PIF_VALUE: 14
PIF_VALUE: 13
PIF_VALUE: 14
PIF_VALUE: 0
PIF_VALUE: 14
PIF_VALUE: 15
PIF_VALUE: 15

## 2021-09-03 ASSESSMENT — PAIN SCALES - GENERAL
PAINLEVEL_OUTOF10: 0
PAINLEVEL_OUTOF10: 10

## 2021-09-03 NOTE — BRIEF OP NOTE
Brief Postoperative Note    Date:   9/3/2021  Patient name: Vincent Amaral  YOB: 1934  Sex: female   MRN:   [id number]    PCP: Selena Shi MD     Procedure:Atrial fibrillation ablation. Pre-Op Diagnosis: Atrial fibrillation. Post-Op Diagnosis: Same    Surgeon: David Page MD, MRCP, [facility]    Assistant: Karen Ferris. Anesthesia/sedation: Local lidocaine/fentanyl and midazolam as needed. Estimated Blood Loss (mL): less than 50     Complications: None    Findings:   Atrial fibrillation. Recommendations:  See orders in Epic. Bed rest for 3 hours. Watch access site/s for bleeding or swelling. Hold pressure of bleeding or swelling. Ambulate after 2 hours after sheath removal.  Remove Wilcox's catheter (if in place) once patient ambulates. Stop IV fluids once patient starts eating. Start home medications as ordered. Follow up in 4 weeks in EP clinic.              Electronically signed by Randi Belcher MD, Janina Freedman on 9/3/2021 at 2:41 PM

## 2021-09-03 NOTE — ANESTHESIA PRE PROCEDURE
Department of Anesthesiology  Preprocedure Note       Name:  Inocencia Mckenzie   Age:  80 y.o.  :  1934                                          MRN:  [id number]         Date:  9/3/2021      Surgeon: * Surgery not found *    Procedure:     Medications prior to admission:   Prior to Admission medications    Medication Sig Start Date End Date Taking?  Authorizing Provider   flecainide (TAMBOCOR) 100 MG tablet Take 1 tablet by mouth 2 times daily 21   Armin Cabrera MD   apixaban (ELIQUIS) 2.5 MG TABS tablet Take 1 tablet by mouth 2 times daily 21   Armin Cabrera MD   acetaminophen (TYLENOL) 325 MG tablet Take 650 mg by mouth every morning     Historical Provider, MD   acetaminophen (TYLENOL) 325 MG tablet Take 325 mg by mouth nightly     Historical Provider, MD   vitamin D 25 MCG (1000 UT) CAPS Take 1 capsule by mouth once a week    Historical Provider, MD   metoprolol tartrate (LOPRESSOR) 50 MG tablet Take 1 tablet by mouth 2 times daily  Patient taking differently: Take 75 mg by mouth 2 times daily 1.5 tabs bid 21   Lisbet Cao PA-C   lansoprazole (PREVACID) 30 MG delayed release capsule Take 1 capsule by mouth daily 21   ANDREA Ramirez CNP   atorvastatin (LIPITOR) 40 MG tablet Take 1 tablet by mouth nightly 21   ANDREA Ramirez CNP   spironolactone (ALDACTONE) 25 MG tablet Take 0.5 tablets by mouth daily 21   ANDREA Ramirez CNP   isosorbide mononitrate (IMDUR) 30 MG extended release tablet Take 1 tablet by mouth daily 21   ANDREA Ramirez CNP   furosemide (LASIX) 20 MG tablet Take 1 tablet by mouth three times a week Mon-Wed-21   ANDREA Ramirez CNP   nitroGLYCERIN (NITROSTAT) 0.4 MG SL tablet Place 1 tablet under the tongue every 5 minutes as needed for Chest pain (SOB) 19   ANDREA Ramirez CNP   vitamin C (ASCORBIC ACID) 500 MG tablet Take 500 mg by mouth daily    Historical Provider, MD   aspirin EC 81 MG EC tablet Take 1 tablet by mouth daily 9/24/16   Paul Gentile MD       Current medications:    Current Facility-Administered Medications   Medication Dose Route Frequency Provider Last Rate Last Admin    0.9 % sodium chloride infusion  25 mL IntraVENous PRN Stoney Babinski, PA-C        sodium chloride flush 0.9 % injection 5-40 mL  5-40 mL IntraVENous 2 times per day Stoney Babinski, PA-C        sodium chloride flush 0.9 % injection 5-40 mL  5-40 mL IntraVENous PRN Stoney Babinski, PA-C           Allergies: Allergies   Allergen Reactions    Amiodarone Other (See Comments)     Prolonged QT; Torsades DP    Ketamine Other (See Comments)     Pt \"made her very anxious/crazy\" when recovering from this drug.        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) I48.91    SOB (shortness of breath) R06.02    Chest pain R07.9    Atrial fibrillation with RVR ([facility]) J36.53    Acute systolic CHF (congestive heart failure) ([facility]) I50.21    CAD, multiple vessel I25.10    Ischemic dilated cardiomyopathy ([facility] [address] I25.5, I42.0    Hypotension I95.9    Atrial fibrillation, currently in sinus rhythm Z86.79    Severe tricuspid regurgitation I07.1    Moderate mitral regurgitation I34.0    NSVT (nonsustained ventricular tachycardia) ([facility]) I47.2    Prolonged QT interval R94.31    Atrial fibrillation with rapid ventricular response ([facility]) I48.91    Hyponatremia E87.1    Hyperglycemia R73.9       Past Medical History:        Diagnosis Date    [facility]     Accidental fall 2002    [facility] ER- fell on face on cement    Allergic rhinitis     Arthritis     CHF (congestive heart failure) ([facility] [address]     Encounter for cardioversion procedure 10/05/2017    GERD (gastroesophageal reflux disease)     Hx of transesophageal echocardiography (KAMRAN) for monitoring 10/2017    Mild mitral regurgitation 10/2017    Nonrheumatic aortic valve insufficiency 2017    Osteoporosis     Pre-op evaluation: prior to left knee repalcement 2017    PVC's (premature ventricular contractions) 2017    S/P cardiac catheterization 10/07/2017    with Dr. Jaz Quezada -diagonal 90%, circ 90%,     Torn meniscus 2016       Past Surgical History:        Procedure Laterality Date    APPENDECTOMY  194    BACK SURGERY  3/24/14    Lumbar Laminectomy Fusion L3-5, L4-5 PLIF - Dr. Marito Aburto Left 2017    [facility]uez Left Total Knee    ROTATOR CUFF REPAIR  3/8/06    right shoulder        Social History:    Social History     Tobacco Use    Smoking status: Former Smoker     Packs/day: 0.50     Years: 5.00     Pack years: 2.50     Quit date: 1965     Years since quittin.0    Smokeless tobacco: Never Used   Substance Use Topics    Alcohol use: Not Currently     Comment: occ. red wine                                Counseling given: Not Answered      Vital Signs (Current): There were no vitals filed for this visit.                                            BP Readings from Last 3 Encounters:   21 99/69   21 (!) 91/55   21 92/66       NPO Status:                                                                                 BMI:   Wt Readings from Last 3 Encounters:   21 124 lb 3.2 oz (56.3 kg)   21 124 lb (56.2 kg)   21 124 lb (56.2 kg)     There is no height or weight on file to calculate BMI.    CBC:   Lab Results   Component Value Date    WBC 7.2 2021    RBC 3.39 2021    RBC 3.58 2021 HGB 9.6 08/07/2021    HCT 31.6 08/07/2021    MCV 93.2 08/07/2021    RDW 13.5 07/19/2021     08/07/2021       CMP:   Lab Results   Component Value Date     08/07/2021    K 4.2 08/07/2021    K 4.4 08/05/2021     08/07/2021    CO2 21 08/07/2021    BUN 11 08/07/2021    CREATININE 0.8 08/07/2021    LABGLOM 68 08/07/2021    GLUCOSE 85 08/07/2021    GLUCOSE 112 07/19/2021    PROT 6.7 08/05/2021    CALCIUM 8.3 08/07/2021    BILITOT 1.0 08/05/2021    ALKPHOS 83 08/05/2021    AST 15 08/05/2021    ALT 13 08/05/2021       POC Tests: No results for input(s): POCGLU, POCNA, POCK, POCCL, POCBUN, POCHEMO, POCHCT in the last 72 hours. Coags:   Lab Results   Component Value Date    INR 1.75 08/06/2021    APTT 72.0 07/02/2021       HCG (If Applicable): No results found for: PREGTESTUR, PREGSERUM, HCG, HCGQUANT     ABGs: No results found for: PHART, PO2ART, YUE8WAT, QLQ4QEM, BEART, F5NCRXPN     Type & Screen (If Applicable):  Lab Results   Component Value Date    LABRH NEG 07/01/2021       Drug/Infectious Status (If Applicable):  No results found for: HIV, HEPCAB    COVID-19 Screening (If Applicable):   Lab Results   Component Value Date    COVID19 NOT DETECTED 07/02/2021    COVID19 Not Detected 02/22/2021           Anesthesia Evaluation   history of anesthetic complications (avoid Ketamine, makes anxious): Airway: Mallampati: II  TM distance: >3 FB   Neck ROM: full  Mouth opening: > = 3 FB Dental:    (+) partials  Comment: Partial on top    Pulmonary:normal exam        (-) COPD and asthma                           Cardiovascular:  Exercise tolerance: good (>4 METS),   (+) CAD:, dysrhythmias: atrial fibrillation, CHF (Recovered EF 50-55%): no interval change,     (-) past MI      Rhythm: regular  Rate: normal                 ROS comment: Echo 8/6/21  Summary   Ejection fraction was estimated at 50-55%. Left atrial size was normal with no thrombus identified.  APPENDAGE: The   left atrial appendage size was normal with no thrombus identified. DOPPLER: The function was abnormal (abnormal emptying velocity). Cath 10/2017  LAD:  The LAD has a proximal vessel that is approximately 4.0 mm in  diameter and quickly narrows to 1.5 mm in diameter. The entire LAD is  diffusely diseased. There is a large diagonal branch that has 90%  stenosis.     LEFT CIRCUMFLEX:  The left circumflex is small and diffusely diseased. The AV groove circumflex continues on to with a 90% stenosis in the  proximal segment. The OM branch is diseased in the mid  Segment. 1.  Continued management of heart failure symptoms. 2.  Optimal medical therapy. 3.  Risk factor management. 4.  Multivessel disease, not amenable to PCI. Continue medical  management. Neuro/Psych:      (-) seizures and CVA           GI/Hepatic/Renal:   (+) GERD (Prevacid): well controlled,      (-) liver disease and no renal disease       Endo/Other:        (-) diabetes mellitus, hypothyroidism, hyperthyroidism               Abdominal:             Vascular:     - DVT and PE. Other Findings:             Anesthesia Plan      general     ASA 3     (GETA. PIV. Additional access can be obtained after induction if needed. Standard ASA monitors. IV/PO opioids and other adjuncts as needed for pain control. PACU post op for recovery. Possible anesthetics complications were discussed with the patient, including but not limited to: PONV, damage to the airway and surrounding structures (teeth, lips, gums, tongue, etc.), adverse reactions to medicine, cardiac complications (MI, CHF, arrhythmias, etc.), respiratory complications (post-op ventilation, respiratory failure, etc.), neurologic complications (nerve damage, stroke, seizure), and death. The patient was given the opportunity to ask questions and all questions were answered to the patient's satisfaction. The patient is in agreement with the anesthetic plan.  )  Induction: intravenous.       Anesthetic plan and risks

## 2021-09-03 NOTE — H&P
Electrophysiology: H&P and pre-procedure sedation      Patient demographics:  Date:   9/3/2021  Patient name: Maritza Funez  YOB: 1934  Sex: female   MRN:   [id number]    Reason for admission:  Atrial fibrillation ablation. Brief clinical summary:  86/F with symptomatic, flecainide refractory and amiodarone intolerant PeAF with multiple DCCV and hospital admissions for symptomatic AF with RVR  presents electively for AF ablation. Uninterrupted anticoagulation with apixaban. Normal LVEF (50-55%). Medical hx: tachycardia cardiomyopathy (resolved), Mild to moderate MR, mild AR and mild CAD. Past Medical History:  Past Medical History:   Diagnosis Date    A-fib [facility])     Accidental fall 09/2002    [facility] ER- fell on face on cement    Allergic rhinitis     Arthritis     CHF [facility]congestive heart failure) ([facility] [address]     Encounter for cardioversion procedure 10/05/2017    GERD (gastroesophageal reflux disease)     Hx of transesophageal echocardiography (KAMRAN) for monitoring 10/2017    Mild mitral regurgitation 10/2017    Nonrheumatic aortic valve insufficiency 8/29/2017    Osteoporosis     Pre-op evaluation: prior to left knee repalcement 8/29/2017    PVC's (premature ventricular contractions) 8/29/2017    S/P cardiac catheterization 10/07/2017    with Dr. Nidhi Garzon -diagonal 90%, circ 90%,     Torn meniscus 12/2016       Past Surgical History:  Past Surgical History:   Procedure Laterality Date    APPENDECTOMY  1946    BACK SURGERY  3/24/14    Lumbar Laminectomy Fusion L3-5, L4-5 PLIF - Dr. Hossein Espinoza Left 09/05/2017    [facility] Total Knee    ROTATOR CUFF REPAIR  3/8/06    right shoulder        Physical examination:  Vital Signs: There were no vitals filed for this visit. GENERAL: Alert and oriented. No distress. EYES: No pallor or icterus. ENT: No central cyanosis. VESSELS: No jugular venous distension or carotid bruits. HEART: Normal S1/S2.  No murmur, rub or gallop. LUNGS: Clear to auscultation. ABDOMEN: Soft and non-tender. EXTREMITIES: No lower extremity edema. Feet are warm. NEUROLOGICAL: Grossly intact. Family history:  Family History   Problem Relation Age of Onset    Arthritis Mother     Heart Disease Sister     High Blood Pressure Sister     Arthritis Sister     COPD Sister     Cancer Sister         Skin Cancer    Cancer Brother     Heart Disease Brother     Diabetes Maternal Grandfather     Heart Disease Brother         CHF    Cancer Brother         Lung Cancer        Laboratory results:  Lab Results   Component Value Date    WBC 7.0 09/03/2021    HGB 9.9 (L) 09/03/2021    HCT 33.6 (L) 09/03/2021     09/03/2021    CHOL 211 (H) 09/24/2016    TRIG 92 09/24/2016    HDL 62 09/24/2016    ALT 13 08/05/2021    AST 15 08/05/2021     08/07/2021    K 4.2 08/07/2021     08/07/2021    CREATININE 0.8 08/07/2021    BUN 11 08/07/2021    CO2 21 (L) 08/07/2021    TSH 2.540 08/05/2021    INR 1.75 (H) 08/06/2021    LABA1C 5.1 07/02/2021            Medications:   PRN Meds: sodium chloride flush  Home Meds:   No current facility-administered medications on file prior to encounter.      Current Outpatient Medications on File Prior to Encounter   Medication Sig Dispense Refill    flecainide (TAMBOCOR) 100 MG tablet Take 1 tablet by mouth 2 times daily 180 tablet 3    apixaban (ELIQUIS) 2.5 MG TABS tablet Take 1 tablet by mouth 2 times daily 180 tablet 3    acetaminophen (TYLENOL) 325 MG tablet Take 650 mg by mouth every morning       acetaminophen (TYLENOL) 325 MG tablet Take 325 mg by mouth nightly       vitamin D 25 MCG (1000 UT) CAPS Take 1 capsule by mouth once a week      metoprolol tartrate (LOPRESSOR) 50 MG tablet Take 1 tablet by mouth 2 times daily (Patient taking differently: Take 75 mg by mouth 2 times daily 1.5 tabs bid) 180 tablet 1    lansoprazole (PREVACID) 30 MG delayed release capsule Take 1 capsule by mouth daily 90 capsule 3    atorvastatin (LIPITOR) 40 MG tablet Take 1 tablet by mouth nightly 90 tablet 3    spironolactone (ALDACTONE) 25 MG tablet Take 0.5 tablets by mouth daily 45 tablet 3    isosorbide mononitrate (IMDUR) 30 MG extended release tablet Take 1 tablet by mouth daily 90 tablet 3    furosemide (LASIX) 20 MG tablet Take 1 tablet by mouth three times a week Mon-Wed-Fri 36 tablet 3    nitroGLYCERIN (NITROSTAT) 0.4 MG SL tablet Place 1 tablet under the tongue every 5 minutes as needed for Chest pain (SOB) 25 tablet 3    vitamin C (ASCORBIC ACID) 500 MG tablet Take 500 mg by mouth daily      aspirin EC 81 MG EC tablet Take 1 tablet by mouth daily 30 tablet 0   . Coumadin Use Last 7 Days:  no  Antiplatelet drug therapy use last 7 days: no  Other anticoagulant use last 7 days: yes - aspixaban. Additional Medication Information:  Per medical records. Impression:  Symptomatic, flecainide refractory and amiodarone intolerant PeAF. Plan:  AF ablation. Consent: The risk and benefits of Afib catheter ablation including bleeding, vascular complications, pericardial tamponade requiring emergency pericardiocentesis or emergency sternotomy and placement of pericardial drain or emergency sternotomy, phrenic nerve injury, pulmonary vein stenosis, atrio-esophageal fistula,  and recurrent atrial tachy-arrhythmia requiring further ablations and/or initiation of AAD therapy were discussed with the patient. She  verbalized understanding of the discussion. Her questions were answered. The patient wishes to proceed.       Electronically signed by Grace Wilson MD, MRCPWandy on 9/3/2021 at 10:52 AM

## 2021-09-03 NOTE — PROGRESS NOTES
Pharmacy Medication History Note      List of current medications patient is taking is complete. Source of information: Patient's , Epic fill history    Changes made to medication list:  Medications removed (include reason, ex. therapy complete or physician discontinued): · Acetaminophen 650mg - Alternate therapy  · Acetaminophen 325mg - Alternate therapy  · Metoprolol tartrate 50mg - Alternate therapy    Medications added/doses adjusted:  · Acetaminophen 500mg - Alternate therapy  · Metoprolol tartrate 50mg - Alternate therapy    Other notes (ex. Recent course of antibiotics, Coumadin dosing):  · Metoprolol was recently increased to 1.5 tablets (75mg) twice daily prior to admission  · Patient was receiving flecainide 50mg twice daily prior to admission. Patient was told to hold flecainide 4 days prior to surgery. Patient has not received new flecainide 100mg twice daily script. · Denies use of other OTC or herbal medications.       Allergies reviewed      Electronically signed by Dixie Pedersen on 9/3/2021 at 3:15 PM

## 2021-09-03 NOTE — FLOWSHEET NOTE
Pt was anointed. 09/03/21 1827   Encounter Summary   Services provided to: Patient   Referral/Consult From: [facility]   Continue Visiting Yes  (9/3)   Complexity of Encounter Moderate   Length of Encounter 15 minutes   Routine   Type Initial   Assessment Approachable;Calm   Intervention Prayer;[place];Nurtured hope   Outcome Acceptance;Expressed gratitude;Encouraged; Hopeful   Sacraments   Sacrament of Sick-Anointing Anointed

## 2021-09-03 NOTE — PROCEDURES
[facility]  Dept: [phone]    CARDIAC ELECTROPHYSIOLOGY: PROCEDURE NOTE  PATIENT DEMOGRAPHICS:  Date:   9/3/2021  Patient name: Lashon Navas  YOB: 1934  Sex: female   MRN:   [id number]    PRIMARY CARE PROVIDER:     Dustin Mahajan MD     CARDIOLOGIST:  Vivian Arzola MD    PROCEDURE PLANNED:  Atrial fibrillation radiofrequency catheter ablation. INDICATION FOR PROCEDURE:  Drug refractory symptomatic persistent atrial fibrillation. BRIEF CLINICAL SUMMARY:  86/F with symptomatic, flecainide refractory and amiodarone intolerant PeAF with multiple DCCV and hospital admissions for symptomatic AF with RVR  presents electively for AF ablation. Uninterrupted anticoagulation with apixaban. Normal LVEF (50-55%). Medical hx: tachycardia cardiomyopathy (resolved), Mild to moderate MR, mild AR and mild CAD. PROCEDURE PERFORMED:  1. Left and right atrial pacing and recording. 2. 3D electro anatomical mapping (CARTO). 3. Intracardiac echocardiography (ICE). 4. Right and left cardiac catheterization with ICE guided transseptal puncture. 5. Left atrial voltage mapping. 6. Left and right pulmonary vein isolation in pairs. DESCRIPTION OF THE PROCEDURE:  The patient was brought to electrophysiology laboratory in a fasting and non-sedated state. She was prepped and draped in the usual sterile fashion. General anesthesia was administered by anesthesia Service. Lidocaine, 2% was injected into femoral regions and right side of the neck for local anesthesia. Vascular access was obtained under ultrasound guidance and hemostatic short sheaths placed over the guidewires (8.5 [place] and 7-Tuvaluan in the right femoral vein, 11 Tu[place]uan in left femoral vein, and 8-Tuvaluan in the right internal jugular vein).   Fast anatomical map of right atrium and coronary sinus was created using 3.5 mm irrigated tip contact-sense catheter (D/F STSF) . A dual site 7-French catheter (Medical TZ) was inserted through the right internal jugular vein and positioned in the coronary sinus with proximal electrodes at high right atrium for pacing and recording. Following this, a 8 French phased array ultrasound catheter was advanced into right atrium via left femoral vein for intracardiac echocardiography. I initially tried to perform transseptal puncture under ICE guidance using 8.5 French SL 1 sheath and 71 cm Brockenbrough needle. However, due to inadequate reach the SL 1 system was exchanged with 8.5 [place] steerable sheath ([employer]) and a single transseptal puncture was performed using 98 cm Brockenbrough needle. A 10,000 units of heparin bolus was administered soon after transseptal puncture. Subsequently, ACT was monitored every 20 minutes. Heparin boluses were given as needed to maintain an ACT between 300-350 seconds throughout the left heart catheterization. The 6-French multipolar irrigated mapping catheter ([employer]) was advanced into left atrium through the Visigo sheath. Left atrial voltage map was created in sinus rhythm (patches of low voltage area along anterior wall). Pulmonary veins (left common) were mapped at baseline, during ablation and 20 minutes after initial isolation. mapping and ablation was performed using sheath exchange technique. I did not use Isuprel or adenosine. Pulmonary vein     Electric isolation of left common and right pulmonary veins was performed using wide area of circumferential radiofrequency ablation around the pulmonary in pairs during sinus rhythm. All veins were isolated during the first pass. The entrance and exit block were confirmed in all veins during coronary sinus and pulmonary vein pacing respectively. The esophageal temperature was monitored throughout the procedure. The ablation was stopped with 0.5 degree Celsius rise in esophageal temperature.  The maximum recorded esophageal temperature during ablation was 38.2 degree Celsius. No tachycardia was induced by rapid atrial fibrillation and programmed atrial stimulation. Post ablation sinus cycle length was 1161 ms, IN interval 203 ms, QRS duration 111 ms and QT interval of 487 ms. AV Wenckebach cycle length was 380 ms and AV xochitl effective refractory period was 360 ms at 600 ms drive train. At the end of the procedure imaging with ICE showed no left atrial clots or pericardial effusion. The catheters and sheaths were removed and hemostasis achieved by vascular closure device (Vascade) and manual compression. The venotomy sites were covered by light dressing. RADIOFREQUENCY SUMMARY:  Total number of RF lesions 71 and total RF time 11.39. Maximum energy used 40 padilla along the posterior wall and the roof (target tag index 300-350) and 50 padilla along the anterior wall (target tag index 400-450). The mean catheter tip temperature during the RF application was 32 degrees C. Mean impedance was 127 ohms. MEDICATIONS:  1. General anesthesia  2. Heparin 18,000 units IV. 3. Protamine 20 mg IV. 4. Lasix 20 mg IV. FLUOROSCOPIC TIME:  Zero. BLOOD LOSS:  Less than 50 cc. Fannie Mckenzie IN/OUT:  2000/0 cc. COMPLICATIONS:  None. SUMMARY:  1. Successful electric isolation of left common and right pulmonary veins using radiofrequency catheter ablation. 2. Normal AV node functions. 3. Normal His-Purkinje function. RECOMMENDATIONS:    1. Observation for 3 hours. 2. Resume antiarrhythmic drugs, metoprolol and anticoagulation. 3. Post operative care per protocol.        Electronically signed by Ashia Davies MD, Todd Vera on 9/3/2021 at 2:56 PM 116.2

## 2021-09-03 NOTE — ANESTHESIA POSTPROCEDURE EVALUATION
Department of Anesthesiology  Postprocedure Note    Patient: Gera Hi  MRN: [id number]  YOB: 1934  Date of evaluation: 9/3/2021  Time:  4:37 PM     Procedure Summary     Date: 09/03/21 Room / Location: [address] [place] [address] [facility]    Anesthesia Start: [phone] Anesthesia Stop: 1451    Procedure: STR ABLATION WITH ANESTHESIA Diagnosis:     Scheduled Providers: Dallas Qureshi DO Responsible Provider: Dallas Qureshi DO    Anesthesia Type: general ASA Status: 3          Anesthesia Type: general    Delvis Phase I: Delvis Score: 9    Delvis Phase II:      Last vitals: Reviewed and per EMR flowsheets.        Anesthesia Post Evaluation    Patient location during evaluation: PACU  Patient participation: complete - patient participated  Level of consciousness: awake and alert  Airway patency: patent  Nausea & Vomiting: no vomiting and no nausea  Complications: no  Cardiovascular status: hemodynamically stable  Respiratory status: acceptable  Hydration status: stable

## 2021-09-03 NOTE — FLOWSHEET NOTE
Pt direct admit into 3B30, spouse accompanied. Here for planned scheduled afib ablation at 1100. IV team notified to start peripheral site, registration notified of admit, cath lab notified of arrival. Dr Del Rosario Flodirk to the room shortly thereafter.

## 2021-09-04 VITALS
DIASTOLIC BLOOD PRESSURE: 70 MMHG | RESPIRATION RATE: 17 BRPM | HEART RATE: 68 BPM | TEMPERATURE: 97.7 F | SYSTOLIC BLOOD PRESSURE: 103 MMHG | WEIGHT: 127 LBS | BODY MASS INDEX: 24.8 KG/M2 | OXYGEN SATURATION: 100 %

## 2021-09-04 LAB
EKG ATRIAL RATE: 71 BPM
EKG P AXIS: 95 DEGREES
EKG P-R INTERVAL: 160 MS
EKG Q-T INTERVAL: 514 MS
EKG QRS DURATION: 98 MS
EKG QTC CALCULATION (BAZETT): 558 MS
EKG R AXIS: -43 DEGREES
EKG T AXIS: -70 DEGREES
EKG VENTRICULAR RATE: 71 BPM

## 2021-09-04 PROCEDURE — 93010 ELECTROCARDIOGRAM REPORT: CPT | Performed by: NUCLEAR MEDICINE

## 2021-09-04 PROCEDURE — 99217 PR OBSERVATION CARE DISCHARGE MANAGEMENT: CPT | Performed by: INTERNAL MEDICINE

## 2021-09-04 PROCEDURE — 99214 OFFICE O/P EST MOD 30 MIN: CPT | Performed by: STUDENT IN AN ORGANIZED HEALTH CARE EDUCATION/TRAINING PROGRAM

## 2021-09-04 PROCEDURE — 2580000003 HC RX 258: Performed by: PHYSICIAN ASSISTANT

## 2021-09-04 PROCEDURE — 6370000000 HC RX 637 (ALT 250 FOR IP): Performed by: INTERNAL MEDICINE

## 2021-09-04 PROCEDURE — G0378 HOSPITAL OBSERVATION PER HR: HCPCS

## 2021-09-04 RX ADMIN — METOPROLOL TARTRATE 25 MG: 25 TABLET, FILM COATED ORAL at 08:58

## 2021-09-04 RX ADMIN — APIXABAN 2.5 MG: 2.5 TABLET, FILM COATED ORAL at 08:54

## 2021-09-04 RX ADMIN — FLECAINIDE ACETATE 100 MG: 50 TABLET ORAL at 08:54

## 2021-09-04 RX ADMIN — SPIRONOLACTONE 12.5 MG: 25 TABLET ORAL at 08:59

## 2021-09-04 RX ADMIN — SODIUM CHLORIDE, PRESERVATIVE FREE 10 ML: 5 INJECTION INTRAVENOUS at 09:03

## 2021-09-04 RX ADMIN — ACETAMINOPHEN 650 MG: 325 TABLET ORAL at 08:59

## 2021-09-04 RX ADMIN — ASPIRIN 81 MG: 81 TABLET, COATED ORAL at 09:00

## 2021-09-04 ASSESSMENT — PAIN SCALES - GENERAL: PAINLEVEL_OUTOF10: 5

## 2021-09-04 NOTE — PROGRESS NOTES
Cardiology Progress Note      Patient: Gertrudis Smith  YOB: 1934  MRN: [id number]   Acct: [de-identified]  Admit Date:  9/3/2021  Primary Cardiologist: Zayda Patel MD, Dr Sabrina Gonzalez, EP     Patient presented for outpatient afib ablation with Dr Tremaine Hall (Events in last 24 hours):   Pt awake, alert. NAD. C/o mild SOB, not new, has had since being dianogsed with afib. No different than before. Denies CP. Ready to go home. D/w patient bout 1 week f/u in clinic for groin check, 1 month with marielle.      1 short run of Svt around 530 this am, lasting about 5-10 seconds   Objective:   BP (!) 91/55   Pulse 70   Temp 98.4 °F (36.9 °C) (Oral)   Resp 18   Wt 127 lb (57.6 kg)   SpO2 99% Comment: dyspneic with exertion, but still % sat  BMI 24.80 kg/m²      BP has been 90s/50s   TELEMETRY: nsr,     Physical Exam:  General Appearance: alert and oriented to person, place and time, in no acute distress  Cardiovascular: normal rate, regular rhythm, normal S1 and S2, no murmurs, rubs, clicks, or gallops, distal pulses intact, no carotid bruits, no JVD  Pulmonary/Chest: clear to auscultation bilaterally- no wheezes, rales or rhonchi, normal air movement, no respiratory distress  Abdomen: soft, non-tender, non-distended, normal bowel sounds, no masses   Extremities: no cyanosis, clubbing or edema, pulse   Skin: warm and dry, no rash or erythema  Head: normocephalic and atraumatic  Eyes: pupils equal, round, and reactive to light  Neck: supple and non-tender without mass, no thyromegaly   Musculoskeletal: normal range of motion, no joint swelling, deformity or tenderness  Neurological: alert, oriented, normal speech, no focal findings or movement disorder noted    Medications:    sodium chloride flush  5-40 mL IntraVENous 2 times per day    acetaminophen  650 mg Oral QAM    apixaban  2.5 mg Oral BID    aspirin EC  81 mg Oral Daily    atorvastatin  40 mg Oral Nightly    flecainide  100 mg Oral BID  furosemide  20 mg Oral Once per day on Mon Wed Fri    metoprolol tartrate  50 mg Oral BID    spironolactone  12.5 mg Oral Daily    pantoprazole  40 mg Oral QAM AC      sodium chloride       sodium chloride flush, 5-40 mL, PRN  sodium chloride, 25 mL, PRN  acetaminophen, 650 mg, Q4H PRN        Diagnostics:  EKG:     Echo:     Stress:     Afib ablation  SUMMARY:  1. Successful electric isolation of left common and right pulmonary veins using radiofrequency catheter ablation. 2. Normal AV node functions. 3. Normal His-Purkinje function. RECOMMENDATIONS:    1. Observation for 3 hours. 2. Resume antiarrhythmic drugs, metoprolol and anticoagulation. 3. Post operative care per protocol.         Electronically signed by Mirna Shepard MD, Young Chung on 9/3/2021 at 2:56 PM     Findings:   Atrial fibrillation.      Recommendations:  See orders in Epic. Bed rest for 3 hours. Watch access site/s for bleeding or swelling. Hold pressure of bleeding or swelling. Ambulate after 2 hours after sheath removal.  Remove Wilcox's catheter (if in place) once patient ambulates. Stop IV fluids once patient starts eating. Start home medications as ordered. Follow up in 4 weeks in EP clinic.                  Electronically signed by Mirna Shepard MD, Young Chung on 9/3/2021 at 2:41 PM      Lab Data:    Cardiac Enzymes:  No results for input(s): CKTOTAL, CKMB, CKMBINDEX, TROPONINI in the last 72 hours.     CBC:   Lab Results   Component Value Date    WBC 7.0 09/03/2021    RBC 3.49 09/03/2021    RBC 3.58 07/19/2021    HGB 9.9 09/03/2021    HCT 33.6 09/03/2021     09/03/2021       CMP:    Lab Results   Component Value Date     09/03/2021    K 5.0 09/03/2021    K 4.4 08/05/2021     09/03/2021    CO2 22 09/03/2021    BUN 30 09/03/2021    CREATININE 1.0 09/03/2021    LABGLOM 52 09/03/2021    GLUCOSE 88 09/03/2021    GLUCOSE 112 07/19/2021    CALCIUM 9.0 09/03/2021       Hepatic Function Panel:    Lab Results   Component Value Date    ALKPHOS 83 08/05/2021    ALT 13 08/05/2021    AST 15 08/05/2021    PROT 6.7 08/05/2021    BILITOT 1.0 08/05/2021    BILIDIR <0.2 06/15/2021    LABALBU 3.3 08/05/2021       Magnesium:    Lab Results   Component Value Date    MG 1.8 08/07/2021       PT/INR:    Lab Results   Component Value Date    INR 1.38 09/03/2021       HgBA1c:    Lab Results   Component Value Date    LABA1C 5.1 07/02/2021       FLP:    Lab Results   Component Value Date    TRIG 92 09/24/2016    HDL 62 09/24/2016    LDLCALC 131 09/24/2016       TSH:    Lab Results   Component Value Date    TSH 2.540 08/05/2021         Assessment:  Persistent afib s/p ablation--on eliquis   Hypotension--reduce BB to 25 mg BID    D/w Dr Heber Carlton. He saw patient. See procedure note dated 09/04. Plan:  Continue current management including flecainide and reduce metop to 25 mg BID. Aldactone 12.5 mg daily. lipitor 40 mg daily. Asa.     F/u in 1 week for groin check. F/u in 4 weeks with Dr Heber Carlton. Patient condition at discharge: stable  Disposition: home.      Electronically signed by Belen Goldmann, PA-C on 9/4/2021 at 8:21 AM

## 2021-09-04 NOTE — PROGRESS NOTES
Patient and  given discharge instructions via teach back method. Patient and  verbalize understanding of medication changes, follow up appointments and care of her groin sites. Patient taken to the discharge lobby in stable condition with belongings.

## 2021-09-07 ENCOUNTER — CARE COORDINATION (OUTPATIENT)
Dept: CASE MANAGEMENT | Age: 86
End: 2021-09-07

## 2021-09-07 NOTE — CARE COORDINATION
Fredy 45 Transitions Follow Up Call    2021    Patient: Nicole Funez  Patient : 1934   MRN: [id number]  Reason for Admission:   Discharge Date: 21 RARS: Readmission Risk Score: 22    Attempted to contact patient for BPCI-A and procedure follow up. Unable to reach patient. Left message with contact information and request for call back.         Follow Up  Future Appointments   Date Time Provider Priscilla Finley   9/10/2021 11:30 AM SCHEDULE, BOBBI PACER NURSE N [facility] PACER [facility] [place]   10/13/2021  9:30 AM Sarah Marroquin MD [facility] [place]   10/20/2021 10:00 AM Kenny Flores MD N [facility] [place]   2021 10:30 AM ANDREA Cespedes - CNP N [facility] CHF [facility] - [place]   3/14/2022 12:30 PM Tyrell Simeon MD N [facility] - Richard Nuno RN

## 2021-09-08 ENCOUNTER — CARE COORDINATION (OUTPATIENT)
Dept: CASE MANAGEMENT | Age: 86
End: 2021-09-08

## 2021-09-08 NOTE — CARE COORDINATION
Fredy 45 Transitions Follow Up Call    2021    Patient: Darrouzett Jr Funez  Patient : 1934   MRN: [id number]  Reason for Admission:   Discharge Date: 21 RARS: Readmission Risk Score: 22    Attempted to contact patient for BPCI-A follow up. Unable to reach patient. Left message with contact information and request for call back.       Follow Up  Future Appointments   Date Time Provider Priscilla Filney   9/10/2021 11:30 AM SCHEDULE, BOBBI PACER NURSE N [facility] [place]   10/13/2021  9:30 AM Zachary Hager MD [facility] [place]   10/20/2021 10:00 AM Shantel Nj MD N [facility] Heart [facility] [place]   2021 10:30 AM ANDREA Alejandre - CNP N [facility] CHF [facility] - [place]   3/14/2022 12:30 PM Tyrell Quigley MD N [facility] Heart [facility] - Mani Sanches RN

## 2021-09-10 ENCOUNTER — NURSE ONLY (OUTPATIENT)
Dept: CARDIOLOGY CLINIC | Age: 86
End: 2021-09-10

## 2021-09-10 PROCEDURE — 99024 POSTOP FOLLOW-UP VISIT: CPT | Performed by: INTERNAL MEDICINE

## 2021-09-10 NOTE — PROGRESS NOTES
Pt here for bilat groin check and rt carotid check after an ablation     Rt carotid s any redness, swelling or drainage and no hard knots palpated    Rt groin has an approx dime sized soft area with palpitation. No redness, drainage or warm to the touch. Purple, green and yellow bruising to bilat groin areas. Lt groin area with no hard knots, edema , redness or drainage. Area soft     Denies any n/t going down either leg or rt arm. Told them to watch for hard knots quarter sized or larger and feeling hard to the touch.  If this happens, told them to apply pressure and call 911

## 2021-09-14 ENCOUNTER — CARE COORDINATION (OUTPATIENT)
Dept: CASE MANAGEMENT | Age: 86
End: 2021-09-14

## 2021-09-14 NOTE — CARE COORDINATION
Fredy 45 Transitions Follow Up Call    2021    Patient: Mani Funez  Patient : 1934   MRN: [id number]  Reason for Admission:   Discharge Date: 21 RARS: Readmission Risk Score: 22    Attempted to contact patient for BPCI-A follow up. Unable to reach patient. Left message with contact information and request for call back.         Follow Up  Future Appointments   Date Time Provider Priscilla Fani   10/13/2021  9:30 AM Yovana Hand MD [facility] [place]   10/20/2021 10:00 AM Sandra Dorman MD N [facility] Heart [facility] [place]   2021 10:30 AM ANDREA Wright - CNP N SR[facility] CHF P - [place]   3/14/2022 12:30 PM Tyrell Oliver MD N SR[facility] Heart [facility] - Nicholas Jose RN

## 2021-09-22 ENCOUNTER — CARE COORDINATION (OUTPATIENT)
Dept: CASE MANAGEMENT | Age: 86
End: 2021-09-22

## 2021-09-22 NOTE — CARE COORDINATION
Fredy 45 Transitions Follow Up Call    2021    Patient: Brisa Funez  Patient : 1934   MRN: [id number]  Reason for Admission:   Discharge Date: 21 RARS: Readmission Risk Score: 22    Attempted to contact patient for BPCI-A follow up. Unable to reach patient. Left message with contact information and request for call back.         Follow Up  Future Appointments   Date Time Provider Priscilla Finley   10/13/2021  9:30 AM Geoff Lopez MD [facility]   10/20/2021 10:00 AM Saud Miller MD N [facility] Heart [facility] - [facility]   2021 10:30 AM ANDREA Musa - CNP N [facility] CHF [facility] - [place]   3/14/2022 12:30 PM [facility] - [place] Juan Fitzpatrick RN

## 2021-10-01 ENCOUNTER — CARE COORDINATION (OUTPATIENT)
Dept: CASE MANAGEMENT | Age: 86
End: 2021-10-01

## 2021-10-01 NOTE — CARE COORDINATION
Fredy 45 Transitions Follow Up Call    10/1/2021    Patient: Elena Funez  Patient : 1934   MRN: [id number]  Reason for Admission:   Discharge Date: 21 RARS: Readmission Risk Score: 22         Spoke with: David Sierra, patient    Contacted patient for final BPCI-A follow up. Spouse answered the phone who states Alejandro Metcalf is doing pretty good. He passed phone to [facility]. Alejandro Metcalf states that she is doing great since ablation. No c/o chest pain/discomfort, palpitations, elevated or rapid HR, increased shortness of breath, dizziness/lightheadedness. Reports she checks her pulse every day and pulse has been \"normal\". Reports incision sites are fine. States she had a nurse check them and was fine. She denies having any redness, swelling, bleeding, or unusual knots noted. Discussed when to contact provider with any new or worsening signs/symptoms. She verbalized understanding. Informed her that this will be the final follow up call. She does not have any questions or concerns at this time. Care Transitions Subsequent and Final Call    Subsequent and Final Calls  Do you have any ongoing symptoms?: No  Do you currently have any active services?: No  Do you have any needs or concerns that I can assist you with?: No  Care Transitions Interventions  Other Interventions:            Follow Up  Future Appointments   Date Time Provider Priscilla Finley   10/13/2021  9:30 AM Tresa Sterling MD [facility] - [place]   10/20/2021 10:00 AM MD CHRISTA Cobian SRPX Heart P - [place]   2021 10:30 AM Brenda Pack APRN - CNP N [facility] CHF [facility] - [place]   3/14/2022 12:30 PM MD CHRISTA Leon SRPX Heart P - Malvin Garcia RN

## 2021-10-13 ENCOUNTER — OFFICE VISIT (OUTPATIENT)
Dept: FAMILY MEDICINE CLINIC | Age: 86
End: 2021-10-13
Payer: MEDICARE

## 2021-10-13 VITALS
HEIGHT: 60 IN | HEART RATE: 68 BPM | RESPIRATION RATE: 16 BRPM | DIASTOLIC BLOOD PRESSURE: 60 MMHG | BODY MASS INDEX: 23.95 KG/M2 | TEMPERATURE: 97.5 F | OXYGEN SATURATION: 98 % | WEIGHT: 122 LBS | SYSTOLIC BLOOD PRESSURE: 96 MMHG

## 2021-10-13 DIAGNOSIS — Z98.890 S/P ABLATION OF ATRIAL FIBRILLATION: ICD-10-CM

## 2021-10-13 DIAGNOSIS — I47.29 NSVT (NONSUSTAINED VENTRICULAR TACHYCARDIA): ICD-10-CM

## 2021-10-13 DIAGNOSIS — I48.19 PERSISTENT ATRIAL FIBRILLATION (HCC): ICD-10-CM

## 2021-10-13 DIAGNOSIS — I42.0 ISCHEMIC DILATED CARDIOMYOPATHY (HCC): ICD-10-CM

## 2021-10-13 DIAGNOSIS — Z86.79 S/P ABLATION OF ATRIAL FIBRILLATION: ICD-10-CM

## 2021-10-13 DIAGNOSIS — I25.5 ISCHEMIC DILATED CARDIOMYOPATHY (HCC): ICD-10-CM

## 2021-10-13 DIAGNOSIS — Z86.79 ATRIAL FIBRILLATION, CURRENTLY IN SINUS RHYTHM: ICD-10-CM

## 2021-10-13 DIAGNOSIS — E78.5 HYPERLIPIDEMIA, UNSPECIFIED HYPERLIPIDEMIA TYPE: Primary | ICD-10-CM

## 2021-10-13 PROCEDURE — 4040F PNEUMOC VAC/ADMIN/RCVD: CPT | Performed by: FAMILY MEDICINE

## 2021-10-13 PROCEDURE — G8420 CALC BMI NORM PARAMETERS: HCPCS | Performed by: FAMILY MEDICINE

## 2021-10-13 PROCEDURE — G8484 FLU IMMUNIZE NO ADMIN: HCPCS | Performed by: FAMILY MEDICINE

## 2021-10-13 PROCEDURE — 99214 OFFICE O/P EST MOD 30 MIN: CPT | Performed by: FAMILY MEDICINE

## 2021-10-13 PROCEDURE — 1090F PRES/ABSN URINE INCON ASSESS: CPT | Performed by: FAMILY MEDICINE

## 2021-10-13 PROCEDURE — 1123F ACP DISCUSS/DSCN MKR DOCD: CPT | Performed by: FAMILY MEDICINE

## 2021-10-13 PROCEDURE — 1036F TOBACCO NON-USER: CPT | Performed by: FAMILY MEDICINE

## 2021-10-13 PROCEDURE — G8427 DOCREV CUR MEDS BY ELIG CLIN: HCPCS | Performed by: FAMILY MEDICINE

## 2021-10-17 PROBLEM — I48.91 RAPID ATRIAL FIBRILLATION (HCC): Status: RESOLVED | Noted: 2017-10-05 | Resolved: 2021-10-17

## 2021-10-17 ASSESSMENT — ENCOUNTER SYMPTOMS
SORE THROAT: 0
CHEST TIGHTNESS: 0
EYES NEGATIVE: 1
RHINORRHEA: 0
NAUSEA: 0
VOMITING: 0
BACK PAIN: 0
SHORTNESS OF BREATH: 0
ABDOMINAL PAIN: 0
COUGH: 0
DIARRHEA: 0

## 2021-10-17 NOTE — PROGRESS NOTES
[facility] [address] [place] [address]  Dept: [phone]  Dept Fax: [phone]  Loc: [phone]  PROGRESS NOTE      VisitDate: 10/13/2021    Keerthi Smyth is a 80 y.o. female who presents today for:     Chief Complaint   Patient presents with    3 Month Follow-Up     A-Fib with RVR, CAD. S/p Ablation on 9/3/21. Mary Fenton Declined AWV     Labs Only     FLP per HM         Subjective:  HPI  Follow-up A. fib hypertension hyperlipidemia. Recently had ablation. Doing well. Review of Systems   Constitutional: Negative for activity change, appetite change, fatigue and fever. HENT: Negative for congestion, rhinorrhea and sore throat. Eyes: Negative. Respiratory: Negative for cough, chest tightness and shortness of breath. Cardiovascular: Negative for chest pain and palpitations. Gastrointestinal: Negative for abdominal pain, diarrhea, nausea and vomiting. Genitourinary: Negative for dysuria and urgency. Musculoskeletal: Negative for arthralgias and back pain. Neurological: Negative for dizziness and headaches. Psychiatric/Behavioral: Negative for dysphoric mood. The patient is not nervous/anxious.       Past Medical History:   Diagnosis Date    A-fib [facility])     Accidental fall 09/2002    [address] [place] [address] ER- fell on face on cement    Allergic rhinitis     Arthritis     CHF [facility]congestive heart failure) ([facility] [address]     Encounter for cardioversion procedure 10/05/2017    GERD (gastroesophageal reflux disease)     Hx of transesophageal echocardiography (KAMRAN) for monitoring 10/2017    Mild mitral regurgitation 10/2017    Nonrheumatic aortic valve insufficiency 8/29/2017    Osteoporosis     Pre-op evaluation: prior to left knee repalcement 8/29/2017    PVC's (premature ventricular contractions) 8/29/2017    S/P cardiac catheterization 10/07/2017    with Dr. Vladislav Moseley -diagonal 90%, circ 90%,     Torn meniscus 12/2016      Past Surgical History: Procedure Laterality Date    APPENDECTOMY  194   [facility] SURGERY  3/24/14    Lumbar Laminectomy Fusion L3-5, L4-5 PLHAMIDA Green Left 2017    Rodriguez Left Total Knee    ROTATOR CUFF REPAIR  3/8/06    right shoulder      Family History   Problem Relation Age of Onset    Arthritis Mother     Heart Disease Sister     High Blood Pressure Sister     Arthritis Sister     COPD Sister     Cancer Sister         Skin Cancer    Cancer Brother     Heart Disease Brother     Diabetes Maternal Grandfather     Heart Disease Brother         CHF    Cancer Brother         Lung Cancer     Social History     Tobacco Use    Smoking status: Former Smoker     Packs/day: 0.50     Years: 5.00     Pack years: 2.50     Quit date: 1965     Years since quittin.1    Smokeless tobacco: Never Used   Substance Use Topics    Alcohol use: Not Currently     Comment: occ.  red wine      Current Outpatient Medications   Medication Sig Dispense Refill    metoprolol tartrate (LOPRESSOR) 25 MG tablet Take 1 tablet by mouth 2 times daily 60 tablet 3    acetaminophen (TYLENOL) 500 MG tablet Take 500 mg by mouth daily      flecainide (TAMBOCOR) 100 MG tablet Take 1 tablet by mouth 2 times daily 180 tablet 3    apixaban (ELIQUIS) 2.5 MG TABS tablet Take 1 tablet by mouth 2 times daily 180 tablet 3    vitamin D 25 MCG (1000 UT) CAPS Take 1 capsule by mouth once a week      lansoprazole (PREVACID) 30 MG delayed release capsule Take 1 capsule by mouth daily 90 capsule 3    atorvastatin (LIPITOR) 40 MG tablet Take 1 tablet by mouth nightly 90 tablet 3    spironolactone (ALDACTONE) 25 MG tablet Take 0.5 tablets by mouth daily 45 tablet 3    isosorbide mononitrate (IMDUR) 30 MG extended release tablet Take 1 tablet by mouth daily 90 tablet 3    furosemide (LASIX) 20 MG tablet Take 1 tablet by mouth three times a week Mon-Wed-Fri 36 tablet 3    nitroGLYCERIN (NITROSTAT) 0.4 MG SL tablet Place 1 tablet under the tongue every 5 minutes as needed for Chest pain (SOB) 25 tablet 3    vitamin C (ASCORBIC ACID) 500 MG tablet Take 500 mg by mouth daily      aspirin EC 81 MG EC tablet Take 1 tablet by mouth daily 30 tablet 0     No current facility-administered medications for this visit. Allergies   Allergen Reactions    Amiodarone Other (See Comments)     Prolonged QT; Torsades DP    Ketamine Other (See Comments)     Pt \"made her very anxious/crazy\" when recovering from this drug. Health Maintenance   Topic Date Due    Shingles Vaccine (1 of 2) Never done    Lipid screen  09/24/2017    Annual Wellness Visit (AWV)  Never done    Potassium monitoring  09/03/2022    Creatinine monitoring  09/03/2022    DTaP/Tdap/Td vaccine (2 - Td or Tdap) 09/02/2030    Flu vaccine  Completed    Pneumococcal 65+ years Vaccine  Completed    COVID-19 Vaccine  Completed    Hepatitis A vaccine  Aged Out    Hepatitis B vaccine  Aged Out    Hib vaccine  Aged Out    Meningococcal (ACWY) vaccine  Aged Out         Objective:     Physical Exam  Constitutional:       General: She is not in acute distress. Appearance: She is well-developed. She is not diaphoretic. HENT:      Head: Normocephalic and atraumatic. Eyes:      General: No scleral icterus. Conjunctiva/sclera: Conjunctivae normal.   Neck:      Thyroid: No thyromegaly. Vascular: No JVD. Comments: No bruits  Cardiovascular:      Rate and Rhythm: Normal rate and regular rhythm. Heart sounds: Normal heart sounds. Pulmonary:      Effort: Pulmonary effort is normal. No respiratory distress. Breath sounds: Normal breath sounds. No wheezing or rales. Abdominal:      Palpations: Abdomen is soft. There is no mass. Tenderness: There is no abdominal tenderness. There is no guarding. Musculoskeletal:         General: No tenderness. Skin:     General: Skin is warm and dry. Findings: No rash.    Neurological: Mental Status: She is alert and oriented to person, place, and time. Cranial Nerves: No cranial nerve deficit. BP 96/60   Pulse 68   Temp 97.5 °F (36.4 °C) (Oral)   Resp 16   Ht 5' (1.524 m)   Wt 122 lb (55.3 kg)   SpO2 98%   BMI 23.83 kg/m²       Impression/Plan:  1. Hyperlipidemia, unspecified hyperlipidemia type    2. S/P ablation of atrial fibrillation    3. Persistent atrial fibrillation ([facility] [address]    4. NSVT (nonsustained ventricular tachycardia) ([facility] [address]    5. Atrial fibrillation, currently in sinus rhythm    6. Ischemic dilated cardiomyopathy ([facility] [address]      Requested Prescriptions      No prescriptions requested or ordered in this encounter     Orders Placed This Encounter   Procedures    Lipid Panel     Standing Status:   Future     Standing Expiration Date:   10/13/2022     Order Specific Question:   Is Patient Fasting?/# of Hours     Answer:   yes/12    Comprehensive Metabolic Panel     Standing Status:   Future     Standing Expiration Date:   10/13/2022       Patient giveneducational materials - see patient instructions. Discussed use, benefit, and side effects of prescribed medications. All patient questions answered. Pt voiced understanding. Reviewed health maintenance. Patient agreedwith treatment plan. Follow up as directed. **This report has been created using voice recognition software. It may contain minor errorswhich are inherent in voice recognition technology. **       Electronically signed by Rain Hoover MD on 10/17/2021 at 11:21 AM

## 2021-10-20 ENCOUNTER — OFFICE VISIT (OUTPATIENT)
Dept: CARDIOLOGY CLINIC | Age: 86
End: 2021-10-20
Payer: MEDICARE

## 2021-10-20 VITALS
BODY MASS INDEX: 23.99 KG/M2 | SYSTOLIC BLOOD PRESSURE: 105 MMHG | DIASTOLIC BLOOD PRESSURE: 62 MMHG | WEIGHT: 122.2 LBS | HEIGHT: 60 IN | HEART RATE: 68 BPM

## 2021-10-20 DIAGNOSIS — Z98.890 STATUS POST ABLATION OF ATRIAL FIBRILLATION: Primary | ICD-10-CM

## 2021-10-20 DIAGNOSIS — Z86.79 STATUS POST ABLATION OF ATRIAL FIBRILLATION: Primary | ICD-10-CM

## 2021-10-20 DIAGNOSIS — I25.10 CAD, MULTIPLE VESSEL: ICD-10-CM

## 2021-10-20 PROCEDURE — 4040F PNEUMOC VAC/ADMIN/RCVD: CPT | Performed by: INTERNAL MEDICINE

## 2021-10-20 PROCEDURE — G8420 CALC BMI NORM PARAMETERS: HCPCS | Performed by: INTERNAL MEDICINE

## 2021-10-20 PROCEDURE — G8427 DOCREV CUR MEDS BY ELIG CLIN: HCPCS | Performed by: INTERNAL MEDICINE

## 2021-10-20 PROCEDURE — G8484 FLU IMMUNIZE NO ADMIN: HCPCS | Performed by: INTERNAL MEDICINE

## 2021-10-20 PROCEDURE — 1036F TOBACCO NON-USER: CPT | Performed by: INTERNAL MEDICINE

## 2021-10-20 PROCEDURE — 99214 OFFICE O/P EST MOD 30 MIN: CPT | Performed by: INTERNAL MEDICINE

## 2021-10-20 PROCEDURE — 93000 ELECTROCARDIOGRAM COMPLETE: CPT | Performed by: INTERNAL MEDICINE

## 2021-10-20 PROCEDURE — 1123F ACP DISCUSS/DSCN MKR DOCD: CPT | Performed by: INTERNAL MEDICINE

## 2021-10-20 PROCEDURE — 1090F PRES/ABSN URINE INCON ASSESS: CPT | Performed by: INTERNAL MEDICINE

## 2021-10-20 NOTE — PROGRESS NOTES
S/p ablation 9-13-21    C/o dizziness in the mornings after taking her medications and fatigue    Denies chest pain, SOB, palpitations, swelling lower legs/ feet      EKG today

## 2021-10-20 NOTE — PATIENT INSTRUCTIONS
You may receive a survey regarding the care you received during your visit. Your input is valuable to us. We encourage you to complete and return your survey. We hope you will choose us in the future for your healthcare needs.       Continue flecainide for two more months and then stop

## 2021-10-20 NOTE — PROGRESS NOTES
[facility] ()  [id number] [place] [id number]  Dept: [phone]    CARDIAC ELECTROPHYSIOLOGY: FOLLOW UP NOTE  PATIENT DEMOGRAPHICS:  Date:   10/20/2021  Patient name: Holley Calles  YOB: 1934  Sex: female   MRN:   [id number]    PRIMARY CARE PHYSICIAN:   Ruddy Zuniga MD    CARDIOLOGIST:  Tevin Rush MD    REASON FOR FOLLOW UP:  Follow-up status post atrial fibrillation ablation. HISTORY OF PRESENT ILLNESS:  86/F underwent radiofrequency catheter ablation for atrial fibrillation on 9/3/2021. She is here for follow-up visit. Doing well. No complaints. Palpitations have resolved. Denies chest pain, shortness of breath, syncope or lower extremity swelling. No pain bleeding or swelling in the groins. Medical hx: PeAF dx 2016, multiple DCCV, failed Sotalol s/p RF catheter ablation, PVI (9/3/2021), amiodarone intolerance, tachycardia cardiomyopathy (resolved-normal EF), Non-obstructive CAD and mild to moderate MR.     REVIEW OF SYSTEMS:    Constitutional: Negative for chills and fever  HENT: Negative for congestion, sinus pressure, sneezing and sore throat. Eyes: Negative for pain, discharge, redness and itching. Respiratory: Negative for apnea, cough  Gastrointestinal: Negative for blood in stool, constipation, diarrhea   Endocrine: Negative for cold intolerance, heat intolerance, polydipsia. Genitourinary: Negative for dysuria, enuresis, flank pain and hematuria. Musculoskeletal: Negative for arthralgias, joint swelling and neck pain. Neurological: Negative for numbness and headaches. Psychiatric/Behavioral: Negative for agitation, confusion, decreased concentration and dysphoric mood.       PAST MEDICAL HISTORY:  Past Medical History:   Diagnosis Date    A-fib [facility])     Accidental fall 09/2002    [facility] ER- fell on face on cement    Allergic rhinitis     Arthritis     [facility]est[facility] failure) [facility] [address]     Encounter for cardioversion procedure 10/05/2017    GERD (gastroesophageal reflux disease)     Hx of transesophageal echocardiography (KAMRAN) for monitoring 10/2017    Mild mitral regurgitation 10/2017    Nonrheumatic aortic valve insufficiency 2017    Osteoporosis     Pre-op evaluation: prior to left knee repalcement 2017    PVC's (premature ventricular contractions) 2017    S/P cardiac catheterization 10/07/2017    with Dr. Edwin Cerrato -diagonal 90%, circ 90%,     Torn meniscus 2016       PSH:  Past Surgical History:   Procedure Laterality Date    APPENDECTOMY  194    BACK SURGERY  3/24/14    Lumbar Laminectomy Fusion L3-5, L4-5 PLIF - Dr. Grajeda Kingdom Left 2017    Rodriguez Left Total Knee    ROTATOR CUFF REPAIR  3/8/06    right shoulder        FAMILY HISTORY:  Family History   Problem Relation Age of Onset    Arthritis Mother     Heart Disease Sister     High Blood Pressure Sister     Arthritis Sister     COPD Sister     Cancer Sister         Skin Cancer    Cancer Brother     Heart Disease Brother     Diabetes Maternal Grandfather     Heart Disease Brother         CHF    Cancer Brother         Lung Cancer        SOCIAL HISTORY:  Social History     Socioeconomic History    Marital status:      Spouse name: Deepika Pereira Number of children: 3    Years of education: Not on file    Highest education level: Not on file   Occupational History    Not on file   Tobacco Use    Smoking status: Former Smoker     Packs/day: 0.50     Years: 5.00     Pack years: 2.50     Quit date: 1965     Years since quittin.1    Smokeless tobacco: Never Used   Vaping Use    Vaping Use: Never used   Substance and Sexual Activity    Alcohol use: Not Currently     Comment: occ.  red wine    Drug use: No    Sexual activity: Yes     Partners: Male   Other Topics Concern    Not on file   Social History Narrative    Not on file     Social Determinants of Health 2016: Nonoperative coronary artery disease.     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 [id number] score 3 (age and gender). · Hx of tachycardia cardiomyopathy, resolved EF 50-55%. · Nonoperative coronary artery disease. · Mild to moderate mitral regurgitation and mild aortic regurgitation. ASSESSMENT AND PLAN:  Doing well. In sinus rhythm. Asymptomatic. No documented recurrence of atrial fibrillation. Continue flecainide for 2 more months. Apixaban lifelong. Follow-up with Dr. Hari Flores. **This report has been created using voice recognition software. It may contain minor errors which are inherent in voice recognition technology. **      Juan Roa MD, [facility], [facility], [facility] on 10/20/2021 at 10:16 AM

## 2021-10-22 LAB
ALBUMIN SERPL-MCNC: 4 G/DL (ref 3.2–5.3)
ALK PHOSPHATASE: 81 U/L (ref 39–130)
ALT SERPL-CCNC: 7 U/L (ref 0–31)
ANION GAP SERPL CALCULATED.3IONS-SCNC: 8 MMOL/L (ref 5–15)
AST SERPL-CCNC: 21 U/L (ref 0–41)
BILIRUB SERPL-MCNC: 0.6 MG/DL (ref 0.3–1.2)
BUN BLDV-MCNC: 26 MG/DL (ref 5–27)
CALCIUM SERPL-MCNC: 9.5 MG/DL (ref 8.5–10.5)
CHLORIDE BLD-SCNC: 106 MMOL/L (ref 98–109)
CHOLESTEROL/HDL RATIO: 2.5 (ref 1–5)
CHOLESTEROL: 147 MG/DL (ref 150–200)
CO2: 25 MMOL/L (ref 22–32)
CREAT SERPL-MCNC: 0.98 MG/DL (ref 0.4–1)
EGFR AFRICAN AMERICAN: >60 ML/MIN/1.73SQ.M
EGFR IF NONAFRICAN AMERICAN: 54 ML/MIN/1.73SQ.M
GLUCOSE: 95 MG/DL (ref 65–99)
HDLC SERPL-MCNC: 59 MG/DL
LDL CHOLESTEROL CALCULATED: 70 MG/DL
LDL/HDL RATIO: 1.2
POTASSIUM SERPL-SCNC: 5.4 MMOL/L (ref 3.5–5)
SODIUM BLD-SCNC: 139 MMOL/L (ref 134–146)
TOTAL PROTEIN: 7.3 G/DL (ref 6–8)
TRIGL SERPL-MCNC: 91 MG/DL (ref 27–150)
VLDLC SERPL CALC-MCNC: 18 MG/DL (ref 0–30)

## 2021-11-01 ENCOUNTER — OFFICE VISIT (OUTPATIENT)
Dept: CARDIOLOGY CLINIC | Age: 86
End: 2021-11-01
Payer: MEDICARE

## 2021-11-01 VITALS
WEIGHT: 122.4 LBS | BODY MASS INDEX: 24.03 KG/M2 | DIASTOLIC BLOOD PRESSURE: 64 MMHG | OXYGEN SATURATION: 98 % | SYSTOLIC BLOOD PRESSURE: 108 MMHG | HEIGHT: 60 IN | HEART RATE: 50 BPM

## 2021-11-01 DIAGNOSIS — I25.10 CORONARY ARTERY DISEASE INVOLVING NATIVE CORONARY ARTERY OF NATIVE HEART WITHOUT ANGINA PECTORIS: ICD-10-CM

## 2021-11-01 DIAGNOSIS — Z98.890 STATUS POST ABLATION OF ATRIAL FIBRILLATION: ICD-10-CM

## 2021-11-01 DIAGNOSIS — Z86.79 STATUS POST ABLATION OF ATRIAL FIBRILLATION: ICD-10-CM

## 2021-11-01 DIAGNOSIS — I50.22 CHRONIC SYSTOLIC HEART FAILURE (HCC): Primary | ICD-10-CM

## 2021-11-01 DIAGNOSIS — I10 ESSENTIAL HYPERTENSION: ICD-10-CM

## 2021-11-01 PROCEDURE — G8484 FLU IMMUNIZE NO ADMIN: HCPCS | Performed by: NURSE PRACTITIONER

## 2021-11-01 PROCEDURE — 1123F ACP DISCUSS/DSCN MKR DOCD: CPT | Performed by: NURSE PRACTITIONER

## 2021-11-01 PROCEDURE — 1036F TOBACCO NON-USER: CPT | Performed by: NURSE PRACTITIONER

## 2021-11-01 PROCEDURE — G8427 DOCREV CUR MEDS BY ELIG CLIN: HCPCS | Performed by: NURSE PRACTITIONER

## 2021-11-01 PROCEDURE — 99213 OFFICE O/P EST LOW 20 MIN: CPT | Performed by: NURSE PRACTITIONER

## 2021-11-01 PROCEDURE — 1090F PRES/ABSN URINE INCON ASSESS: CPT | Performed by: NURSE PRACTITIONER

## 2021-11-01 PROCEDURE — G8420 CALC BMI NORM PARAMETERS: HCPCS | Performed by: NURSE PRACTITIONER

## 2021-11-01 PROCEDURE — 4040F PNEUMOC VAC/ADMIN/RCVD: CPT | Performed by: NURSE PRACTITIONER

## 2021-11-01 ASSESSMENT — ENCOUNTER SYMPTOMS
COLOR CHANGE: 0
SHORTNESS OF BREATH: 0
CONSTIPATION: 1
WHEEZING: 0
CHEST TIGHTNESS: 0
BLOOD IN STOOL: 0
APNEA: 0
COUGH: 0
ANAL BLEEDING: 0
ABDOMINAL DISTENTION: 0
ABDOMINAL PAIN: 0
NAUSEA: 0

## 2021-11-01 NOTE — PROGRESS NOTES
[place]       Visit Date: 11/1/2021  Cardiologist:  Dr. Sherron Mcintosh  Primary Care Physician: Dr. Yanira Barajas MD    Willy Jerome is a 80 y.o. female who presents today for:  Follow-up visit to CHF clinic. HPI: Obtained from patient and chart    Willy Jerome is a 80 y.o. female who was last seen in CHF clinic on 4/19/21. Hx knee surgery in Sept 2017, developed A fib RVR a few weeks later post-op. She was Cardioverted 10/9/17.  Also had Torsades while in the hospital felt to be due to Amiodarone which was stopped. Previous ECHO EF 60% (9/23/16). KAMRAN 10/9/17 EF 25-30%. Wore a LifeVest. EF improved 40-45% 1/11/18, NIC, Mar 2021 EF 50-55% mild/moderate MR. She was being evaluated for W[place]ckin[place]. She decided to pursue AFib ablation with Dr. Marc Prado. This was performed on 9/3/21. Office visit for follow-up Ablation with Dr. Marc Prado 10/20: remained in [place] J[facility] Flecainide planned x 2 more months. Eliquis 2.5 BID lifelong. AFib ablation 9/3; Flecainide continued; Metoprolol decreased to 25 BID post procedure. No complications with procedure. Office visit for follow-up with Dr. Sherron Mcintosh 8/21. She was s/p KAMRAN/CV 4 weeks prior for recurrent AFib with RVR. Was again in  AFib s/p CV, some SOB. Wanting to proceed with Afib ablation ASAP. Fleccainide dose increased to control RVR until ablation pending - working to move date of procedure up. Office visit today:   Accompanied by:   Last hospital admission related to Heart Failure: Oct 2017  Chest Pain: no  Worsening SOB: no - feels better than has in \"a long time\".   Worsening Orthopnea/PND: no  Edema: no  Any extra diuretic use: no  Weight gain: no  Fatigue: minimal - able to perform ADLs  Abdominal bloating: no  Appetite: good - doesn't eat as much as she used to  CORDELL: no  Cough: no  Compliant checking home weight: yes   Compliant checking blood pressure: yes      Past Medical History:   Diagnosis Date    [facility]fib ([facility]     Accidental fall 2002    [facility]- fell on face on cement    Allergic rhinitis     Arthritis     CHF (congestive heart failure) ([facility]     Encounter for cardioversion procedure 10/05/2017    GERD (gastroesophageal reflux disease)     Hx of transesophageal echocardiography (KAMRAN) for monitoring 10/2017    Mild mitral regurgitation 10/2017    Nonrheumatic aortic valve insufficiency 2017    Osteoporosis     Pre-op evaluation: prior to left knee repalcement 2017    PVC's (premature ventricular contractions) 2017    S/P cardiac catheterization 10/07/2017    with Dr. Lion Shallow -diagonal 90%, circ 90%,     Torn meniscus 2016     Past Surgical History:   Procedure Laterality Date    APPENDECTOMY      BACK SURGERY  3/24/14    Lumbar Laminectomy Fusion L3-5, L4-5 PLIF - Dr. Galarza Jayson Left 2017    Rodriguez Left Total Knee    ROTATOR CUFF REPAIR  3/8/06    right shoulder      Family History   Problem Relation Age of Onset    Arthritis Mother     Heart Disease Sister     High Blood Pressure Sister     Arthritis Sister     COPD Sister     Cancer Sister         Skin Cancer    Cancer Brother     Heart Disease Brother     Diabetes Maternal Grandfather     Heart Disease Brother         CHF    Cancer Brother         Lung Cancer     Social History     Tobacco Use    Smoking status: Former Smoker     Packs/day: 0.50     Years: 5.00     Pack years: 2.50     Quit date: 1965     Years since quittin.2    Smokeless tobacco: Never Used   Substance Use Topics    Alcohol use: Not Currently     Comment: occ.  red wine     Current Outpatient Medications   Medication Sig Dispense Refill    metoprolol tartrate (LOPRESSOR) 25 MG tablet Take 1 tablet by mouth 2 times daily 60 tablet 3    flecainide (TAMBOCOR) 100 MG tablet Take 1 tablet by mouth 2 times daily 180 tablet 3    apixaban (ELIQUIS) 2.5 MG TABS tablet Take 1 tablet by mouth 2 times daily 180 tablet 3    vitamin D 25 MCG (1000 UT) CAPS Take 1 capsule by mouth once a week      lansoprazole (PREVACID) 30 MG delayed release capsule Take 1 capsule by mouth daily 90 capsule 3    atorvastatin (LIPITOR) 40 MG tablet Take 1 tablet by mouth nightly 90 tablet 3    spironolactone (ALDACTONE) 25 MG tablet Take 0.5 tablets by mouth daily 45 tablet 3    isosorbide mononitrate (IMDUR) 30 MG extended release tablet Take 1 tablet by mouth daily 90 tablet 3    furosemide (LASIX) 20 MG tablet Take 1 tablet by mouth three times a week Mon-Wed-Fri 36 tablet 3    nitroGLYCERIN (NITROSTAT) 0.4 MG SL tablet Place 1 tablet under the tongue every 5 minutes as needed for Chest pain (SOB) 25 tablet 3    vitamin C (ASCORBIC ACID) 500 MG tablet Take 500 mg by mouth daily      aspirin EC 81 MG EC tablet Take 1 tablet by mouth daily 30 tablet 0    acetaminophen (TYLENOL) 500 MG tablet Take 500 mg by mouth daily       No current facility-administered medications for this visit. Allergies   Allergen Reactions    Amiodarone Other (See Comments)     Prolonged QT; Torsades DP    Ketamine Other (See Comments)     Pt \"made her very anxious/crazy\" when recovering from this drug. SUBJECTIVE:   Review of Systems   Constitutional: Negative for activity change, appetite change, fatigue and fever. HENT: Negative for congestion. Respiratory: Negative for apnea, cough, chest tightness, shortness of breath and wheezing. Cardiovascular: Negative for chest pain, palpitations and leg swelling. Gastrointestinal: Positive for constipation. Negative for abdominal distention, abdominal pain, anal bleeding, blood in stool and nausea. Genitourinary: Negative for difficulty urinating and dysuria. Musculoskeletal: Negative for arthralgias and gait problem. Skin: Negative for color change. Neurological: Negative for dizziness, numbness and headaches.    Psychiatric/Behavioral: Negative for agitation, confusion and sleep disturbance. The patient is not nervous/anxious. OBJECTIVE:   Today's Vitals:  /64   Pulse 50   Ht 5' (1.524 m)   Wt 122 lb 6.4 oz (55.5 kg)   SpO2 98%   BMI 23.90 kg/m²     Physical Exam  Vitals reviewed. Constitutional:       Appearance: She is well-developed. HENT:      Head: Normocephalic and atraumatic. Eyes:      Pupils: Pupils are equal, round, and reactive to light. Neck:      Vascular: No JVD. Cardiovascular:      Rate and Rhythm: Normal rate and regular rhythm. Heart sounds: Murmur heard. Pulmonary:      Effort: Pulmonary effort is normal. No respiratory distress. Breath sounds: No rales. Abdominal:      General: There is no distension. Palpations: Abdomen is soft. Tenderness: There is no abdominal tenderness. Musculoskeletal:         General: No tenderness. Cervical back: Normal range of motion. Right lower leg: No edema. Left lower leg: No edema. Skin:     General: Skin is warm and dry. Capillary Refill: Capillary refill takes less than 2 seconds. Neurological:      Mental Status: She is alert and oriented to person, place, and time. Psychiatric:         Mood and Affect: Mood normal.         Behavior: Behavior normal.         Wt Readings from Last 3 Encounters:   21 122 lb 6.4 oz (55.5 kg)   10/20/21 122 lb 3.2 oz (55.4 kg)   10/13/21 122 lb (55.3 kg)     BP Readings from Last 3 Encounters:   21 108/64   10/20/21 105/62   10/13/21 96/60     Pulse Readings from Last 3 Encounters:   21 50   10/20/21 68   10/13/21 68     Body mass index is 23.9 kg/m².     EK/3/21  Electrocardiogram, 12-lead  Order: [id number]  Status:  Final result   Visible to patient:  No (not released) Next appt:  2022 at 12:30 PM in Cardiology (Javier Baker MD)   0 Result Notes   Ref Range & Units 9/3/21 0939   Ventricular Rate BPM 58    Atrial Rate BPM 58    P-R Interval ms 168    QRS Duration ms 98    Q-T Interval ms 492 QTc Calculation (Bazett) ms 482    P Axis degrees 60    R Axis degrees -40    T Axis degrees -37    Resulting Agency  [facility]      Narrative & Impression    Sinus bradycardia  Left axis deviation  Low voltage QRS, consider pulmonary disease, pericardial effusion, or normal variant  T wave abnormality, consider anterolateral ischemia  Prolonged QT interval or tu fusion, consider myocardial disease, electrolyte imbalance, or drug effects  Abnormal ECG  When compared with ECG of 06-AUG-2021 13:54,  Premature atrial complexes are no longer Present  Inverted T waves have replaced nonspecific T wave abnormality in Anterolateral leads  QT has lengthened  Confirmed by Heaven Jaramillo ([id number]) on 9/3/2021 10:30:14 AM           9/3/21: Ablation  [facility] ()  [address] [place] [address]  Dept: [phone]     CARDIAC ELECTROPHYSIOLOGY: PROCEDURE NOTE  PATIENT DEMOGRAPHICS:  Date:             9/3/2021  Patient name: Versa Babinski  YOB: 1934  Sex: female   MRN:              [id number]     PRIMARY CARE PROVIDER:     Ester Vargas MD      CARDIOLOGIST:  Osmani Diaz MD     PROCEDURE PLANNED:  Atrial fibrillation radiofrequency catheter ablation. INDICATION FOR PROCEDURE:  Drug refractory symptomatic persistent atrial fibrillation. BRIEF CLINICAL SUMMARY:  86/F with symptomatic, flecainide refractory and amiodarone intolerant PeAF with multiple DCCV and hospital admissions for symptomatic AF with RVR  presents electively for AF ablation. Uninterrupted anticoagulation with apixaban. Normal LVEF (50-55%). Medical hx: tachycardia cardiomyopathy (resolved), Mild to moderate MR, mild AR and mild CAD. PROCEDURE PERFORMED:  1. Left and right atrial pacing and recording. 2. 3D electro anatomical mapping (CARTO). 3. Intracardiac echocardiography (ICE).   4. Right and left cardiac catheterization with ICE guided transseptal puncture. 5. Left atrial voltage mapping. 6. Left and right pulmonary vein isolation in pairs. DESCRIPTION OF THE PROCEDURE:  The patient was brought to electrophysiology laboratory in a fasting and non-sedated state. She was prepped and draped in the usual sterile fashion. General anesthesia was administered by anesthesia Service. Lidocaine, 2% was injected into femoral regions and right side of the neck for local anesthesia. Vascular access was obtained under ultrasound guidance and hemostatic short sheaths placed over the guidewires (8.5 [place] and 7-French in the right femoral vein, 11 French in left femoral vein, and 8-French in the right internal jugular vein). Fast anatomical map of right atrium and coronary sinus was created using 3.5 mm irrigated tip contact-sense catheter (D/F STSF) . A dual site 7-French catheter (Medical TZ) was inserted through the right internal jugular vein and positioned in the coronary sinus with proximal electrodes at high right atrium for pacing and recording. Following this, a 8 French phased array ultrasound catheter was advanced into right atrium via left femoral vein for intracardiac echocardiography. I initially tried to perform transseptal puncture under ICE guidance using 8.5 French SL 1 sheath and 71 cm Brockenbrough needle. However, due to inadequate reach the SL 1 system was exchanged with 8.5 [place] steerable sheath (Exacaster) and a single transseptal puncture was performed using 98 cm Brockenbrough needle. A 10,000 units of heparin bolus was administered soon after transseptal puncture. Subsequently, ACT was monitored every 20 minutes. Heparin boluses were given as needed to maintain an ACT between 300-350 seconds throughout the left heart catheterization. The 6-French multipolar irrigated mapping catheter ([facility]) was advanced into left atrium through the Visigo sheath.  Left atrial voltage map was created in sinus rhythm (patches of low voltage area along anterior wall). Pulmonary veins (left common) were mapped at baseline, during ablation and 20 minutes after initial isolation. mapping and ablation was performed using sheath exchange technique. I did not use Isuprel or adenosine. Pulmonary vein      Electric isolation of left common and right pulmonary veins was performed using wide area of circumferential radiofrequency ablation around the pulmonary in pairs during sinus rhythm. All veins were isolated during the first pass. The entrance and exit block were confirmed in all veins during coronary sinus and pulmonary vein pacing respectively. The esophageal temperature was monitored throughout the procedure. The ablation was stopped with 0.5 degree Celsius rise in esophageal temperature. The maximum recorded esophageal temperature during ablation was 38.2 degree Celsius. No tachycardia was induced by rapid atrial fibrillation and programmed atrial stimulation. Post ablation sinus cycle length was 1161 ms, WA interval 203 ms, QRS duration 111 ms and QT interval of 487 ms. AV Wenckebach cycle length was 380 ms and AV xochitl effective refractory period was 360 ms at 600 ms drive train. At the end of the procedure imaging with ICE showed no left atrial clots or pericardial effusion. The catheters and sheaths were removed and hemostasis achieved by vascular closure device (Vascade) and manual compression. The venotomy sites were covered by light dressing.      RADIOFREQUENCY SUMMARY:  Total number of RF lesions 71 and total RF time 11.39. Maximum energy used 40 padilla along the posterior wall and the roof (target tag index 300-350) and 50 padilla along the anterior wall (target tag index 400-450). The mean catheter tip temperature during the RF application was 32 degrees C. Mean impedance was 127 ohms. MEDICATIONS:  1. General anesthesia  2. Heparin 18,000 units IV. 3. Protamine 20 mg IV. 4. Lasix 20 mg IV. FLUOROSCOPIC TIME:  Zero.       BLOOD LOSS:  Less than 50 cc. .     IN/OUT:  2000/0 cc. COMPLICATIONS:  None.       SUMMARY:  1. Successful electric isolation of left common and right pulmonary veins using radiofrequency catheter ablation. 2. Normal AV node functions. 3. Normal His-Purkinje function. RECOMMENDATIONS:    1. Observation for 3 hours. 2. Resume antiarrhythmic drugs, metoprolol and anticoagulation. 3. Post operative care per protocol.         Electronically signed by Johny Smith MD, Lovetta Basque on 9/3/2021 at 2:56 PM     ECHO:   9/3/21:  Transthoracic Echocardiography Report (TTE)      Demographics      Patient Name     Luisana Caldwell  Gender                 Female      MR #             [id number]   Race                                                      Ethnicity      Account #        [de-identified]   Room Number            0030      Accession Number [id number]  Date of Study          09/03/2021      Date of Birth    1934  Referring Physician    Johana Stone MD      Age              80 year(s)  Cierra Angel, [facility] [place]                                   Interpreting Physician Mary Stone MD     Procedure     Type of Study      TTE procedure:ECHOCARDIOGRAM COMPLETE 2D W DOPPLER W COLOR. Procedure Date  Date: 09/03/2021 Start: 10:09 PM     Study Location: Bedside  Technical Quality: Adequate visualization     Indications:S/P ablation and Hypotension.     Additional Medical History:Ex Smoker, Chest Pain, NSVT, Atrial fibrilaltion,  GERD, Congestive heart failure, Coronary artery disease, Ischemic  Cardiomyopathy, Osteoarthritis, PVC's, Shortness of breath.     Patient Status: STAT     Height: 60 inches Weight: 124 pounds BSA: 1.52 m^2 BMI: 24.22 kg/m^2     BP: 91/55 mmHg     Allergies    - No Known Allergies.      - No Known Allergies.        - See Epic.       - Other allergy:(Amiodarone, Ketamine).      Conclusions      Summary   Limited echo to rule out pericardial effusion, status post Afib ablation. Trace posterior pericardial effusion. Normal left ventricular size and function. Ejection fraction is visually estimated at 50-55%%. Grade II diastolic dysfunction. Normal right ventricular size and function. Severly dilated left atrium. Mild aortic valve regurgitation. Mild mitral valve regurgitation. Mild tricuspid valve regurgitation. Mild pulmonary valve regurgitation. Signature      ----------------------------------------------------------------   Electronically signed by Rashmi Sosa MD (Interpreting   physician) on 09/04/2021 at 09:33 AM   ----------------------------------------------------------------      Findings      Mitral Valve   Mild thickening f the mitral valve leaflets . Mild mitral valve regurgitation. Aortic Valve   Aortic valve appears tricuspid. Mildly thickened aortic valve cusps. Mild aortic valve regurgitation. Tricuspid Valve   Tricuspid valve is structurally normal.   Mild tricuspid valve regurgitation. Pulmonic Valve   Mild pulmonary valve regurgitation. Left Atrium   Severly dilated left atrium. Left Ventricle   Left ventricle size is normal.   Left ventricular systolic function was normal.   Ejection fraction is visually estimated at 50-55%%. Mild concentric left ventricular hypertrophy. Grade II diastolic dysfunction. Right Atrium   Mildly enlarged right atrium size. Right Ventricle   Normal right ventricular size and function. Pericardial Effusion   Trace posterior pericardial effusion. Aorta / Great Vessels   The aorta root and ascending aorta are within normal limits.      M-Mode/2D Measurements & Calculations      LV Diastolic    LV Systolic Dimension: 3.7  AV Cusp Separation: 2 cmAO   Dimension: 5.2  cm                          Root Dimension: 3 cmLA Area:   cm              LV Volume Diastolic: 970 ml 48.8 cm^2   LV FS:28.9 %    LV transesophageal approach was used. Risk benefits and alternatives were explained to the patient and informed   consent was obtained. Ejection fraction was estimated at 50-55%. There was mild-moderate mitral regurgitation. Aortic root = 3.5 cm. Signature      ----------------------------------------------------------------   Electronically signed by Jairo Benavides MD (Interpreting   physician) on 03/02/2021 at 02:45 PM   ----------------------------------------------------------------      Findings      Mitral Valve   The mitral valve structure was normal with normal leaflet separation. DOPPLER: The transmitral velocity was within the normal range with no   evidence for mitral stenosis. There was mild-moderate mitral   regurgitation. Aortic Valve   The aortic valve was trileaflet with normal thickness and cuspal   separation. There was no echocardiographic evidence of a vegetation. DOPPLER: Transaortic velocity was within the normal range with no evidence   of aortic stenosis or regurgitaiton. Tricuspid Valve   The tricuspid valve structure was normal with normal leaflet separation. There was no echocardiographic evidence of a vegetation. DOPPLER: There   was no evidence of tricuspid stenosis or regurgitation. Pulmonic Valve   The pulmonic valve leaflets exhibited normal thickness, no calcification,   and normal cuspal separation. There was no echocardiographic evidence of   vegetation. DOPPLER: The transpulmonic velocity was within the normal   range with mild regurgitation. Left Atrium   Left atrial size was normal with no thrombus identified. APPENDAGE: The   left atrial appendage size was normal with no thrombus identified. DOPPLER: The function was normal (normal emptying velocity). Left Ventricle   Normal left ventricular size and systolic function. There were no regional wall motion abnormalities. Wall thickness was within normal limits.    Ejection fraction was estimated at 50-55%. Right Atrium   Right atrial size was normal with no thrombus identified. ATRIAL SEPTUM:   No defect or patent foramen ovale was identified. There was no   right-to-left shunt, with provocative maneuvers to increase right atrial   pressure. Right Ventricle   The right ventricular size was normal with normal systolic function and   wall thickness. Pericardial Effusion   The pericardium was normal in appearance with no evidence of a pericardial   effusion. Pleural Effusion   No evidence of pleural effusion. Results reviewed:  BNP:   Lab Results   Component Value Date    PROBNP 8436.0 (H) 08/05/2021     CBC:   Lab Results   Component Value Date    WBC 7.0 09/03/2021    RBC 3.49 09/03/2021    RBC 3.58 07/19/2021    HGB 9.9 09/03/2021    HCT 33.6 09/03/2021     09/03/2021     CMP:    Lab Results   Component Value Date     10/21/2021    K 5.4 10/21/2021    K 4.4 08/05/2021     10/21/2021    CO2 25 10/21/2021    BUN 26 10/21/2021    CREATININE 0.98 10/21/2021    LABGLOM 52 09/03/2021    GLUCOSE 95 10/21/2021    CALCIUM 9.5 10/21/2021     Hepatic Function Panel:    Lab Results   Component Value Date    ALKPHOS 81 10/21/2021    ALKPHOS 83 08/05/2021    ALT 7 10/21/2021    AST 21 10/21/2021    PROT 7.3 10/21/2021    BILITOT 0.6 10/21/2021    BILIDIR <0.2 06/15/2021    LABALBU 4.0 10/21/2021     Magnesium:    Lab Results   Component Value Date    MG 1.8 08/07/2021     PT/INR:    Lab Results   Component Value Date    INR 1.38 09/03/2021     Lipids:    Lab Results   Component Value Date    TRIG 91 10/21/2021    HDL 59 10/21/2021    LDLCALC 70 10/21/2021    LABVLDL 18 10/21/2021       ASSESSMENT AND PLAN:   The patient's condition/symptoms are Stable      Diagnosis Orders   1. Chronic systolic heart failure (HCC)     2. Status post ablation of atrial fibrillation     3.  Coronary artery disease involving native coronary artery of native heart without angina pectoris 4. Essential hypertension     EF recovered to 50-55%  Mild/Moderate MR    Plan:  · GDMT   · ACE/ARB/ARNi: no  · Beta Blocker: lopressor tartrate 25 BID (post ablation)  · Aldosterone antagonist: Aldactone 12.5 mg daily  · Diuretic/Potassium: Lasix 20 mg three times weekly  · Hydralazine/Nitrate: Imdur 30 mg daily  · Other: Eliquis 2.5, ASA 81 mg, Lipitor  · No signs of fluid overload. BP controlled. She is doing well post ablation. · HF Zones reviewed. · Daily weights and record  · Fluid restriction of 2 Liters per day (64 oz)   · Limit sodium in diet to 2712-1315 mg/day  · Healthy food options were discussed   · Monitor BP daily 1 hour after meds are taken  · Increase activity as tolerated     Patient was instructed to call the [facility] for changes in the following symptoms:    Weight gain of 3 pounds in 1 day or 5 pounds in 1 week   Increased shortness of breath   Shortness of breath while laying down   Cough   Chest pain   Swelling in feet, ankles or legs   Tenderness or bloating in the abdomen   Fatigue    Decreased appetite or feeling \"full\"   Nausea    Confusion   Continue current medications. Continues to watch the salt in your diet. Continue to monitor your weight and your blood pressure. If your pulse is running less than 50 then call or if you are feeling more tired or weak. For your constipation:   May use Colace on a daily basis to help keep stool soft. May take it twice a day if need. If stool gets too loose then skip a dose or two. May use Miralax; take one capfull or 1/2 capfull daily or twice daily as needed. May use Fiber power (Equate or other over the counter brand) in your liquids or foods on daily basis to add fiber to help to relieve constipation. Can try Plum juice for a change from the Prune juice. Follow-up with Dr. Cathleen Vidal as scheduled in March or sooner if need.         Return in about 1 year (around 11/1/2022), or if symptoms worsen or fail to improve, for CHF clinic. . or sooner if needed     Patient given educational materials - see patient instructions. We discussed the importance of weighing oneself and recording daily. We also discussed the importance of a low sodium diet, higher sodium foods to avoid and appropriate low sodium food choices. Discussed use, benefit, and side effects of prescribed medications. All patient questions answered. Patient verbalizes understanding of plan of care using teach back method, and is agreeable to the treatment plan. 36 minutes of time was spent reviewing the chart and educating the patient about HF, medications, diet, exercise, and discussing the plan of care. I personally spent more then 50% of the appt time face to face with the patient counseling/coordinating patient's care.       Electronicallysigned by Elton Babinski, APRN - CNP on 11/1/2021 at 2:02 PM

## 2021-11-01 NOTE — PATIENT INSTRUCTIONS
Continue current medications. Continues to watch the salt in your diet. Continue to monitor your weight and your blood pressure. If your pulse is running less than 50 then call or if you are feeling more tired or weak. For your constipation:   May use Colace on a daily basis to help keep stool soft. May take it twice a day if need. If stool gets too loose then skip a dose or two. May use Miralax; take one capfull or 1/2 capfull daily or twice daily as needed. May use Fiber power (Equate or other over the counter brand) in your liquids or foods on daily basis to add fiber to help to relieve constipation. Can try Plum juice for a change from the Prune juice. Follow-up with Dr. Jigar Arce as scheduled in March or sooner if need. Follow-up with CHF clinic in one year or sooner if need. You may receive a survey regarding the care you received during your visit. Your input is valuable to us. We encourage you to complete and return your survey. We hope you will choose us in the future for your healthcare needs.     Continue:  · Continue current medications  · Daily weights and record  · Fluid restriction of 2 Liters per day  · Limit sodium in diet to around 2080-7452 mg/day  · Monitor BP  · Activity as tolerated     Call the [facility] Failure [facility] for any of the following symptoms: [phone]   Weight gain of 2-3 pounds in 1 day or 5 pounds in 1 week   Increased shortness of breath   Shortness of breath while laying down   Cough   Chest pain   Swelling in feet, ankles or legs   Tenderness or bloating in the abdomen   Fatigue    Decreased appetite or nausea    Confusion

## 2022-02-01 RX ORDER — ATORVASTATIN CALCIUM 40 MG/1
40 TABLET, FILM COATED ORAL NIGHTLY
Qty: 90 TABLET | Refills: 3 | Status: SHIPPED | OUTPATIENT
Start: 2022-02-01

## 2022-02-01 RX ORDER — ISOSORBIDE MONONITRATE 30 MG/1
30 TABLET, EXTENDED RELEASE ORAL DAILY
Qty: 90 TABLET | Refills: 3 | Status: SHIPPED | OUTPATIENT
Start: 2022-02-01

## 2022-02-01 RX ORDER — LANSOPRAZOLE 30 MG/1
30 CAPSULE, DELAYED RELEASE ORAL DAILY
Qty: 90 CAPSULE | Refills: 3 | Status: SHIPPED | OUTPATIENT
Start: 2022-02-01

## 2022-02-01 RX ORDER — SPIRONOLACTONE 25 MG/1
12.5 TABLET ORAL DAILY
Qty: 45 TABLET | Refills: 3 | Status: SHIPPED | OUTPATIENT
Start: 2022-02-01

## 2022-02-01 RX ORDER — FUROSEMIDE 20 MG/1
20 TABLET ORAL
Qty: 36 TABLET | Refills: 3 | Status: SHIPPED | OUTPATIENT
Start: 2022-02-02

## 2022-03-14 ENCOUNTER — OFFICE VISIT (OUTPATIENT)
Dept: CARDIOLOGY CLINIC | Age: 87
End: 2022-03-14
Payer: MEDICARE

## 2022-03-14 VITALS
SYSTOLIC BLOOD PRESSURE: 128 MMHG | BODY MASS INDEX: 24.54 KG/M2 | DIASTOLIC BLOOD PRESSURE: 62 MMHG | WEIGHT: 125 LBS | HEIGHT: 60 IN | HEART RATE: 84 BPM

## 2022-03-14 DIAGNOSIS — I25.10 CAD, MULTIPLE VESSEL: Primary | ICD-10-CM

## 2022-03-14 DIAGNOSIS — I50.22 CHRONIC SYSTOLIC HEART FAILURE (HCC): ICD-10-CM

## 2022-03-14 DIAGNOSIS — I35.1 NONRHEUMATIC AORTIC VALVE INSUFFICIENCY: ICD-10-CM

## 2022-03-14 DIAGNOSIS — I48.0 PAROXYSMAL ATRIAL FIBRILLATION (HCC): ICD-10-CM

## 2022-03-14 PROCEDURE — 4040F PNEUMOC VAC/ADMIN/RCVD: CPT | Performed by: INTERNAL MEDICINE

## 2022-03-14 PROCEDURE — G8427 DOCREV CUR MEDS BY ELIG CLIN: HCPCS | Performed by: INTERNAL MEDICINE

## 2022-03-14 PROCEDURE — G8484 FLU IMMUNIZE NO ADMIN: HCPCS | Performed by: INTERNAL MEDICINE

## 2022-03-14 PROCEDURE — G8420 CALC BMI NORM PARAMETERS: HCPCS | Performed by: INTERNAL MEDICINE

## 2022-03-14 PROCEDURE — 1036F TOBACCO NON-USER: CPT | Performed by: INTERNAL MEDICINE

## 2022-03-14 PROCEDURE — 99212 OFFICE O/P EST SF 10 MIN: CPT | Performed by: INTERNAL MEDICINE

## 2022-03-14 PROCEDURE — 1090F PRES/ABSN URINE INCON ASSESS: CPT | Performed by: INTERNAL MEDICINE

## 2022-03-14 PROCEDURE — 1123F ACP DISCUSS/DSCN MKR DOCD: CPT | Performed by: INTERNAL MEDICINE

## 2022-03-14 NOTE — PROGRESS NOTES
[facility] [address]  [place] [address]  Dept: [phone]  Dept Fax: [phone]  Loc: [phone]    Visit Date: 3/14/2022    Ms. Keyla Archer is a 80 y.o. female  who presented for:  Chief Complaint   Patient presents with    6 Month Follow-Up       HPI:   Donita Rowe is a pleasant 80 y.o. female, presenting to Cardiology Clinic for 6 month Follow-up. Pulmonary has been feeling well since her ablation for atrial fibrillation in September 2021. No shortness of breath, no chest pain, no leg swelling, stable weight which she checks daily, no bleeding. No dizziness. She does complain of a chronic cough which she says has been going on for years, nonproductive, not concerning to her. No chest pain, angina, MORENO, orthopnea, PND, sob at rest, palpitations, LE edema, or syncope.             Current Outpatient Medications:     metoprolol tartrate (LOPRESSOR) 25 MG tablet, Take 1 tablet by mouth 2 times daily, Disp: 180 tablet, Rfl: 3    lansoprazole (PREVACID) 30 MG delayed release capsule, Take 1 capsule by mouth daily, Disp: 90 capsule, Rfl: 3    atorvastatin (LIPITOR) 40 MG tablet, Take 1 tablet by mouth nightly, Disp: 90 tablet, Rfl: 3    spironolactone (ALDACTONE) 25 MG tablet, Take 0.5 tablets by mouth daily, Disp: 45 tablet, Rfl: 3    isosorbide mononitrate (IMDUR) 30 MG extended release tablet, Take 1 tablet by mouth daily, Disp: 90 tablet, Rfl: 3    furosemide (LASIX) 20 MG tablet, Take 1 tablet by mouth three times a week Mon-Wed-Fri, Disp: 36 tablet, Rfl: 3    apixaban (ELIQUIS) 2.5 MG TABS tablet, Take 1 tablet by mouth 2 times daily, Disp: 180 tablet, Rfl: 3    acetaminophen (TYLENOL) 500 MG tablet, Take 500 mg by mouth daily, Disp: , Rfl:     vitamin D 25 MCG (1000 UT) CAPS, Take 1 capsule by mouth once a week, Disp: , Rfl:     nitroGLYCERIN (NITROSTAT) 0.4 MG SL tablet, Place 1 tablet under the tongue every 5 minutes as needed for Chest pain (SOB), Disp: 25 tablet, Rfl: 3    vitamin C (ASCORBIC ACID) 500 MG tablet, Take 500 mg by mouth daily, Disp: , Rfl:     aspirin EC 81 MG EC tablet, Take 1 tablet by mouth daily, Disp: 30 tablet, Rfl: 0    Past Medical History  Zehra Mott  has a past medical history of A-fib ([facility] [address] Accidental fall, Allergic rhinitis, Arthritis, CHF (congestive heart failure) ([facility] [address] Encounter for cardioversion procedure, GERD (gastroesophageal reflux disease), Hx of transesophageal echocardiography (KAMRAN) for monitoring, Mild mitral regurgitation, Nonrheumatic aortic valve insufficiency, Osteoporosis, Pre-op evaluation: prior to left knee repalcement, PVC's (premature ventricular contractions), S/P cardiac catheterization, and Torn meniscus. Social History  Zehra Mott  reports that she quit smoking about 56 years ago. She has a 2.50 pack-year smoking history. She has never used smokeless tobacco. She reports previous alcohol use. She reports that she does not use drugs. Family History  Shawanda family history includes Arthritis in her mother and sister; COPD in her sister; Cancer in her brother, brother, and sister; Diabetes in her maternal grandfather; Heart Disease in her brother, brother, and sister; High Blood Pressure in her sister. There is no family history of bicuspid aortic valve, aneurysms, heart transplant, pacemakers, defibrillators, or sudden cardiac death. Past Surgical History   Past Surgical History:   Procedure Laterality Date    APPENDECTOMY  1946    BACK SURGERY  3/24/14    Lumbar Laminectomy Fusion L3-5, L4-5 PLIF - Dr. Michael Yip Left 09/05/2017    Spring Left Total Knee    ROTATOR CUFF REPAIR  3/8/06    right shoulder        Review of Systems   Constitutional: Negative for chills and fever  HENT: Negative for congestion, sinus pressure, sneezing and sore throat. Eyes: Negative for pain, discharge, redness and itching.    Respiratory: Negative for apnea, positive for cough  Gastrointestinal: Negative for blood in stool, constipation, diarrhea   Endocrine: Negative for cold intolerance, heat intolerance, polydipsia. Genitourinary: Negative for dysuria, enuresis, flank pain and hematuria. Musculoskeletal: Negative for arthralgias, joint swelling and neck pain. Neurological: Negative for numbness and headaches. Psychiatric/Behavioral: Negative for agitation, confusion, decreased concentration and dysphoric mood. Objective:     /62   Pulse 84   Ht 5' (1.524 m)   Wt 125 lb (56.7 kg)   BMI 24.41 kg/m²     Wt Readings from Last 3 Encounters:   03/14/22 125 lb (56.7 kg)   11/01/21 122 lb 6.4 oz (55.5 kg)   10/20/21 122 lb 3.2 oz (55.4 kg)     BP Readings from Last 3 Encounters:   03/14/22 128/62   11/01/21 108/64   10/20/21 105/62       Nursing note and vitals reviewed. Physical Exam   Constitutional: Oriented to person, place, and time. Appears well-developed and well-nourished. HENT:   Head: Normocephalic and atraumatic. Eyes: EOM are normal. Pupils are equal, round, and reactive to light. Neck: Normal range of motion. Neck supple. No JVD present. Cardiovascular: Normal rate, regular rhythm, normal heart sounds and intact distal pulses. No murmur heard. Pulmonary/Chest: Effort normal and breath sounds normal. No respiratory distress. No wheezes. No rales. Abdominal: Soft. Bowel sounds are normal. No distension. There is no tenderness. Musculoskeletal: Normal range of motion. No edema. Neurological: Alert and oriented to person, place, and time. No cranial nerve deficit. Coordination normal.   Skin: Skin is warm and dry. Psychiatric: Normal mood and affect.        No results found for: CKTOTAL, CKMB, CKMBINDEX    Lab Results   Component Value Date    WBC 7.0 09/03/2021    RBC 3.49 09/03/2021    RBC 3.58 07/19/2021    HGB 9.9 09/03/2021    HCT 33.6 09/03/2021    MCV 96.3 09/03/2021    MCH 28.4 09/03/2021    MCH 29.5 09/03/2021    RDW 13.5 07/19/2021     09/03/2021    MPV 10.2 09/03/2021       Lab Results   Component Value Date     10/21/2021    K 5.4 10/21/2021    K 4.4 08/05/2021     10/21/2021    CO2 25 10/21/2021    BUN 26 10/21/2021    LABALBU 4.0 10/21/2021    CREATININE 0.98 10/21/2021    CALCIUM 9.5 10/21/2021    LABGLOM 52 09/03/2021    GLUCOSE 95 10/21/2021       Lab Results   Component Value Date    ALKPHOS 81 10/21/2021    ALKPHOS 83 08/05/2021    ALT 7 10/21/2021    AST 21 10/21/2021    PROT 7.3 10/21/2021    BILITOT 0.6 10/21/2021    BILIDIR <0.2 06/15/2021    LABALBU 4.0 10/21/2021       Lab Results   Component Value Date    MG 1.8 08/07/2021       Lab Results   Component Value Date    INR 1.38 (H) 09/03/2021    INR 1.75 (H) 08/06/2021    INR 1.47 (H) 09/05/2020         Lab Results   Component Value Date    LABA1C 5.1 07/02/2021       Lab Results   Component Value Date    TRIG 91 10/21/2021    HDL 59 10/21/2021    LDLCALC 70 10/21/2021    LABVLDL 18 10/21/2021       Lab Results   Component Value Date    TSH 2.540 08/05/2021         Testing Reviewed:      I have individually reviewed the cardiac test below:    ECHO: Results for orders placed during the hospital encounter of 09/03/21    ECHO Complete 2D W Doppler W Color    Narrative  Transthoracic Echocardiography Report (TTE)    Demographics    Patient Name     [address]  Gender                 Female    MR #             [id number]   Race                       Ethnicity    Account #        [de-identified]   Room Number            0030    Accession Number [id number]  Date of Study          09/03/2021    Date of Birth    1934  Referring Physician    Gallo Mooney MD    Age              80 year(s)  Lyle Vaughan RDCS    Interpreting Physician Daisy Mooney MD    Procedure    Type of Study    TTE procedure:ECHOCARDIOGRAM COMPLETE 2D W DOPPLER W COLOR.     Procedure Date  Date: 09/03/2021 Start: 10:09 PM    Study Location: Bedside  Technical Quality: Adequate visualization    Indications:S/P ablation and Hypotension. Additional Medical History:Ex Smoker, Chest Pain, NSVT, Atrial fibrilaltion,  GERD, Congestive heart failure, Coronary artery disease, Ischemic  Cardiomyopathy, Osteoarthritis, PVC's, Shortness of breath. Patient Status: STAT    Height: 60 inches Weight: 124 pounds BSA: 1.52 m^2 BMI: 24.22 kg/m^2    BP: 91/55 mmHg    Allergies  - No Known Allergies.    - No Known Allergies. - See Epic. - Other allergy:(Amiodarone, Ketamine). Conclusions    Summary  Limited echo to rule out pericardial effusion, status post Afib ablation. Trace posterior pericardial effusion. Normal left ventricular size and function. Ejection fraction is visually estimated at 50-55%%. Grade II diastolic dysfunction. Normal right ventricular size and function. Severly dilated left atrium. Mild aortic valve regurgitation. Mild mitral valve regurgitation. Mild tricuspid valve regurgitation. Mild pulmonary valve regurgitation. Signature    ----------------------------------------------------------------  Electronically signed by Nahum Rodriguez MD (Interpreting  physician) on 09/04/2021 at 09:33 AM  ----------------------------------------------------------------    Findings    Mitral Valve  Mild thickening f the mitral valve leaflets . Mild mitral valve regurgitation. Aortic Valve  Aortic valve appears tricuspid. Mildly thickened aortic valve cusps. Mild aortic valve regurgitation. Tricuspid Valve  Tricuspid valve is structurally normal.  Mild tricuspid valve regurgitation. Pulmonic Valve  Mild pulmonary valve regurgitation. Left Atrium  Severly dilated left atrium. Left Ventricle  Left ventricle size is normal.  Left ventricular systolic function was normal.  Ejection fraction is visually estimated at 50-55%%. Mild concentric left ventricular hypertrophy.   Grade II diastolic dysfunction. Right Atrium  Mildly enlarged right atrium size. Right Ventricle  Normal right ventricular size and function. Pericardial Effusion  Trace posterior pericardial effusion. Aorta / Great Vessels  The aorta root and ascending aorta are within normal limits.     M-Mode/2D Measurements & Calculations    LV Diastolic    LV Systolic Dimension: 3.7  AV Cusp Separation: 2 cmAO  Dimension: 5.2  cm                          Root Dimension: 3 cmLA Area:  cm              LV Volume Diastolic: 191 ml 84.1 cm^2  LV FS:28.9 %    LV Volume Systolic: 58.4 ml  LV PW           LV EDV/LV EDV Index: 710  Diastolic: 1 cm GX/64 G^5GC ESV/LV ESV  Septum          Index: 58.1 ml/38 m^2       RV Diastolic Dimension: 3.3 cm  Diastolic: 1 cm EF Calculated: 55.3 %  Ascending Aorta: 3.5 cm  LA volume/Index: 93.2 ml  /61m^2    Doppler Measurements & Calculations    MV Peak E-Wave: 84 cm/s    AV Peak Velocity: 141 LVOT Peak Velocity: 85.7  MV Peak A-Wave: 59.6 cm/s  cm/s                  cm/s  MV E/A Ratio: 1.41         AV Peak Gradient:     LVOT Peak Gradient: 3  MV Peak Gradient: 2.82     7.95 mmHg             mmHg  mmHg  TV Peak E-Wave: 52.7 cm/s  MV Deceleration Time: 264                        TV Peak A-Wave: 63.8 cm/s  msec  AV P1/2t: 446 msec    TV Peak Gradient: 1.11  IVRT: 67 msec         mmHg  MV E' Septal Velocity: 5                         TR Velocity:252 cm/s  cm/s                                             TR Gradient:25.4 mmHg  MV A' Septal Velocity: 9.5 AV DVI (Vmax):0.61    PV Peak Velocity: 73.3  cm/s                                             cm/s  MV E' Lateral Velocity:                          PV Peak Gradient: 2.15  11.7 cm/s                                        mmHg  MV A' Lateral Velocity:  6.7 cm/s  E/E' septal: 16.8                                LA ED Velocity: 122 cm/s  E/E' lateral: 7.18  MR Velocity: 493 cm/s    [email or web address]       Assessment/Plan   Coronary Artery Disease  Cath Dx 90%, PDA 60%  On aspirin  Has not needed to use nitroglycerin though keeps this at home in her bedroom she has never needed to use, discussed case with her in her purse. Chronic heart failure  Last echocardiogram in September 2021, LVEF 50 to 08%, grade 2 diastolic dysfunction  Stable weight, appears euvolemic  Therapy: On metoprolol, atorvastatin, spironolactone, furosemide, aspirin    Atrial fibrillation  S/P ablation in Sep 2021  On chronic apixaban  In sinus rhythm today      Disposition:  Return in about 1 year (around 3/14/2023), or Sooner if having chest pain, shortness of breath. Patient and plan discussed with Dr. Sadi Anders MD  PGY-1 Emergency Medicine  1:22 PM 3/14/22      Attending Supervising Physician's [facility] [address] Statement  I performed a history and physical examination on the patient and discussed the management with the resident physician. I reviewed and agree with the findings and plan as documented in the resident's note except for as noted below. Afib s/p ablation, no recurrence, Eliquis without bleeding, no CHF exacerbations. Continue current therapy. No NTG SL prn. Discussed diet/exercise/BP/weight loss/health lifestyle choices/lipids; the patient understands the goals and will try to comply.       Electronically signed by Jefry Ralph MD on 3/14/22 at 1:39 PM EDT

## 2022-04-07 ENCOUNTER — OFFICE VISIT (OUTPATIENT)
Dept: FAMILY MEDICINE CLINIC | Age: 87
End: 2022-04-07
Payer: MEDICARE

## 2022-04-07 VITALS
DIASTOLIC BLOOD PRESSURE: 58 MMHG | HEART RATE: 62 BPM | SYSTOLIC BLOOD PRESSURE: 118 MMHG | OXYGEN SATURATION: 98 % | WEIGHT: 123.8 LBS | TEMPERATURE: 97.7 F | RESPIRATION RATE: 18 BRPM | BODY MASS INDEX: 24.18 KG/M2

## 2022-04-07 DIAGNOSIS — J01.00 ACUTE MAXILLARY SINUSITIS, RECURRENCE NOT SPECIFIED: ICD-10-CM

## 2022-04-07 DIAGNOSIS — J02.9 ACUTE PHARYNGITIS, UNSPECIFIED ETIOLOGY: Primary | ICD-10-CM

## 2022-04-07 PROCEDURE — 1036F TOBACCO NON-USER: CPT | Performed by: FAMILY MEDICINE

## 2022-04-07 PROCEDURE — G8420 CALC BMI NORM PARAMETERS: HCPCS | Performed by: FAMILY MEDICINE

## 2022-04-07 PROCEDURE — 1090F PRES/ABSN URINE INCON ASSESS: CPT | Performed by: FAMILY MEDICINE

## 2022-04-07 PROCEDURE — G8427 DOCREV CUR MEDS BY ELIG CLIN: HCPCS | Performed by: FAMILY MEDICINE

## 2022-04-07 PROCEDURE — 99213 OFFICE O/P EST LOW 20 MIN: CPT | Performed by: FAMILY MEDICINE

## 2022-04-07 PROCEDURE — 4040F PNEUMOC VAC/ADMIN/RCVD: CPT | Performed by: FAMILY MEDICINE

## 2022-04-07 PROCEDURE — 1123F ACP DISCUSS/DSCN MKR DOCD: CPT | Performed by: FAMILY MEDICINE

## 2022-04-07 RX ORDER — CEFUROXIME AXETIL 250 MG/1
250 TABLET ORAL 2 TIMES DAILY
Qty: 20 TABLET | Refills: 0 | Status: SHIPPED | OUTPATIENT
Start: 2022-04-07 | End: 2022-04-17

## 2022-04-10 ASSESSMENT — ENCOUNTER SYMPTOMS
EYES NEGATIVE: 1
COUGH: 1
SINUS PRESSURE: 1
CHEST TIGHTNESS: 0
ABDOMINAL PAIN: 0
RHINORRHEA: 0
VOMITING: 0
DIARRHEA: 0
SHORTNESS OF BREATH: 0
NAUSEA: 0
BACK PAIN: 0
SORE THROAT: 0
SINUS PAIN: 1

## 2022-04-10 NOTE — PROGRESS NOTES
[address] [place] [facility] [address]  Dept: [phone]  Dept Fax: [phone]  Loc: [phone]  PROGRESS NOTE      VisitDate: 4/7/2022    Gabino Funez is a 80 y.o. female who presents today for:     Chief Complaint   Patient presents with    Cough     symptoms x1wk, dry cough, some chest tightness and sob but that is not abnormal for her, denies fever has been taking robitussin dm         Subjective:  HPI  Patient comes in with a weeklong history of cough sinus pain pressure posterior nasal drainage without fever chills nausea vomiting, body aches. Review of Systems   Constitutional: Negative for activity change, appetite change, fatigue and fever. HENT: Positive for congestion, postnasal drip, sinus pressure and sinus pain. Negative for rhinorrhea and sore throat. Eyes: Negative. Respiratory: Positive for cough. Negative for chest tightness and shortness of breath. Cardiovascular: Negative for chest pain and palpitations. Gastrointestinal: Negative for abdominal pain, diarrhea, nausea and vomiting. Genitourinary: Negative for dysuria and urgency. Musculoskeletal: Negative for arthralgias and back pain. Neurological: Negative for dizziness and headaches. Psychiatric/Behavioral: Negative for dysphoric mood. The patient is not nervous/anxious.       Past Medical History:   Diagnosis Date    A-fib [facility])     Accidental fall 09/2002    [facility] ER- fell on face on cement    Allergic rhinitis     Arthritis     CHF (congestive heart failure) ([facility] [address]     Encounter for cardioversion procedure 10/05/2017    GERD (gastroesophageal reflux disease)     Hx of transesophageal echocardiography (KAMRAN) for monitoring 10/2017    Mild mitral regurgitation 10/2017    Nonrheumatic aortic valve insufficiency 8/29/2017    Osteoporosis     Pre-op evaluation: prior to left knee repalcement 8/29/2017    PVC's (premature ventricular contractions) 2017    S/P cardiac catheterization 10/07/2017    with Dr. Tamy Wilder -diagonal 90%, circ 90%,     Torn meniscus 2016      Past Surgical History:   Procedure Laterality Date    APPENDECTOMY  89   [facility] SURGERY  3/24/14    Lumbar Laminectomy Fusion L3-5, L4-5 PLIF - Dr. Kassie Padilla Left 2017    Rodriguez Left Total Knee    ROTATOR CUFF REPAIR  3/8/06    right shoulder      Family History   Problem Relation Age of Onset    Arthritis Mother     Heart Disease Sister     High Blood Pressure Sister     Arthritis Sister     COPD Sister     Cancer Sister         Skin Cancer    Cancer Brother     Heart Disease Brother     Diabetes Maternal Grandfather     Heart Disease Brother         CHF    Cancer Brother         Lung Cancer     Social History     Tobacco Use    Smoking status: Former Smoker     Packs/day: 0.50     Years: 5.00     Pack years: 2.50     Quit date: 1965     Years since quittin.7    Smokeless tobacco: Never Used   Substance Use Topics    Alcohol use: Not Currently     Comment: occ.  red wine      Current Outpatient Medications   Medication Sig Dispense Refill    cefUROXime (CEFTIN) 250 MG tablet Take 1 tablet by mouth 2 times daily for 10 days 20 tablet 0    metoprolol tartrate (LOPRESSOR) 25 MG tablet Take 1 tablet by mouth 2 times daily 180 tablet 3    lansoprazole (PREVACID) 30 MG delayed release capsule Take 1 capsule by mouth daily 90 capsule 3    atorvastatin (LIPITOR) 40 MG tablet Take 1 tablet by mouth nightly 90 tablet 3    spironolactone (ALDACTONE) 25 MG tablet Take 0.5 tablets by mouth daily 45 tablet 3    isosorbide mononitrate (IMDUR) 30 MG extended release tablet Take 1 tablet by mouth daily 90 tablet 3    furosemide (LASIX) 20 MG tablet Take 1 tablet by mouth three times a week Mon-Wed-Fri 36 tablet 3    apixaban (ELIQUIS) 2.5 MG TABS tablet Take 1 tablet by mouth 2 times daily 180 tablet 3    acetaminophen (TYLENOL) 500 MG tablet Take 500 mg by mouth daily      vitamin D 25 MCG (1000 UT) CAPS Take 1 capsule by mouth once a week      nitroGLYCERIN (NITROSTAT) 0.4 MG SL tablet Place 1 tablet under the tongue every 5 minutes as needed for Chest pain (SOB) 25 tablet 3    vitamin C (ASCORBIC ACID) 500 MG tablet Take 500 mg by mouth daily      aspirin EC 81 MG EC tablet Take 1 tablet by mouth daily 30 tablet 0     No current facility-administered medications for this visit. Allergies   Allergen Reactions    Amiodarone Other (See Comments)     Prolonged QT; Torsades DP    Ketamine Other (See Comments)     Pt \"made her very anxious/crazy\" when recovering from this drug. Health Maintenance   Topic Date Due    Shingles Vaccine (1 of 2) Never done   [employer] Visit (AWV)  Never done    COVID-19 Vaccine (3 - Booster for Moderna series) 07/23/2021    Depression Screen  07/13/2022    Lipid screen  10/21/2022    Potassium monitoring  10/21/2022    Creatinine monitoring  10/21/2022    DTaP/Tdap/Td vaccine (2 - Td or Tdap) 09/02/2030    Flu vaccine  Completed    Pneumococcal 65+ years Vaccine  Completed    Hepatitis A vaccine  Aged Out    Hepatitis B vaccine  Aged Out    Hib vaccine  Aged Out    Meningococcal (ACWY) vaccine  Aged Out         Objective:     Physical Exam  Constitutional:       General: She is not in acute distress. Appearance: She is well-developed. She is not diaphoretic. HENT:      Head: Normocephalic and atraumatic. Comments: Sinus tenderness, posterior nasal drainage, redness in the throat  Eyes:      General: No scleral icterus. Conjunctiva/sclera: Conjunctivae normal.   Neck:      Thyroid: No thyromegaly. Vascular: No JVD. Comments: No bruits  Cardiovascular:      Rate and Rhythm: Normal rate and regular rhythm. Heart sounds: Normal heart sounds. Pulmonary:      Effort: Pulmonary effort is normal. No respiratory distress.       Breath sounds: Normal breath sounds. No wheezing or rales. Abdominal:      Palpations: Abdomen is soft. There is no mass. Tenderness: There is no abdominal tenderness. There is no guarding. Musculoskeletal:         General: No tenderness. Skin:     General: Skin is warm and dry. Findings: No rash. Neurological:      Mental Status: She is alert and oriented to person, place, and time. Cranial Nerves: No cranial nerve deficit. BP (!) 118/58 (Site: Right Upper Arm)   Pulse 62   Temp 97.7 °F (36.5 °C) (Oral)   Resp 18   Wt 123 lb 12.8 oz (56.2 kg)   SpO2 98%   BMI 24.18 kg/m²       Impression/Plan:  1. Acute pharyngitis, unspecified etiology    2. Acute maxillary sinusitis, recurrence not specified      Requested Prescriptions     Signed Prescriptions Disp Refills    cefUROXime (CEFTIN) 250 MG tablet 20 tablet 0     Sig: Take 1 tablet by mouth 2 times daily for 10 days     No orders of the defined types were placed in this encounter. Patient giveneducational materials - see patient instructions. Discussed use, benefit, and side effects of prescribed medications. All patient questions answered. Pt voiced understanding. Reviewed health maintenance. Patient agreedwith treatment plan. Follow up as directed. **This report has been created using voice recognition software. It may contain minor errorswhich are inherent in voice recognition technology. **       Electronically signed by Alexandru Arrington MD on 4/10/2022 at 11:39 AM

## 2022-10-31 ENCOUNTER — OFFICE VISIT (OUTPATIENT)
Dept: CARDIOLOGY CLINIC | Age: 87
End: 2022-10-31
Payer: MEDICARE

## 2022-10-31 VITALS
SYSTOLIC BLOOD PRESSURE: 100 MMHG | WEIGHT: 129.6 LBS | HEART RATE: 63 BPM | OXYGEN SATURATION: 98 % | DIASTOLIC BLOOD PRESSURE: 58 MMHG | BODY MASS INDEX: 25.31 KG/M2

## 2022-10-31 DIAGNOSIS — I50.32 CHRONIC DIASTOLIC CONGESTIVE HEART FAILURE, NYHA CLASS 2 (HCC): Primary | ICD-10-CM

## 2022-10-31 DIAGNOSIS — R53.83 OTHER FATIGUE: ICD-10-CM

## 2022-10-31 DIAGNOSIS — R53.83 OTHER FATIGUE: Primary | ICD-10-CM

## 2022-10-31 LAB
ANION GAP SERPL CALCULATED.3IONS-SCNC: 13 MEQ/L (ref 8–16)
BUN BLDV-MCNC: 28 MG/DL (ref 7–22)
CALCIUM SERPL-MCNC: 9.1 MG/DL (ref 8.5–10.5)
CHLORIDE BLD-SCNC: 103 MEQ/L (ref 98–111)
CO2: 24 MEQ/L (ref 23–33)
CREAT SERPL-MCNC: 0.9 MG/DL (ref 0.4–1.2)
ERYTHROCYTE [DISTWIDTH] IN BLOOD BY AUTOMATED COUNT: 12.8 % (ref 11.5–14.5)
ERYTHROCYTE [DISTWIDTH] IN BLOOD BY AUTOMATED COUNT: 47.1 FL (ref 35–45)
FOLATE: 19.2 NG/ML (ref 4.8–24.2)
GFR SERPL CREATININE-BSD FRML MDRD: > 60 ML/MIN/1.73M2
GLUCOSE BLD-MCNC: 97 MG/DL (ref 70–108)
HCT VFR BLD CALC: 38.6 % (ref 37–47)
HEMOGLOBIN: 11.9 GM/DL (ref 12–16)
IRON: 72 UG/DL (ref 50–170)
MAGNESIUM: 2.4 MG/DL (ref 1.6–2.4)
MCH RBC QN AUTO: 31.1 PG (ref 26–33)
MCHC RBC AUTO-ENTMCNC: 30.8 GM/DL (ref 32.2–35.5)
MCV RBC AUTO: 100.8 FL (ref 81–99)
PLATELET # BLD: 182 THOU/MM3 (ref 130–400)
PMV BLD AUTO: 11.2 FL (ref 9.4–12.4)
POTASSIUM SERPL-SCNC: 4.7 MEQ/L (ref 3.5–5.2)
PRO-BNP: 807.7 PG/ML (ref 0–1800)
RBC # BLD: 3.83 MILL/MM3 (ref 4.2–5.4)
SODIUM BLD-SCNC: 140 MEQ/L (ref 135–145)
TOTAL IRON BINDING CAPACITY: 255 UG/DL (ref 171–450)
TSH SERPL DL<=0.05 MIU/L-ACNC: 1.85 UIU/ML (ref 0.4–4.2)
VITAMIN B-12: 277 PG/ML (ref 211–911)
WBC # BLD: 6.6 THOU/MM3 (ref 4.8–10.8)

## 2022-10-31 PROCEDURE — G8427 DOCREV CUR MEDS BY ELIG CLIN: HCPCS | Performed by: NURSE PRACTITIONER

## 2022-10-31 PROCEDURE — 1123F ACP DISCUSS/DSCN MKR DOCD: CPT | Performed by: NURSE PRACTITIONER

## 2022-10-31 PROCEDURE — 99213 OFFICE O/P EST LOW 20 MIN: CPT | Performed by: NURSE PRACTITIONER

## 2022-10-31 PROCEDURE — 1036F TOBACCO NON-USER: CPT | Performed by: NURSE PRACTITIONER

## 2022-10-31 PROCEDURE — G8417 CALC BMI ABV UP PARAM F/U: HCPCS | Performed by: NURSE PRACTITIONER

## 2022-10-31 PROCEDURE — 1090F PRES/ABSN URINE INCON ASSESS: CPT | Performed by: NURSE PRACTITIONER

## 2022-10-31 PROCEDURE — G8484 FLU IMMUNIZE NO ADMIN: HCPCS | Performed by: NURSE PRACTITIONER

## 2022-10-31 ASSESSMENT — ENCOUNTER SYMPTOMS
COUGH: 0
ABDOMINAL DISTENTION: 0
WHEEZING: 0
SHORTNESS OF BREATH: 1
ABDOMINAL PAIN: 0

## 2022-10-31 NOTE — PROGRESS NOTES
Heart Failure Clinic       Visit Date: 10/31/2022  Cardiologist:  Dr. Michael Osorio  Primary Care Physician: Dr. Gina Garcia MD    Alysia Lieja is a 80 y.o. female who presents today for:  Chief Complaint   Patient presents with    Congestive Heart Failure       HPI:     TYPE HF: HFpEF 55-60% Grade 2 DD   Cause: long hx of Afib, severely dilated LA  Device:   HX: Afib-RVR s/p KAMRAN DCCV x4, Afib (eliquis)  Dry Wt:  129 on 10/31/22      Alysia Leija is a 80 y.o. female who presents to the office for a follow up patient visit in the heart failure clinic. Concerns today: at base line today, here for her 1yr f/u. Does note to be more fatigued lately. Hgb of 9.9, no hx of dx anemia. Hx of Afib w/ successful cardioversion in 2021. Urinating well on her lasix, takes it 3 times a week. Does weight herself daily and notes a weight increase if she does not take her diuretic routinely.        Patient follows:      Hospitalization:  > 6 months      Activity: ADLs performed, does fatigue easily  Diet: follows low sodium low fluid daily    Patient has:  Chest Pain: no  SOB: sometimes  Orthopnea/PND: no  CORDELL: never tested  Edema: no  Fatigue: yes  Abdominal bloating: no  Cough: no  Appetite: good      Past Medical History:   Diagnosis Date    A-fib ([facility] [address]     Accidental fall 09/2002    [facility] ER- fell on face on cement    Allergic rhinitis     Arthritis     CHF (congestive heart failure) ([facility] [address]     Encounter for cardioversion procedure 10/05/2017    GERD (gastroesophageal reflux disease)     Hx of transesophageal echocardiography (KAMRAN) for monitoring 10/2017    Mild mitral regurgitation 10/2017    Nonrheumatic aortic valve insufficiency 8/29/2017    Osteoporosis     Pre-op evaluation: prior to left knee repalcement 8/29/2017    PVC's (premature ventricular contractions) 8/29/2017    S/P cardiac catheterization 10/07/2017    with Dr. Michael Osorio -diagonal 90%, circ 90%,     Torn meniscus 12/2016     Past Surgical History:   Procedure Laterality Date    APPENDECTOMY  194    BACK SURGERY  3/24/14    Lumbar Laminectomy Fusion L3-5, L4-5 PLIF - Dr. Asha Keller Left 2017    Rodriguez Left Total Knee    ROTATOR CUFF REPAIR  3/8/06    right shoulder      Family History   Problem Relation Age of Onset    Arthritis Mother     Heart Disease Sister     High Blood Pressure Sister     Arthritis Sister     COPD Sister     Cancer Sister         Skin Cancer    Cancer Brother     Heart Disease Brother     Diabetes Maternal Grandfather     Heart Disease Brother         CHF    Cancer Brother         Lung Cancer     Social History     Tobacco Use    Smoking status: Former     Packs/day: 0.50     Years: 5.00     Pack years: 2.50     Types: Cigarettes     Quit date: 1965     Years since quittin.2    Smokeless tobacco: Never   Substance Use Topics    Alcohol use: Not Currently     Comment: occ.  red wine     Current Outpatient Medications   Medication Sig Dispense Refill    metoprolol tartrate (LOPRESSOR) 25 MG tablet Take 1 tablet by mouth 2 times daily 180 tablet 3    lansoprazole (PREVACID) 30 MG delayed release capsule Take 1 capsule by mouth daily 90 capsule 3    atorvastatin (LIPITOR) 40 MG tablet Take 1 tablet by mouth nightly 90 tablet 3    spironolactone (ALDACTONE) 25 MG tablet Take 0.5 tablets by mouth daily 45 tablet 3    isosorbide mononitrate (IMDUR) 30 MG extended release tablet Take 1 tablet by mouth daily 90 tablet 3    furosemide (LASIX) 20 MG tablet Take 1 tablet by mouth three times a week Mon-Wed-Fri 36 tablet 3    apixaban (ELIQUIS) 2.5 MG TABS tablet Take 1 tablet by mouth 2 times daily 180 tablet 3    vitamin D 25 MCG (1000 UT) CAPS Take 1 capsule by mouth once a week      nitroGLYCERIN (NITROSTAT) 0.4 MG SL tablet Place 1 tablet under the tongue every 5 minutes as needed for Chest pain (SOB) 25 tablet 3    vitamin C (ASCORBIC ACID) 500 MG tablet Take 500 mg by mouth daily      aspirin EC 81 MG EC tablet Take 1 tablet by mouth daily 30 tablet 0    acetaminophen (TYLENOL) 500 MG tablet Take 500 mg by mouth daily       No current facility-administered medications for this visit. Allergies   Allergen Reactions    Amiodarone Other (See Comments)     Prolonged QT; Torsades DP    Ketamine Other (See Comments)     Pt \"made her very anxious/crazy\" when recovering from this drug. SUBJECTIVE:   Review of Systems   Constitutional:  Positive for fatigue. Negative for activity change and appetite change. Respiratory:  Positive for shortness of breath. Negative for cough and wheezing. Cardiovascular:  Negative for chest pain, palpitations and leg swelling. Gastrointestinal:  Negative for abdominal distention and abdominal pain. Musculoskeletal:  Negative for gait problem. Neurological:  Positive for light-headedness. Negative for weakness and headaches. Psychiatric/Behavioral:  Negative for sleep disturbance. OBJECTIVE:   Today's Vitals:  BP (!) 100/58   Pulse 63   Wt 129 lb 9.6 oz (58.8 kg)   SpO2 98%   BMI 25.31 kg/m²     Physical Exam  Vitals reviewed. Constitutional:       General: She is not in acute distress. Appearance: Normal appearance. She is well-developed. She is not diaphoretic. HENT:      Head: Normocephalic and atraumatic. Eyes:      Conjunctiva/sclera: Conjunctivae normal.   Cardiovascular:      Rate and Rhythm: Normal rate and regular rhythm. Heart sounds: Normal heart sounds. No murmur heard. Pulmonary:      Effort: Pulmonary effort is normal. No respiratory distress. Breath sounds: Normal breath sounds. No wheezing, rhonchi or rales. Abdominal:      General: Bowel sounds are normal. There is no distension. Palpations: Abdomen is soft. Tenderness: There is no abdominal tenderness. Musculoskeletal:         General: Normal range of motion. Cervical back: Normal range of motion and neck supple. Right lower leg: No edema.       Left lower leg: No edema. Skin:     General: Skin is warm and dry. Capillary Refill: Capillary refill takes less than 2 seconds. Neurological:      Mental Status: She is alert and oriented to person, place, and time. Coordination: Coordination normal.   Psychiatric:         Behavior: Behavior normal.       Wt Readings from Last 3 Encounters:   10/31/22 129 lb 9.6 oz (58.8 kg)   04/07/22 123 lb 12.8 oz (56.2 kg)   03/14/22 125 lb (56.7 kg)     BP Readings from Last 3 Encounters:   10/31/22 (!) 100/58   04/07/22 (!) 118/58   03/14/22 128/62     Pulse Readings from Last 3 Encounters:   10/31/22 63   04/07/22 62   03/14/22 84     Body mass index is 25.31 kg/m². ECHO:         Summary   Limited echo to rule out pericardial effusion, status post Afib ablation. Trace posterior pericardial effusion. Normal left ventricular size and function. Ejection fraction is visually estimated at 50-55%%. Grade II diastolic dysfunction. Normal right ventricular size and function. Severly dilated left atrium. Mild aortic valve regurgitation. Mild mitral valve regurgitation. Mild tricuspid valve regurgitation. Mild pulmonary valve regurgitation. Signature      ----------------------------------------------------------------   Electronically signed by Codey Wright MD (Interpreting   physician) on 09/04/2021 at 09:33 AM   ----------------------------------------------------------------    Summary  Normal left ventricular size and systolic function. There were no regional wall motion abnormalities. Wall thickness was within normal limits. Ejection fraction was estimated at 55-60%. Doppler parameters were consistent with abnormal left ventricular  relaxation (grade 1 diastolic dysfunction). Left atrial size was severely dilated. There was mild aortic regurgitation. The mitral valve structure demonstrated posterior mitral annular  calcification.  DOPPLER: The transmitral velocity was within the normal  range with no evidence for mitral stenosis. There was moderate to severe  mitral regurgitation (ERO 0.5 cm2, RV 88 cc). Consider KAMRAN for further  evaluation as the color flow appear to be moderate while the hemodynamic  suggest severe. There was mild tricuspid regurgitation. Assuming RAP = 5 mmHg, the  estimated RVSP = 34 mmHg. Ascending aorta = 3.6 cm. IVC size is within normal limits with normal respiratory phasic changes. Signature     ----------------------------------------------------------------  Electronically signed by Dickson Dumont MD (Interpreting  physician) on 01/13/2021 at 11:20 AM  ----------------------------------------------------------------    CATH/STRESS:    Conclusions      Summary   There were no significant perfusion abnormalities. Myocardial perfusion imaging was normal at rest and with stress. Left ventricular systolic function was normal.      Recommendation   Clinical correlation is recommended. Aggressive risk factor management.       Signatures      ----------------------------------------------------------------   Electronically signed by Dickson Dumont MD (Interpreting   Cardiologist) on 07/02/2020 at 18:59   ----------------------------------------------------------------      Results reviewed:  BNP: No results found for: BNP  CBC:   Lab Results   Component Value Date/Time    WBC 7.0 09/03/2021 10:30 AM    RBC 3.49 09/03/2021 10:30 AM    RBC 3.58 07/19/2021 01:36 PM    HGB 9.9 09/03/2021 10:30 AM    HCT 33.6 09/03/2021 10:30 AM     09/03/2021 10:30 AM     CMP:    Lab Results   Component Value Date/Time     10/21/2021 07:35 AM    K 5.4 10/21/2021 07:35 AM    K 4.4 08/05/2021 11:49 AM     10/21/2021 07:35 AM    CO2 25 10/21/2021 07:35 AM    BUN 26 10/21/2021 07:35 AM    CREATININE 0.98 10/21/2021 07:35 AM    LABGLOM 52 09/03/2021 10:30 AM    GLUCOSE 95 10/21/2021 07:35 AM    CALCIUM 9.5 10/21/2021 07:35 AM     Hepatic Function Panel:    Lab Results   Component Value Date/Time    ALKPHOS 81 10/21/2021 07:35 AM    ALKPHOS 83 08/05/2021 11:49 AM    ALT 7 10/21/2021 07:35 AM    AST 21 10/21/2021 07:35 AM    PROT 7.3 10/21/2021 07:35 AM    BILITOT 0.6 10/21/2021 07:35 AM    BILIDIR <0.2 06/15/2021 01:15 PM    LABALBU 4.0 10/21/2021 07:35 AM     Magnesium:    Lab Results   Component Value Date/Time    MG 1.8 08/07/2021 04:20 AM     PT/INR:    Lab Results   Component Value Date/Time    INR 1.38 09/03/2021 10:30 AM     Lipids:    Lab Results   Component Value Date/Time    TRIG 91 10/21/2021 07:35 AM    HDL 59 10/21/2021 07:35 AM    LDLCALC 70 10/21/2021 07:35 AM    LABVLDL 18 10/21/2021 07:35 AM       ASSESSMENT AND PLAN:   The patient's condition/symptoms are stable        Diagnosis Orders   1. Chronic diastolic congestive heart failure, NYHA class 2 (HCC)  Basic Metabolic Panel    Magnesium    Brain Natriuretic Peptide    CBC    Iron    Iron Binding Capacity    Vitamin B12 & Folate        Continue:  GDMT:   ACE/ARB/ARNI -    BB - lopressor 25 BID   Diuretic - Lasix 20 TID  AA - Aldactone 12.5 daily  SGLT2 -    Vasodilator - Imdur 30 daily  Other - Eliquis, ASA      HFpEF 55%, grade II DD  Stable, no fluid on exam today. Urinating well on her lasix, no recent hospital admission. We did discuss decreasing her lopressor, however she does not take it if her SBP under 100 and/or HR under 60. She states the dizziness only last for a little bit in the morning. Lab reviewed - K 5.4, 0.98, Hgb 9.9 (2021)  ECHO 2021: severely dilated LA, Posterior mitral calcification w/ mod to sever MR  CATH none  Negative stress 2017    Repeat blood work today    No med changes today     Will discuss repeat ECHO at next visit d/t fatigue, I do think she needs an anemia work up    Continue diet/fluid adherence  Continue daily wts. F/U w/ Cardiology  F/U in clinic in 6 months      Tolerating above noted HF meds, no ill side effects noted.  Will continue to monitor kidney function and electrolytes. Will optimize as tolerated. Pt is compliant w/ medications. Total visit time of 25 minutes has been spent with patient on education of symptoms, management, medication, and plan of care; as well as review of chart: labs, ECHO, radiology reports, etc.   I personally spent more then 50% of the appt time face to face with the patient. Daily weights  Fluid restriction of 2 Liters per day  Limit sodium in diet to around 5420-3401 mg/day  Monitor BP  Activity as tolerated     Patient was instructed to call the [facility] for any changes in symptoms as noted in AVS.      Return in about 6 months (around 4/30/2023). or sooner if needed     Patient given educational materials - see patient instructions. We discussed the importance of weighing oneself and recording daily. We also discussed the importance of a low sodium diet, higher sodium foods to avoid and better low sodium food options. Patient verbalizes understanding of plan of care using teach back method, and is agreeable to the treatment plan.        Electronically signed by ANDREA Grant CNP on 10/31/2022 at 9:10 AM

## 2022-10-31 NOTE — PATIENT INSTRUCTIONS
You may receive a survey regarding the care you received during your visit. Your input is valuable to us. We encourage you to complete and return your survey. We hope you will choose us in the future for your healthcare needs. Continue:  Continue current medications  Daily weights and record  Fluid restriction of 2 Liters per day  Limit sodium in diet to around 8866-9051 mg/day  Monitor BP  Activity as tolerated     Call the Heart Failure Clinic for any of the following symptoms: [phone]  Weight gain of 2-3 pounds in 1 day or 5 pounds in 1 week  Increased shortness of breath  Shortness of breath while laying down  Cough  Chest pain  Swelling in feet, ankles or legs  Tenderness or bloating in the abdomen  Fatigue   Decreased appetite or nausea   Confusion        Repeat blood work today    No med changes today     Will discuss repeat ECHO at next visit    Continue diet/fluid adherence  Continue daily wts.   F/U w/ Cardiology  F/U in clinic in 6 months

## 2022-11-01 ENCOUNTER — TELEPHONE (OUTPATIENT)
Dept: CARDIOLOGY CLINIC | Age: 87
End: 2022-11-01

## 2022-11-01 NOTE — TELEPHONE ENCOUNTER
----- Message from ANDREA Finch CNP sent at 10/31/2022  4:10 PM EDT -----  Labs reviewed, Hgb has improved to 11.9 from 9.9. Iron is normal. Can we add a TSH / T4 to it?       ANDREA Finch CNP  [facility] Clinical Staff  All labs reviewed, if fatigue continues we will repeat ECHO

## 2023-01-12 ENCOUNTER — HOSPITAL ENCOUNTER (OUTPATIENT)
Dept: INTERVENTIONAL RADIOLOGY/VASCULAR | Age: 88
Discharge: HOME | End: 2023-01-12
Payer: MEDICARE

## 2023-01-12 DIAGNOSIS — M53.3 SI (SACROILIAC) PAIN: ICD-10-CM

## 2023-01-12 PROCEDURE — 2709999900 IR FLUORO GUIDED NEEDLE PLACEMENT

## 2023-01-12 PROCEDURE — 6360000004 HC RX CONTRAST MEDICATION: Performed by: RADIOLOGY

## 2023-01-12 PROCEDURE — 27096 INJECT SACROILIAC JOINT: CPT

## 2023-01-12 PROCEDURE — 2500000003 HC RX 250 WO HCPCS: Performed by: RADIOLOGY

## 2023-01-12 PROCEDURE — 6360000002 HC RX W HCPCS: Performed by: RADIOLOGY

## 2023-01-12 RX ORDER — METHYLPREDNISOLONE ACETATE 80 MG/ML
80 INJECTION, SUSPENSION INTRA-ARTICULAR; INTRALESIONAL; INTRAMUSCULAR; SOFT TISSUE ONCE
Status: COMPLETED | OUTPATIENT
Start: 2023-01-12 | End: 2023-01-12

## 2023-01-12 RX ORDER — BUPIVACAINE HYDROCHLORIDE 2.5 MG/ML
5 INJECTION, SOLUTION EPIDURAL; INFILTRATION; INTRACAUDAL ONCE
Status: COMPLETED | OUTPATIENT
Start: 2023-01-12 | End: 2023-01-12

## 2023-01-12 RX ADMIN — IOHEXOL 1 ML: 180 INJECTION INTRAVENOUS at 13:25

## 2023-01-12 RX ADMIN — BUPIVACAINE HYDROCHLORIDE 4 ML: 2.5 INJECTION, SOLUTION EPIDURAL; INFILTRATION; INTRACAUDAL; PERINEURAL at 13:25

## 2023-01-12 RX ADMIN — METHYLPREDNISOLONE ACETATE 80 MG: 80 INJECTION, SUSPENSION INTRA-ARTICULAR; INTRALESIONAL; INTRAMUSCULAR; SOFT TISSUE at 13:25

## 2023-01-12 ASSESSMENT — PAIN SCALES - GENERAL: PAINLEVEL_OUTOF10: 0

## 2023-01-12 NOTE — PROGRESS NOTES
1307 Pt in specials radiology for right SI joint injection. Explained procedure to pt and pt verbalizes understanding. Consent signed. [id number] Jermaine Sharma Carrier to speak to pt.  2466 Pt positioned prone on table. 1325 Right SI joint injection complete. Pt tolerated well. 1328 Pt discharged ambulatory with steady gait. Pt offers no complaints.

## 2023-01-12 NOTE — H&P
Formulation and discussion of sedation / procedure plans, risks, benefits, side effects and alternatives with patient and/or responsible adult completed. History and Physical reviewed and unchanged.     Electronically signed by Liv Mcdonald MD on 1/12/23 at 1:21 PM EST

## 2023-02-01 NOTE — TELEPHONE ENCOUNTER
Shawanda Funez called requesting a refill on the following medications:  Requested Prescriptions     Pending Prescriptions Disp Refills    furosemide (LASIX) 20 MG tablet 36 tablet 3     Sig: Take 1 tablet by mouth three times a week Mon-Wed-Fri     Pharmacy verified: [facility] on [facility] [facility]  .pv      Date of last visit: 3/14/2022  Date of next visit (if applicable): 1/54/8996

## 2023-02-02 RX ORDER — FUROSEMIDE 20 MG/1
20 TABLET ORAL
Qty: 30 TABLET | Refills: 1 | Status: SHIPPED | OUTPATIENT
Start: 2023-02-03

## 2023-02-08 RX ORDER — ISOSORBIDE MONONITRATE 30 MG/1
30 TABLET, EXTENDED RELEASE ORAL DAILY
Qty: 90 TABLET | Refills: 3 | Status: SHIPPED | OUTPATIENT
Start: 2023-02-08

## 2023-02-08 RX ORDER — SPIRONOLACTONE 25 MG/1
12.5 TABLET ORAL DAILY
Qty: 45 TABLET | Refills: 3 | Status: SHIPPED | OUTPATIENT
Start: 2023-02-08

## 2023-02-08 RX ORDER — LANSOPRAZOLE 30 MG/1
30 CAPSULE, DELAYED RELEASE ORAL DAILY
Qty: 90 CAPSULE | Refills: 3 | Status: SHIPPED | OUTPATIENT
Start: 2023-02-08

## 2023-02-08 RX ORDER — ATORVASTATIN CALCIUM 40 MG/1
40 TABLET, FILM COATED ORAL NIGHTLY
Qty: 90 TABLET | Refills: 3 | Status: SHIPPED | OUTPATIENT
Start: 2023-02-08

## 2023-02-08 NOTE — TELEPHONE ENCOUNTER
Shawanda Funez called requesting a refill on the following medications:  Requested Prescriptions     Pending Prescriptions Disp Refills    spironolactone (ALDACTONE) 25 MG tablet 45 tablet 3     Sig: Take 0.5 tablets by mouth daily    apixaban (ELIQUIS) 2.5 MG TABS tablet 180 tablet 3     Sig: Take 1 tablet by mouth 2 times daily    metoprolol tartrate (LOPRESSOR) 25 MG tablet 180 tablet 3     Sig: Take 1 tablet by mouth 2 times daily    lansoprazole (PREVACID) 30 MG delayed release capsule 90 capsule 3     Sig: Take 1 capsule by mouth daily    atorvastatin (LIPITOR) 40 MG tablet 90 tablet 3     Sig: Take 1 tablet by mouth nightly    isosorbide mononitrate (IMDUR) 30 MG extended release tablet 90 tablet 3     Sig: Take 1 tablet by mouth daily     [facility]   . pv      Date of last visit:   Date of next visit (if applicable): 5/97/6128

## 2023-02-16 ENCOUNTER — HOSPITAL ENCOUNTER (OUTPATIENT)
Dept: INTERVENTIONAL RADIOLOGY/VASCULAR | Age: 88
Discharge: HOME | End: 2023-02-16
Payer: MEDICARE

## 2023-02-16 DIAGNOSIS — R52 PAIN: ICD-10-CM

## 2023-02-16 PROCEDURE — 6360000002 HC RX W HCPCS: Performed by: RADIOLOGY

## 2023-02-16 PROCEDURE — 6360000004 HC RX CONTRAST MEDICATION: Performed by: RADIOLOGY

## 2023-02-16 PROCEDURE — 27096 INJECT SACROILIAC JOINT: CPT

## 2023-02-16 PROCEDURE — 2709999900 IR INJ SI JOINT W FLUORO W OR WO ARTHROGRAPHY RIGHT

## 2023-02-16 PROCEDURE — 2500000003 HC RX 250 WO HCPCS: Performed by: RADIOLOGY

## 2023-02-16 RX ORDER — BUPIVACAINE HYDROCHLORIDE 2.5 MG/ML
5 INJECTION, SOLUTION EPIDURAL; INFILTRATION; INTRACAUDAL ONCE
Status: COMPLETED | OUTPATIENT
Start: 2023-02-16 | End: 2023-02-16

## 2023-02-16 RX ORDER — METHYLPREDNISOLONE ACETATE 80 MG/ML
80 INJECTION, SUSPENSION INTRA-ARTICULAR; INTRALESIONAL; INTRAMUSCULAR; SOFT TISSUE ONCE
Status: COMPLETED | OUTPATIENT
Start: 2023-02-16 | End: 2023-02-16

## 2023-02-16 RX ADMIN — BUPIVACAINE HYDROCHLORIDE 4 ML: 2.5 INJECTION, SOLUTION EPIDURAL; INFILTRATION; INTRACAUDAL; PERINEURAL at 14:36

## 2023-02-16 RX ADMIN — IOHEXOL 1 ML: 180 INJECTION INTRAVENOUS at 14:36

## 2023-02-16 RX ADMIN — METHYLPREDNISOLONE ACETATE 80 MG: 80 INJECTION, SUSPENSION INTRA-ARTICULAR; INTRALESIONAL; INTRAMUSCULAR; SOFT TISSUE at 14:36

## 2023-02-16 ASSESSMENT — PAIN SCALES - GENERAL: PAINLEVEL_OUTOF10: 3

## 2023-02-16 NOTE — OP NOTE
Department of Radiology  Post Procedure Progress Note      Pre-Procedure Diagnosis:  SacroIliitis     Procedure Performed:  Sacroiliac Block/Steroid injection procedure     Anesthesia: local     Findings: successful    Immediate Complications:  None    Estimated Blood Loss: minimal    SEE DICTATED PROCEDURE NOTE FOR COMPLETE DETAILS.       Nataliia Serrano MD   2/16/2023 2:38 PM

## 2023-02-16 NOTE — PROGRESS NOTES
18 Pt in specials radiology for right SI joint injection. Explained procedure to pt and pt verbalizes understanding. Consent signed. 1432 Pt positioned prone on table. [address] Kevin D[address]az to speak to pt.  1436 Right SI joint injection complete. Pt tolerated well. 1441 Pt discharged ambulatory with steady gait. Offers no complaints.

## 2023-03-14 ENCOUNTER — OFFICE VISIT (OUTPATIENT)
Dept: CARDIOLOGY CLINIC | Age: 88
End: 2023-03-14
Payer: MEDICARE

## 2023-03-14 VITALS
HEIGHT: 60 IN | BODY MASS INDEX: 25.29 KG/M2 | HEART RATE: 77 BPM | DIASTOLIC BLOOD PRESSURE: 68 MMHG | WEIGHT: 128.8 LBS | SYSTOLIC BLOOD PRESSURE: 108 MMHG

## 2023-03-14 DIAGNOSIS — Z98.890 STATUS POST ABLATION OF ATRIAL FIBRILLATION: ICD-10-CM

## 2023-03-14 DIAGNOSIS — Z86.79 STATUS POST ABLATION OF ATRIAL FIBRILLATION: ICD-10-CM

## 2023-03-14 DIAGNOSIS — I35.1 NONRHEUMATIC AORTIC VALVE INSUFFICIENCY: ICD-10-CM

## 2023-03-14 DIAGNOSIS — I25.10 CAD, MULTIPLE VESSEL: ICD-10-CM

## 2023-03-14 DIAGNOSIS — I42.8 NONISCHEMIC CARDIOMYOPATHY (HCC): ICD-10-CM

## 2023-03-14 DIAGNOSIS — I50.32 CHRONIC DIASTOLIC CONGESTIVE HEART FAILURE, NYHA CLASS 2 (HCC): Primary | ICD-10-CM

## 2023-03-14 PROCEDURE — G8417 CALC BMI ABV UP PARAM F/U: HCPCS | Performed by: NURSE PRACTITIONER

## 2023-03-14 PROCEDURE — G8427 DOCREV CUR MEDS BY ELIG CLIN: HCPCS | Performed by: NURSE PRACTITIONER

## 2023-03-14 PROCEDURE — 93000 ELECTROCARDIOGRAM COMPLETE: CPT | Performed by: NURSE PRACTITIONER

## 2023-03-14 PROCEDURE — G8484 FLU IMMUNIZE NO ADMIN: HCPCS | Performed by: NURSE PRACTITIONER

## 2023-03-14 PROCEDURE — 1036F TOBACCO NON-USER: CPT | Performed by: NURSE PRACTITIONER

## 2023-03-14 PROCEDURE — 1123F ACP DISCUSS/DSCN MKR DOCD: CPT | Performed by: NURSE PRACTITIONER

## 2023-03-14 PROCEDURE — 1090F PRES/ABSN URINE INCON ASSESS: CPT | Performed by: NURSE PRACTITIONER

## 2023-03-14 PROCEDURE — 99213 OFFICE O/P EST LOW 20 MIN: CPT | Performed by: NURSE PRACTITIONER

## 2023-03-14 NOTE — PROGRESS NOTES
spironolactone (ALDACTONE) 25 MG tablet, Take 0.5 tablets by mouth daily, Disp: 45 tablet, Rfl: 3    apixaban (ELIQUIS) 2.5 MG TABS tablet, Take 1 tablet by mouth 2 times daily, Disp: 180 tablet, Rfl: 3    metoprolol tartrate (LOPRESSOR) 25 MG tablet, Take 1 tablet by mouth 2 times daily, Disp: 180 tablet, Rfl: 3    lansoprazole (PREVACID) 30 MG delayed release capsule, Take 1 capsule by mouth daily, Disp: 90 capsule, Rfl: 3    atorvastatin (LIPITOR) 40 MG tablet, Take 1 tablet by mouth nightly, Disp: 90 tablet, Rfl: 3    isosorbide mononitrate (IMDUR) 30 MG extended release tablet, Take 1 tablet by mouth daily, Disp: 90 tablet, Rfl: 3    furosemide (LASIX) 20 MG tablet, Take 1 tablet by mouth three times a week Mon-Wed-Fri, Disp: 30 tablet, Rfl: 1    acetaminophen (TYLENOL) 500 MG tablet, Take 500 mg by mouth daily, Disp: , Rfl:     vitamin D 25 MCG (1000 UT) CAPS, Take 1 capsule by mouth once a week, Disp: , Rfl:     nitroGLYCERIN (NITROSTAT) 0.4 MG SL tablet, Place 1 tablet under the tongue every 5 minutes as needed for Chest pain (SOB), Disp: 25 tablet, Rfl: 3    vitamin C (ASCORBIC ACID) 500 MG tablet, Take 500 mg by mouth daily, Disp: , Rfl:     aspirin EC 81 MG EC tablet, Take 1 tablet by mouth daily, Disp: 30 tablet, Rfl: 0    Past Medical History  Red Juan  has a past medical history of A-fib ([facility] [address] Accidental fall, Allergic rhinitis, Arthritis, CHF (congestive heart failure) ([facility] [address] Encounter for cardioversion procedure, GERD (gastroesophageal reflux disease), Hx of transesophageal echocardiography (KAMRAN) for monitoring, Mild mitral regurgitation, Nonrheumatic aortic valve insufficiency, Osteoporosis, Pre-op evaluation: prior to left knee repalcement, PVC's (premature ventricular contractions), S/P cardiac catheterization, and Torn meniscus. Social History  Red Juan  reports that she quit smoking about 57 years ago. She has a 2.50 pack-year smoking history.  She has never used smokeless tobacco. She reports that

## 2023-03-14 NOTE — PATIENT INSTRUCTIONS
Continue current medications as prescribed. Stay as active as you can. Eat heart healthy diet. Follow CHF guidelines and follow with CHF clinic as scheduled. Follow-up with your PCP as scheduled. Follow-up with Dr. Delta Rodriguez  in 1 year as scheduled or sooner if need.

## 2023-05-01 ENCOUNTER — OFFICE VISIT (OUTPATIENT)
Dept: CARDIOLOGY CLINIC | Age: 88
End: 2023-05-01
Payer: MEDICARE

## 2023-05-01 ENCOUNTER — NURSE ONLY (OUTPATIENT)
Dept: LAB | Age: 88
End: 2023-05-01

## 2023-05-01 VITALS
OXYGEN SATURATION: 95 % | HEART RATE: 61 BPM | SYSTOLIC BLOOD PRESSURE: 124 MMHG | DIASTOLIC BLOOD PRESSURE: 68 MMHG | BODY MASS INDEX: 25.36 KG/M2 | WEIGHT: 129.2 LBS | HEIGHT: 60 IN

## 2023-05-01 DIAGNOSIS — I42.8 NONISCHEMIC CARDIOMYOPATHY (HCC): ICD-10-CM

## 2023-05-01 DIAGNOSIS — I50.32 CHRONIC DIASTOLIC CONGESTIVE HEART FAILURE, NYHA CLASS 2 (HCC): ICD-10-CM

## 2023-05-01 DIAGNOSIS — I48.0 PAROXYSMAL ATRIAL FIBRILLATION (HCC): ICD-10-CM

## 2023-05-01 DIAGNOSIS — I50.32 CHRONIC DIASTOLIC CONGESTIVE HEART FAILURE, NYHA CLASS 2 (HCC): Primary | ICD-10-CM

## 2023-05-01 LAB
ANION GAP SERPL CALC-SCNC: 12 MEQ/L (ref 8–16)
BUN SERPL-MCNC: 28 MG/DL (ref 7–22)
CALCIUM SERPL-MCNC: 8.8 MG/DL (ref 8.5–10.5)
CHLORIDE SERPL-SCNC: 110 MEQ/L (ref 98–111)
CO2 SERPL-SCNC: 24 MEQ/L (ref 23–33)
CREAT SERPL-MCNC: 0.8 MG/DL (ref 0.4–1.2)
FERRITIN SERPL IA-MCNC: 162 NG/ML (ref 10–291)
GFR SERPL CREATININE-BSD FRML MDRD: > 60 ML/MIN/1.73M2
GLUCOSE SERPL-MCNC: 81 MG/DL (ref 70–108)
HCT VFR BLD AUTO: 38.8 % (ref 37–47)
HGB BLD-MCNC: 11.8 GM/DL (ref 12–16)
IRON SATN MFR SERPL: 33 % (ref 20–50)
IRON SERPL-MCNC: 80 UG/DL (ref 50–170)
MAGNESIUM SERPL-MCNC: 2.2 MG/DL (ref 1.6–2.4)
NT-PROBNP SERPL IA-MCNC: 899.7 PG/ML (ref 0–449)
POTASSIUM SERPL-SCNC: 4.6 MEQ/L (ref 3.5–5.2)
SODIUM SERPL-SCNC: 146 MEQ/L (ref 135–145)
TIBC SERPL-MCNC: 243 UG/DL (ref 171–450)

## 2023-05-01 PROCEDURE — 1123F ACP DISCUSS/DSCN MKR DOCD: CPT | Performed by: NURSE PRACTITIONER

## 2023-05-01 PROCEDURE — 1090F PRES/ABSN URINE INCON ASSESS: CPT | Performed by: NURSE PRACTITIONER

## 2023-05-01 PROCEDURE — 1036F TOBACCO NON-USER: CPT | Performed by: NURSE PRACTITIONER

## 2023-05-01 PROCEDURE — G8427 DOCREV CUR MEDS BY ELIG CLIN: HCPCS | Performed by: NURSE PRACTITIONER

## 2023-05-01 PROCEDURE — G8417 CALC BMI ABV UP PARAM F/U: HCPCS | Performed by: NURSE PRACTITIONER

## 2023-05-01 PROCEDURE — 99214 OFFICE O/P EST MOD 30 MIN: CPT | Performed by: NURSE PRACTITIONER

## 2023-05-01 ASSESSMENT — ENCOUNTER SYMPTOMS
ABDOMINAL DISTENTION: 0
SHORTNESS OF BREATH: 1
WHEEZING: 0
COUGH: 0
ABDOMINAL PAIN: 0

## 2023-05-01 NOTE — PATIENT INSTRUCTIONS
You may receive a survey regarding the care you received during your visit. Your input is valuable to us. We encourage you to complete and return your survey. We hope you will choose us in the future for your healthcare needs. Continue:  Continue current medications  Daily weights and record  Fluid restriction of 2 Liters per day  Limit sodium in diet to around 7828-3953 mg/day  Monitor BP  Activity as tolerated     Call the Heart Failure Clinic for any of the following symptoms: [phone]  Weight gain of 2-3 pounds in 1 day or 5 pounds in 1 week  Increased shortness of breath  Shortness of breath while laying down  Cough  Chest pain  Swelling in feet, ankles or legs  Tenderness or bloating in the abdomen  Fatigue   Decreased appetite or nausea   Confusion            Repeat blood work with in the week    Start taking 12.5mg ( half a tab ) twice a day  Call off office if pulse is staying above 80bpm     Complete ECHO ordered     Continue diet/fluid adherence  Continue daily wts.   F/U w/ Cardiology  F/U in clinic in 6 months

## 2023-05-01 NOTE — PROGRESS NOTES
10/31/2022 09:25 AM    RBC 3.83 10/31/2022 09:25 AM    RBC 3.58 07/19/2021 01:36 PM    HGB 11.9 10/31/2022 09:25 AM    HCT 38.6 10/31/2022 09:25 AM     10/31/2022 09:25 AM     CMP:    Lab Results   Component Value Date/Time     10/31/2022 09:25 AM    K 4.7 10/31/2022 09:25 AM    K 4.4 08/05/2021 11:49 AM     10/31/2022 09:25 AM    CO2 24 10/31/2022 09:25 AM    BUN 28 10/31/2022 09:25 AM    CREATININE 0.9 10/31/2022 09:25 AM    LABGLOM >60 10/31/2022 08:51 AM    GLUCOSE 97 10/31/2022 09:25 AM    GLUCOSE 95 10/21/2021 07:35 AM    CALCIUM 9.1 10/31/2022 09:25 AM     Hepatic Function Panel:    Lab Results   Component Value Date/Time    ALKPHOS 81 10/21/2021 07:35 AM    ALKPHOS 83 08/05/2021 11:49 AM    ALT 7 10/21/2021 07:35 AM    AST 21 10/21/2021 07:35 AM    PROT 7.3 10/21/2021 07:35 AM    BILITOT 0.6 10/21/2021 07:35 AM    BILIDIR <0.2 06/15/2021 01:15 PM    LABALBU 4.0 10/21/2021 07:35 AM     Magnesium:    Lab Results   Component Value Date/Time    MG 2.4 10/31/2022 09:25 AM     PT/INR:    Lab Results   Component Value Date/Time    INR 1.38 09/03/2021 10:30 AM     Lipids:    Lab Results   Component Value Date/Time    TRIG 91 10/21/2021 07:35 AM    HDL 59 10/21/2021 07:35 AM    LDLCALC 70 10/21/2021 07:35 AM    LABVLDL 18 10/21/2021 07:35 AM       ASSESSMENT AND PLAN:   The patient's condition/symptoms are stable        Diagnosis Orders   1. Chronic diastolic congestive heart failure, NYHA class 2 (HCC)  Basic Metabolic Panel    Magnesium    Brain Natriuretic Peptide    Hemoglobin and Hematocrit    Ferritin    Iron    Iron Binding Capacity    IRON SATURATION    Echo 2D w doppler w color complete      2.  Paroxysmal atrial fibrillation (HCC)        3. improved Nonischemic cardiomyopathy (HCC)          Continue:  GDMT:   ACE/ARB/ARNI -    BB - lopressor 12.5 BID   Diuretic - Lasix 20 three times weekly   AA - Aldactone 12.5 daily  SGLT2 -    Vasodilator - Imdur 30 daily  Other - Eliquis,

## 2023-05-02 ENCOUNTER — HOSPITAL ENCOUNTER (OUTPATIENT)
Dept: NON INVASIVE DIAGNOSTICS | Age: 88
Discharge: HOME | End: 2023-05-02
Payer: MEDICARE

## 2023-05-02 DIAGNOSIS — I50.32 CHRONIC DIASTOLIC CONGESTIVE HEART FAILURE, NYHA CLASS 2 (HCC): ICD-10-CM

## 2023-05-02 LAB
LV EF: 53 %
LVEF MODALITY: NORMAL

## 2023-05-02 PROCEDURE — 93306 TTE W/DOPPLER COMPLETE: CPT

## 2023-05-03 DIAGNOSIS — I50.32 CHRONIC DIASTOLIC CONGESTIVE HEART FAILURE, NYHA CLASS 2 (HCC): Primary | ICD-10-CM

## 2023-05-03 RX ORDER — FUROSEMIDE 20 MG/1
20 TABLET ORAL DAILY
Qty: 90 TABLET | Refills: 3 | Status: SHIPPED | OUTPATIENT
Start: 2023-05-03

## 2023-05-03 RX ORDER — FUROSEMIDE 20 MG/1
20 TABLET ORAL DAILY
Qty: 90 TABLET | Refills: 3 | Status: SHIPPED | OUTPATIENT
Start: 2023-05-03 | End: 2023-05-03 | Stop reason: SDUPTHER

## 2023-05-03 NOTE — TELEPHONE ENCOUNTER
----- Message from ANDREA Sampson CNP sent at 5/3/2023  8:45 AM EDT -----  No major concerns with ECHO however does show fluid. I would like to try taking Lasix 20mg daily, currently taking three times weekly.    BMP - two weeks 23

## 2023-05-17 ENCOUNTER — TELEPHONE (OUTPATIENT)
Dept: CARDIOLOGY CLINIC | Age: 88
End: 2023-05-17

## 2023-05-17 ENCOUNTER — NURSE ONLY (OUTPATIENT)
Dept: LAB | Age: 88
End: 2023-05-17

## 2023-05-17 DIAGNOSIS — I50.32 CHRONIC DIASTOLIC CONGESTIVE HEART FAILURE, NYHA CLASS 2 (HCC): Primary | ICD-10-CM

## 2023-05-17 DIAGNOSIS — I50.32 CHRONIC DIASTOLIC CONGESTIVE HEART FAILURE, NYHA CLASS 2 (HCC): ICD-10-CM

## 2023-05-17 LAB
ANION GAP SERPL CALC-SCNC: 13 MEQ/L (ref 8–16)
BUN SERPL-MCNC: 18 MG/DL (ref 7–22)
CALCIUM SERPL-MCNC: 9.1 MG/DL (ref 8.5–10.5)
CHLORIDE SERPL-SCNC: 102 MEQ/L (ref 98–111)
CO2 SERPL-SCNC: 23 MEQ/L (ref 23–33)
CREAT SERPL-MCNC: 0.9 MG/DL (ref 0.4–1.2)
GFR SERPL CREATININE-BSD FRML MDRD: > 60 ML/MIN/1.73M2
GLUCOSE SERPL-MCNC: 112 MG/DL (ref 70–108)
POTASSIUM SERPL-SCNC: 3.9 MEQ/L (ref 3.5–5.2)
SODIUM SERPL-SCNC: 138 MEQ/L (ref 135–145)

## 2023-05-17 NOTE — TELEPHONE ENCOUNTER
----- Message from ANDREA Henry CNP sent at 5/17/2023 11:21 AM EDT -----  Labs reviewed no medication changes - if willing to eat a banana a couple times a week.  BMP in 3 months

## 2023-05-18 ENCOUNTER — HOSPITAL ENCOUNTER (EMERGENCY)
Age: 88
Discharge: HOME | End: 2023-05-18
Payer: MEDICARE

## 2023-05-18 ENCOUNTER — APPOINTMENT (OUTPATIENT)
Dept: GENERAL RADIOLOGY | Age: 88
End: 2023-05-18
Payer: MEDICARE

## 2023-05-18 VITALS
SYSTOLIC BLOOD PRESSURE: 121 MMHG | TEMPERATURE: 98.7 F | HEART RATE: 79 BPM | RESPIRATION RATE: 20 BRPM | DIASTOLIC BLOOD PRESSURE: 68 MMHG | OXYGEN SATURATION: 96 % | BODY MASS INDEX: 24.41 KG/M2 | WEIGHT: 125 LBS
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
## 2023-05-18 DIAGNOSIS — S69.92XA LEFT WRIST INJURY, INITIAL ENCOUNTER: ICD-10-CM

## 2023-05-18 PROCEDURE — 73110 X-RAY EXAM OF WRIST: CPT

## 2023-05-18 PROCEDURE — 99213 OFFICE O/P EST LOW 20 MIN: CPT

## 2023-05-18 PROCEDURE — 99213 OFFICE O/P EST LOW 20 MIN: CPT | Performed by: NURSE PRACTITIONER

## 2023-05-18 ASSESSMENT — PAIN DESCRIPTION - LOCATION: LOCATION: WRIST

## 2023-05-18 ASSESSMENT — PAIN SCALES - GENERAL: PAINLEVEL_OUTOF10: 6

## 2023-05-18 ASSESSMENT — PAIN DESCRIPTION - ORIENTATION: ORIENTATION: LEFT

## 2023-05-18 ASSESSMENT — PAIN - FUNCTIONAL ASSESSMENT
PAIN_FUNCTIONAL_ASSESSMENT: ACTIVITIES ARE NOT PREVENTED
PAIN_FUNCTIONAL_ASSESSMENT: 0-10

## 2023-05-18 ASSESSMENT — PAIN DESCRIPTION - DESCRIPTORS: DESCRIPTORS: ACHING

## 2023-05-18 ASSESSMENT — PAIN DESCRIPTION - FREQUENCY: FREQUENCY: CONTINUOUS

## 2023-05-18 ASSESSMENT — PAIN DESCRIPTION - PAIN TYPE: TYPE: ACUTE PAIN

## 2023-05-18 NOTE — ED PROVIDER NOTES
APIXABAN (ELIQUIS) 2.5 MG TABS TABLET    Take 1 tablet by mouth 2 times daily    ASPIRIN EC 81 MG EC TABLET    Take 1 tablet by mouth daily    ATORVASTATIN (LIPITOR) 40 MG TABLET    Take 1 tablet by mouth nightly    FUROSEMIDE (LASIX) 20 MG TABLET    Take 1 tablet by mouth daily    ISOSORBIDE MONONITRATE (IMDUR) 30 MG EXTENDED RELEASE TABLET    Take 1 tablet by mouth daily    LANSOPRAZOLE (PREVACID) 30 MG DELAYED RELEASE CAPSULE    Take 1 capsule by mouth daily    METOPROLOL TARTRATE (LOPRESSOR) 25 MG TABLET    Take 0.5 tablets by mouth 2 times daily    NITROGLYCERIN (NITROSTAT) 0.4 MG SL TABLET    Place 1 tablet under the tongue every 5 minutes as needed for Chest pain (SOB)    SPIRONOLACTONE (ALDACTONE) 25 MG TABLET    Take 0.5 tablets by mouth daily    VITAMIN C (ASCORBIC ACID) 500 MG TABLET    Take 1 tablet by mouth daily    VITAMIN D 25 MCG (1000 UT) CAPS    Take 1 capsule by mouth once a week       ALLERGIES     Patient is is allergic to amiodarone and ketamine. Patients   Immunization History   Administered Date(s) Administered    COVID-19, MODERNA BLUE border, Primary or Immunocompromised, (age 12y+), IM, 100 mcg/0.5mL 01/28/2021, 02/23/2021    Influenza Vaccine, unspecified formulation 10/02/2014    Influenza, FLUAD, (age 72 y+), Adjuvanted, 0.5mL 10/05/2021    Influenza, High Dose (Fluzone 65 yrs and older) 10/04/2018, 09/28/2019    Pneumococcal, PPSV23, PNEUMOVAX 21, (age 2y+), SC/IM, 0.5mL 10/31/2013    TDaP, ADACEL (age 10y-63y), Nelwyn Breeding (age 10y+), IM, 0.5mL 09/02/2020       FAMILY HISTORY     Patient's family history includes Arthritis in her mother and sister; COPD in her sister; Cancer in her brother, brother, and sister; Diabetes in her maternal grandfather; Heart Disease in her brother, brother, and sister; High Blood Pressure in her sister. SOCIAL HISTORY     Patient  reports that she quit smoking about 57 years ago. She has a 2.50 pack-year smoking history.  She has never used smokeless

## 2023-05-18 NOTE — ED TRIAGE NOTES
Patient to room with c/o left wrist pain beginning one hour ago after falling while in her yard. No edema or discoloration noted at this time. Cold pack applied.

## 2023-08-15 ENCOUNTER — NURSE ONLY (OUTPATIENT)
Dept: LAB | Age: 88
End: 2023-08-15

## 2023-08-15 DIAGNOSIS — I50.32 CHRONIC DIASTOLIC CONGESTIVE HEART FAILURE, NYHA CLASS 2 (HCC): ICD-10-CM

## 2023-08-15 LAB
ANION GAP SERPL CALC-SCNC: 10 MEQ/L (ref 8–16)
BUN SERPL-MCNC: 22 MG/DL (ref 7–22)
CALCIUM SERPL-MCNC: 9.1 MG/DL (ref 8.5–10.5)
CHLORIDE SERPL-SCNC: 104 MEQ/L (ref 98–111)
CO2 SERPL-SCNC: 26 MEQ/L (ref 23–33)
CREAT SERPL-MCNC: 1 MG/DL (ref 0.4–1.2)
GFR SERPL CREATININE-BSD FRML MDRD: 54 ML/MIN/1.73M2
GLUCOSE SERPL-MCNC: 82 MG/DL (ref 70–108)
POTASSIUM SERPL-SCNC: 4.4 MEQ/L (ref 3.5–5.2)
SODIUM SERPL-SCNC: 140 MEQ/L (ref 135–145)

## 2023-11-06 ENCOUNTER — OFFICE VISIT (OUTPATIENT)
Dept: CARDIOLOGY CLINIC | Age: 88
End: 2023-11-06
Payer: MEDICARE

## 2023-11-06 VITALS
OXYGEN SATURATION: 99 % | SYSTOLIC BLOOD PRESSURE: 108 MMHG | BODY MASS INDEX: 24.84 KG/M2 | DIASTOLIC BLOOD PRESSURE: 68 MMHG | WEIGHT: 127.2 LBS | HEART RATE: 66 BPM

## 2023-11-06 DIAGNOSIS — I42.8 NONISCHEMIC CARDIOMYOPATHY (HCC): ICD-10-CM

## 2023-11-06 DIAGNOSIS — I50.32 CHRONIC DIASTOLIC CONGESTIVE HEART FAILURE, NYHA CLASS 2 (HCC): Primary | ICD-10-CM

## 2023-11-06 DIAGNOSIS — I48.0 PAROXYSMAL ATRIAL FIBRILLATION (HCC): ICD-10-CM

## 2023-11-06 PROCEDURE — 1036F TOBACCO NON-USER: CPT | Performed by: NURSE PRACTITIONER

## 2023-11-06 PROCEDURE — G8420 CALC BMI NORM PARAMETERS: HCPCS | Performed by: NURSE PRACTITIONER

## 2023-11-06 PROCEDURE — 1123F ACP DISCUSS/DSCN MKR DOCD: CPT | Performed by: NURSE PRACTITIONER

## 2023-11-06 PROCEDURE — 99214 OFFICE O/P EST MOD 30 MIN: CPT | Performed by: NURSE PRACTITIONER

## 2023-11-06 PROCEDURE — G8427 DOCREV CUR MEDS BY ELIG CLIN: HCPCS | Performed by: NURSE PRACTITIONER

## 2023-11-06 PROCEDURE — 1090F PRES/ABSN URINE INCON ASSESS: CPT | Performed by: NURSE PRACTITIONER

## 2023-11-06 PROCEDURE — G8484 FLU IMMUNIZE NO ADMIN: HCPCS | Performed by: NURSE PRACTITIONER

## 2023-11-06 ASSESSMENT — ENCOUNTER SYMPTOMS
COUGH: 0
SHORTNESS OF BREATH: 1
ABDOMINAL PAIN: 0
WHEEZING: 0
ABDOMINAL DISTENTION: 0

## 2023-11-06 NOTE — PATIENT INSTRUCTIONS
You may receive a survey regarding the care you received during your visit. Your input is valuable to us. We encourage you to complete and return your survey. We hope you will choose us in the future for your healthcare needs. Your nurses today were Rosa and Heroicve. Office hours:   Mon-Thurs 8-4:30  Friday 8-12  Phone: [phone]    Continue:  Continue current medications  Daily weights and record  Fluid restriction of 2 Liters per day  Limit sodium in diet to around 6355-5028 mg/day  Monitor BP  Activity as tolerated     Call the [address] for any of the following symptoms:   Weight gain of 2-3 pounds in 1 day or 5 pounds in 1 week  Increased shortness of breath  Shortness of breath while laying down  Cough  Chest pain  Swelling in feet, ankles or legs  Bloating in abdomen  Fatigue        No medication changes today    Continue diet/fluid adherence  Continue daily wts.   F/U w/ Cardiology  F/U in clinic in 6 months

## 2024-02-12 RX ORDER — ATORVASTATIN CALCIUM 40 MG/1
40 TABLET, FILM COATED ORAL NIGHTLY
Qty: 90 TABLET | Refills: 3 | Status: SHIPPED | OUTPATIENT
Start: 2024-02-12

## 2024-02-12 RX ORDER — LANSOPRAZOLE 30 MG/1
30 CAPSULE, DELAYED RELEASE ORAL DAILY
Qty: 90 CAPSULE | Refills: 3 | Status: SHIPPED | OUTPATIENT
Start: 2024-02-12

## 2024-02-12 RX ORDER — SPIRONOLACTONE 25 MG/1
12.5 TABLET ORAL DAILY
Qty: 45 TABLET | Refills: 3 | Status: SHIPPED | OUTPATIENT
Start: 2024-02-12

## 2024-02-12 RX ORDER — ISOSORBIDE MONONITRATE 30 MG/1
30 TABLET, EXTENDED RELEASE ORAL DAILY
Qty: 90 TABLET | Refills: 3 | Status: SHIPPED | OUTPATIENT
Start: 2024-02-12

## 2024-03-05 ENCOUNTER — OFFICE VISIT (OUTPATIENT)
Dept: FAMILY MEDICINE CLINIC | Age: 89
End: 2024-03-05
Payer: MEDICARE

## 2024-03-05 VITALS
TEMPERATURE: 99 F | BODY MASS INDEX: 24.8 KG/M2 | RESPIRATION RATE: 16 BRPM | SYSTOLIC BLOOD PRESSURE: 88 MMHG | WEIGHT: 127 LBS | DIASTOLIC BLOOD PRESSURE: 54 MMHG | OXYGEN SATURATION: 96 % | HEART RATE: 79 BPM

## 2024-03-05 DIAGNOSIS — I50.32 CHRONIC DIASTOLIC CONGESTIVE HEART FAILURE, NYHA CLASS 2 (HCC): ICD-10-CM

## 2024-03-05 DIAGNOSIS — I42.8 NONISCHEMIC CARDIOMYOPATHY (HCC): ICD-10-CM

## 2024-03-05 DIAGNOSIS — I48.0 PAROXYSMAL ATRIAL FIBRILLATION (HCC): ICD-10-CM

## 2024-03-05 DIAGNOSIS — J20.9 ACUTE BRONCHITIS, UNSPECIFIED ORGANISM: Primary | ICD-10-CM

## 2024-03-05 DIAGNOSIS — R05.1 ACUTE COUGH: ICD-10-CM

## 2024-03-05 PROCEDURE — G8427 DOCREV CUR MEDS BY ELIG CLIN: HCPCS | Performed by: NURSE PRACTITIONER

## 2024-03-05 PROCEDURE — 1090F PRES/ABSN URINE INCON ASSESS: CPT | Performed by: NURSE PRACTITIONER

## 2024-03-05 PROCEDURE — 1036F TOBACCO NON-USER: CPT | Performed by: NURSE PRACTITIONER

## 2024-03-05 PROCEDURE — G8484 FLU IMMUNIZE NO ADMIN: HCPCS | Performed by: NURSE PRACTITIONER

## 2024-03-05 PROCEDURE — 99214 OFFICE O/P EST MOD 30 MIN: CPT | Performed by: NURSE PRACTITIONER

## 2024-03-05 PROCEDURE — 1123F ACP DISCUSS/DSCN MKR DOCD: CPT | Performed by: NURSE PRACTITIONER

## 2024-03-05 PROCEDURE — 87635 SARS-COV-2 COVID-19 AMP PRB: CPT | Performed by: NURSE PRACTITIONER

## 2024-03-05 PROCEDURE — G8420 CALC BMI NORM PARAMETERS: HCPCS | Performed by: NURSE PRACTITIONER

## 2024-03-05 RX ORDER — PREDNISONE 20 MG/1
20 TABLET ORAL 2 TIMES DAILY
Qty: 10 TABLET | Refills: 0 | Status: SHIPPED | OUTPATIENT
Start: 2024-03-05 | End: 2024-03-10

## 2024-03-05 RX ORDER — AMOXICILLIN AND CLAVULANATE POTASSIUM 500; 125 MG/1; MG/1
1 TABLET, FILM COATED ORAL 2 TIMES DAILY
Qty: 20 TABLET | Refills: 0 | Status: SHIPPED | OUTPATIENT
Start: 2024-03-05 | End: 2024-03-15

## 2024-03-05 RX ORDER — CODEINE PHOSPHATE/GUAIFENESIN 10-100MG/5
4 LIQUID (ML) ORAL 2 TIMES DAILY PRN
Qty: 40 ML | Refills: 0 | Status: SHIPPED | OUTPATIENT
Start: 2024-03-05 | End: 2024-03-10

## 2024-03-05 SDOH — ECONOMIC STABILITY: INCOME INSECURITY: HOW HARD IS IT FOR YOU TO PAY FOR THE VERY BASICS LIKE FOOD, HOUSING, MEDICAL CARE, AND HEATING?: NOT HARD AT ALL

## 2024-03-05 SDOH — ECONOMIC STABILITY: HOUSING INSECURITY
IN THE LAST 12 MONTHS, WAS THERE A TIME WHEN YOU DID NOT HAVE A STEADY PLACE TO SLEEP OR SLEPT IN A SHELTER (INCLUDING NOW)?: NO

## 2024-03-05 SDOH — ECONOMIC STABILITY: FOOD INSECURITY: WITHIN THE PAST 12 MONTHS, THE FOOD YOU BOUGHT JUST DIDN'T LAST AND YOU DIDN'T HAVE MONEY TO GET MORE.: NEVER TRUE

## 2024-03-05 SDOH — ECONOMIC STABILITY: FOOD INSECURITY: WITHIN THE PAST 12 MONTHS, YOU WORRIED THAT YOUR FOOD WOULD RUN OUT BEFORE YOU GOT MONEY TO BUY MORE.: NEVER TRUE

## 2024-03-05 ASSESSMENT — PATIENT HEALTH QUESTIONNAIRE - PHQ9
SUM OF ALL RESPONSES TO PHQ QUESTIONS 1-9: 0
SUM OF ALL RESPONSES TO PHQ9 QUESTIONS 1 & 2: 0
SUM OF ALL RESPONSES TO PHQ QUESTIONS 1-9: 0
SUM OF ALL RESPONSES TO PHQ QUESTIONS 1-9: 0
2. FEELING DOWN, DEPRESSED OR HOPELESS: 0
SUM OF ALL RESPONSES TO PHQ QUESTIONS 1-9: 0
1. LITTLE INTEREST OR PLEASURE IN DOING THINGS: 0

## 2024-03-05 ASSESSMENT — ENCOUNTER SYMPTOMS
RHINORRHEA: 0
EYE PAIN: 0
WHEEZING: 0
EYE DISCHARGE: 0
BACK PAIN: 0
EYE REDNESS: 0
SORE THROAT: 1
CONSTIPATION: 0
ABDOMINAL PAIN: 0
SHORTNESS OF BREATH: 0
DIARRHEA: 0
COUGH: 1
VOMITING: 0
TROUBLE SWALLOWING: 0
NAUSEA: 0
ALLERGIC/IMMUNOLOGIC NEGATIVE: 1

## 2024-03-05 NOTE — PROGRESS NOTES
[facility] PROFESSIONAL SERVS  [facility]  [address] [id number]  Dept: [phone]  Dept Fax: [phone]  Loc: [phone]     Visit Date:  3/5/2024      Patient:  Shawanda Funez  YOB: 1934    HPI:     Chief Complaint   Patient presents with    Cough     And sore throat and chest pain from coughing- symptoms started 03/02/2024       Patient with 4 days of cough, congestion, sore throat; with cough getting worse and keeping her awake at night.  Denies fever, chills, headache, wheezing, stridor, diarrhea or constipation.  Patient is a non-smoker.    HCC diagnoses need reviewed.        Medications    Current Outpatient Medications:     amoxicillin-clavulanate (AUGMENTIN) 500-125 MG per tablet, Take 1 tablet by mouth 2 times daily for 10 days, Disp: 20 tablet, Rfl: 0    predniSONE (DELTASONE) 20 MG tablet, Take 1 tablet by mouth 2 times daily for 5 days, Disp: 10 tablet, Rfl: 0    guaiFENesin-codeine (TUSSI-ORGANIDIN NR) 100-10 MG/5ML syrup, Take 4 mLs by mouth 2 times daily as needed for Cough for up to 5 days. Max Daily Amount: 8 mLs, Disp: 40 mL, Rfl: 0    isosorbide mononitrate (IMDUR) 30 MG extended release tablet, Take 1 tablet by mouth daily, Disp: 90 tablet, Rfl: 3    lansoprazole (PREVACID) 30 MG delayed release capsule, Take 1 capsule by mouth daily, Disp: 90 capsule, Rfl: 3    spironolactone (ALDACTONE) 25 MG tablet, Take 0.5 tablets by mouth daily, Disp: 45 tablet, Rfl: 3    apixaban (ELIQUIS) 2.5 MG TABS tablet, Take 1 tablet by mouth 2 times daily, Disp: 180 tablet, Rfl: 3    atorvastatin (LIPITOR) 40 MG tablet, Take 1 tablet by mouth nightly, Disp: 90 tablet, Rfl: 3    furosemide (LASIX) 20 MG tablet, Take 1 tablet by mouth daily, Disp: 90 tablet, Rfl: 3    metoprolol tartrate (LOPRESSOR) 25 MG tablet, Take 0.5 tablets by mouth 2 times daily, Disp: 90 tablet, Rfl: 3    vitamin C (ASCORBIC ACID) 500 MG tablet, Take 1 tablet by mouth daily,

## 2024-03-07 ENCOUNTER — HOSPITAL ENCOUNTER (EMERGENCY)
Age: 89
Discharge: HOME | End: 2024-03-07
Payer: MEDICARE

## 2024-03-07 VITALS
DIASTOLIC BLOOD PRESSURE: 76 MMHG | HEART RATE: 71 BPM | SYSTOLIC BLOOD PRESSURE: 115 MMHG | TEMPERATURE: 97.9 F | RESPIRATION RATE: 16 BRPM | OXYGEN SATURATION: 96 %

## 2024-03-07 DIAGNOSIS — J01.00 ACUTE MAXILLARY SINUSITIS, RECURRENCE NOT SPECIFIED: Primary | ICD-10-CM

## 2024-03-07 PROCEDURE — 99213 OFFICE O/P EST LOW 20 MIN: CPT | Performed by: NURSE PRACTITIONER

## 2024-03-07 RX ORDER — AZELASTINE 1 MG/ML
1 SPRAY, METERED NASAL 2 TIMES DAILY
Qty: 30 ML | Refills: 0 | Status: SHIPPED | OUTPATIENT
Start: 2024-03-07

## 2024-03-07 ASSESSMENT — ENCOUNTER SYMPTOMS
COUGH: 1
CHOKING: 0
SHORTNESS OF BREATH: 0
CHEST TIGHTNESS: 0
APNEA: 0
SWOLLEN GLANDS: 0
STRIDOR: 0
WHEEZING: 0
SORE THROAT: 0
SINUS PAIN: 0
RHINORRHEA: 1
SINUS PRESSURE: 1

## 2024-03-07 ASSESSMENT — PAIN - FUNCTIONAL ASSESSMENT: PAIN_FUNCTIONAL_ASSESSMENT: NONE - DENIES PAIN

## 2024-03-07 NOTE — ED PROVIDER NOTES
procedure 10/05/2017    GERD (gastroesophageal reflux disease)     Hx of transesophageal echocardiography (KAMRAN) for monitoring 10/2017    Mild mitral regurgitation 10/2017    Nonrheumatic aortic valve insufficiency 8/29/2017    Osteoporosis     Pre-op evaluation: prior to left knee repalcement 8/29/2017    PVC's (premature ventricular contractions) 8/29/2017    S/P cardiac catheterization 10/07/2017    with Dr. Mendoza -diagonal 90%, circ 90%,     Torn meniscus 12/2016       SURGICAL HISTORY     Patient  has a past surgical history that includes Rotator cuff repair (3/8/06); Appendectomy (1946); back surgery (3/24/14); eye surgery; and joint replacement (Left, 09/05/2017).    CURRENT MEDICATIONS       Discharge Medication List as of 3/7/2024  2:27 PM        CONTINUE these medications which have NOT CHANGED    Details   amoxicillin-clavulanate (AUGMENTIN) 500-125 MG per tablet Take 1 tablet by mouth 2 times daily for 10 days, Disp-20 tablet, R-0Normal      predniSONE (DELTASONE) 20 MG tablet Take 1 tablet by mouth 2 times daily for 5 days, Disp-10 tablet, R-0Normal      guaiFENesin-codeine (TUSSI-ORGANIDIN NR) 100-10 MG/5ML syrup Take 4 mLs by mouth 2 times daily as needed for Cough for up to 5 days. Max Daily Amount: 8 mLs, Disp-40 mL, R-0Normal      isosorbide mononitrate (IMDUR) 30 MG extended release tablet Take 1 tablet by mouth daily, Disp-90 tablet, R-3Normal      lansoprazole (PREVACID) 30 MG delayed release capsule Take 1 capsule by mouth daily, Disp-90 capsule, R-3Normal      spironolactone (ALDACTONE) 25 MG tablet Take 0.5 tablets by mouth daily, Disp-45 tablet, R-3Normal      apixaban (ELIQUIS) 2.5 MG TABS tablet Take 1 tablet by mouth 2 times daily, Disp-180 tablet, R-3Normal      atorvastatin (LIPITOR) 40 MG tablet Take 1 tablet by mouth nightly, Disp-90 tablet, R-3Normal      furosemide (LASIX) 20 MG tablet Take 1 tablet by mouth daily, Disp-90 tablet, R-3Normal      metoprolol tartrate (LOPRESSOR) 25 MG

## 2024-03-07 NOTE — DISCHARGE INSTRUCTIONS
Drink lots of fluids  Take Motrin or Tylenol for headache  Humidification of air  Use nasal spray as directed  Take a nasal decongestant as directed  Monitor for any fever increased and sinus pain or pressure  Follow-up see her primary care provider in 48 hours  Or go to the emergency department for any changes or concerns.

## 2024-03-07 NOTE — ED NOTES
Pt presents to UC for c/o cough x 1 week. Pt is taking Augmentin and prednisone for this     Walt Washington LPN  03/07/24 0638

## 2024-04-01 ENCOUNTER — TELEPHONE (OUTPATIENT)
Dept: CARDIOLOGY CLINIC | Age: 89
End: 2024-04-01

## 2024-05-10 NOTE — TELEPHONE ENCOUNTER
Shawanda is requesting a refill of their   Requested Prescriptions     Pending Prescriptions Disp Refills    metoprolol tartrate (LOPRESSOR) 25 MG tablet 90 tablet 3     Sig: Take 0.5 tablets by mouth 2 times daily   . Please advise.      Last Appt:  Visit date not found  Next Appt:   Visit date not found  Preferred pharmacy:   [id number] - [place], [place] - [address] [phone] - F [phone]

## 2024-05-28 ENCOUNTER — OFFICE VISIT (OUTPATIENT)
Dept: CARDIOLOGY CLINIC | Age: 89
End: 2024-05-28
Payer: MEDICARE

## 2024-05-28 VITALS
SYSTOLIC BLOOD PRESSURE: 110 MMHG | WEIGHT: 125.4 LBS | DIASTOLIC BLOOD PRESSURE: 82 MMHG | HEIGHT: 60 IN | HEART RATE: 61 BPM | BODY MASS INDEX: 24.62 KG/M2

## 2024-05-28 DIAGNOSIS — I48.0 PAROXYSMAL ATRIAL FIBRILLATION (HCC): Primary | ICD-10-CM

## 2024-05-28 DIAGNOSIS — I50.32 CHRONIC DIASTOLIC CONGESTIVE HEART FAILURE, NYHA CLASS 2 (HCC): ICD-10-CM

## 2024-05-28 PROCEDURE — G8420 CALC BMI NORM PARAMETERS: HCPCS | Performed by: INTERNAL MEDICINE

## 2024-05-28 PROCEDURE — 99214 OFFICE O/P EST MOD 30 MIN: CPT | Performed by: INTERNAL MEDICINE

## 2024-05-28 PROCEDURE — 93000 ELECTROCARDIOGRAM COMPLETE: CPT | Performed by: INTERNAL MEDICINE

## 2024-05-28 PROCEDURE — G8427 DOCREV CUR MEDS BY ELIG CLIN: HCPCS | Performed by: INTERNAL MEDICINE

## 2024-05-28 PROCEDURE — 1090F PRES/ABSN URINE INCON ASSESS: CPT | Performed by: INTERNAL MEDICINE

## 2024-05-28 PROCEDURE — 1036F TOBACCO NON-USER: CPT | Performed by: INTERNAL MEDICINE

## 2024-05-28 PROCEDURE — 1123F ACP DISCUSS/DSCN MKR DOCD: CPT | Performed by: INTERNAL MEDICINE

## 2024-05-28 NOTE — PROGRESS NOTES
Pt here for 1yr fu appt.     EKG Done today.    Pt c/o of: lightheaded/dizziness, fatigue    Pt denies: CP, SOB, JOSUE,  tachycardia/palpitations, fatigue

## 2024-05-28 NOTE — PROGRESS NOTES
[facility] PHYSICIANS [place] SPECIALTY  [facility] CARDIOLOGY  [address]  [address]  [facility] [address]  Dept: [phone]  Dept Fax: [phone]  Loc: [phone]    Visit Date: 5/28/2024    Ms. Funez is a 89 y.o. female  who presented for:  Chief Complaint   Patient presents with    Follow-up     1 yr fu        HPI:   HPI   88 yo F hx of CAD, EF 50-55%, Afib s/p ablation who presents for follow-up.  She is tired and does not sleep well.  No chest pain or angina.  Taking all meds.  No CHF issues.  Lasix PRN.  .  She has no issues with urination.  No bleeding.        Current Outpatient Medications:     metoprolol tartrate (LOPRESSOR) 25 MG tablet, Take 0.5 tablets by mouth 2 times daily, Disp: 90 tablet, Rfl: 0    azelastine (ASTELIN) 0.1 % nasal spray, 1 spray by Nasal route 2 times daily Use in each nostril as directed, Disp: 30 mL, Rfl: 0    isosorbide mononitrate (IMDUR) 30 MG extended release tablet, Take 1 tablet by mouth daily, Disp: 90 tablet, Rfl: 3    lansoprazole (PREVACID) 30 MG delayed release capsule, Take 1 capsule by mouth daily, Disp: 90 capsule, Rfl: 3    spironolactone (ALDACTONE) 25 MG tablet, Take 0.5 tablets by mouth daily, Disp: 45 tablet, Rfl: 3    apixaban (ELIQUIS) 2.5 MG TABS tablet, Take 1 tablet by mouth 2 times daily, Disp: 180 tablet, Rfl: 3    atorvastatin (LIPITOR) 40 MG tablet, Take 1 tablet by mouth nightly, Disp: 90 tablet, Rfl: 3    furosemide (LASIX) 20 MG tablet, Take 1 tablet by mouth daily, Disp: 90 tablet, Rfl: 3    vitamin C (ASCORBIC ACID) 500 MG tablet, Take 1 tablet by mouth daily, Disp: , Rfl:     aspirin EC 81 MG EC tablet, Take 1 tablet by mouth daily, Disp: 30 tablet, Rfl: 0    Past Medical History  Shawanda  has a past medical history of A-fib (HCC), Accidental fall, Allergic rhinitis, Arthritis, CHF (congestive heart failure) ([facility]), Encounter for cardioversion procedure, GERD (gastroesophageal reflux disease), Hx of transesophageal

## 2024-06-03 ENCOUNTER — OFFICE VISIT (OUTPATIENT)
Dept: CARDIOLOGY CLINIC | Age: 89
End: 2024-06-03
Payer: MEDICARE

## 2024-06-03 VITALS
OXYGEN SATURATION: 98 % | HEART RATE: 87 BPM | WEIGHT: 122.8 LBS | DIASTOLIC BLOOD PRESSURE: 68 MMHG | HEIGHT: 60 IN | SYSTOLIC BLOOD PRESSURE: 114 MMHG | BODY MASS INDEX: 24.11 KG/M2

## 2024-06-03 DIAGNOSIS — I48.0 PAROXYSMAL ATRIAL FIBRILLATION (HCC): ICD-10-CM

## 2024-06-03 DIAGNOSIS — I50.32 CHRONIC DIASTOLIC CONGESTIVE HEART FAILURE, NYHA CLASS 2 (HCC): Primary | ICD-10-CM

## 2024-06-03 DIAGNOSIS — Z91.89 AT RISK FOR FLUID VOLUME OVERLOAD: ICD-10-CM

## 2024-06-03 DIAGNOSIS — I42.8 NONISCHEMIC CARDIOMYOPATHY (HCC): ICD-10-CM

## 2024-06-03 PROCEDURE — 1036F TOBACCO NON-USER: CPT | Performed by: NURSE PRACTITIONER

## 2024-06-03 PROCEDURE — 1090F PRES/ABSN URINE INCON ASSESS: CPT | Performed by: NURSE PRACTITIONER

## 2024-06-03 PROCEDURE — 99214 OFFICE O/P EST MOD 30 MIN: CPT | Performed by: NURSE PRACTITIONER

## 2024-06-03 PROCEDURE — G8427 DOCREV CUR MEDS BY ELIG CLIN: HCPCS | Performed by: NURSE PRACTITIONER

## 2024-06-03 PROCEDURE — 1123F ACP DISCUSS/DSCN MKR DOCD: CPT | Performed by: NURSE PRACTITIONER

## 2024-06-03 PROCEDURE — G8420 CALC BMI NORM PARAMETERS: HCPCS | Performed by: NURSE PRACTITIONER

## 2024-06-03 ASSESSMENT — ENCOUNTER SYMPTOMS
SHORTNESS OF BREATH: 1
COUGH: 0
WHEEZING: 0
ABDOMINAL PAIN: 0
ABDOMINAL DISTENTION: 0

## 2024-06-03 NOTE — PATIENT INSTRUCTIONS
You may receive a survey regarding the care you received during your visit.  Your input is valuable to us.  We encourage you to complete and return your survey.  We hope you will choose us in the future for your healthcare needs.    Your nurses today were Maryam.  Office hours:   Mon-Thurs 8-4:30  Friday 8-12  Phone: [phone]    Continue:  Continue current medications  Daily weights and record  Fluid restriction of 2 Liters per day  Limit sodium in diet to around 7694-9079 mg/day  Monitor BP  Activity as tolerated     Call the Heart Failure Clinic for any of the following symptoms:   Weight gain of -3 pounds in 1 day or 5 pounds in 1 week  Increased shortness of breath  Shortness of breath while laying down  Cough  Chest pain  Swelling in feet, ankles or legs  Bloating in abdomen  Fatigue

## 2024-06-03 NOTE — PROGRESS NOTES
Heart Failure Clinic       Visit Date: 6/3/2024  Cardiologist:  Dr. Mendoza  Primary Care Physician: Geovany Perrin MD    Shawanda Funez is a 89 y.o. female who presents today for:  Chief Complaint   Patient presents with    Check-Up     CHF       HPI:     TYPE HF: HFpEF 55-60% Grade 2 DD (55-60% 2021) (40-45% 2018) (25-30% 2017)  Cause: improved nonischemic cardiomyopathy - tachy induced in 2017   Device:   HX: Afib-RVR s/p KAMRAN DCCV x4, Afib (eliquis)  Dry Wt:  129 on 10/31/22, 129 on 5/1/23, 127 on 11/6/23, 122 on 6/3/24      Shawanda Funez is a 89 y.o. female who presents to the office for a follow up patient visit in the heart failure clinic.    Concerns today: 6 month f/u. Weights are down from previous. She takes her lasix most days, does not take when leaving the house. Urinates well. Denies SOB, leg swelling, bloating, orthopnea, and PND. BB and imdur stopped last week by Dr. Mendoza d/t fatigue, pt notes that she feels a little better since stopping.     Visit on 11/6/23: here today for 6 month f/u. No hospitalizations or weight gain Urinating well on her lasix. Denies worsening SOB, leg swelling, bloating, orthopnea, nocturnal dyspnea. Does add salt to some foods.     Visit on 5/1/23: here today for her 6 month f/u. Weights are stable and no hospitalization for CHF since last visit. She notes continued fatigue and falling asleep more easily. She denies SOB, leg swelling, bloating, or orthopnea. She continues her lasix three times a week with good urination. She notes that she is not following her low Na diet d/t eating higher Na foods, stays under her 2l/day.     Visit on 10/31/22: [place] today, here for her 1yr f/u. Does note to be more fatigued lately. Hgb of 9.9, no hx of dx anemia. Hx of Afib w/ successful cardioversion in 2021. Urinating well on her lasix, takes it 3 times a week. Does weight herself daily and notes a weight increase if she does not take her diuretic routinely.       Patient

## 2024-07-08 ENCOUNTER — TELEPHONE (OUTPATIENT)
Dept: CARDIOLOGY CLINIC | Age: 89
End: 2024-07-08

## 2024-07-08 DIAGNOSIS — I50.32 CHRONIC DIASTOLIC CONGESTIVE HEART FAILURE, NYHA CLASS 2 (HCC): Primary | ICD-10-CM

## 2024-07-08 RX ORDER — POTASSIUM CHLORIDE 750 MG/1
10 TABLET, EXTENDED RELEASE ORAL DAILY
Qty: 7 TABLET | Refills: 0 | Status: SHIPPED | OUTPATIENT
Start: 2024-07-08 | End: 2024-07-15

## 2024-07-08 NOTE — TELEPHONE ENCOUNTER
----- Message from Michelle Beavers, ANDREA - CNP sent at 7/8/2024  7:40 AM EDT -----  Labs reviewed - renal function is stable  BNP is elevated from previous (indicating fluid) I would like her to take her lasix twice a day for 5-7 days with a banana a day   To continue to monitor Na/fluid intake and try not to miss a dose of lasix

## 2024-07-08 NOTE — TELEPHONE ENCOUNTER
Notified patient. Verbalized understanding.  Has not been taking lasix or watching diet. Education given.     Bananas make her constipated. Can she get a script instead?

## 2024-07-18 ENCOUNTER — APPOINTMENT (OUTPATIENT)
Dept: GENERAL RADIOLOGY | Age: 89
DRG: 286 | End: 2024-07-18
Attending: INTERNAL MEDICINE
Payer: MEDICARE

## 2024-07-18 ENCOUNTER — HOSPITAL ENCOUNTER (INPATIENT)
Age: 89
LOS: 7 days | DRG: 286 | End: 2024-07-25
Attending: EMERGENCY MEDICINE
Payer: MEDICARE

## 2024-07-18 DIAGNOSIS — R07.9 CHEST PAIN: ICD-10-CM

## 2024-07-18 DIAGNOSIS — I25.5 ISCHEMIC CARDIOMYOPATHY: ICD-10-CM

## 2024-07-18 DIAGNOSIS — I25.10 CORONARY ARTERY DISEASE, UNSPECIFIED VESSEL OR LESION TYPE, UNSPECIFIED WHETHER ANGINA PRESENT, UNSPECIFIED WHETHER NATIVE OR TRANSPLANTED HEART: ICD-10-CM

## 2024-07-18 DIAGNOSIS — I48.91 ATRIAL FIBRILLATION WITH RVR (HCC): ICD-10-CM

## 2024-07-18 DIAGNOSIS — I48.0 PAROXYSMAL ATRIAL FIBRILLATION (HCC): ICD-10-CM

## 2024-07-18 DIAGNOSIS — I42.9 CARDIOMYOPATHY, UNSPECIFIED TYPE (HCC): ICD-10-CM

## 2024-07-18 DIAGNOSIS — I50.9 ACUTE ON CHRONIC CONGESTIVE HEART FAILURE, UNSPECIFIED HEART FAILURE TYPE (HCC): ICD-10-CM

## 2024-07-18 DIAGNOSIS — N17.9 AKI (ACUTE KIDNEY INJURY) (HCC): Primary | ICD-10-CM

## 2024-07-18 LAB
ALBUMIN SERPL BCG-MCNC: 4.2 G/DL (ref 3.5–5.1)
ALP SERPL-CCNC: 90 U/L (ref 38–126)
ALT SERPL W/O P-5'-P-CCNC: 42 U/L (ref 11–66)
ANION GAP SERPL CALC-SCNC: 18 MEQ/L (ref 8–16)
AST SERPL-CCNC: 28 U/L (ref 5–40)
BASOPHILS ABSOLUTE: 0.1 THOU/MM3 (ref 0–0.1)
BASOPHILS NFR BLD AUTO: 0.6 %
BILIRUB CONJ SERPL-MCNC: 0.5 MG/DL (ref 0.1–13.8)
BILIRUB SERPL-MCNC: 1.5 MG/DL (ref 0.3–1.2)
BUN SERPL-MCNC: 29 MG/DL (ref 7–22)
CALCIUM SERPL-MCNC: 9.2 MG/DL (ref 8.5–10.5)
CHLORIDE SERPL-SCNC: 102 MEQ/L (ref 98–111)
CO2 SERPL-SCNC: 20 MEQ/L (ref 23–33)
CREAT SERPL-MCNC: 1.3 MG/DL (ref 0.4–1.2)
DEPRECATED RDW RBC AUTO: 51.1 FL (ref 35–45)
EKG Q-T INTERVAL: 244 MS
EKG QRS DURATION: 88 MS
EKG QTC CALCULATION (BAZETT): 417 MS
EKG R AXIS: -70 DEGREES
EKG T AXIS: 149 DEGREES
EKG VENTRICULAR RATE: 176 BPM
EOSINOPHIL NFR BLD AUTO: 2.6 %
EOSINOPHILS ABSOLUTE: 0.2 THOU/MM3 (ref 0–0.4)
ERYTHROCYTE [DISTWIDTH] IN BLOOD BY AUTOMATED COUNT: 14.4 % (ref 11.5–14.5)
GFR SERPL CREATININE-BSD FRML MDRD: 39 ML/MIN/1.73M2
GLUCOSE SERPL-MCNC: 159 MG/DL (ref 70–108)
HCT VFR BLD AUTO: 42.7 % (ref 37–47)
HGB BLD-MCNC: 13.6 GM/DL (ref 12–16)
IMM GRANULOCYTES # BLD AUTO: 0.04 THOU/MM3 (ref 0–0.07)
IMM GRANULOCYTES NFR BLD AUTO: 0.5 %
LIPASE SERPL-CCNC: 33.5 U/L (ref 5.6–51.3)
LYMPHOCYTES ABSOLUTE: 2.3 THOU/MM3 (ref 1–4.8)
LYMPHOCYTES NFR BLD AUTO: 27.5 %
MAGNESIUM SERPL-MCNC: 2.4 MG/DL (ref 1.6–2.4)
MCH RBC QN AUTO: 30.9 PG (ref 26–33)
MCHC RBC AUTO-ENTMCNC: 31.9 GM/DL (ref 32.2–35.5)
MCV RBC AUTO: 97 FL (ref 81–99)
MONOCYTES ABSOLUTE: 0.8 THOU/MM3 (ref 0.4–1.3)
MONOCYTES NFR BLD AUTO: 9 %
NEUTROPHILS ABSOLUTE: 5 THOU/MM3 (ref 1.8–7.7)
NEUTROPHILS NFR BLD AUTO: 59.8 %
NRBC BLD AUTO-RTO: 0 /100 WBC
NT-PROBNP SERPL IA-MCNC: ABNORMAL PG/ML (ref 0–449)
OSMOLALITY SERPL CALC.SUM OF ELEC: 288.6 MOSMOL/KG (ref 275–300)
PLATELET # BLD AUTO: 204 THOU/MM3 (ref 130–400)
PMV BLD AUTO: 12.4 FL (ref 9.4–12.4)
POTASSIUM SERPL-SCNC: 3.7 MEQ/L (ref 3.5–5.2)
PROT SERPL-MCNC: 6.8 G/DL (ref 6.1–8)
RBC # BLD AUTO: 4.4 MILL/MM3 (ref 4.2–5.4)
SODIUM SERPL-SCNC: 140 MEQ/L (ref 135–145)
TROPONIN, HIGH SENSITIVITY: 25 NG/L (ref 0–12)
TROPONIN, HIGH SENSITIVITY: 31 NG/L (ref 0–12)
TSH SERPL DL<=0.005 MIU/L-ACNC: 3.12 UIU/ML (ref 0.4–4.2)
WBC # BLD AUTO: 8.4 THOU/MM3 (ref 4.8–10.8)

## 2024-07-18 PROCEDURE — 93005 ELECTROCARDIOGRAM TRACING: CPT

## 2024-07-18 PROCEDURE — 96374 THER/PROPH/DIAG INJ IV PUSH: CPT

## 2024-07-18 PROCEDURE — 84443 ASSAY THYROID STIM HORMONE: CPT

## 2024-07-18 PROCEDURE — 83880 ASSAY OF NATRIURETIC PEPTIDE: CPT

## 2024-07-18 PROCEDURE — 96376 TX/PRO/DX INJ SAME DRUG ADON: CPT

## 2024-07-18 PROCEDURE — 2580000003 HC RX 258

## 2024-07-18 PROCEDURE — 85025 COMPLETE CBC W/AUTO DIFF WBC: CPT

## 2024-07-18 PROCEDURE — 99223 1ST HOSP IP/OBS HIGH 75: CPT

## 2024-07-18 PROCEDURE — 36415 COLL VENOUS BLD VENIPUNCTURE: CPT

## 2024-07-18 PROCEDURE — 83690 ASSAY OF LIPASE: CPT

## 2024-07-18 PROCEDURE — 71045 X-RAY EXAM CHEST 1 VIEW: CPT

## 2024-07-18 PROCEDURE — 82248 BILIRUBIN DIRECT: CPT

## 2024-07-18 PROCEDURE — 93010 ELECTROCARDIOGRAM REPORT: CPT | Performed by: INTERNAL MEDICINE

## 2024-07-18 PROCEDURE — 6370000000 HC RX 637 (ALT 250 FOR IP)

## 2024-07-18 PROCEDURE — 99285 EMERGENCY DEPT VISIT HI MDM: CPT

## 2024-07-18 PROCEDURE — 83735 ASSAY OF MAGNESIUM: CPT

## 2024-07-18 PROCEDURE — 84484 ASSAY OF TROPONIN QUANT: CPT

## 2024-07-18 PROCEDURE — 1200000003 HC TELEMETRY R&B

## 2024-07-18 PROCEDURE — 80053 COMPREHEN METABOLIC PANEL: CPT

## 2024-07-18 PROCEDURE — 2500000003 HC RX 250 WO HCPCS

## 2024-07-18 RX ORDER — ACETAMINOPHEN 650 MG/1
650 SUPPOSITORY RECTAL EVERY 6 HOURS PRN
Status: DISCONTINUED | OUTPATIENT
Start: 2024-07-18 | End: 2024-07-25 | Stop reason: HOSPADM

## 2024-07-18 RX ORDER — SODIUM CHLORIDE, SODIUM LACTATE, POTASSIUM CHLORIDE, CALCIUM CHLORIDE 600; 310; 30; 20 MG/100ML; MG/100ML; MG/100ML; MG/100ML
INJECTION, SOLUTION INTRAVENOUS CONTINUOUS
Status: ACTIVE | OUTPATIENT
Start: 2024-07-18 | End: 2024-07-18

## 2024-07-18 RX ORDER — SODIUM CHLORIDE 0.9 % (FLUSH) 0.9 %
5-40 SYRINGE (ML) INJECTION EVERY 12 HOURS SCHEDULED
Status: DISCONTINUED | OUTPATIENT
Start: 2024-07-18 | End: 2024-07-25 | Stop reason: HOSPADM

## 2024-07-18 RX ORDER — 0.9 % SODIUM CHLORIDE 0.9 %
500 INTRAVENOUS SOLUTION INTRAVENOUS ONCE
Status: COMPLETED | OUTPATIENT
Start: 2024-07-18 | End: 2024-07-18

## 2024-07-18 RX ORDER — SODIUM CHLORIDE 9 MG/ML
INJECTION, SOLUTION INTRAVENOUS PRN
Status: DISCONTINUED | OUTPATIENT
Start: 2024-07-18 | End: 2024-07-24 | Stop reason: SDUPTHER

## 2024-07-18 RX ORDER — SPIRONOLACTONE 25 MG/1
12.5 TABLET ORAL DAILY
Status: DISCONTINUED | OUTPATIENT
Start: 2024-07-18 | End: 2024-07-25 | Stop reason: HOSPADM

## 2024-07-18 RX ORDER — PANTOPRAZOLE SODIUM 40 MG/1
40 TABLET, DELAYED RELEASE ORAL
Status: DISCONTINUED | OUTPATIENT
Start: 2024-07-19 | End: 2024-07-25 | Stop reason: HOSPADM

## 2024-07-18 RX ORDER — ACETAMINOPHEN 325 MG/1
650 TABLET ORAL EVERY 6 HOURS PRN
Status: DISCONTINUED | OUTPATIENT
Start: 2024-07-18 | End: 2024-07-24 | Stop reason: SDUPTHER

## 2024-07-18 RX ORDER — ATORVASTATIN CALCIUM 40 MG/1
40 TABLET, FILM COATED ORAL NIGHTLY
Status: DISCONTINUED | OUTPATIENT
Start: 2024-07-18 | End: 2024-07-25 | Stop reason: HOSPADM

## 2024-07-18 RX ORDER — POLYETHYLENE GLYCOL 3350 17 G/17G
17 POWDER, FOR SOLUTION ORAL DAILY PRN
Status: DISCONTINUED | OUTPATIENT
Start: 2024-07-18 | End: 2024-07-25 | Stop reason: HOSPADM

## 2024-07-18 RX ORDER — DILTIAZEM HYDROCHLORIDE 5 MG/ML
10 INJECTION INTRAVENOUS ONCE
Status: COMPLETED | OUTPATIENT
Start: 2024-07-18 | End: 2024-07-18

## 2024-07-18 RX ORDER — FUROSEMIDE 20 MG/1
20 TABLET ORAL DAILY
Status: DISCONTINUED | OUTPATIENT
Start: 2024-07-18 | End: 2024-07-25 | Stop reason: HOSPADM

## 2024-07-18 RX ORDER — ONDANSETRON 4 MG/1
4 TABLET, ORALLY DISINTEGRATING ORAL EVERY 8 HOURS PRN
Status: DISCONTINUED | OUTPATIENT
Start: 2024-07-18 | End: 2024-07-25 | Stop reason: HOSPADM

## 2024-07-18 RX ORDER — SODIUM CHLORIDE 0.9 % (FLUSH) 0.9 %
5-40 SYRINGE (ML) INJECTION PRN
Status: DISCONTINUED | OUTPATIENT
Start: 2024-07-18 | End: 2024-07-25 | Stop reason: HOSPADM

## 2024-07-18 RX ORDER — ONDANSETRON 2 MG/ML
4 INJECTION INTRAMUSCULAR; INTRAVENOUS EVERY 6 HOURS PRN
Status: DISCONTINUED | OUTPATIENT
Start: 2024-07-18 | End: 2024-07-25 | Stop reason: HOSPADM

## 2024-07-18 RX ORDER — ASPIRIN 81 MG/1
81 TABLET ORAL DAILY
Status: DISCONTINUED | OUTPATIENT
Start: 2024-07-18 | End: 2024-07-25 | Stop reason: HOSPADM

## 2024-07-18 RX ADMIN — SODIUM CHLORIDE 500 ML: 9 INJECTION, SOLUTION INTRAVENOUS at 08:21

## 2024-07-18 RX ADMIN — SPIRONOLACTONE 12.5 MG: 25 TABLET ORAL at 17:31

## 2024-07-18 RX ADMIN — DILTIAZEM HYDROCHLORIDE 5 MG/HR: 5 INJECTION, SOLUTION INTRAVENOUS at 09:16

## 2024-07-18 RX ADMIN — DILTIAZEM HYDROCHLORIDE 10 MG: 5 INJECTION, SOLUTION INTRAVENOUS at 08:21

## 2024-07-18 RX ADMIN — APIXABAN 2.5 MG: 2.5 TABLET, FILM COATED ORAL at 17:31

## 2024-07-18 RX ADMIN — SODIUM CHLORIDE, POTASSIUM CHLORIDE, SODIUM LACTATE AND CALCIUM CHLORIDE: 600; 310; 30; 20 INJECTION, SOLUTION INTRAVENOUS at 19:07

## 2024-07-18 ASSESSMENT — PAIN - FUNCTIONAL ASSESSMENT: PAIN_FUNCTIONAL_ASSESSMENT: NONE - DENIES PAIN

## 2024-07-18 NOTE — ED TRIAGE NOTES
Presents to ED with c/o hypertension and sob that started a couple weeks ago. Patient states she does not have an appetite. Alert and oriented. Respirations easy and unlabored.

## 2024-07-18 NOTE — ED NOTES
ED to inpatient nurses report      Chief Complaint:  Chief Complaint   Patient presents with    Hypertension     Present to ED from: home    MOA:     LOC: alert and orientated to name, place, date  Mobility: Requires assistance * 1  Oxygen Baseline: none    Current needs required: none     Code Status:   Limited      Mental Status:  Level of Consciousness: Alert (0)    Psych Assessment:        Vitals:  Patient Vitals for the past 24 hrs:   BP Temp Temp src Pulse Resp SpO2 Height Weight   07/18/24 1808 110/62 -- -- (!) 107 19 99 % -- --   07/18/24 1731 (!) 111/48 -- -- (!) 112 20 95 % -- --   07/18/24 1508 107/74 -- -- (!) 108 20 97 % -- --   07/18/24 1312 97/67 -- -- (!) 113 18 100 % -- --   07/18/24 1213 116/70 -- -- (!) 124 20 98 % -- --   07/18/24 1014 100/86 -- -- (!) 103 21 98 % -- --   07/18/24 1004 101/72 -- -- (!) 105 16 98 % -- --   07/18/24 0914 (!) 125/104 -- -- (!) 128 21 91 % -- --   07/18/24 0859 104/82 -- -- (!) 122 23 97 % -- --   07/18/24 0855 101/71 -- -- (!) 116 19 99 % -- --   07/18/24 0853 (!) 83/67 -- -- (!) 103 20 98 % -- --   07/18/24 0822 (!) 105/91 -- -- (!) 166 22 98 % -- --   07/18/24 0744 115/77 97.9 °F (36.6 °C) Oral (!) 169 26 93 % -- --   07/18/24 0741 -- -- -- -- -- -- 1.524 m (5') 53.1 kg (117 lb)        LDAs:   Peripheral IV 07/18/24 Left Antecubital (Active)   Site Assessment Clean, dry & intact 07/18/24 0750       Peripheral IV 07/18/24 Right Hand (Active)   Site Assessment Clean, dry & intact 07/18/24 0750       Ambulatory Status:  No data recorded    Diagnosis:  DISPOSITION Admitted 07/18/2024 11:42:16 AM   Final diagnoses:   Acute on chronic congestive heart failure, unspecified heart failure type (HCC)   PROSPER (acute kidney injury) (HCC)   Paroxysmal atrial fibrillation (HCC)        Consults:  None     Pain Score:  Pain Assessment  Pain Assessment: None - Denies Pain    C-SSRS:        Sepsis Screening:       Deven Fall Risk:       Swallow Screening        Preferred Language:

## 2024-07-18 NOTE — ED PROVIDER NOTES
[facility] EMERGENCY DEPT  EMERGENCY DEPARTMENT ENCOUNTER          Pt Name: Shawanda Funez  MRN: [id number]  Birthdate 9/20/1934  Date of evaluation: 7/18/2024  Physician: Caden Muro MD  Supervising Attending Physician: Zarina Decker MD       CHIEF COMPLAINT       Chief Complaint   Patient presents with    Hypertension         HISTORY OF PRESENT ILLNESS    HPI  Shawanda Funez is a 89F with PMH significant for A-fib [on Eliquis] s/p ablation (not currently on medication due to metoprolol side effects), CHF, mild MR who presented to the ED for tachycardia and associated SOB. Patient reports that her \"heart has been racing\" over the past couple weeks and mild exertional SOB over the past week, however SOB seemed to worsen this morning upon waking around 5:30am. She attempted to take her pulse this morning and it kept reading 'error'. Reports associated nausea with the tachycardia and SOB. Also states that she has been doubling her Lasix up recently as per cards recs since she \"had too much fluid on her\". Follows with Dr. Mendoza. Review of cards notes show that bradycardia and fatigue were the reasons that patient was taken off of BB. Denies tobacco, EtOH, drug use. No known recent sick contacts.     Denies fever, chills, headache, lightheadedness, dizziness, vision changes, tinnitus, cough, congestion, sore throat, neck pain/stiffness, chest pain, abdominal pain, vomiting, constipation, diarrhea, dysuria, blood in the urine or stool, numbness/tingling/weakness in extremities.    The patient has no other acute complaints at this time.      PAST MEDICAL AND SURGICAL HISTORY     Past Medical History:   Diagnosis Date    A-fib ([facility])     Accidental fall 09/2002    [facility] ER- fell on face on cement    Allergic rhinitis     Arthritis     CHF (congestive heart failure) ([facility])     Encounter for cardioversion procedure 10/05/2017    GERD (gastroesophageal reflux disease)     Hx of transesophageal echocardiography  (KAMRAN) for monitoring 10/2017    Mild mitral regurgitation 10/2017    Nonrheumatic aortic valve insufficiency 8/29/2017    Osteoporosis     Pre-op evaluation: prior to left knee repalcement 8/29/2017    PVC's (premature ventricular contractions) 8/29/2017    S/P cardiac catheterization 10/07/2017    with Dr. Mendoza -diagonal 90%, circ 90%,     Torn meniscus 12/2016     Past Surgical History:   Procedure Laterality Date    APPENDECTOMY  1946    BACK SURGERY  3/24/14    Lumbar Laminectomy Fusion L3-5, L4-5 PLIF - Dr. Hodgson    EYE SURGERY      JOINT REPLACEMENT Left 09/05/2017    Rodriguez Left Total Knee    ROTATOR CUFF REPAIR  3/8/06    right shoulder          MEDICATIONS     Current Facility-Administered Medications:     [COMPLETED] dilTIAZem injection 10 mg, 10 mg, IntraVENous, Once, 10 mg at 07/18/24 0821 **FOLLOWED BY** dilTIAZem 125 mg in sodium chloride 0.9 % 125 mL infusion, 2.5-15 mg/hr, IntraVENous, Continuous, Caden Muro MD, Last Rate: 5 mL/hr at 07/18/24 0916, 5 mg/hr at 07/18/24 0916    Current Outpatient Medications:     potassium chloride (KLOR-CON M) 10 MEQ extended release tablet, Take 1 tablet by mouth daily for 7 days, Disp: 7 tablet, Rfl: 0    azelastine (ASTELIN) 0.1 % nasal spray, 1 spray by Nasal route 2 times daily Use in each nostril as directed, Disp: 30 mL, Rfl: 0    lansoprazole (PREVACID) 30 MG delayed release capsule, Take 1 capsule by mouth daily, Disp: 90 capsule, Rfl: 3    spironolactone (ALDACTONE) 25 MG tablet, Take 0.5 tablets by mouth daily, Disp: 45 tablet, Rfl: 3    apixaban (ELIQUIS) 2.5 MG TABS tablet, Take 1 tablet by mouth 2 times daily, Disp: 180 tablet, Rfl: 3    atorvastatin (LIPITOR) 40 MG tablet, Take 1 tablet by mouth nightly, Disp: 90 tablet, Rfl: 3    furosemide (LASIX) 20 MG tablet, Take 1 tablet by mouth daily, Disp: 90 tablet, Rfl: 3    vitamin C (ASCORBIC ACID) 500 MG tablet, Take 1 tablet by mouth daily, Disp: , Rfl:     aspirin EC 81 MG EC tablet, Take 1

## 2024-07-18 NOTE — H&P
History & Physical  Internal Medicine Resident         Patient: Shawanda Funez 89 y.o. female      : 1934  Date of Admission: 2024  Date of Service: Pt seen/examined on 24 and Admitted to Inpatient with expected LOS greater than two midnights due to medical therapy.       ASSESSMENT AND PLAN  Atrial fibrillation with RVR  History of atrial fibrillation s/p ablation in .  Patient has not had any recurrence of atrial fibrillation since that time.  Patient recently discontinued her Lopressor 12.5 mg twice daily by her cardiologist due to fatigue and lower heart rate.  Patient states that she is been feeling unwell for approximately week and has noted that she has had a high heart rate during that time.  Patient recently instructed to double her diuretic.  Patient dry on exam, potential etiologies for going into RVR.  Patient started on Cardizem drip in the ED.  -Continue home Eliquis  -Continue diltiazem drip  -Patient cannot be on amiodarone due to previous history of torsades from QTc prolongation due to amiodarone  -Limited x 4 CODE STATUS, patient is unsure if she would want DCCV or to change her CODE STATUS for DCCV.  Due to this, have deferred cardiology consult at this time    PROSPER  Baseline creatinine 0.8-1.0.  Creatinine on admission 1.3.  S/p 500 mL NS bolus in ED. Likely due to increasing diuretic, as below.  -LR at 50 mL/h for 10 hours due to CHF history  -UA/Urine studies pending  -Hold nephrotoxic agents  -Monitor BMP     HFpEF, not in acute exacerbation  Echo on 2023 shows EF 50-55%.  Home GDMT includes spironolactone 12.5 mg.  Home diuretic includes Lasix 20 mg daily, however had recently been taking twice daily since .  Patient follows with CHF clinic.  Had been told by CHF clinic on  to double diuretic due to elevated proBNP.  proBNP 87879, higher than baseline, however is dry on exam.  CXR shows no acute cardiopulmonary process, no pulmonary edema.  Patient does not  appear fluid overloaded on examination, in contrast she appears dry on exam.  -Continue home GDMT  -Diuretic Due to PROSPER  -Salt restricted and fluid restricted diet  -I/O's, daily weights     Hyperlipidemia  -Continue statin     GERD  -Continue PPI       Data reviewed (unless otherwise discussed in assessment/plan)  EKG:  I have reviewed the EKG with the following interpretation: Atrial fibrillation with RVR  Imaging: I have reviewed CXR with the following interpretation: no acute cardiopulmonary process  Labs: Reviewed, see chart and plan above.       =======================================================================    SUBJECTIVE    Chief Complaint: Tachycardia    History Of Present Illness:  Shwaanda Funez is a 89 y.o. female with PMHx of paroxysmal A-fib, HFpEF, HLD, GERD who presents to [facility] with complaints of tachycardia with some shortness of breath.  Patient reports that she has been feeling unwell for approximately 1 week.  Patient does report some shortness of breath with walking.  Patient states that she has checked her blood pressure every day which also due to her heart rate.  Patient is that her heart rate has been very high.  Patient is that she has not had any appetite.  History of atrial fibrillation s/p ablation in 2021.  Patient has not had any recurrence of atrial fibrillation since that time.  Patient recently discontinued her Lopressor 12.5 mg twice daily by her cardiologist due to fatigue and lower heart rate.   Patient recently instructed to double her diuretic by CHF clinic due to elevated proBNP.  Patient was not examined at time of doubling diuretic.  Patient denies any chest pain.  Patient just overall reports feeling unwell with some palpitations and heart racing feeling.  Patient denies any N/V/C/D.    ED course: Initial vitals include temperature 97.9 °F, respiratory rate 26, heart rate 169, BP 1 115/77, SpO2 93% on room air.  Pertinent initial labs include

## 2024-07-18 NOTE — ED NOTES
Patient resting in bed. Respirations easy and unlabored. No distress noted. Call light within reach.     iud/yes(specify)

## 2024-07-18 NOTE — ED PROVIDER NOTES
ATTENDING NOTE:    I supervised and discussed the history, physical exam and the management of this patient with the resident. I reviewed the resident's note and agree with the documented findings and plan of care.  Please see my additional note.    Patient states that she has been having's heart racing for the past 2 weeks.  She reports her heart is \"up-and-down\".  She states she checks her pulse on a machine at home, she states this morning the machine kept saying air would not give her number.  Last night it was ranging from 100-1 57.  She says has been up and down on the monitor for the past couple weeks.  She also reports that she \"huffs and puffs up the steps now\" and this is also been going on the last couple weeks.  Denies chest pain or syncope.  She was seen at the beginning of June and had to stop taking Imdur and also stopped taking beta-blocker, she states this was due to low blood pressure.  Cardiology note reviewed, bradycardia was reason given for changes in medication regimen.  On exam, patient has an irregularly irregular rhythm, is tachycardic.  No pedal edema, lungs are clear, abdomen is soft and nontender.  EKG reviewed, patient's A-fib with RVR.  IV fluid, Cardizem ordered in addition to labs.  Plan for admission.    I personally saw and examined the patient.  I have reviewed and agree with the resident's findings, including all diagnostic interpretations and treatment plans as written.  I was present for the key portion of any procedures performed and the inclusive time noted in any critical care statement.    Electronically verified by ELIZABETH HILL       Critical Care    Performed by: Elizabeth Hill MD  Authorized by: Elizabeth Hill MD    Critical care provider statement:     Critical care time (minutes):  35    Critical care time was exclusive of:  Separately billable procedures and treating other patients and teaching time    Critical care was necessary to treat or prevent imminent or

## 2024-07-18 NOTE — ED NOTES
Patient to be transferred to [facility]. Patient is in stable condition at this time. Staff notified prior to transfer.

## 2024-07-19 ENCOUNTER — APPOINTMENT (OUTPATIENT)
Age: 89
DRG: 286 | End: 2024-07-19
Attending: INTERNAL MEDICINE
Payer: MEDICARE

## 2024-07-19 PROBLEM — N17.9 AKI (ACUTE KIDNEY INJURY) (HCC): Status: ACTIVE | Noted: 2024-07-19

## 2024-07-19 PROBLEM — I50.9 CHRONIC CONGESTIVE HEART FAILURE (HCC): Status: ACTIVE | Noted: 2024-07-19

## 2024-07-19 LAB
ANION GAP SERPL CALC-SCNC: 13 MEQ/L (ref 8–16)
BUN SERPL-MCNC: 29 MG/DL (ref 7–22)
CALCIUM SERPL-MCNC: 8.9 MG/DL (ref 8.5–10.5)
CHLORIDE SERPL-SCNC: 105 MEQ/L (ref 98–111)
CO2 SERPL-SCNC: 22 MEQ/L (ref 23–33)
CREAT SERPL-MCNC: 1 MG/DL (ref 0.4–1.2)
DEPRECATED RDW RBC AUTO: 49.7 FL (ref 35–45)
ERYTHROCYTE [DISTWIDTH] IN BLOOD BY AUTOMATED COUNT: 14.1 % (ref 11.5–14.5)
GFR SERPL CREATININE-BSD FRML MDRD: 54 ML/MIN/1.73M2
GLUCOSE SERPL-MCNC: 116 MG/DL (ref 70–108)
HCT VFR BLD AUTO: 39.8 % (ref 37–47)
HGB BLD-MCNC: 12.6 GM/DL (ref 12–16)
MAGNESIUM SERPL-MCNC: 2.3 MG/DL (ref 1.6–2.4)
MCH RBC QN AUTO: 30.4 PG (ref 26–33)
MCHC RBC AUTO-ENTMCNC: 31.7 GM/DL (ref 32.2–35.5)
MCV RBC AUTO: 96.1 FL (ref 81–99)
OSMOLALITY SERPL CALC.SUM OF ELEC: 286.2 MOSMOL/KG (ref 275–300)
PLATELET # BLD AUTO: 181 THOU/MM3 (ref 130–400)
PMV BLD AUTO: 12.1 FL (ref 9.4–12.4)
POTASSIUM SERPL-SCNC: 3.6 MEQ/L (ref 3.5–5.2)
RBC # BLD AUTO: 4.14 MILL/MM3 (ref 4.2–5.4)
SODIUM SERPL-SCNC: 140 MEQ/L (ref 135–145)
WBC # BLD AUTO: 9.9 THOU/MM3 (ref 4.8–10.8)

## 2024-07-19 PROCEDURE — 6370000000 HC RX 637 (ALT 250 FOR IP)

## 2024-07-19 PROCEDURE — 93306 TTE W/DOPPLER COMPLETE: CPT

## 2024-07-19 PROCEDURE — 2580000003 HC RX 258

## 2024-07-19 PROCEDURE — 1200000003 HC TELEMETRY R&B

## 2024-07-19 PROCEDURE — 2500000003 HC RX 250 WO HCPCS

## 2024-07-19 PROCEDURE — 2500000003 HC RX 250 WO HCPCS: Performed by: INTERNAL MEDICINE

## 2024-07-19 PROCEDURE — 36415 COLL VENOUS BLD VENIPUNCTURE: CPT

## 2024-07-19 PROCEDURE — 83735 ASSAY OF MAGNESIUM: CPT

## 2024-07-19 PROCEDURE — 80048 BASIC METABOLIC PNL TOTAL CA: CPT

## 2024-07-19 PROCEDURE — 6360000002 HC RX W HCPCS: Performed by: INTERNAL MEDICINE

## 2024-07-19 PROCEDURE — 99233 SBSQ HOSP IP/OBS HIGH 50: CPT | Performed by: PHYSICIAN ASSISTANT

## 2024-07-19 PROCEDURE — 85027 COMPLETE CBC AUTOMATED: CPT

## 2024-07-19 PROCEDURE — 6370000000 HC RX 637 (ALT 250 FOR IP): Performed by: NURSE PRACTITIONER

## 2024-07-19 PROCEDURE — 99223 1ST HOSP IP/OBS HIGH 75: CPT | Performed by: INTERNAL MEDICINE

## 2024-07-19 RX ORDER — AMIODARONE HYDROCHLORIDE 200 MG/1
200 TABLET ORAL DAILY
Status: DISCONTINUED | OUTPATIENT
Start: 2024-07-20 | End: 2024-07-20

## 2024-07-19 RX ORDER — FUROSEMIDE 10 MG/ML
40 INJECTION INTRAMUSCULAR; INTRAVENOUS 2 TIMES DAILY
Status: DISCONTINUED | OUTPATIENT
Start: 2024-07-19 | End: 2024-07-20

## 2024-07-19 RX ORDER — LANOLIN ALCOHOL/MO/W.PET/CERES
3 CREAM (GRAM) TOPICAL NIGHTLY PRN
Status: DISCONTINUED | OUTPATIENT
Start: 2024-07-19 | End: 2024-07-25 | Stop reason: HOSPADM

## 2024-07-19 RX ADMIN — APIXABAN 2.5 MG: 2.5 TABLET, FILM COATED ORAL at 19:54

## 2024-07-19 RX ADMIN — Medication 3 MG: at 22:09

## 2024-07-19 RX ADMIN — FUROSEMIDE 40 MG: 10 INJECTION, SOLUTION INTRAMUSCULAR; INTRAVENOUS at 17:59

## 2024-07-19 RX ADMIN — ASPIRIN 81 MG: 81 TABLET, COATED ORAL at 07:55

## 2024-07-19 RX ADMIN — AMIODARONE HYDROCHLORIDE 0.5 MG/MIN: 1.8 INJECTION, SOLUTION INTRAVENOUS at 19:56

## 2024-07-19 RX ADMIN — AMIODARONE HYDROCHLORIDE 1 MG/MIN: 1.8 INJECTION, SOLUTION INTRAVENOUS at 14:28

## 2024-07-19 RX ADMIN — APIXABAN 2.5 MG: 2.5 TABLET, FILM COATED ORAL at 07:55

## 2024-07-19 RX ADMIN — AMIODARONE HYDROCHLORIDE 150 MG: 1.5 INJECTION, SOLUTION INTRAVENOUS at 14:09

## 2024-07-19 RX ADMIN — ATORVASTATIN CALCIUM 40 MG: 40 TABLET, FILM COATED ORAL at 19:54

## 2024-07-19 RX ADMIN — SODIUM CHLORIDE, PRESERVATIVE FREE 10 ML: 5 INJECTION INTRAVENOUS at 07:55

## 2024-07-19 RX ADMIN — DILTIAZEM HYDROCHLORIDE 10 MG/HR: 5 INJECTION, SOLUTION INTRAVENOUS at 02:20

## 2024-07-19 RX ADMIN — PANTOPRAZOLE SODIUM 40 MG: 40 TABLET, DELAYED RELEASE ORAL at 05:31

## 2024-07-19 NOTE — CONSULTS
[facility]'s  Consult    Patient's Name/Date of Birth: Shawanda Funez / 9/20/1934 (89 y.o.)    Date: July 19, 2024     Referring Provider: Wilfredo Erwin PA-C    CHIEF COMPLAINT:  Afib      HPI: This is a pleasant 89 y.o. female with hx of Afib, HLD, GERD with sob, fatigue, and tachycardia.  S/p Afib ablation 2021 and was SR.  She has not felt well and BNP is elevated.  Some racing.  No syncope.  No chest pain.  Cr 1.3, Trop curve flat, BNP 06916.  She was given IV NS bolus and started on Diltiazem gtt.  She is on Lasix and Eliquis, as well as Aldactone.   BB caused bradycardia.  She is limited code status x 4.  We recently stopped BB due to bradycardia.  She feels better since admit. Son and daughter present.   recently passed.       All labs, EKG's, diagnostic testing and images as well as cardiac cath, stress testing were reviewed during this encounter    Past Medical History:   Diagnosis Date    A-fib ([facility])     Accidental fall 09/2002    [facility] ER- fell on face on cement    Allergic rhinitis     Arthritis     CHF (congestive heart failure) ([facility])     Encounter for cardioversion procedure 10/05/2017    GERD (gastroesophageal reflux disease)     Hx of transesophageal echocardiography ([facility]) for monitoring 10/2017    Mild mitral regurgitation 10/2017    Nonrheumatic aortic valve insufficiency 8/29/2017    Osteoporosis     Pre-op evaluation: prior to left knee repalcement 8/29/2017    PVC's (premature ventricular contractions) 8/29/2017    S/P cardiac catheterization 10/07/2017    with Dr. Mendoza -diagonal 90%, circ 90%,     Torn meniscus 12/2016     Past Surgical History:   Procedure Laterality Date    APPENDECTOMY  1946    BACK SURGERY  3/24/14    Lumbar Laminectomy Fusion L3-5, L4-5 PLIF - Dr. Hodgson    EYE SURGERY      JOINT REPLACEMENT Left 09/05/2017    Michael Left Total Knee    ROTATOR CUFF REPAIR  3/8/06    right shoulder      Current Facility-Administered Medications     Cancer Brother         Lung Cancer     Social History     Socioeconomic History    Marital status:      Spouse name: Jared    Number of children: 3    Years of education: Not on file    Highest education level: Not on file   Occupational History    Not on file   Tobacco Use    Smoking status: Former     Current packs/day: 0.00     Average packs/day: 0.5 packs/day for 5.0 years (2.5 ttl pk-yrs)     Types: Cigarettes     Start date: 1960     Quit date: 1965     Years since quittin.9    Smokeless tobacco: Never   Vaping Use    Vaping Use: Never used   Substance and Sexual Activity    Alcohol use: Not Currently     Comment: occ. red wine    Drug use: No    Sexual activity: Yes     Partners: Male   Other Topics Concern    Not on file   Social History Narrative    Not on file     Social Determinants of Health     Financial Resource Strain: Low Risk  (3/5/2024)    Overall Financial Resource Strain (CARDIA)     Difficulty of Paying Living Expenses: Not hard at all   Food Insecurity: No Food Insecurity (2024)    Hunger Vital Sign     Worried About Running Out of Food in the Last Year: Never true     Ran Out of Food in the Last Year: Never true   Transportation Needs: No Transportation Needs (2024)    PRAPARE - Transportation     Lack of Transportation (Medical): No     Lack of Transportation (Non-Medical): No   Physical Activity: Not on file   Stress: Not on file   Social Connections: Not on file   Intimate Partner Violence: Not on file   Housing Stability: Low Risk  (2024)    Housing Stability Vital Sign     Unable to Pay for Housing in the Last Year: No     Number of Places Lived in the Last Year: 1     Unstable Housing in the Last Year: No     ROS:   Constitutional: Denies any recent wt change.  Eyes:  Denies any blurring or double vision, no glaucoma  Ears/Nose/Mouth/Throat:  Denies any chronic sinus/rhinitis, bleeding gums  Cardiovascular:  As described above.    Respiratory:

## 2024-07-19 NOTE — PLAN OF CARE
Problem: Discharge Planning  Goal: Discharge to home or other facility with appropriate resources  Flowsheets (Taken 7/19/2024 0435)  Discharge to home or other facility with appropriate resources:   Refer to discharge planning if patient needs post-hospital services based on physician order or complex needs related to functional status, cognitive ability or social support system   Identify discharge learning needs (meds, wound care, etc)   Identify barriers to discharge with patient and caregiver     Problem: Safety - Adult  Goal: Free from fall injury  Flowsheets (Taken 7/19/2024 0435)  Free From Fall Injury:   Based on caregiver fall risk screen, instruct family/caregiver to ask for assistance with transferring infant if caregiver noted to have fall risk factors   Instruct family/caregiver on patient safety     Problem: ABCDS Injury Assessment  Goal: Absence of physical injury  Flowsheets (Taken 7/19/2024 0435)  Absence of Physical Injury: Implement safety measures based on patient assessment

## 2024-07-19 NOTE — CARE COORDINATION
Case Management Assessment Initial Evaluation    Date/Time of Evaluation: 2024 8:42 AM  Assessment Completed by: Teagan Sun RN    If patient is discharged prior to next notation, then this note serves as note for discharge by case management.    Patient Name: Shawanda Funez                   YOB: 1934  Diagnosis: Paroxysmal atrial fibrillation (HCC) [I48.0]  PROSPER (acute kidney injury) (HCC) [N17.9]  Atrial fibrillation with RVR (HCC) [I48.91]  Acute on chronic congestive heart failure, unspecified heart failure type (HCC) [I50.9]                   Date / Time: 2024  7:36 AM  Location: [address] [place] [address]     Patient Admission Status: Inpatient   Readmission Risk Low 0-14, Mod 15-19), High > 20: Readmission Risk Score: 11.7    Current PCP: Geovany Moore MD    Additional Case Management Notes: To ED with tachycardia and SOB.  Patient recently discontinued her Lopressor 12.5 mg twice daily by her cardiologist due to fatigue and lower heart rate.  Patient recently instructed to double her diuretic by CHF clinic due to elevated proBNP. Hospitalist following. Daily labs. Cardizem gtt @7.5 mg/hr. Eliquis. Asa. Pro-BNP 79784.0 (3895.0) Troponin 25 (31)   Vitals:    24 0215 24 0445 24 0531 24 0749   BP: 108/70 94/69  97/62   Pulse:  96  87   Resp:  18  18   Temp:  98.9 °F (37.2 °C)  98 °F (36.7 °C)   TempSrc:  Oral  Oral   SpO2:  99%  98%   Weight:   54.6 kg (120 lb 4.8 oz)    Height:       Procedures: none    Imagin/18 CXR: Negative    Patient Goals/Plan/Treatment Preferences: Met w/ Shawanda. Verified PCP and insurance. Shawanda is independent and actively drives. Has needed DME. Plans to return home alone. Good family support. Denies discharge needs.     24 1145   Service Assessment   Patient Orientation Alert and Oriented   Cognition Alert   History Provided By Patient   Primary Caregiver Self   Accompanied By/Relationship son, daughter-in- law and sister   Support Systems  Children;Family Members   Patient's Healthcare Decision Maker is: Patient Declined (Legal Next of Kin Remains as Decision Maker)   PCP Verified by CM Yes   Last Visit to PCP Within last 6 months   Prior Functional Level Independent in ADLs/IADLs   Current Functional Level Independent in ADLs/IADLs   Can patient return to prior living arrangement Yes   Ability to make needs known: Good   Family able to assist with home care needs: Yes   Would you like for me to discuss the discharge plan with any other family members/significant others, and if so, who? Yes  (family at bedside)   Financial Resources Medicare;Other (Comment)  (Secondary: BCBS)   Community Resources Health Education  ([facility])   CM/SW Referral ADLs/IADLs;DME   Social/Functional History   Lives With Alone   Type of Home House   Active  Yes   Discharge Planning   Type of Residence House   Living Arrangements Alone   Current Services Prior To Admission Durable Medical Equipment   Current DME Prior to Arrival Walker;Cane   Potential Assistance Needed N/A   DME Ordered? No   Potential Assistance Purchasing Medications No   Type of Home Care Services None   Patient expects to be discharged to: House   Services At/After Discharge   Transition of Care Consult (CM Consult) Discharge Planning   Confirm Follow Up Transport Family   Condition of Participation: Discharge Planning   The Plan for Transition of Care is related to the following treatment goals: \"Get my breathing better and walk out of here\"

## 2024-07-19 NOTE — PROGRESS NOTES
Hospitalist Progress Note    Patient:  Shawanda Funez      Unit/Bed:6K-25/025-A    YOB: 1934    MRN: [id number]       Acct: [id number]     PCP: Geovany Moore MD    Date of Admission: 7/18/2024    Assessment/Plan:    Atrial fibrillation with RVR: History of atrial fibrillation s/p ablation in 2021.  Patient has not had any recurrence of atrial fibrillation since that time.  Patient recently discontinued her Lopressor 12.5 mg twice daily by her cardiologist due to fatigue and Bradycardia  -Cardiology consulted, discussed cardioversion with patient vs medication rate control , patient preferring to wait and possibly pursue cardioversion outpatient  -Per Cardiology, d/c Diltiazem gtt, switch to amiodarone gtt and then wean to PO amiodarone  -Continue home Eliquis     PROSPER: Baseline creatinine 0.8-1.0. Resolved   -Okay to resume diuretics, increase dose to 40 mg IV Lasix IV BID per Cardiology     HFpEF, not in acute exacerbation: Echo on 5/2/2023 shows EF 50-55%.  Home GDMT includes spironolactone 12.5 mg and Lasix  -Continue home GDMT  -Okay to resume diuretics, PROSPER resolved, increase Lasix to 40 mg IV BID per Cardiology  -Salt restricted and fluid restricted diet  -I/O's  -Daily weights      Hyperlipidemia:  -Continue statin      GERD:  -Continue PPI      Chief Complaint: Tachycardia      Subjective: 89 y.o. female admitted to the hospitalist service for tachycardia. Patient reports chest heaviness. She denies nausea or vomiting.      Medications:    Infusion Medications    dilTIAZem 7.5 mg/hr (07/19/24 0619)    sodium chloride       Scheduled Medications    sodium chloride flush  5-40 mL IntraVENous 2 times per day    apixaban  2.5 mg Oral BID    atorvastatin  40 mg Oral Nightly    aspirin EC  81 mg Oral Daily    pantoprazole  40 mg Oral QAM AC    spironolactone  12.5 mg Oral Daily    [Held by provider] furosemide  20 mg Oral Daily     PRN Meds: sodium chloride flush, sodium chloride, ondansetron

## 2024-07-20 LAB
BACTERIA: ABNORMAL
BILIRUB UR QL STRIP: NEGATIVE
CASTS #/AREA URNS LPF: ABNORMAL /LPF
CASTS #/AREA URNS LPF: ABNORMAL /LPF
CHARACTER UR: CLEAR
CHARCOAL URNS QL MICRO: ABNORMAL
COLOR UR: YELLOW
CREAT UR-MCNC: 222.5 MG/DL
CRYSTALS URNS QL MICRO: ABNORMAL
ECHO AR MAX VEL PISA: 3.4 M/S
ECHO AV CUSP MM: 1.6 CM
ECHO AV PEAK GRADIENT: 4 MMHG
ECHO AV PEAK VELOCITY: 1.1 M/S
ECHO AV REGURGITANT PHT: 592 MS
ECHO AV VELOCITY RATIO: 0.45
ECHO BSA: 1.5 M2
ECHO LA AREA 2C: 17.9 CM2
ECHO LA AREA 4C: 19.2 CM2
ECHO LA DIAMETER INDEX: 1.8 CM/M2
ECHO LA DIAMETER: 2.7 CM
ECHO LA MAJOR AXIS: 5.2 CM
ECHO LA MINOR AXIS: 5.3 CM
ECHO LA VOL BP: 53 ML (ref 22–52)
ECHO LA VOL MOD A2C: 50 ML (ref 22–52)
ECHO LA VOL MOD A4C: 55 ML (ref 22–52)
ECHO LA VOL/BSA BIPLANE: 35 ML/M2 (ref 16–34)
ECHO LA VOLUME INDEX MOD A2C: 33 ML/M2 (ref 16–34)
ECHO LA VOLUME INDEX MOD A4C: 37 ML/M2 (ref 16–34)
ECHO LV EDV A2C: 74 ML
ECHO LV EDV A4C: 89 ML
ECHO LV EDV INDEX A4C: 59 ML/M2
ECHO LV EDV NDEX A2C: 49 ML/M2
ECHO LV EJECTION FRACTION A2C: 20 %
ECHO LV EJECTION FRACTION A4C: 36 %
ECHO LV EJECTION FRACTION BIPLANE: 29 % (ref 55–100)
ECHO LV ESV A2C: 58 ML
ECHO LV ESV A4C: 58 ML
ECHO LV ESV INDEX A2C: 39 ML/M2
ECHO LV ESV INDEX A4C: 39 ML/M2
ECHO LV FRACTIONAL SHORTENING: -2 % (ref 28–44)
ECHO LV INTERNAL DIMENSION DIASTOLE INDEX: 3.07 CM/M2
ECHO LV INTERNAL DIMENSION DIASTOLIC: 4.6 CM (ref 3.9–5.3)
ECHO LV INTERNAL DIMENSION SYSTOLIC INDEX: 3.13 CM/M2
ECHO LV INTERNAL DIMENSION SYSTOLIC: 4.7 CM
ECHO LV IVSD: 1.2 CM (ref 0.6–0.9)
ECHO LV MASS 2D: 192.9 G (ref 67–162)
ECHO LV MASS INDEX 2D: 128.6 G/M2 (ref 43–95)
ECHO LV POSTERIOR WALL DIASTOLIC: 1.1 CM (ref 0.6–0.9)
ECHO LV RELATIVE WALL THICKNESS RATIO: 0.48
ECHO LVOT PEAK GRADIENT: 1 MMHG
ECHO LVOT PEAK VELOCITY: 0.5 M/S
ECHO MV REGURGITANT ALIASING (NYQUIST) VELOCITY: 39 CM/S
ECHO MV REGURGITANT PEAK GRADIENT: 81 MMHG
ECHO MV REGURGITANT PEAK VELOCITY: 4.5 M/S
ECHO MV REGURGITANT RADIUS PISA: 0.7 CM
ECHO MV REGURGITANT VTIA: 115 CM
ECHO PULMONARY ARTERY END DIASTOLIC PRESSURE: 6 MMHG
ECHO PV MAX VELOCITY: 0.5 M/S
ECHO PV PEAK GRADIENT: 1 MMHG
ECHO PV REGURGITANT MAX VELOCITY: 1.3 M/S
ECHO RV INTERNAL DIMENSION: 2.8 CM
ECHO TV E WAVE: 0.5 M/S
ECHO TV REGURGITANT MAX VELOCITY: 2.76 M/S
ECHO TV REGURGITANT PEAK GRADIENT: 30 MMHG
EPITHELIAL CELLS, UA: ABNORMAL /HPF
GLUCOSE UR QL STRIP.AUTO: NEGATIVE MG/DL
HGB UR QL STRIP.AUTO: NEGATIVE
KETONES UR QL STRIP.AUTO: ABNORMAL
LEUKOCYTE ESTERASE UR QL STRIP.AUTO: ABNORMAL
NITRITE UR QL STRIP.AUTO: NEGATIVE
PH UR STRIP.AUTO: 5 [PH] (ref 5–9)
PROT UR STRIP.AUTO-MCNC: 30 MG/DL
RBC #/AREA URNS HPF: ABNORMAL /HPF
RENAL EPI CELLS #/AREA URNS HPF: ABNORMAL /[HPF]
SODIUM UR-SCNC: < 20 MEQ/L
SP GR UR REFRACT.AUTO: > 1.03 (ref 1–1.03)
UROBILINOGEN UR QL STRIP.AUTO: 1 EU/DL (ref 0–1)
UUN 24H UR-MCNC: 998 MG/DL
WBC #/AREA URNS HPF: ABNORMAL /HPF
YEAST LIKE FUNGI URNS QL MICRO: ABNORMAL

## 2024-07-20 PROCEDURE — 93306 TTE W/DOPPLER COMPLETE: CPT | Performed by: INTERNAL MEDICINE

## 2024-07-20 PROCEDURE — 6360000002 HC RX W HCPCS: Performed by: PHYSICIAN ASSISTANT

## 2024-07-20 PROCEDURE — 99232 SBSQ HOSP IP/OBS MODERATE 35: CPT | Performed by: PHYSICIAN ASSISTANT

## 2024-07-20 PROCEDURE — 6370000000 HC RX 637 (ALT 250 FOR IP): Performed by: NURSE PRACTITIONER

## 2024-07-20 PROCEDURE — 84540 ASSAY OF URINE/UREA-N: CPT

## 2024-07-20 PROCEDURE — 6370000000 HC RX 637 (ALT 250 FOR IP)

## 2024-07-20 PROCEDURE — 84300 ASSAY OF URINE SODIUM: CPT

## 2024-07-20 PROCEDURE — 6370000000 HC RX 637 (ALT 250 FOR IP): Performed by: PHYSICIAN ASSISTANT

## 2024-07-20 PROCEDURE — 2500000003 HC RX 250 WO HCPCS: Performed by: INTERNAL MEDICINE

## 2024-07-20 PROCEDURE — 82570 ASSAY OF URINE CREATININE: CPT

## 2024-07-20 PROCEDURE — 2580000003 HC RX 258

## 2024-07-20 PROCEDURE — 81001 URINALYSIS AUTO W/SCOPE: CPT

## 2024-07-20 PROCEDURE — 1200000003 HC TELEMETRY R&B

## 2024-07-20 RX ORDER — FUROSEMIDE 10 MG/ML
20 INJECTION INTRAMUSCULAR; INTRAVENOUS 2 TIMES DAILY
Status: DISCONTINUED | OUTPATIENT
Start: 2024-07-20 | End: 2024-07-25 | Stop reason: HOSPADM

## 2024-07-20 RX ORDER — POTASSIUM CHLORIDE 20 MEQ/1
20 TABLET, EXTENDED RELEASE ORAL PRN
Status: DISCONTINUED | OUTPATIENT
Start: 2024-07-20 | End: 2024-07-25 | Stop reason: HOSPADM

## 2024-07-20 RX ORDER — AMIODARONE HYDROCHLORIDE 200 MG/1
200 TABLET ORAL ONCE
Status: COMPLETED | OUTPATIENT
Start: 2024-07-20 | End: 2024-07-20

## 2024-07-20 RX ORDER — POTASSIUM CHLORIDE 7.45 MG/ML
10 INJECTION INTRAVENOUS PRN
Status: DISCONTINUED | OUTPATIENT
Start: 2024-07-20 | End: 2024-07-25 | Stop reason: HOSPADM

## 2024-07-20 RX ORDER — CALCIUM CARBONATE 500 MG/1
1000 TABLET, CHEWABLE ORAL ONCE
Status: DISCONTINUED | OUTPATIENT
Start: 2024-07-21 | End: 2024-07-24

## 2024-07-20 RX ORDER — POTASSIUM CHLORIDE 20 MEQ/1
40 TABLET, EXTENDED RELEASE ORAL PRN
Status: DISCONTINUED | OUTPATIENT
Start: 2024-07-20 | End: 2024-07-25 | Stop reason: HOSPADM

## 2024-07-20 RX ORDER — FAMOTIDINE 20 MG/1
20 TABLET, FILM COATED ORAL
Status: ACTIVE | OUTPATIENT
Start: 2024-07-20 | End: 2024-07-21

## 2024-07-20 RX ADMIN — APIXABAN 2.5 MG: 2.5 TABLET, FILM COATED ORAL at 20:18

## 2024-07-20 RX ADMIN — ASPIRIN 81 MG: 81 TABLET, COATED ORAL at 08:18

## 2024-07-20 RX ADMIN — PANTOPRAZOLE SODIUM 40 MG: 40 TABLET, DELAYED RELEASE ORAL at 06:28

## 2024-07-20 RX ADMIN — AMIODARONE HYDROCHLORIDE 200 MG: 200 TABLET ORAL at 15:25

## 2024-07-20 RX ADMIN — SODIUM CHLORIDE, PRESERVATIVE FREE 10 ML: 5 INJECTION INTRAVENOUS at 20:18

## 2024-07-20 RX ADMIN — Medication 3 MG: at 22:22

## 2024-07-20 RX ADMIN — AMIODARONE HYDROCHLORIDE 0.5 MG/MIN: 1.8 INJECTION, SOLUTION INTRAVENOUS at 23:45

## 2024-07-20 RX ADMIN — ATORVASTATIN CALCIUM 40 MG: 40 TABLET, FILM COATED ORAL at 20:18

## 2024-07-20 RX ADMIN — FUROSEMIDE 20 MG: 10 INJECTION, SOLUTION INTRAMUSCULAR; INTRAVENOUS at 17:36

## 2024-07-20 RX ADMIN — APIXABAN 2.5 MG: 2.5 TABLET, FILM COATED ORAL at 08:19

## 2024-07-20 NOTE — PROGRESS NOTES
Cardiology Progress Note      Patient:  Shawanda Funez  YOB: 1934  MRN: [id number]   Acct: [id number]  Admit Date:  7/18/2024  Primary Cardiologist:  Seen by Dr Mendoza    Per Dr Mendoza's consult note  CHIEF COMPLAINT:  Afib        HPI: This is a pleasant 89 y.o. female with hx of Afib, HLD, GERD with sob, fatigue, and tachycardia.  S/p Afib ablation 2021 and was SR.  She has not felt well and BNP is elevated.  Some racing.  No syncope.  No chest pain.  Cr 1.3, Trop curve flat, BNP 56569.  She was given IV NS bolus and started on Diltiazem gtt.  She is on Lasix and Eliquis, as well as Aldactone.   BB caused bradycardia.  She is limited code status x 4.  We recently stopped BB due to bradycardia.  She feels better since admit. Son and daughter present.   recently passed.     Subjective (Events in last 24 hours):   Pt continues in Afib with rate in the low 100's.  BP running in the 90's asystolically.  States she typically has low BP.      Objective:   BP 91/67   Pulse (!) 117   Temp 97.9 °F (36.6 °C) (Oral)   Resp 18   Ht 1.524 m (5')   Wt 56 kg (123 lb 6.4 oz)   SpO2 98%   BMI 24.10 kg/m²        TELEMETRY:Afib    Physical Exam:  General Appearance: alert and oriented to person, place and time, in no acute distress  Cardiovascular: irregularly irregular rhythm, normal S1 and S2, no murmurs, rubs, clicks, or gallops, distal pulses intact, no carotid bruits, no JVD  Pulmonary/Chest: clear to auscultation bilaterally- no wheezes, rales or rhonchi, normal air movement, no respiratory distress  Abdomen: soft, non-tender, non-distended, normal bowel sounds, no masses   Extremities: no cyanosis, clubbing or edema, pulse   Skin: warm and dry, no rash or erythema  Neurological: alert, oriented, normal speech, no focal findings or movement disorder noted    Medications:    furosemide  40 mg IntraVENous BID    amiodarone  200 mg Oral Daily    sodium chloride flush  5-40 mL IntraVENous 2 times per day

## 2024-07-20 NOTE — PROGRESS NOTES
Hospitalist Progress Note    Patient:  Shawanda Funez      Unit/Bed:6K-25/025-A    YOB: 1934    MRN: [id number]       Acct: [id number]     PCP: Geovany Moore MD    Date of Admission: 7/18/2024    Assessment/Plan:    Atrial fibrillation with RVR: History of atrial fibrillation s/p ablation in 2021.  Patient has not had any recurrence of atrial fibrillation since that time.  Patient recently discontinued her Lopressor 12.5 mg twice daily by her cardiologist due to fatigue and Bradycardia  -Cardiology consulted, discussed cardioversion with patient vs medication rate control , patient preferring to wait and possibly pursue cardioversion outpatient  -Diltiazem drip discontinued per Cardiology, switched to amiodarone gtt which she continues on at this time. Transition to PO Amiodarone per Cardiology  -Continue home Eliquis     PROSPER: Baseline creatinine 0.8-1.0. Resolved   -Okay to resume diuretics, increase dose to 40 mg IV Lasix IV BID per Cardiology     HFpEF, not in acute exacerbation: Echo on 5/2/2023 shows EF 50-55%.  Home GDMT includes spironolactone 12.5 mg and Lasix  -Continue home GDMT  -Okay to resume diuretics, PROSPER resolved, increase Lasix to 40 mg IV BID per Cardiology  -Salt restricted and fluid restricted diet  -I/O's  -Daily weights      Hyperlipidemia:  -Continue statin      GERD:  -Continue PPI      Chief Complaint: Tachycardia      Subjective: 89 y.o. female admitted to the hospitalist service for tachycardia. Patient continues to have tachycardia. She denies chest pain. She endorses SOB when she gets up.    Medications:    Infusion Medications    amiodarone 0.5 mg/min (07/19/24 1956)    sodium chloride       Scheduled Medications    furosemide  20 mg IntraVENous BID    sodium chloride flush  5-40 mL IntraVENous 2 times per day    apixaban  2.5 mg Oral BID    atorvastatin  40 mg Oral Nightly    aspirin EC  81 mg Oral Daily    pantoprazole  40 mg Oral QAM AC    spironolactone  12.5 mg

## 2024-07-20 NOTE — PLAN OF CARE
Problem: Discharge Planning  Goal: Discharge to home or other facility with appropriate resources  Outcome: Progressing  Flowsheets (Taken 7/19/2024 2252)  Discharge to home or other facility with appropriate resources:   Identify barriers to discharge with patient and caregiver   Identify discharge learning needs (meds, wound care, etc)   Refer to discharge planning if patient needs post-hospital services based on physician order or complex needs related to functional status, cognitive ability or social support system   Arrange for interpreters to assist at discharge as needed   Arrange for needed discharge resources and transportation as appropriate     Problem: Safety - Adult  Goal: Free from fall injury  Outcome: Progressing  Flowsheets (Taken 7/19/2024 2252)  Free From Fall Injury:   Instruct family/caregiver on patient safety   Based on caregiver fall risk screen, instruct family/caregiver to ask for assistance with transferring infant if caregiver noted to have fall risk factors     Problem: ABCDS Injury Assessment  Goal: Absence of physical injury  Outcome: Progressing  Flowsheets (Taken 7/19/2024 2252)  Absence of Physical Injury: Implement safety measures based on patient assessment     Problem: Chronic Conditions and Co-morbidities  Goal: Patient's chronic conditions and co-morbidity symptoms are monitored and maintained or improved  Outcome: Progressing  Flowsheets (Taken 7/19/2024 2252)  Care Plan - Patient's Chronic Conditions and Co-Morbidity Symptoms are Monitored and Maintained or Improved:   Collaborate with multidisciplinary team to address chronic and comorbid conditions and prevent exacerbation or deterioration   Monitor and assess patient's chronic conditions and comorbid symptoms for stability, deterioration, or improvement   Update acute care plan with appropriate goals if chronic or comorbid symptoms are exacerbated and prevent overall improvement and discharge  Care plan reviewed with

## 2024-07-21 PROBLEM — E44.1 MILD MALNUTRITION (HCC): Status: ACTIVE | Noted: 2024-07-21

## 2024-07-21 LAB — POTASSIUM SERPL-SCNC: 3.7 MEQ/L (ref 3.5–5.2)

## 2024-07-21 PROCEDURE — 2580000003 HC RX 258

## 2024-07-21 PROCEDURE — 1200000003 HC TELEMETRY R&B

## 2024-07-21 PROCEDURE — 99232 SBSQ HOSP IP/OBS MODERATE 35: CPT | Performed by: PHYSICIAN ASSISTANT

## 2024-07-21 PROCEDURE — 2500000003 HC RX 250 WO HCPCS: Performed by: INTERNAL MEDICINE

## 2024-07-21 PROCEDURE — 6370000000 HC RX 637 (ALT 250 FOR IP): Performed by: PHYSICIAN ASSISTANT

## 2024-07-21 PROCEDURE — 6370000000 HC RX 637 (ALT 250 FOR IP)

## 2024-07-21 PROCEDURE — 84132 ASSAY OF SERUM POTASSIUM: CPT

## 2024-07-21 PROCEDURE — 36415 COLL VENOUS BLD VENIPUNCTURE: CPT

## 2024-07-21 PROCEDURE — 6370000000 HC RX 637 (ALT 250 FOR IP): Performed by: NURSE PRACTITIONER

## 2024-07-21 RX ORDER — DOCUSATE SODIUM 100 MG/1
100 CAPSULE, LIQUID FILLED ORAL DAILY
Status: DISCONTINUED | OUTPATIENT
Start: 2024-07-21 | End: 2024-07-23

## 2024-07-21 RX ORDER — DIGOXIN 125 MCG
125 TABLET ORAL DAILY
Status: DISCONTINUED | OUTPATIENT
Start: 2024-07-21 | End: 2024-07-22

## 2024-07-21 RX ORDER — DIGOXIN 250 MCG
250 TABLET ORAL DAILY
Status: DISCONTINUED | OUTPATIENT
Start: 2024-07-21 | End: 2024-07-21 | Stop reason: SDUPTHER

## 2024-07-21 RX ORDER — SENNOSIDES A AND B 8.6 MG/1
2 TABLET, FILM COATED ORAL NIGHTLY
Status: DISCONTINUED | OUTPATIENT
Start: 2024-07-21 | End: 2024-07-23

## 2024-07-21 RX ADMIN — DOCUSATE SODIUM 100 MG: 100 CAPSULE, LIQUID FILLED ORAL at 15:09

## 2024-07-21 RX ADMIN — Medication 3 MG: at 20:31

## 2024-07-21 RX ADMIN — ACETAMINOPHEN 650 MG: 325 TABLET ORAL at 15:09

## 2024-07-21 RX ADMIN — AMIODARONE HYDROCHLORIDE 0.5 MG/MIN: 1.8 INJECTION, SOLUTION INTRAVENOUS at 23:15

## 2024-07-21 RX ADMIN — POLYETHYLENE GLYCOL 3350 17 G: 17 POWDER, FOR SOLUTION ORAL at 13:35

## 2024-07-21 RX ADMIN — APIXABAN 2.5 MG: 2.5 TABLET, FILM COATED ORAL at 09:28

## 2024-07-21 RX ADMIN — PANTOPRAZOLE SODIUM 40 MG: 40 TABLET, DELAYED RELEASE ORAL at 05:05

## 2024-07-21 RX ADMIN — ASPIRIN 81 MG: 81 TABLET, COATED ORAL at 09:28

## 2024-07-21 RX ADMIN — DIGOXIN 125 MCG: 0.12 TABLET ORAL at 15:09

## 2024-07-21 RX ADMIN — AMIODARONE HYDROCHLORIDE 0.5 MG/MIN: 1.8 INJECTION, SOLUTION INTRAVENOUS at 12:57

## 2024-07-21 RX ADMIN — APIXABAN 2.5 MG: 2.5 TABLET, FILM COATED ORAL at 20:28

## 2024-07-21 RX ADMIN — SODIUM CHLORIDE, PRESERVATIVE FREE 10 ML: 5 INJECTION INTRAVENOUS at 09:28

## 2024-07-21 RX ADMIN — ATORVASTATIN CALCIUM 40 MG: 40 TABLET, FILM COATED ORAL at 20:28

## 2024-07-21 RX ADMIN — SENNOSIDES 17.2 MG: 8.6 TABLET, FILM COATED ORAL at 20:31

## 2024-07-21 ASSESSMENT — PAIN DESCRIPTION - LOCATION
LOCATION: ARM
LOCATION: ARM

## 2024-07-21 ASSESSMENT — PAIN SCALES - GENERAL
PAINLEVEL_OUTOF10: 9
PAINLEVEL_OUTOF10: 3

## 2024-07-21 ASSESSMENT — PAIN - FUNCTIONAL ASSESSMENT: PAIN_FUNCTIONAL_ASSESSMENT: PREVENTS OR INTERFERES SOME ACTIVE ACTIVITIES AND ADLS

## 2024-07-21 ASSESSMENT — PAIN DESCRIPTION - DESCRIPTORS
DESCRIPTORS: ACHING
DESCRIPTORS: SORE

## 2024-07-21 ASSESSMENT — PAIN DESCRIPTION - ORIENTATION
ORIENTATION: LEFT
ORIENTATION: LEFT

## 2024-07-21 NOTE — PLAN OF CARE
Problem: Discharge Planning  Goal: Discharge to home or other facility with appropriate resources  Flowsheets (Taken 7/21/2024 0350)  Discharge to home or other facility with appropriate resources:   Identify barriers to discharge with patient and caregiver   Arrange for needed discharge resources and transportation as appropriate   Identify discharge learning needs (meds, wound care, etc)   Arrange for interpreters to assist at discharge as needed   Refer to discharge planning if patient needs post-hospital services based on physician order or complex needs related to functional status, cognitive ability or social support system     Problem: Safety - Adult  Goal: Free from fall injury  Flowsheets (Taken 7/21/2024 0350)  Free From Fall Injury:   Instruct family/caregiver on patient safety   Based on caregiver fall risk screen, instruct family/caregiver to ask for assistance with transferring infant if caregiver noted to have fall risk factors     Problem: ABCDS Injury Assessment  Goal: Absence of physical injury  Flowsheets (Taken 7/21/2024 0350)  Absence of Physical Injury: Implement safety measures based on patient assessment     Problem: Chronic Conditions and Co-morbidities  Goal: Patient's chronic conditions and co-morbidity symptoms are monitored and maintained or improved  Flowsheets (Taken 7/21/2024 0350)  Care Plan - Patient's Chronic Conditions and Co-Morbidity Symptoms are Monitored and Maintained or Improved:   Monitor and assess patient's chronic conditions and comorbid symptoms for stability, deterioration, or improvement   Collaborate with multidisciplinary team to address chronic and comorbid conditions and prevent exacerbation or deterioration   Update acute care plan with appropriate goals if chronic or comorbid symptoms are exacerbated and prevent overall improvement and discharge     Problem: Cardiovascular - Adult  Goal: Maintains optimal cardiac output and hemodynamic stability  Flowsheets

## 2024-07-21 NOTE — PROGRESS NOTES
Comprehensive Nutrition Assessment    Type and Reason for Visit:  Initial, Positive Nutrition Screen (Weight loss and decreased appetite)    Nutrition Recommendations/Plan:   Recommend initiating glycolax daily as pt drinks q day at home   Continue diet per provider and encourage adequate PO intake at best efforts  Modify ONS: Ensure compact (BID) - collecting in room, decreasing frequency; and Activia yogurt (BID) - to assist with constipation  Recommend MVI  Discussed continuation of ONS if appetite continues to be poor upon discharge - pt understanding     Malnutrition Assessment:  Malnutrition Status:  Mild malnutrition (07/21/24 1248)    Context:  Acute Illness     Findings of the 6 clinical characteristics of malnutrition:  Energy Intake:  75% or less of estimated energy requirements for 7 or more days (last 3 weeks)  Weight Loss:  No significant weight loss     Body Fat Loss:  No significant body fat loss     Muscle Mass Loss:   (difficult to asses - pt of advanced age)    Fluid Accumulation:  Mild Extremities   Strength:  Not Performed    Nutrition Assessment:     Pt. mildy malnourished AEB criteria as listed above.  At risk for further nutrition compromise r/t A fib with RVR, PROSPER, advanced age and underlying medical condition (PMHx: arthritis, CHF, GERD, osteoporosis, HLD).        Nutrition Related Findings:    Pt. Report/Treatments/Miscellaneous: Pt seen, endorses poor appetite for the last ~3 weeks. She states she has only been eating a couple of cookies and coffee for the last few weeks - occasionally something more (isn't specific when asked). She denies any ONS use PTA - discussed how it could be beneficial at this time while appetite low - pt states she is hopeful he appetite perks up soon. Pt states that food just does not sound good or appealing. Denies weight loss, N/V, but does state she chronically deals with constipation. She takes miralax daily and will drink prune juice if needed - reports

## 2024-07-21 NOTE — PROGRESS NOTES
dilated.    Right Atrium: Right atrium is moderately dilated.    IVC/SVC: IVC diameter is less than or equal to 21 mm and decreases less than 50% during inspiration; therefore the estimated right atrial pressure is intermediate (~8 mmHg).    Image quality is adequate. Procedure performed with the patient in a supine position.     Comparison Study Information    Prior Study    There is a prior study available for comparison. Prior study date: 5/2/2023.   Summary   Normal left ventricular size and systolic function.   There were no regional wall motion abnormalities.   Wall thickness was within normal limits.   Ejection fraction was estimated at 50-55%.   There was moderate mitral regurgitation.   There was moderate to severe pulmonic regurgitation.   There was severe tricuspid regurgitation. Assuming RAP = 10 mmHg, the   estimated RVSP = 52 mmHg.   IVC size is within normal limits with normal respiratory phasic changes.     Results for orders placed or performed during the hospital encounter of 05/02/23   Echo 2D w doppler w color complete   Result Value Ref Range    Left Ventricular Ejection Fraction 53     LVEF MODALITY ECHO     Narrative    Transthoracic Echocardiography Report (TTE)     Demographics      Patient Name    IZA KEENE  Gender               Female      MR #            [id number]   Race                                                   Ethnicity      Account #       [id number]   Room Number      Accession       [id number]  Date of Study        05/02/2023   Number      Date of Birth   09/20/1934  Referring Physician  Michelle Moore      Age             88 year(s)  Sonographer          Josias Jacobo, PEYTON,                                                    RVT                                  Interpreting         Tyrell Mendoza MD                               Physician     Procedure    Type of Study      TTE  Peak A-Wave: 55.5 cm/s          150 cm/s          90.7 cm/s   MV E/A Ratio: 1.5                  AV Peak Gradient: LVOT Peak Gradient: 3   MV Peak Gradient: 2.78 mmHg        9 mmHg            mmHg      MV Deceleration Time: 173 msec                       TV Peak E-Wave: 67.1                                                        cm/s                                                        TV Peak A-Wave: 65   MV E' Septal Velocity: 6.2 cm/s    AV P1/2t: 818     cm/s   MV A' Septal Velocity: 9.9 cm/s    msec   MV E' Lateral Velocity: 13.2 cm/s                    TV Peak Gradient: 1.8   MV A' Lateral Velocity: 7.8 cm/s                     mmHg   E/E' septal: 13.44                                   TR Velocity:282 cm/s   E/E' lateral: 6.31                 AV DVI (Vmax):0.6 TR Gradient:31.81   MR Velocity: 547 cm/s                                mmHg   MV LIANA PISA: 0.22 cm^2                               PV Peak Velocity:   MR VTI: 189 cm                                       68.1 cm/s   Alias Velocity: 38.5 cm/sPISA                        PV Peak Gradient:   Radius: 0.7 cm                                       1.86 mmHg      PISA area: 3.08 cm^2MR flow rate:   118.58 ml/sMR volume:41.58 ml                        MO ED Velocity: 125                                                        cm/s     [email or web address]         Cath:   Results for orders placed or performed during the hospital encounter of 08/05/21   Cardiac Catheterization         Lab Data:    Cardiac Enzymes:  No results for input(s): \"CKTOTAL\", \"CKMB\", \"CKMBINDEX\", \"TROPONINI\" in the last 72 hours.    CBC:   Lab Results   Component Value Date/Time    WBC 9.9 07/19/2024 05:39 AM    RBC 4.14 07/19/2024 05:39 AM    RBC 3.58 07/19/2021 01:36 PM    HGB 12.6 07/19/2024 05:39 AM    HCT 39.8 07/19/2024 05:39 AM     07/19/2024 05:39 AM       CMP:    Lab  No

## 2024-07-21 NOTE — PROGRESS NOTES
Pt alerted nurse to a sore area above her Lt antecubital IV.  Area noted to be cool, edematous, and reddened.  IV amiodarone stopped, pulled back on syringe attached, got about 0.25mL out.  Warm blanket applied.  Called pharmacy to double check that nothing else needed to be done at this time.  IV removed.  Flushed Right hand IV, hoping to use it,  and fluids were leaking out under the dressing.  That IV removed also.  New 22g placed in left inner forearm.  Amiodarone restarted, after being off for approx 5 min.  Pt tolerates well.

## 2024-07-21 NOTE — PROGRESS NOTES
Hospitalist Progress Note    Patient:  Shawanda Funez      Unit/Bed:6K-25/025-A    YOB: 1934    MRN: [id number]       Acct: [id number]     PCP: Geovany Moore MD    Date of Admission: 7/18/2024    Assessment/Plan:    Atrial fibrillation with RVR: History of atrial fibrillation s/p ablation in 2021.  Patient has not had any recurrence of atrial fibrillation since that time.  Patient recently discontinued her Lopressor 12.5 mg twice daily by her cardiologist due to fatigue and Bradycardia  -Cardiology consulted, discussed cardioversion with patient vs medication rate control , patient preferring to wait and possibly pursue cardioversion outpatient  -Diltiazem drip discontinued per Cardiology, switched to amiodarone gtt which she continues on at this time. Transition to PO Amiodarone per Cardiology   -Patient continues on amiodarone gtt, Cardiology following  -Continue home Eliquis     PROSPER: Baseline creatinine 0.8-1.0. Resolved   -Okay to resume diuretics, increase dose to 40 mg IV Lasix IV BID per Cardiology     HFpEF, not in acute exacerbation: Echo on 5/2/2023 shows EF 50-55%.  Home GDMT includes spironolactone 12.5 mg and Lasix  -Continue home GDMT  -Okay to resume diuretics, PROSPER resolved, increase Lasix to 40 mg IV BID per Cardiology  -Salt restricted and fluid restricted diet  -I/O's  -Daily weights      Constipation:   -Bowel regimen increased- Colace and Senna added    Hyperlipidemia:  -Continue statin      GERD:  -Continue PPI      Chief Complaint: Tachycardia      Subjective: 89 y.o. female admitted to the hospitalist service for tachycardia. Patient continues on amiodarone gtt. She remains in RVR with low blood pressure. Patient endorses nausea. She denies vomiting. She feels that she is constipated.    Medications:    Infusion Medications    amiodarone 0.5 mg/min (07/20/24 6341)    sodium chloride       Scheduled Medications    furosemide  20 mg IntraVENous BID    calcium carbonate  1,000

## 2024-07-22 LAB
ANION GAP SERPL CALC-SCNC: 15 MEQ/L (ref 8–16)
APTT PPP: 32.6 SECONDS (ref 22–38)
BUN SERPL-MCNC: 27 MG/DL (ref 7–22)
CALCIUM SERPL-MCNC: 8.5 MG/DL (ref 8.5–10.5)
CHLORIDE SERPL-SCNC: 97 MEQ/L (ref 98–111)
CO2 SERPL-SCNC: 22 MEQ/L (ref 23–33)
CREAT SERPL-MCNC: 1.1 MG/DL (ref 0.4–1.2)
DIGOXIN SERPL-MCNC: 1 NG/ML (ref 0.5–2)
GFR SERPL CREATININE-BSD FRML MDRD: 48 ML/MIN/1.73M2
GLUCOSE SERPL-MCNC: 119 MG/DL (ref 70–108)
POTASSIUM SERPL-SCNC: 3.8 MEQ/L (ref 3.5–5.2)
SODIUM SERPL-SCNC: 134 MEQ/L (ref 135–145)

## 2024-07-22 PROCEDURE — 6370000000 HC RX 637 (ALT 250 FOR IP)

## 2024-07-22 PROCEDURE — 80048 BASIC METABOLIC PNL TOTAL CA: CPT

## 2024-07-22 PROCEDURE — 99233 SBSQ HOSP IP/OBS HIGH 50: CPT | Performed by: PHYSICIAN ASSISTANT

## 2024-07-22 PROCEDURE — 5A2204Z RESTORATION OF CARDIAC RHYTHM, SINGLE: ICD-10-PCS | Performed by: INTERNAL MEDICINE

## 2024-07-22 PROCEDURE — B24BZZ4 ULTRASONOGRAPHY OF HEART WITH AORTA, TRANSESOPHAGEAL: ICD-10-PCS | Performed by: INTERNAL MEDICINE

## 2024-07-22 PROCEDURE — 6360000002 HC RX W HCPCS: Performed by: PHYSICIAN ASSISTANT

## 2024-07-22 PROCEDURE — 1200000003 HC TELEMETRY R&B

## 2024-07-22 PROCEDURE — 6370000000 HC RX 637 (ALT 250 FOR IP): Performed by: PHYSICIAN ASSISTANT

## 2024-07-22 PROCEDURE — 2580000003 HC RX 258

## 2024-07-22 PROCEDURE — 99232 SBSQ HOSP IP/OBS MODERATE 35: CPT | Performed by: STUDENT IN AN ORGANIZED HEALTH CARE EDUCATION/TRAINING PROGRAM

## 2024-07-22 PROCEDURE — 36415 COLL VENOUS BLD VENIPUNCTURE: CPT

## 2024-07-22 PROCEDURE — 2500000003 HC RX 250 WO HCPCS: Performed by: INTERNAL MEDICINE

## 2024-07-22 PROCEDURE — 80162 ASSAY OF DIGOXIN TOTAL: CPT

## 2024-07-22 PROCEDURE — 85730 THROMBOPLASTIN TIME PARTIAL: CPT

## 2024-07-22 RX ORDER — HEPARIN SODIUM 10000 [USP'U]/100ML
5-30 INJECTION, SOLUTION INTRAVENOUS CONTINUOUS
Status: DISCONTINUED | OUTPATIENT
Start: 2024-07-22 | End: 2024-07-22

## 2024-07-22 RX ORDER — HEPARIN SODIUM 10000 [USP'U]/100ML
5-30 INJECTION, SOLUTION INTRAVENOUS CONTINUOUS
Status: DISCONTINUED | OUTPATIENT
Start: 2024-07-22 | End: 2024-07-25 | Stop reason: HOSPADM

## 2024-07-22 RX ORDER — DIGOXIN 125 MCG
62.5 TABLET ORAL DAILY
Status: DISCONTINUED | OUTPATIENT
Start: 2024-07-23 | End: 2024-07-25 | Stop reason: HOSPADM

## 2024-07-22 RX ADMIN — DIGOXIN 125 MCG: 0.12 TABLET ORAL at 08:35

## 2024-07-22 RX ADMIN — ATORVASTATIN CALCIUM 40 MG: 40 TABLET, FILM COATED ORAL at 21:12

## 2024-07-22 RX ADMIN — SENNOSIDES 17.2 MG: 8.6 TABLET, FILM COATED ORAL at 21:12

## 2024-07-22 RX ADMIN — PANTOPRAZOLE SODIUM 40 MG: 40 TABLET, DELAYED RELEASE ORAL at 06:18

## 2024-07-22 RX ADMIN — ASPIRIN 81 MG: 81 TABLET, COATED ORAL at 08:35

## 2024-07-22 RX ADMIN — FUROSEMIDE 20 MG: 10 INJECTION, SOLUTION INTRAMUSCULAR; INTRAVENOUS at 18:28

## 2024-07-22 RX ADMIN — SODIUM CHLORIDE, PRESERVATIVE FREE 10 ML: 5 INJECTION INTRAVENOUS at 21:17

## 2024-07-22 RX ADMIN — HEPARIN SODIUM 12 UNITS/KG/HR: 10000 INJECTION, SOLUTION INTRAVENOUS at 21:21

## 2024-07-22 RX ADMIN — AMIODARONE HYDROCHLORIDE 0.5 MG/MIN: 1.8 INJECTION, SOLUTION INTRAVENOUS at 12:49

## 2024-07-22 RX ADMIN — DOCUSATE SODIUM 100 MG: 100 CAPSULE, LIQUID FILLED ORAL at 08:35

## 2024-07-22 RX ADMIN — APIXABAN 2.5 MG: 2.5 TABLET, FILM COATED ORAL at 08:35

## 2024-07-22 NOTE — PROGRESS NOTES
Hospitalist Progress Note    Patient:  Shawanda Funez      Unit/Bed:-25/025-A    YOB: 1934    MRN: [id number]       Acct: [id number]     PCP: Geovany Moore MD    Date of Admission: 7/18/2024    Assessment/Plan:    Atrial fibrillation with RVR: History of atrial fibrillation s/p ablation in 2021.  Patient has not had any recurrence of atrial fibrillation since that time.  Patient recently discontinued her Lopressor 12.5 mg twice daily by her cardiologist due to fatigue and Bradycardia  -Cardiology consulted, initially discussed cardioversion with patient vs medication rate control , patient preferring to wait and possibly pursue cardioversion outpatient  -Case discussed with Cardiology PA this morning who was going to discuss further with Dr. Mendoza. Per note, plan for heart cath Wednesday, patient received Eliquis this AM. Heparin initiated, hold Eliquis pending heart cath  -Diltiazem drip discontinued per Cardiology, switched to amiodarone gtt which she continues on at this time. Transition to PO Amiodarone per Cardiology   -Patient continues on amiodarone gtt, Cardiology following, digoxin added, dose reduced to 62.5 2/2 creatinine clearance  -Continue home Eliquis  -Digoxin level therapeutic at 1.o     PROSPER: Baseline creatinine 0.8-1.0. Resolved   -Okay to resume diuretics, increase dose to 40 mg IV Lasix IV BID per Cardiology     HFpEF, not in acute exacerbation: Echo on 5/2/2023 shows EF 50-55%.  Home GDMT includes spironolactone 12.5 mg and Lasix  -Continue home GDMT  -Okay to resume diuretics, PROSPER resolved, increase Lasix to 40 mg IV BID per Cardiology  -Salt restricted and fluid restricted diet  -I/O's  -Daily weights      Constipation:   -Bowel regimen increased- Colace and Senna added    Hyperlipidemia:  -Continue statin      GERD:  -Continue PPI      Chief Complaint: Tachycardia      Subjective: 89 y.o. female admitted to the hospitalist service for tachycardia. Patient continues on  Labs     07/21/24  1149   K 3.7       No results for input(s): \"AST\", \"ALT\", \"BILIDIR\", \"BILITOT\", \"ALKPHOS\" in the last 72 hours.    No results for input(s): \"INR\" in the last 72 hours.  No results for input(s): \"CKTOTAL\", \"TROPONINI\" in the last 72 hours.    Urinalysis:      Lab Results   Component Value Date/Time    NITRU NEGATIVE 07/20/2024 01:00 PM    WBCUA 5-9 07/20/2024 01:00 PM    BACTERIA NONE SEEN 07/20/2024 01:00 PM    RBCUA 0-2 07/20/2024 01:00 PM    BLOODU NEGATIVE 07/20/2024 01:00 PM    GLUCOSEU NEGATIVE 07/20/2024 01:00 PM       Radiology:  XR CHEST PORTABLE   Final Result   1. No interval change  since previous study dated 8/5/2021, no acute   cardiopulmonary disease..               **This report has been created using voice recognition software. It may contain   minor errors which are inherent in voice recognition technology.**      Electronically signed by Dr. Jensen Rodriguez          Diet: ADULT DIET; Regular; Low Fat/Low Chol/High Fiber/2 gm Na  ADULT ORAL NUTRITION SUPPLEMENT; Breakfast, Lunch; Standard 4 oz Oral Supplement  ADULT ORAL NUTRITION SUPPLEMENT; Breakfast, Lunch; Other Oral Supplement; Activia yogurt      Code Status: Limited      Electronically signed by Wilfredo Erwin PA-C on 7/22/2024 at 9:28 AM

## 2024-07-22 NOTE — PROGRESS NOTES
Cardiology Progress Note      Patient:  Shawanda Funez  YOB: 1934  MRN: [id number]   Acct: [id number]  Admit Date:  7/18/2024  Primary Cardiologist: Tyrell Mednoza MD  Note per dr Mendoza:  \"Per Dr Mendoza's consult note  CHIEF COMPLAINT:  Afib        HPI: This is a pleasant 89 y.o. female with hx of Afib, HLD, GERD with sob, fatigue, and tachycardia.  S/p Afib ablation 2021 and was SR.  She has not felt well and BNP is elevated.  Some racing.  No syncope.  No chest pain.  Cr 1.3, Trop curve flat, BNP 78550.  She was given IV NS bolus and started on Diltiazem gtt.  She is on Lasix and Eliquis, as well as Aldactone.   BB caused bradycardia.  She is limited code status x 4.  We recently stopped BB due to bradycardia.  She feels better since admit. Son and daughter present.   recently passed. \"    Subjective (Events in last 24 hours):   Pt awake, alert. NAD. Denies cp or sob today. No orthopnea or swelling. Son and other family at bedside today. Had very lengthy discussion about patient's wishes and whether to proceed with workup for her heart valve. Explained in great detail the R/B/I/A especially as it relates to her CHF, afib with rvr and the valve. The patient and family were able to understand these details as they relate to one another and separately. The decision was made to move forward with R/LHC and KAMRAN/CV as discussed with Dr Mendoza for further evaluation of her cardiac situation. She expressed understanding of the r/b/I/a of this procedure and the need to be full code and is agreeable.     Objective:   BP (!) 98/59   Pulse 54   Temp 98.2 °F (36.8 °C) (Oral)   Resp 18   Ht 1.524 m (5')   Wt 56 kg (123 lb 6.4 oz)   SpO2 97%   BMI 24.10 kg/m²      BP soft, afib with rvr  TELEMETRY: afib rvr     Physical Exam:  General Appearance: alert and oriented to person, place and time, in no acute distress  Cardiovascular: tachy rate, irregular rhythm, normal S1 and S2, + murmur, no rubs, clicks, or  ablation and CV's   Acute on chronic combined CHF-improved  EF 25-30%    Bradycardia with betablocker  Moderate to severe MR  Moderate TR  Elevated IVC pressure     Plan:  Daily weights  2 g sodium restriction daily  2 L fluid restriction daily  Keep K>4, Mg>2    CHF Guidelines  BB-no. Bradycardia resulting   ACE/ARB/Entresto- no. Low BP. Consider if BP improves  Diuretics-yes. Lasix IV  Aldactone-yes. 12.5 mg daily  Jardiance/Farxiga-consider at discharge. Check cost.     Hold eliquis. Start heparin.   Per DR Mendoza, no cath until Wednesday given eliquis dose this AM at 0835.   Will follow.      Electronically signed by Connor Mortimer, PA-C on 7/22/2024 at 2:31 PM

## 2024-07-23 ENCOUNTER — PREP FOR PROCEDURE (OUTPATIENT)
Dept: CARDIOLOGY | Age: 89
End: 2024-07-23

## 2024-07-23 LAB
APTT PPP: 43.5 SECONDS (ref 22–38)
APTT PPP: 51.8 SECONDS (ref 22–38)
APTT PPP: 58.4 SECONDS (ref 22–38)
APTT PPP: 71.6 SECONDS (ref 22–38)
CREAT SERPL-MCNC: 1.1 MG/DL (ref 0.4–1.2)
GFR SERPL CREATININE-BSD FRML MDRD: 48 ML/MIN/1.73M2

## 2024-07-23 PROCEDURE — 1200000003 HC TELEMETRY R&B

## 2024-07-23 PROCEDURE — 85730 THROMBOPLASTIN TIME PARTIAL: CPT

## 2024-07-23 PROCEDURE — 36415 COLL VENOUS BLD VENIPUNCTURE: CPT

## 2024-07-23 PROCEDURE — 82565 ASSAY OF CREATININE: CPT

## 2024-07-23 PROCEDURE — 2500000003 HC RX 250 WO HCPCS: Performed by: INTERNAL MEDICINE

## 2024-07-23 PROCEDURE — 6360000002 HC RX W HCPCS: Performed by: PHYSICIAN ASSISTANT

## 2024-07-23 PROCEDURE — 99232 SBSQ HOSP IP/OBS MODERATE 35: CPT | Performed by: PHYSICIAN ASSISTANT

## 2024-07-23 PROCEDURE — 6370000000 HC RX 637 (ALT 250 FOR IP)

## 2024-07-23 PROCEDURE — 6370000000 HC RX 637 (ALT 250 FOR IP): Performed by: STUDENT IN AN ORGANIZED HEALTH CARE EDUCATION/TRAINING PROGRAM

## 2024-07-23 PROCEDURE — 6370000000 HC RX 637 (ALT 250 FOR IP): Performed by: PHYSICIAN ASSISTANT

## 2024-07-23 RX ORDER — SODIUM CHLORIDE 9 MG/ML
INJECTION, SOLUTION INTRAVENOUS PRN
Status: CANCELLED | OUTPATIENT
Start: 2024-07-23

## 2024-07-23 RX ORDER — HEPARIN SODIUM 1000 [USP'U]/ML
60 INJECTION, SOLUTION INTRAVENOUS; SUBCUTANEOUS ONCE
Status: DISCONTINUED | OUTPATIENT
Start: 2024-07-23 | End: 2024-07-24

## 2024-07-23 RX ORDER — ASPIRIN 325 MG
325 TABLET ORAL ONCE
Status: CANCELLED | OUTPATIENT
Start: 2024-07-23 | End: 2024-07-23

## 2024-07-23 RX ORDER — HEPARIN SODIUM 1000 [USP'U]/ML
30 INJECTION, SOLUTION INTRAVENOUS; SUBCUTANEOUS PRN
Status: DISCONTINUED | OUTPATIENT
Start: 2024-07-23 | End: 2024-07-25 | Stop reason: HOSPADM

## 2024-07-23 RX ORDER — HEPARIN SODIUM 1000 [USP'U]/ML
60 INJECTION, SOLUTION INTRAVENOUS; SUBCUTANEOUS PRN
Status: DISCONTINUED | OUTPATIENT
Start: 2024-07-23 | End: 2024-07-25 | Stop reason: HOSPADM

## 2024-07-23 RX ORDER — SODIUM CHLORIDE 0.9 % (FLUSH) 0.9 %
5-40 SYRINGE (ML) INJECTION PRN
Status: CANCELLED | OUTPATIENT
Start: 2024-07-23

## 2024-07-23 RX ORDER — DOCUSATE SODIUM 100 MG/1
100 CAPSULE, LIQUID FILLED ORAL DAILY PRN
Status: DISCONTINUED | OUTPATIENT
Start: 2024-07-23 | End: 2024-07-25 | Stop reason: HOSPADM

## 2024-07-23 RX ORDER — SENNOSIDES A AND B 8.6 MG/1
1 TABLET, FILM COATED ORAL NIGHTLY
Status: DISCONTINUED | OUTPATIENT
Start: 2024-07-23 | End: 2024-07-25 | Stop reason: HOSPADM

## 2024-07-23 RX ORDER — SODIUM CHLORIDE 0.9 % (FLUSH) 0.9 %
5-40 SYRINGE (ML) INJECTION EVERY 12 HOURS SCHEDULED
Status: CANCELLED | OUTPATIENT
Start: 2024-07-23

## 2024-07-23 RX ORDER — SODIUM CHLORIDE 9 MG/ML
INJECTION, SOLUTION INTRAVENOUS CONTINUOUS
Status: CANCELLED | OUTPATIENT
Start: 2024-07-23

## 2024-07-23 RX ORDER — NITROGLYCERIN 0.4 MG/1
0.4 TABLET SUBLINGUAL EVERY 5 MIN PRN
Status: CANCELLED | OUTPATIENT
Start: 2024-07-23

## 2024-07-23 RX ADMIN — ATORVASTATIN CALCIUM 40 MG: 40 TABLET, FILM COATED ORAL at 20:25

## 2024-07-23 RX ADMIN — ASPIRIN 81 MG: 81 TABLET, COATED ORAL at 07:57

## 2024-07-23 RX ADMIN — FUROSEMIDE 20 MG: 10 INJECTION, SOLUTION INTRAMUSCULAR; INTRAVENOUS at 16:23

## 2024-07-23 RX ADMIN — AMIODARONE HYDROCHLORIDE 0.5 MG/MIN: 1.8 INJECTION, SOLUTION INTRAVENOUS at 01:23

## 2024-07-23 RX ADMIN — DIGOXIN 62.5 MCG: 0.12 TABLET ORAL at 07:57

## 2024-07-23 RX ADMIN — DOCUSATE SODIUM 100 MG: 100 CAPSULE, LIQUID FILLED ORAL at 07:57

## 2024-07-23 RX ADMIN — PANTOPRAZOLE SODIUM 40 MG: 40 TABLET, DELAYED RELEASE ORAL at 05:11

## 2024-07-23 RX ADMIN — AMIODARONE HYDROCHLORIDE 0.5 MG/MIN: 1.8 INJECTION, SOLUTION INTRAVENOUS at 13:46

## 2024-07-23 NOTE — PLAN OF CARE
Problem: Discharge Planning  Goal: Discharge to home or other facility with appropriate resources  Outcome: Progressing  Flowsheets (Taken 7/22/2024 2014)  Discharge to home or other facility with appropriate resources: Identify barriers to discharge with patient and caregiver  Note: Patient plans to return home when medically stable.      Problem: Safety - Adult  Goal: Free from fall injury  Outcome: Progressing  Flowsheets (Taken 7/21/2024 0350 by Taylor Landon RN)  Free From Fall Injury:   Instruct family/caregiver on patient safety   Based on caregiver fall risk screen, instruct family/caregiver to ask for assistance with transferring infant if caregiver noted to have fall risk factors  Note: No falls noted this shift. Continue falling star program. Bed alarm on, bed in low position. Call light and personal belongings in reach.  Patient uses call light appropriately.       Problem: ABCDS Injury Assessment  Goal: Absence of physical injury  Outcome: Progressing  Flowsheets (Taken 7/21/2024 0350 by Taylor Landon RN)  Absence of Physical Injury: Implement safety measures based on patient assessment     Problem: Chronic Conditions and Co-morbidities  Goal: Patient's chronic conditions and co-morbidity symptoms are monitored and maintained or improved  Outcome: Progressing  Flowsheets (Taken 7/22/2024 2014)  Care Plan - Patient's Chronic Conditions and Co-Morbidity Symptoms are Monitored and Maintained or Improved: Monitor and assess patient's chronic conditions and comorbid symptoms for stability, deterioration, or improvement     Problem: Cardiovascular - Adult  Goal: Maintains optimal cardiac output and hemodynamic stability  Outcome: Progressing  Flowsheets  Taken 7/22/2024 2014 by Lina Del Rio RN  Maintains optimal cardiac output and hemodynamic stability: Monitor blood pressure and heart rate  Taken 7/22/2024 0838 by Talya Harry, RN  Maintains optimal cardiac output and hemodynamic stability:

## 2024-07-23 NOTE — PROGRESS NOTES
Hospitalist Progress Note    Patient:  Shawanda Funez      Unit/Bed:[facility]/025-A    YOB: 1934    MRN: [id number]       Acct: [id number]     PCP: Geovany Moore MD    Date of Admission: 7/18/2024    Assessment/Plan:    Atrial fibrillation with RVR: History of atrial fibrillation s/p ablation in 2021.  Patient has not had any recurrence of atrial fibrillation since that time.  Patient recently discontinued her Lopressor 12.5 mg twice daily by her cardiologist due to fatigue and Bradycardia  -Cardiology consulted, initially discussed cardioversion with patient vs medication rate control , patient preferring to wait and possibly pursue cardioversion outpatient  -Case discussed with Cardiology PA this morning who was going to discuss further with Dr. Mendoza. Per note, plan for heart cath Wednesday, patient received Eliquis this AM. Heparin initiated, hold Eliquis pending heart cath  -Diltiazem drip discontinued per Cardiology, switched to amiodarone gtt which she continues on at this time. Transition to PO Amiodarone per Cardiology   -Patient continues on amiodarone gtt, Cardiology following, digoxin added, dose reduced to 62.5 2/2 creatinine clearance  -Continue home Eliquis upon discharge- switched to heparin gtt prior to procedure  -Digoxin level therapeutic at 1.0     PROSPER: Baseline creatinine 0.8-1.0. Resolved   -Okay to resume diuretics, increase dose to 40 mg IV Lasix IV BID per Cardiology     HFpEF, not in acute exacerbation: Echo on 5/2/2023 shows EF 50-55%.  Home GDMT includes spironolactone 12.5 mg and Lasix  -Continue home GDMT  -Okay to resume diuretics, PROSPER resolved, increase Lasix to 40 mg IV BID per Cardiology  -Salt restricted and fluid restricted diet  -I/O's  -Daily weights      Constipation:   -Bowel regimen increased- Colace and Senna added, patient reports diarrhea, will alter regimen to Colace PRN, only 1 tablet of Senna nightly instead of 2    Hyperlipidemia:  -Continue statin     1.524 m (5')   Wt 57.2 kg (125 lb 15.9 oz)   SpO2 97%   BMI 24.61 kg/m²     Physical Exam  Constitutional:       Interventions: She is not intubated.  Cardiovascular:      Rate and Rhythm: Rhythm irregular.   Pulmonary:      Effort: She is not intubated.   Neurological:      Mental Status: She is alert.   Psychiatric:         Speech: She is communicative.        Labs:   No results for input(s): \"WBC\", \"HGB\", \"HCT\", \"PLT\" in the last 72 hours.    Recent Labs     07/21/24  1149 07/22/24  1312 07/23/24  0312   NA  --  134*  --    K 3.7 3.8  --    CL  --  97*  --    CO2  --  22*  --    BUN  --  27*  --    CREATININE  --  1.1 1.1   CALCIUM  --  8.5  --        No results for input(s): \"AST\", \"ALT\", \"BILIDIR\", \"BILITOT\", \"ALKPHOS\" in the last 72 hours.    No results for input(s): \"INR\" in the last 72 hours.  No results for input(s): \"CKTOTAL\", \"TROPONINI\" in the last 72 hours.    Urinalysis:      Lab Results   Component Value Date/Time    NITRU NEGATIVE 07/20/2024 01:00 PM    WBCUA 5-9 07/20/2024 01:00 PM    BACTERIA NONE SEEN 07/20/2024 01:00 PM    RBCUA 0-2 07/20/2024 01:00 PM    BLOODU NEGATIVE 07/20/2024 01:00 PM    GLUCOSEU NEGATIVE 07/20/2024 01:00 PM       Radiology:  XR CHEST PORTABLE   Final Result   1. No interval change  since previous study dated 8/5/2021, no acute   cardiopulmonary disease..               **This report has been created using voice recognition software. It may contain   minor errors which are inherent in voice recognition technology.**      Electronically signed by Dr. Jensen Rodriguez          Diet: ADULT DIET; Regular; Low Fat/Low Chol/High Fiber/2 gm Na  ADULT ORAL NUTRITION SUPPLEMENT; Breakfast, Lunch; Standard 4 oz Oral Supplement  ADULT ORAL NUTRITION SUPPLEMENT; Breakfast, Lunch; Other Oral Supplement; Activia yogurt      Code Status: Limited      Electronically signed by Wilfredo Erwin PA-C on 7/23/2024 at 10:04 AM

## 2024-07-23 NOTE — PROGRESS NOTES
Evaluated cost of Farxiga and Jardiance for Sadia Sanches [facility]    Farxiga: $14.40/month, patient eligible for free 30-day trial from [facility].    Jardiance: $14.40/month, patient eligible for free 14-day trial from [facility].    Thank you,  Mercedes Garzon, [facility] - Prescription Assistance ([phone]) 7/23/2024,10:58 AM

## 2024-07-23 NOTE — PLAN OF CARE
Problem: Discharge Planning  Goal: Discharge to home or other facility with appropriate resources  Outcome: Progressing  Discharge to home or other facility with appropriate resources: Identify barriers to discharge with patient and caregiver     Problem: Safety - Adult  Goal: Free from fall injury  Outcome: Progressing  Free From Fall Injury:   Instruct family/caregiver on patient safety   Based on caregiver fall risk screen, instruct family/caregiver to ask for assistance with transferring infant if caregiver noted to have fall risk factors     Problem: ABCDS Injury Assessment  Goal: Absence of physical injury  Outcome: Progressing  Absence of Physical Injury: Implement safety measures based on patient assessment     Problem: Chronic Conditions and Co-morbidities  Goal: Patient's chronic conditions and co-morbidity symptoms are monitored and maintained or improved  Outcome: Progressing  Care Plan - Patient's Chronic Conditions and Co-Morbidity Symptoms are Monitored and Maintained or Improved: Monitor and assess patient's chronic conditions and comorbid symptoms for stability, deterioration, or improvement     Problem: Cardiovascular - Adult  Goal: Maintains optimal cardiac output and hemodynamic stability  Outcome: Progressing  Maintains optimal cardiac output and hemodynamic stability: Monitor blood pressure and heart rate  Goal: Absence of cardiac dysrhythmias or at baseline  Outcome: Progressing  Absence of cardiac dysrhythmias or at baseline: Monitor cardiac rate and rhythm     Problem: Nutrition Deficit:  Goal: Optimize nutritional status  Outcome: Progressing  Nutrient intake appropriate for improving, restoring, or maintaining nutritional needs: Assess nutritional status and recommend course of action     Problem: Pain  Goal: Verbalizes/displays adequate comfort level or baseline comfort level  Outcome: Progressing  Verbalizes/displays adequate comfort level or baseline comfort level: Encourage patient

## 2024-07-23 NOTE — CARE COORDINATION
7/23/24, 2:22 PM EDT    DISCHARGE ON GOING EVALUATION    Shawanda KEENE [facility] day: 5  Location: [facility]25/025-A Reason for admit: Paroxysmal atrial fibrillation (HCC) [I48.0]  PROSPER (acute kidney injury) (HCC) [N17.9]  Atrial fibrillation with RVR (HCC) [I48.91]  Acute on chronic congestive heart failure, unspecified heart failure type (HCC) [I50.9]     Procedures: 7-20-24 Echo    Imaging since last note: No    Barriers to Discharge: Cardiology following and planning heart cath tomorrow. Remains on Amiodarone and Heparin gtts.     PCP: Geovany Moore MD  Readmission Risk Score: 10.7    Patient Goals/Plan/Treatment Preferences: Plans return home alone with good family support.

## 2024-07-24 ENCOUNTER — HOSPITAL ENCOUNTER (OUTPATIENT)
Age: 89
Discharge: HOME | End: 2024-07-26
Attending: INTERNAL MEDICINE
Payer: MEDICARE

## 2024-07-24 LAB
ABO: NORMAL
ACTIVATED CLOTTING TIME: 207 SECONDS (ref 1–150)
ANION GAP SERPL CALC-SCNC: 13 MEQ/L (ref 8–16)
ANION GAP SERPL CALC-SCNC: 8 MEQ/L (ref 8–16)
ANTIBODY SCREEN: NORMAL
APTT PPP: 100.3 SECONDS (ref 22–38)
APTT PPP: 166.8 SECONDS (ref 22–38)
APTT PPP: 46.3 SECONDS (ref 22–38)
APTT PPP: 90.6 SECONDS (ref 22–38)
BDY SITE: ABNORMAL
BDY SITE: NORMAL
BUN SERPL-MCNC: 16 MG/DL (ref 7–22)
BUN SERPL-MCNC: 17 MG/DL (ref 7–22)
CALCIUM SERPL-MCNC: 8.1 MG/DL (ref 8.5–10.5)
CALCIUM SERPL-MCNC: 8.4 MG/DL (ref 8.5–10.5)
CHLORIDE SERPL-SCNC: 102 MEQ/L (ref 98–111)
CHLORIDE SERPL-SCNC: 102 MEQ/L (ref 98–111)
CHOLEST SERPL-MCNC: 107 MG/DL (ref 100–199)
CO2 SERPL-SCNC: 23 MEQ/L (ref 23–33)
CO2 SERPL-SCNC: 29 MEQ/L (ref 23–33)
COLLECTED BY:: ABNORMAL
COLLECTED BY:: NORMAL
CREAT SERPL-MCNC: 0.9 MG/DL (ref 0.4–1.2)
CREAT SERPL-MCNC: 0.9 MG/DL (ref 0.4–1.2)
CREAT SERPL-MCNC: 1 MG/DL (ref 0.4–1.2)
DEPRECATED RDW RBC AUTO: 49.2 FL (ref 35–45)
DEPRECATED RDW RBC AUTO: 51 FL (ref 35–45)
ECHO BSA: 1.5 M2
EKG ATRIAL RATE: 69 BPM
EKG P AXIS: 56 DEGREES
EKG P-R INTERVAL: 160 MS
EKG Q-T INTERVAL: 412 MS
EKG Q-T INTERVAL: 504 MS
EKG QRS DURATION: 92 MS
EKG QRS DURATION: 94 MS
EKG QTC CALCULATION (BAZETT): 509 MS
EKG QTC CALCULATION (BAZETT): 540 MS
EKG R AXIS: -69 DEGREES
EKG R AXIS: -77 DEGREES
EKG T AXIS: -128 DEGREES
EKG T AXIS: -131 DEGREES
EKG VENTRICULAR RATE: 69 BPM
EKG VENTRICULAR RATE: 92 BPM
ERYTHROCYTE [DISTWIDTH] IN BLOOD BY AUTOMATED COUNT: 14.1 % (ref 11.5–14.5)
ERYTHROCYTE [DISTWIDTH] IN BLOOD BY AUTOMATED COUNT: 14.1 % (ref 11.5–14.5)
GFR SERPL CREATININE-BSD FRML MDRD: 54 ML/MIN/1.73M2
GFR SERPL CREATININE-BSD FRML MDRD: 61 ML/MIN/1.73M2
GFR SERPL CREATININE-BSD FRML MDRD: 61 ML/MIN/1.73M2
GLUCOSE SERPL-MCNC: 199 MG/DL (ref 70–108)
GLUCOSE SERPL-MCNC: 99 MG/DL (ref 70–108)
HCT VFR BLD AUTO: 33.9 % (ref 37–47)
HCT VFR BLD AUTO: 37.3 % (ref 37–47)
HDLC SERPL-MCNC: 53 MG/DL
HGB BLD-MCNC: 10.5 GM/DL (ref 12–16)
HGB BLD-MCNC: 12 GM/DL (ref 12–16)
INR PPP: 1.29 (ref 0.85–1.13)
LDLC SERPL CALC-MCNC: 42 MG/DL
MCH RBC QN AUTO: 30.6 PG (ref 26–33)
MCH RBC QN AUTO: 30.8 PG (ref 26–33)
MCHC RBC AUTO-ENTMCNC: 31 GM/DL (ref 32.2–35.5)
MCHC RBC AUTO-ENTMCNC: 32.2 GM/DL (ref 32.2–35.5)
MCV RBC AUTO: 95.6 FL (ref 81–99)
MCV RBC AUTO: 98.8 FL (ref 81–99)
PLATELET # BLD AUTO: 161 THOU/MM3 (ref 130–400)
PLATELET # BLD AUTO: 181 THOU/MM3 (ref 130–400)
PMV BLD AUTO: 11.2 FL (ref 9.4–12.4)
PMV BLD AUTO: 11.3 FL (ref 9.4–12.4)
POTASSIUM SERPL-SCNC: 3 MEQ/L (ref 3.5–5.2)
POTASSIUM SERPL-SCNC: 3.5 MEQ/L (ref 3.5–5.2)
RBC # BLD AUTO: 3.43 MILL/MM3 (ref 4.2–5.4)
RBC # BLD AUTO: 3.9 MILL/MM3 (ref 4.2–5.4)
RH FACTOR: NORMAL
SAO2 % BLD: 65 % (ref 94–97)
SAO2 % BLD: 95 % (ref 94–97)
SODIUM SERPL-SCNC: 138 MEQ/L (ref 135–145)
SODIUM SERPL-SCNC: 139 MEQ/L (ref 135–145)
TRIGL SERPL-MCNC: 59 MG/DL (ref 0–199)
WBC # BLD AUTO: 6.5 THOU/MM3 (ref 4.8–10.8)
WBC # BLD AUTO: 8 THOU/MM3 (ref 4.8–10.8)

## 2024-07-24 PROCEDURE — 2709999900 HC NON-CHARGEABLE SUPPLY: Performed by: INTERNAL MEDICINE

## 2024-07-24 PROCEDURE — 85347 COAGULATION TIME ACTIVATED: CPT

## 2024-07-24 PROCEDURE — 86850 RBC ANTIBODY SCREEN: CPT

## 2024-07-24 PROCEDURE — 6360000002 HC RX W HCPCS: Performed by: INTERNAL MEDICINE

## 2024-07-24 PROCEDURE — 6370000000 HC RX 637 (ALT 250 FOR IP)

## 2024-07-24 PROCEDURE — 99152 MOD SED SAME PHYS/QHP 5/>YRS: CPT | Performed by: INTERNAL MEDICINE

## 2024-07-24 PROCEDURE — C1894 INTRO/SHEATH, NON-LASER: HCPCS | Performed by: INTERNAL MEDICINE

## 2024-07-24 PROCEDURE — 92960 CARDIOVERSION ELECTRIC EXT: CPT

## 2024-07-24 PROCEDURE — 99232 SBSQ HOSP IP/OBS MODERATE 35: CPT | Performed by: PHYSICIAN ASSISTANT

## 2024-07-24 PROCEDURE — 6360000002 HC RX W HCPCS: Performed by: PHYSICIAN ASSISTANT

## 2024-07-24 PROCEDURE — 93010 ELECTROCARDIOGRAM REPORT: CPT | Performed by: INTERNAL MEDICINE

## 2024-07-24 PROCEDURE — 6370000000 HC RX 637 (ALT 250 FOR IP): Performed by: STUDENT IN AN ORGANIZED HEALTH CARE EDUCATION/TRAINING PROGRAM

## 2024-07-24 PROCEDURE — C1887 CATHETER, GUIDING: HCPCS | Performed by: INTERNAL MEDICINE

## 2024-07-24 PROCEDURE — 2500000003 HC RX 250 WO HCPCS: Performed by: INTERNAL MEDICINE

## 2024-07-24 PROCEDURE — 86901 BLOOD TYPING SEROLOGIC RH(D): CPT

## 2024-07-24 PROCEDURE — C1769 GUIDE WIRE: HCPCS | Performed by: INTERNAL MEDICINE

## 2024-07-24 PROCEDURE — 93005 ELECTROCARDIOGRAM TRACING: CPT | Performed by: INTERNAL MEDICINE

## 2024-07-24 PROCEDURE — 36415 COLL VENOUS BLD VENIPUNCTURE: CPT

## 2024-07-24 PROCEDURE — 85027 COMPLETE CBC AUTOMATED: CPT

## 2024-07-24 PROCEDURE — 82810 BLOOD GASES O2 SAT ONLY: CPT

## 2024-07-24 PROCEDURE — 6360000004 HC RX CONTRAST MEDICATION: Performed by: INTERNAL MEDICINE

## 2024-07-24 PROCEDURE — 85730 THROMBOPLASTIN TIME PARTIAL: CPT

## 2024-07-24 PROCEDURE — 80048 BASIC METABOLIC PNL TOTAL CA: CPT

## 2024-07-24 PROCEDURE — 1200000003 HC TELEMETRY R&B

## 2024-07-24 PROCEDURE — 80061 LIPID PANEL: CPT

## 2024-07-24 PROCEDURE — 86900 BLOOD TYPING SEROLOGIC ABO: CPT

## 2024-07-24 PROCEDURE — 93456 R HRT CORONARY ARTERY ANGIO: CPT | Performed by: INTERNAL MEDICINE

## 2024-07-24 PROCEDURE — 93005 ELECTROCARDIOGRAM TRACING: CPT | Performed by: PHYSICIAN ASSISTANT

## 2024-07-24 PROCEDURE — 82565 ASSAY OF CREATININE: CPT

## 2024-07-24 PROCEDURE — B2111ZZ FLUOROSCOPY OF MULTIPLE CORONARY ARTERIES USING LOW OSMOLAR CONTRAST: ICD-10-PCS | Performed by: INTERNAL MEDICINE

## 2024-07-24 PROCEDURE — 6370000000 HC RX 637 (ALT 250 FOR IP): Performed by: NURSE PRACTITIONER

## 2024-07-24 PROCEDURE — C1887 CATHETER, GUIDING: HCPCS

## 2024-07-24 PROCEDURE — 4A023N6 MEASUREMENT OF CARDIAC SAMPLING AND PRESSURE, RIGHT HEART, PERCUTANEOUS APPROACH: ICD-10-PCS | Performed by: INTERNAL MEDICINE

## 2024-07-24 PROCEDURE — 85610 PROTHROMBIN TIME: CPT

## 2024-07-24 PROCEDURE — 6370000000 HC RX 637 (ALT 250 FOR IP): Performed by: PHYSICIAN ASSISTANT

## 2024-07-24 PROCEDURE — 2580000003 HC RX 258: Performed by: PHYSICIAN ASSISTANT

## 2024-07-24 PROCEDURE — 99153 MOD SED SAME PHYS/QHP EA: CPT | Performed by: INTERNAL MEDICINE

## 2024-07-24 PROCEDURE — 93312 ECHO TRANSESOPHAGEAL: CPT

## 2024-07-24 RX ORDER — SODIUM CHLORIDE 9 MG/ML
INJECTION, SOLUTION INTRAVENOUS PRN
Status: DISCONTINUED | OUTPATIENT
Start: 2024-07-24 | End: 2024-07-25 | Stop reason: HOSPADM

## 2024-07-24 RX ORDER — SODIUM CHLORIDE 0.9 % (FLUSH) 0.9 %
5-40 SYRINGE (ML) INJECTION PRN
Status: DISCONTINUED | OUTPATIENT
Start: 2024-07-24 | End: 2024-07-25 | Stop reason: HOSPADM

## 2024-07-24 RX ORDER — ACETAMINOPHEN 325 MG/1
650 TABLET ORAL EVERY 4 HOURS PRN
Status: DISCONTINUED | OUTPATIENT
Start: 2024-07-24 | End: 2024-07-25 | Stop reason: HOSPADM

## 2024-07-24 RX ORDER — MIDAZOLAM HYDROCHLORIDE 1 MG/ML
INJECTION INTRAMUSCULAR; INTRAVENOUS PRN
Status: DISCONTINUED | OUTPATIENT
Start: 2024-07-24 | End: 2024-07-24 | Stop reason: HOSPADM

## 2024-07-24 RX ORDER — SODIUM CHLORIDE 9 MG/ML
INJECTION, SOLUTION INTRAVENOUS CONTINUOUS
Status: DISCONTINUED | OUTPATIENT
Start: 2024-07-24 | End: 2024-07-25 | Stop reason: HOSPADM

## 2024-07-24 RX ORDER — SODIUM CHLORIDE 0.9 % (FLUSH) 0.9 %
5-40 SYRINGE (ML) INJECTION EVERY 12 HOURS SCHEDULED
Status: DISCONTINUED | OUTPATIENT
Start: 2024-07-24 | End: 2024-07-25 | Stop reason: HOSPADM

## 2024-07-24 RX ORDER — ATROPINE SULFATE 0.4 MG/ML
0.5 INJECTION, SOLUTION INTRAVENOUS
Status: DISCONTINUED | OUTPATIENT
Start: 2024-07-24 | End: 2024-07-25 | Stop reason: HOSPADM

## 2024-07-24 RX ORDER — NITROGLYCERIN 0.4 MG/1
0.4 TABLET SUBLINGUAL EVERY 5 MIN PRN
Status: DISCONTINUED | OUTPATIENT
Start: 2024-07-24 | End: 2024-07-24

## 2024-07-24 RX ORDER — ASPIRIN 325 MG
325 TABLET ORAL ONCE
Status: COMPLETED | OUTPATIENT
Start: 2024-07-24 | End: 2024-07-24

## 2024-07-24 RX ORDER — NITROGLYCERIN 0.4 MG/1
0.4 TABLET SUBLINGUAL EVERY 5 MIN PRN
Status: DISCONTINUED | OUTPATIENT
Start: 2024-07-24 | End: 2024-07-25 | Stop reason: HOSPADM

## 2024-07-24 RX ORDER — FENTANYL CITRATE 50 UG/ML
INJECTION, SOLUTION INTRAMUSCULAR; INTRAVENOUS PRN
Status: DISCONTINUED | OUTPATIENT
Start: 2024-07-24 | End: 2024-07-24 | Stop reason: HOSPADM

## 2024-07-24 RX ORDER — ASPIRIN 325 MG
325 TABLET ORAL ONCE
Status: DISCONTINUED | OUTPATIENT
Start: 2024-07-24 | End: 2024-07-25 | Stop reason: HOSPADM

## 2024-07-24 RX ADMIN — AMIODARONE HYDROCHLORIDE 0.5 MG/MIN: 1.8 INJECTION, SOLUTION INTRAVENOUS at 02:11

## 2024-07-24 RX ADMIN — HEPARIN SODIUM 16 UNITS/KG/HR: 10000 INJECTION, SOLUTION INTRAVENOUS at 20:07

## 2024-07-24 RX ADMIN — HEPARIN SODIUM 16 UNITS/KG/HR: 10000 INJECTION, SOLUTION INTRAVENOUS at 03:27

## 2024-07-24 RX ADMIN — ATORVASTATIN CALCIUM 40 MG: 40 TABLET, FILM COATED ORAL at 20:03

## 2024-07-24 RX ADMIN — ASPIRIN 81 MG: 81 TABLET, COATED ORAL at 08:08

## 2024-07-24 RX ADMIN — Medication 3 MG: at 20:04

## 2024-07-24 RX ADMIN — SODIUM CHLORIDE: 9 INJECTION, SOLUTION INTRAVENOUS at 12:15

## 2024-07-24 RX ADMIN — AMIODARONE HYDROCHLORIDE 0.5 MG/MIN: 1.8 INJECTION, SOLUTION INTRAVENOUS at 16:48

## 2024-07-24 RX ADMIN — PANTOPRAZOLE SODIUM 40 MG: 40 TABLET, DELAYED RELEASE ORAL at 05:02

## 2024-07-24 RX ADMIN — ASPIRIN 325 MG: 325 TABLET ORAL at 08:08

## 2024-07-24 RX ADMIN — DIGOXIN 62.5 MCG: 0.12 TABLET ORAL at 08:08

## 2024-07-24 RX ADMIN — SODIUM CHLORIDE, PRESERVATIVE FREE 10 ML: 5 INJECTION INTRAVENOUS at 20:02

## 2024-07-24 NOTE — PROGRESS NOTES
Hospitalist Progress Note    Patient:  Shawanda Funez      Unit/Bed:6-25/025-A    YOB: 1934    MRN: [id number]       Acct: [id number]     PCP: Geovany Moore MD    Date of Admission: 7/18/2024    Assessment/Plan:    Atrial fibrillation with RVR: History of atrial fibrillation s/p ablation in 2021.  Patient has not had any recurrence of atrial fibrillation since that time.  Patient recently discontinued her Lopressor 12.5 mg twice daily by her cardiologist due to fatigue and Bradycardia  -Cardiology consulted, initially discussed cardioversion with patient vs medication rate control , patient preferring to wait and possibly pursue cardioversion outpatient  -Case discussed with Cardiology PA this morning who was going to discuss further with Dr. Mendoza. Per note, plan for heart cath Wednesday, patient received Eliquis this AM. Heparin initiated, hold Eliquis pending heart cath  -Diltiazem drip discontinued per Cardiology, switched to amiodarone gtt which she continues on at this time. Transition to PO Amiodarone per Cardiology   -Patient continues on amiodarone gtt, Cardiology following, digoxin added, dose reduced to 62.5 2/2 creatinine clearance  -Continue home Eliquis upon discharge- switched to heparin gtt prior to procedure  -Digoxin level therapeutic at 1.0  -7/24/24: Patient to go for heart cath today     PROSPER: Baseline creatinine 0.8-1.0. Resolved   -Okay to resume diuretics, increase dose to 40 mg IV Lasix IV BID per Cardiology     HFpEF, not in acute exacerbation: Echo on 5/2/2023 shows EF 50-55%.  Home GDMT includes spironolactone 12.5 mg and Lasix  -Continue home GDMT  -Okay to resume diuretics, PROSPER resolved, increase Lasix to 40 mg IV BID per Cardiology  -Salt restricted and fluid restricted diet  -I/O's  -Daily weights      Constipation:   -Bowel regimen increased- Colace and Senna added, patient reports diarrhea, will alter regimen to Colace PRN, only 1 tablet of Senna nightly instead

## 2024-07-24 NOTE — PLAN OF CARE
pressure and heart rate   Assess for signs of decreased cardiac output     Problem: Cardiovascular - Adult  Goal: Absence of cardiac dysrhythmias or at baseline  7/23/2024 2141 by Mercedes Bullard RN  Outcome: Progressing  Flowsheets (Taken 7/23/2024 2141)  Absence of cardiac dysrhythmias or at baseline:   Monitor cardiac rate and rhythm   Assess for signs of decreased cardiac output   Administer antiarrhythmia medication and electrolyte replacement as ordered     Problem: Nutrition Deficit:  Goal: Optimize nutritional status  7/23/2024 2141 by Mercedes Bullard RN  Outcome: Progressing  Flowsheets (Taken 7/23/2024 2141)  Nutrient intake appropriate for improving, restoring, or maintaining nutritional needs:   Assess nutritional status and recommend course of action   Monitor oral intake, labs, and treatment plans     Problem: Pain  Goal: Verbalizes/displays adequate comfort level or baseline comfort level  7/23/2024 2141 by Mercedes Bullard RN  Outcome: Progressing  Flowsheets  Taken 7/23/2024 2141 by Mercedes Bullard RN  Verbalizes/displays adequate comfort level or baseline comfort level:   Encourage patient to monitor pain and request assistance   Assess pain using appropriate pain scale   Administer analgesics based on type and severity of pain and evaluate response   Implement non-pharmacological measures as appropriate and evaluate response    Problem: Metabolic/Fluid and Electrolytes - Adult  Goal: Electrolytes maintained within normal limits  Outcome: Progressing  Flowsheets (Taken 7/23/2024 2141)  Electrolytes maintained within normal limits:   Monitor labs and assess patient for signs and symptoms of electrolyte imbalances   Administer electrolyte replacement as ordered   Monitor response to electrolyte replacements, including repeat lab results as appropriate     Problem: Metabolic/Fluid and Electrolytes - Adult  Goal: Hemodynamic stability and optimal renal function maintained  Outcome:  Progressing  Flowsheets (Taken 7/23/2024 2141)  Hemodynamic stability and optimal renal function maintained:   Monitor labs and assess for signs and symptoms of volume excess or deficit   Monitor intake, output and patient weight     Problem: Respiratory - Adult  Goal: Achieves optimal ventilation and oxygenation  Outcome: Progressing  Flowsheets (Taken 7/23/2024 2141)  Achieves optimal ventilation and oxygenation: Assess for changes in respiratory status     Problem: Skin/Tissue Integrity - Adult  Goal: Skin integrity remains intact  Outcome: Progressing  Flowsheets (Taken 7/23/2024 2141)  Skin Integrity Remains Intact: Monitor for areas of redness and/or skin breakdown     Problem: Musculoskeletal - Adult  Goal: Return mobility to safest level of function  Outcome: Progressing  Flowsheets (Taken 7/23/2024 2141)  Return Mobility to Safest Level of Function: Assess patient stability and activity tolerance for standing, transferring and ambulating with or without assistive devices     Care plan reviewed with patient.  Patient verbalizes understanding of the care plan and contributed to goal setting.

## 2024-07-24 NOTE — H&P
DO Heather    ondansetron (ZOFRAN-ODT) disintegrating tablet 4 mg, 4 mg, Oral, Q8H PRN **OR** ondansetron (ZOFRAN) injection 4 mg, 4 mg, IntraVENous, Q6H PRN, Myra Rodrigez DO    polyethylene glycol (GLYCOLAX) packet 17 g, 17 g, Oral, Daily PRN, Myra Rodrigez DO, 17 g at 07/21/24 1335    acetaminophen (TYLENOL) tablet 650 mg, 650 mg, Oral, Q6H PRN, 650 mg at 07/21/24 1509 **OR** acetaminophen (TYLENOL) suppository 650 mg, 650 mg, Rectal, Q6H PRN, Myra Rodrigez DO    [Held by provider] apixaban (ELIQUIS) tablet 2.5 mg, 2.5 mg, Oral, BID, Myra Rodrigez DO, 2.5 mg at 07/22/24 0835    atorvastatin (LIPITOR) tablet 40 mg, 40 mg, Oral, Nightly, Myra Rodrigez DO, 40 mg at 07/23/24 2025    aspirin EC tablet 81 mg, 81 mg, Oral, Daily, Myra Rodrigez DO, 81 mg at 07/23/24 0757    pantoprazole (PROTONIX) tablet 40 mg, 40 mg, Oral, QAM AC, Myra Rodrigez DO, 40 mg at 07/24/24 0502    spironolactone (ALDACTONE) tablet 12.5 mg, 12.5 mg, Oral, Daily, Geovany Aguillon PA-C, 12.5 mg at 07/18/24 1731    [Held by provider] furosemide (LASIX) tablet 20 mg, 20 mg, Oral, Daily, Myra Rodrigez DO  Prior to Admission medications    Medication Sig Start Date End Date Taking? Authorizing Provider   potassium chloride (KLOR-CON M) 10 MEQ extended release tablet Take 1 tablet by mouth daily for 7 days 7/8/24 7/15/24  Michelle Beavers, APRN - CNP   azelastine (ASTELIN) 0.1 % nasal spray 1 spray by Nasal route 2 times daily Use in each nostril as directed 3/7/24   Ariadna Hooper, APRN - CNP   lansoprazole (PREVACID) 30 MG delayed release capsule Take 1 capsule by mouth daily 2/12/24   Tyrell Mendoza MD   spironolactone (ALDACTONE) 25 MG tablet Take 0.5 tablets by mouth daily 2/12/24   Tyrell Mendoza MD   apixaban (ELIQUIS) 2.5 MG TABS tablet Take 1 tablet by mouth 2 times daily 2/12/24   Tyrell Mendoza MD   atorvastatin (LIPITOR) 40 MG  tablet Take 1 tablet by mouth nightly 2/12/24   Tyrell Mendoza MD   furosemide (LASIX) 20 MG tablet Take 1 tablet by mouth daily 5/3/23   Michelle Beavers APRN - CNP   vitamin C (ASCORBIC ACID) 500 MG tablet Take 1 tablet by mouth daily    Provider, MD Placido   aspirin EC 81 MG EC tablet Take 1 tablet by mouth daily 9/24/16   Terence Delgado MD     Additional information:       VITAL SIGNS   Vitals:    07/24/24 0322   BP: 104/67   Pulse: 92   Resp: 16   Temp: 98 °F (36.7 °C)   SpO2: 96%       PHYSICAL:   General: No acute distress  HEENT:  Unremarkable for age  Neck: without increased JVD, carotid pulses 2+ bilaterally without bruits  Heart: Irreg-irreg, S1 & S2 WNL, S4 gallop, 3/6 HSM  NYHA: 3  Lungs: Clear to auscultation    Abdomen: BS present, without HSM, masses, or tenderness    Extremities: without C,C,E.  Pulses 2+ bilaterally  Mental Status: Alert & Oriented        PLANNED PROCEDURE   [x]Cath  [x]PCI                []Pacemaker/AICD  [x]KAMRAN             [x]Cardioversion []Peripheral angiography/PTA  [x]Other: RHC     SEDATION  Planned agent:[x]Midazolam []Meperidine [x]Sublimaze []Morphine  []Diazepam  []Other:     ASA Classification:  []1 []2 [x]3 []4 []5  Class 1: A normal healthy patient  Class 2: Pt with mild to moderate systemic disease  Class 3: Severe systemic disease or disturbance  Class 4: Severe systemic disorders that are already life threatening.  Class 5: Moribund pt with little chances of survival, for more than 24 hours.  Mallampati I Airway Classification:   []1 []2 [x]3 []4    [x]Pre-procedure diagnostic studies complete and results available.   Comment:    [x]Previous sedation/anesthesia experiences assessed.   Comment:    [x]The patient is an appropriate candidate to undergo the planned procedure sedation and anesthesia. (Refer to nursing sedation/analgesia documentation record)  [x]Formulation and discussion of sedation/procedure plan, risks, and expectations with patient

## 2024-07-24 NOTE — PROCEDURES
No PA needed for Clonazepam, but a refill. PROCEDURE NOTE  Date: 7/24/2024   Name: Shawanda Funez  YOB: 1934    Procedures EKG completed, given to RN

## 2024-07-24 NOTE — PROGRESS NOTES
Physician Progress Note      PATIENT:               RUTH COUCH  CSN #:                  [id number]  :                       1934  ADMIT DATE:       2024 7:36 AM  DISCH DATE:  RESPONDING  PROVIDER #:        Wilfredo Moreira PA-C          QUERY TEXT:    Patient admitted with Paroxysmal atrial fibrillation and is maintained on   Eliquis. If possible, please document in progress notes and discharge summary   if you are evaluating and/or treating any of the following:?  ?  The medical record reflects the following:  Risk Factors: HTN, CHF, Age 89, Female  Clinical Indicators: Cardiology PN  S/p Afib ablation  and was SR.  H&P Atrial fibrillation with RVR, Patient dry on exam, potential etiologies   for going into RVR.  Treatment: Home Eliquis, IV Cardizem, cardiology consulted.    Thank You,  Gaby Albright [facility], CDS  Options provided:  -- Secondary hypercoagulable state in a patient with atrial fibrillation  -- Other - I will add my own diagnosis  -- Disagree - Not applicable / Not valid  -- Disagree - Clinically unable to determine / Unknown  -- Refer to Clinical Documentation Reviewer    PROVIDER RESPONSE TEXT:    This patient has secondary hypercoagulable state in a patient with atrial   fibrillation.    Query created by: Gaby Albright on 2024 9:12 AM      Electronically signed by:  Wilfredo Moreira PA-C 2024 9:01   AM

## 2024-07-24 NOTE — PROGRESS NOTES
per day    senna  1 tablet Oral Nightly    digoxin  62.5 mcg Oral Daily    furosemide  20 mg IntraVENous BID    sodium chloride flush  5-40 mL IntraVENous 2 times per day    [Held by provider] apixaban  2.5 mg Oral BID    atorvastatin  40 mg Oral Nightly    aspirin EC  81 mg Oral Daily    pantoprazole  40 mg Oral QAM AC    spironolactone  12.5 mg Oral Daily    [Held by provider] furosemide  20 mg Oral Daily      sodium chloride 75 mL/hr at 07/24/24 1215    sodium chloride      heparin (PORCINE) Infusion Stopped (07/24/24 1318)    amiodarone 0.5 mg/min (07/24/24 0211)     sodium chloride flush, 5-40 mL, PRN  sodium chloride, , PRN  nitroGLYCERIN, 0.4 mg, Q5 Min PRN  heparin (porcine), 60 Units/kg, PRN  heparin (porcine), 30 Units/kg, PRN  docusate sodium, 100 mg, Daily PRN  potassium chloride, 40 mEq, PRN   Or  potassium alternative oral replacement, 40 mEq, PRN   Or  potassium chloride, 10 mEq, PRN  potassium chloride, 20 mEq, PRN  melatonin, 3 mg, Nightly PRN  sodium chloride flush, 5-40 mL, PRN  ondansetron, 4 mg, Q8H PRN   Or  ondansetron, 4 mg, Q6H PRN  polyethylene glycol, 17 g, Daily PRN  acetaminophen, 650 mg, Q6H PRN   Or  acetaminophen, 650 mg, Q6H PRN        Diagnostics:  TTE 7/19/24    Left Ventricle: Severely reduced left ventricular systolic function with a visually estimated EF of 25 - 30%. Left ventricle is severely dilated. Normal wall thickness. Severe global hypokinesis present.    Aortic Valve: Mild regurgitation.    Mitral Valve: Moderate annular dilation. Moderately calcified, at the posterior leaflet. Moderate to severe regurgitation.    Tricuspid Valve: Moderate regurgitation.    Left Atrium: Left atrium is moderately dilated.    Right Atrium: Right atrium is moderately dilated.    IVC/SVC: IVC diameter is less than or equal to 21 mm and decreases less than 50% during inspiration; therefore the estimated right atrial pressure is intermediate (~8 mmHg).    Image quality is adequate. Procedure  diuresis  Daily BMP  Cont asa/statin/dig/aldactone  No BB/ace/arb due to hypotension  Npo after midnight  Patient has agreed to proceed for PCI and mitraclip if she is candidate  [facility]  Referral to chf cliinic       Electronically signed by Ave Alas PA-C on 7/24/2024 at 3:09 PM

## 2024-07-24 NOTE — BRIEF OP NOTE
[facility]  Sedation/Analgesia Post Sedation Record    Pt Name: Shawanda Funez  Account number: [id number]  MRN: [id number]  YOB: 1934  Procedure Performed By: Tyrell Mendoza MD MD FACC, JACQUE, ANIBAL  Primary Care Physician: Geovany Moore MD  Date: 7/24/2024    POST-PROCEDURE    Physicians/Assistants: Tyrell Mendoza MD MD FACC, JACQUE, ANIBAL    Procedure Performed:Cath      Sedation/Anesthesia: Versed/ Fentanyl and 2% xylocaine local anesthesia.      Estimated Blood Loss: < 50 ml.     Specimens Removed: None         Disposition of Specimen: N/A        Complications: No Immediate Complications.       Post-procedure Diagnosis/Findings:       3vd  Severe            Tyrell Mendoza MD MD FACC, JACQUE, ANIBAL  Electronically signed 7/24/2024 at 1:19 PM  Interventional Cardiology

## 2024-07-25 VITALS
TEMPERATURE: 97.5 F | DIASTOLIC BLOOD PRESSURE: 50 MMHG | OXYGEN SATURATION: 98 % | HEIGHT: 60 IN | RESPIRATION RATE: 18 BRPM | SYSTOLIC BLOOD PRESSURE: 134 MMHG | HEART RATE: 72 BPM | WEIGHT: 125.99 LBS | BODY MASS INDEX: 24.74 KG/M2

## 2024-07-25 LAB
APTT PPP: 81.6 SECONDS (ref 22–38)
CREAT SERPL-MCNC: 0.9 MG/DL (ref 0.4–1.2)
ECHO BSA: 1.5 M2
ECHO MV MAX VELOCITY: 1.1 M/S
ECHO MV MEAN GRADIENT: 2 MMHG
ECHO MV MEAN VELOCITY: 0.7 M/S
ECHO MV PEAK GRADIENT: 5 MMHG
ECHO MV REGURGITANT PEAK GRADIENT: 88 MMHG
ECHO MV REGURGITANT PEAK VELOCITY: 4.7 M/S
ECHO MV VTI: 18.5 CM
EKG ATRIAL RATE: 76 BPM
EKG P AXIS: 23 DEGREES
EKG P-R INTERVAL: 140 MS
EKG Q-T INTERVAL: 502 MS
EKG QRS DURATION: 92 MS
EKG QTC CALCULATION (BAZETT): 564 MS
EKG R AXIS: -83 DEGREES
EKG T AXIS: 130 DEGREES
EKG VENTRICULAR RATE: 76 BPM
GFR SERPL CREATININE-BSD FRML MDRD: 61 ML/MIN/1.73M2

## 2024-07-25 PROCEDURE — 6360000002 HC RX W HCPCS: Performed by: PHYSICIAN ASSISTANT

## 2024-07-25 PROCEDURE — 82565 ASSAY OF CREATININE: CPT

## 2024-07-25 PROCEDURE — 5A12012 PERFORMANCE OF CARDIAC OUTPUT, SINGLE, MANUAL: ICD-10-PCS | Performed by: INTERNAL MEDICINE

## 2024-07-25 PROCEDURE — 36415 COLL VENOUS BLD VENIPUNCTURE: CPT

## 2024-07-25 PROCEDURE — 2580000003 HC RX 258: Performed by: INTERNAL MEDICINE

## 2024-07-25 PROCEDURE — 93010 ELECTROCARDIOGRAM REPORT: CPT | Performed by: INTERNAL MEDICINE

## 2024-07-25 PROCEDURE — 85730 THROMBOPLASTIN TIME PARTIAL: CPT

## 2024-07-25 PROCEDURE — 99238 HOSP IP/OBS DSCHRG MGMT 30/<: CPT | Performed by: PHYSICIAN ASSISTANT

## 2024-07-25 PROCEDURE — 2500000003 HC RX 250 WO HCPCS: Performed by: INTERNAL MEDICINE

## 2024-07-25 RX ADMIN — SODIUM CHLORIDE: 9 INJECTION, SOLUTION INTRAVENOUS at 00:04

## 2024-07-25 RX ADMIN — POTASSIUM CHLORIDE 10 MEQ: 7.46 INJECTION, SOLUTION INTRAVENOUS at 03:36

## 2024-07-25 RX ADMIN — POTASSIUM CHLORIDE 10 MEQ: 7.46 INJECTION, SOLUTION INTRAVENOUS at 02:02

## 2024-07-25 RX ADMIN — AMIODARONE HYDROCHLORIDE 0.5 MG/MIN: 1.8 INJECTION, SOLUTION INTRAVENOUS at 05:00

## 2024-07-25 RX ADMIN — POTASSIUM CHLORIDE 10 MEQ: 7.46 INJECTION, SOLUTION INTRAVENOUS at 00:09

## 2024-07-25 NOTE — PLAN OF CARE
Problem: Discharge Planning  Goal: Discharge to home or other facility with appropriate resources  Outcome: Progressing  Flowsheets (Taken 7/24/2024 2156)  Discharge to home or other facility with appropriate resources:   Identify barriers to discharge with patient and caregiver   Arrange for needed discharge resources and transportation as appropriate   Identify discharge learning needs (meds, wound care, etc)     Problem: Safety - Adult  Goal: Free from fall injury  Outcome: Progressing  Flowsheets (Taken 7/24/2024 2156)  Free From Fall Injury: Instruct family/caregiver on patient safety     Problem: ABCDS Injury Assessment  Goal: Absence of physical injury  Outcome: Progressing  Flowsheets (Taken 7/23/2024 2141 by Mercedes Bullard, RN)  Absence of Physical Injury: Implement safety measures based on patient assessment     Problem: Chronic Conditions and Co-morbidities  Goal: Patient's chronic conditions and co-morbidity symptoms are monitored and maintained or improved  Outcome: Progressing  Flowsheets (Taken 7/24/2024 2156)  Care Plan - Patient's Chronic Conditions and Co-Morbidity Symptoms are Monitored and Maintained or Improved:   Monitor and assess patient's chronic conditions and comorbid symptoms for stability, deterioration, or improvement   Collaborate with multidisciplinary team to address chronic and comorbid conditions and prevent exacerbation or deterioration   Update acute care plan with appropriate goals if chronic or comorbid symptoms are exacerbated and prevent overall improvement and discharge     Problem: Cardiovascular - Adult  Goal: Maintains optimal cardiac output and hemodynamic stability  Outcome: Progressing  Flowsheets (Taken 7/24/2024 2156)  Maintains optimal cardiac output and hemodynamic stability:   Monitor blood pressure and heart rate   Monitor urine output and notify Licensed Independent Practitioner for values outside of normal range   Assess for signs of decreased cardiac

## 2024-07-25 NOTE — SIGNIFICANT EVENT
Responded to CODE BLUE.  On arrival APRIL Kat, and Devorah Rogers MD present. Informed that the patient was limited x 4. Confirmed this myself on the EMR. Monitor showing  vfib. Although known to be a futile addition without the addition of chest compressions/defibrillation, 100 mg lidocaine given. Time of death 0521.

## 2024-07-25 NOTE — PROGRESS NOTES
0512-Vtach on unit monitor. Patient unresponsive, no pulse found. Code blue called at this time.     0515-Visible carotid pulse, vfib on tele box, applying bedside monitor.     0517-Pt in/out vfib vs vtach on monitor, Lidocaine 100mg IV push.    2451-6617-GSS d/t agonal breaths, auscultating for heart tones.    8573-6917-Nb heart tones auscultated & no spontaneous breathing x 2 mins.     0521- time of death called by APRIL Kat.     Staff at bedside: APRIL Haines. Maxwell Whitehead Resident. Dorothy Gayle House Supervisor, Mariangel Vargas RN Unit supervisor, Lauren Shin, RN Resource ICU. Arlene mckay, RN.    Mariangel Vargas RN Unit Supervisor left a message with Carleen (family member in chart) to return phone call.

## 2024-07-25 NOTE — PROGRESS NOTES
Patient placed in post mortum bag with tags attached. Rosary and necklace removed from patient and placed in denture cup, labeled and placed on top of patient chest .awaiting  home .

## 2024-07-25 NOTE — PROCEDURES
PROCEDURE NOTE  Date: 7/24/2024   Name: Shawanda Funez  YOB: 1934    Procedures  12 lead EKG completed. Results handed to Dorothy WHITE.

## 2024-07-25 NOTE — PROGRESS NOTES
[facility]  Notice of Patient Passing      Patient Name- Shawanda Funez   Acct Number- [id number]   Attending Physician- Wilfredo Erwin PA-C    Admitted on-7/18/2024  7:36 AM     On 7/25/2024 at 0521 patient was found in 6K25 with:   Absence of vital signs.   Absence of neurological response.    Confirmed time of death at 0521.   Physician or On-call Physician notified of time of death- yes    Family present at time of death- no   Spiritual care present at time of death- no    Physician was notified and orders were obtained to release the body.   Post-Mortem documentation completed; form printed, signed, and given to admitting.    Dorothy Gayle RN RN Nursing Supervisor/ Manager  7/25/24   5:53 AM    Pt Name: Shawanda Funez   Address: [address] [place] [address]  Phone: [phone] (home)    SSN: [id number]    MRN: [id number]    AGE: 89 y.o.   YOB: 1934    white  GENDER: female     Primary Care Physician: Geovany Moore MD   Attending Physician Name: APRIL Kat    Date of Death: 07/25/24  Time of Death: 0521  Pronounced By: Dorothy Gayle RN House Supervisor      Emergency Contact:    Carleen Hilton  Daughter  [phone]      Cause of Death: cardiopulmonary arrest    Co-Morbidities:  Patient Active Problem List   Diagnosis    GERD (gastroesophageal reflux disease)    Osteoporosis    Allergic rhinitis    Spinal stenosis of lumbar region    Status post laminectomy with spinal fusion    OA (osteoarthritis of spine), severe    Transient global amnesia    Vertigo    PVC's (premature ventricular contractions)    Nonrheumatic aortic valve insufficiency    SOB (shortness of breath)    Chest pain    CAD, multiple vessel    Ischemic dilated cardiomyopathy (HCC)    Hypotension    Atrial fibrillation, currently in sinus rhythm    Severe tricuspid regurgitation    Moderate mitral regurgitation    NSVT (nonsustained ventricular tachycardia) ([facility])    Prolonged QT interval    Hyponatremia

## 2024-07-30 NOTE — DISCHARGE SUMMARY
Hospital Medicine Discharge Summary      Patient Identification:   Shawanda Funez   : 1934  MRN: [id number]   Account: [id number]      Patient's PCP: Geovany Moore MD    Admit Date: 2024     Discharge Date: 2024    Admitting Physician: No admitting provider for patient encounter.     Discharge Physician: Wilfredo Erwin PA-C     Shawanda Funez is a 89 y.o. female admitted to [facility] on 2024.      HPI On Admission From H&P:    \"Shawanda Funez is a 89 y.o. female with PMHx of paroxysmal A-fib, HFpEF, HLD, GERD who presents to [facility] with complaints of tachycardia with some shortness of breath.  Patient reports that she has been feeling unwell for approximately 1 week.  Patient does report some shortness of breath with walking.  Patient states that she has checked her blood pressure every day which also due to her heart rate.  Patient is that her heart rate has been very high.  Patient is that she has not had any appetite.  History of atrial fibrillation s/p ablation in .  Patient has not had any recurrence of atrial fibrillation since that time.  Patient recently discontinued her Lopressor 12.5 mg twice daily by her cardiologist due to fatigue and lower heart rate.   Patient recently instructed to double her diuretic by CHF clinic due to elevated proBNP.  Patient was not examined at time of doubling diuretic.  Patient denies any chest pain.  Patient just overall reports feeling unwell with some palpitations and heart racing feeling.  Patient denies any N/V/C/D.     ED course: Initial vitals include temperature 97.9 °F, respiratory rate 26, heart rate 169, BP 1 115/77, SpO2 93% on room air.  Pertinent initial labs include creatinine 1.3, proBNP 07917, troponin 31 with repeat 25.  CXR shows no acute cardiopulmonary process.  EKG shows atrial fibrillation with RVR.  Patient given 500 mL NS bolus in ED and started on diltiazem drip.\"    Assessment/Plan With

## (undated) DEVICE — DRESSING QUIKCLOT FEMORAL INTERVENTIONAL 1.5X1.5 IN

## (undated) DEVICE — MEDTRONIC JR4.0 DIAGNOSTIC CATHETER

## (undated) DEVICE — KIT ANGIO W/ AT P65 PREM HND CTRL FOR CNTRST DEL ANGIOTOUCH

## (undated) DEVICE — KIT MFLD ISOLATN NACL CNTRST PRT TBNG SPIK W/ PRSS TRNSDUC

## (undated) DEVICE — CATHETER PULM ART 5FR INFL 0.75CC L110CM BAL DIA8MM SGL WDG

## (undated) DEVICE — BAND COMPR L24CM REG CLR PLAS HEMSTAT EXT HK AND LOOP RETEN

## (undated) DEVICE — PINNACLE INTRODUCER SHEATH: Brand: PINNACLE

## (undated) DEVICE — TIBURON NEONATAL DRAPE: Brand: CONVERTORS

## (undated) DEVICE — GLIDESHEATH SLENDER NITINOL HYDROPHILIC COATED INTRODUCER SHEATH: Brand: GLIDESHEATH SLENDER

## (undated) DEVICE — CATHETER DIAG 5FR L100CM LUMN ID0.047IN JL3.5 CRV 0 SIDE H

## (undated) DEVICE — 260 CM J TIP WIRE .035

## (undated) DEVICE — KIT INTRO 5FR L7CM NDL 21GA L4CM 0018IN NIT COR PLAT TIP

## (undated) DEVICE — CARDIAC CATH LAB PACK LF

## (undated) DEVICE — CATHETER GUID EBU3.5 6 FR LNG VISTA BRT TIP